# Patient Record
Sex: FEMALE | Race: WHITE | Employment: OTHER | URBAN - METROPOLITAN AREA
[De-identification: names, ages, dates, MRNs, and addresses within clinical notes are randomized per-mention and may not be internally consistent; named-entity substitution may affect disease eponyms.]

---

## 2017-08-30 ENCOUNTER — HOSPITAL ENCOUNTER (OUTPATIENT)
Dept: GENERAL RADIOLOGY | Age: 62
Discharge: HOME | End: 2017-08-30
Admitting: INTERNAL MEDICINE

## 2017-08-30 DIAGNOSIS — R51 HEADACHE(784.0): ICD-10-CM

## 2017-08-30 DIAGNOSIS — G44.001 INTRACTABLE CLUSTER HEADACHE SYNDROME, UNSPECIFIED CHRONICITY PATTERN: ICD-10-CM

## 2017-08-30 DIAGNOSIS — Z12.31 ENCOUNTER FOR SCREENING MAMMOGRAM FOR BREAST CANCER: ICD-10-CM

## 2017-08-30 DIAGNOSIS — Z13.820 SCREENING FOR OSTEOPOROSIS: ICD-10-CM

## 2018-02-05 ENCOUNTER — TELEPHONE (OUTPATIENT)
Dept: ORTHOPEDIC SURGERY | Age: 63
End: 2018-02-05

## 2018-02-05 PROBLEM — S22.000A THORACIC COMPRESSION FRACTURE, CLOSED, INITIAL ENCOUNTER (HCC): Status: ACTIVE | Noted: 2018-02-05

## 2020-09-12 ENCOUNTER — APPOINTMENT (OUTPATIENT)
Dept: CT IMAGING | Age: 65
DRG: 190 | End: 2020-09-12
Payer: MEDICARE

## 2020-09-12 ENCOUNTER — HOSPITAL ENCOUNTER (INPATIENT)
Age: 65
LOS: 2 days | Discharge: HOME | DRG: 190 | End: 2020-09-14
Attending: EMERGENCY MEDICINE | Admitting: INTERNAL MEDICINE
Payer: MEDICARE

## 2020-09-12 ENCOUNTER — APPOINTMENT (OUTPATIENT)
Dept: GENERAL RADIOLOGY | Age: 65
DRG: 190 | End: 2020-09-12
Payer: MEDICARE

## 2020-09-12 PROBLEM — J96.01 ACUTE RESPIRATORY FAILURE WITH HYPOXIA AND HYPERCAPNIA (HCC): Status: ACTIVE | Noted: 2020-09-12

## 2020-09-12 PROBLEM — J44.1 COPD EXACERBATION (HCC): Status: ACTIVE | Noted: 2020-09-12

## 2020-09-12 PROBLEM — J96.02 ACUTE RESPIRATORY FAILURE WITH HYPOXIA AND HYPERCAPNIA (HCC): Status: ACTIVE | Noted: 2020-09-12

## 2020-09-12 LAB
A/G RATIO: 1.2 (ref 1.1–2.2)
ALBUMIN SERPL-MCNC: 4.3 G/DL (ref 3.4–5)
ALP BLD-CCNC: 116 U/L (ref 40–129)
ALT SERPL-CCNC: 15 U/L (ref 10–40)
ANION GAP SERPL CALCULATED.3IONS-SCNC: 12 MMOL/L (ref 3–16)
APTT: 31.7 SEC (ref 24.2–36.2)
AST SERPL-CCNC: 21 U/L (ref 15–37)
BASE EXCESS VENOUS: -0.6 MMOL/L (ref -3–3)
BASOPHILS ABSOLUTE: 0 K/UL (ref 0–0.2)
BASOPHILS RELATIVE PERCENT: 0.2 %
BILIRUB SERPL-MCNC: 0.3 MG/DL (ref 0–1)
BUN BLDV-MCNC: 22 MG/DL (ref 7–20)
CALCIUM SERPL-MCNC: 10 MG/DL (ref 8.3–10.6)
CARBOXYHEMOGLOBIN: 7.3 % (ref 0–1.5)
CHLORIDE BLD-SCNC: 97 MMOL/L (ref 99–110)
CO2: 27 MMOL/L (ref 21–32)
CREAT SERPL-MCNC: 0.9 MG/DL (ref 0.6–1.2)
D DIMER: 293 NG/ML DDU (ref 0–229)
EOSINOPHILS ABSOLUTE: 0 K/UL (ref 0–0.6)
EOSINOPHILS RELATIVE PERCENT: 0 %
GFR AFRICAN AMERICAN: >60
GFR NON-AFRICAN AMERICAN: >60
GLOBULIN: 3.6 G/DL
GLUCOSE BLD-MCNC: 205 MG/DL (ref 70–99)
HCO3 VENOUS: 26.2 MMOL/L (ref 23–29)
HCT VFR BLD CALC: 48.3 % (ref 36–48)
HEMOGLOBIN: 16 G/DL (ref 12–16)
INR BLD: 0.94 (ref 0.86–1.14)
LACTIC ACID, SEPSIS: 1.6 MMOL/L (ref 0.4–1.9)
LYMPHOCYTES ABSOLUTE: 0.2 K/UL (ref 1–5.1)
LYMPHOCYTES RELATIVE PERCENT: 2 %
MCH RBC QN AUTO: 32.7 PG (ref 26–34)
MCHC RBC AUTO-ENTMCNC: 33 G/DL (ref 31–36)
MCV RBC AUTO: 99.1 FL (ref 80–100)
METHEMOGLOBIN VENOUS: 0.3 %
MONOCYTES ABSOLUTE: 0.4 K/UL (ref 0–1.3)
MONOCYTES RELATIVE PERCENT: 4 %
NEUTROPHILS ABSOLUTE: 10.2 K/UL (ref 1.7–7.7)
NEUTROPHILS RELATIVE PERCENT: 93.8 %
O2 CONTENT, VEN: 21 VOL %
O2 SAT, VEN: 97 %
O2 THERAPY: ABNORMAL
PCO2, VEN: 50.8 MMHG (ref 40–50)
PDW BLD-RTO: 16.1 % (ref 12.4–15.4)
PH VENOUS: 7.33 (ref 7.35–7.45)
PLATELET # BLD: 114 K/UL (ref 135–450)
PMV BLD AUTO: 9.4 FL (ref 5–10.5)
PO2, VEN: 92.4 MMHG (ref 25–40)
POTASSIUM SERPL-SCNC: 4.6 MMOL/L (ref 3.5–5.1)
PRO-BNP: 105 PG/ML (ref 0–124)
PROCALCITONIN: 0.05 NG/ML (ref 0–0.15)
PROTHROMBIN TIME: 10.9 SEC (ref 10–13.2)
RBC # BLD: 4.87 M/UL (ref 4–5.2)
SODIUM BLD-SCNC: 136 MMOL/L (ref 136–145)
TCO2 CALC VENOUS: 28 MMOL/L
TOTAL PROTEIN: 7.9 G/DL (ref 6.4–8.2)
TROPONIN: <0.01 NG/ML
WBC # BLD: 10.9 K/UL (ref 4–11)

## 2020-09-12 PROCEDURE — 2000000000 HC ICU R&B

## 2020-09-12 PROCEDURE — 71045 X-RAY EXAM CHEST 1 VIEW: CPT

## 2020-09-12 PROCEDURE — 94640 AIRWAY INHALATION TREATMENT: CPT

## 2020-09-12 PROCEDURE — 6360000002 HC RX W HCPCS: Performed by: INTERNAL MEDICINE

## 2020-09-12 PROCEDURE — 82803 BLOOD GASES ANY COMBINATION: CPT

## 2020-09-12 PROCEDURE — 2580000003 HC RX 258: Performed by: EMERGENCY MEDICINE

## 2020-09-12 PROCEDURE — 83605 ASSAY OF LACTIC ACID: CPT

## 2020-09-12 PROCEDURE — U0003 INFECTIOUS AGENT DETECTION BY NUCLEIC ACID (DNA OR RNA); SEVERE ACUTE RESPIRATORY SYNDROME CORONAVIRUS 2 (SARS-COV-2) (CORONAVIRUS DISEASE [COVID-19]), AMPLIFIED PROBE TECHNIQUE, MAKING USE OF HIGH THROUGHPUT TECHNOLOGIES AS DESCRIBED BY CMS-2020-01-R: HCPCS

## 2020-09-12 PROCEDURE — 85379 FIBRIN DEGRADATION QUANT: CPT

## 2020-09-12 PROCEDURE — 87449 NOS EACH ORGANISM AG IA: CPT

## 2020-09-12 PROCEDURE — 6360000002 HC RX W HCPCS: Performed by: EMERGENCY MEDICINE

## 2020-09-12 PROCEDURE — 96365 THER/PROPH/DIAG IV INF INIT: CPT

## 2020-09-12 PROCEDURE — 85610 PROTHROMBIN TIME: CPT

## 2020-09-12 PROCEDURE — 84145 PROCALCITONIN (PCT): CPT

## 2020-09-12 PROCEDURE — 94660 CPAP INITIATION&MGMT: CPT

## 2020-09-12 PROCEDURE — 6360000002 HC RX W HCPCS

## 2020-09-12 PROCEDURE — 2580000003 HC RX 258: Performed by: INTERNAL MEDICINE

## 2020-09-12 PROCEDURE — 36415 COLL VENOUS BLD VENIPUNCTURE: CPT

## 2020-09-12 PROCEDURE — 6370000000 HC RX 637 (ALT 250 FOR IP): Performed by: INTERNAL MEDICINE

## 2020-09-12 PROCEDURE — 96375 TX/PRO/DX INJ NEW DRUG ADDON: CPT

## 2020-09-12 PROCEDURE — 94761 N-INVAS EAR/PLS OXIMETRY MLT: CPT

## 2020-09-12 PROCEDURE — 2700000000 HC OXYGEN THERAPY PER DAY

## 2020-09-12 PROCEDURE — 85730 THROMBOPLASTIN TIME PARTIAL: CPT

## 2020-09-12 PROCEDURE — 99285 EMERGENCY DEPT VISIT HI MDM: CPT

## 2020-09-12 PROCEDURE — 84484 ASSAY OF TROPONIN QUANT: CPT

## 2020-09-12 PROCEDURE — 85025 COMPLETE CBC W/AUTO DIFF WBC: CPT

## 2020-09-12 PROCEDURE — 6370000000 HC RX 637 (ALT 250 FOR IP)

## 2020-09-12 PROCEDURE — 6360000004 HC RX CONTRAST MEDICATION: Performed by: EMERGENCY MEDICINE

## 2020-09-12 PROCEDURE — 71260 CT THORAX DX C+: CPT

## 2020-09-12 PROCEDURE — 96367 TX/PROPH/DG ADDL SEQ IV INF: CPT

## 2020-09-12 PROCEDURE — 93005 ELECTROCARDIOGRAM TRACING: CPT | Performed by: EMERGENCY MEDICINE

## 2020-09-12 PROCEDURE — 83880 ASSAY OF NATRIURETIC PEPTIDE: CPT

## 2020-09-12 PROCEDURE — 80053 COMPREHEN METABOLIC PANEL: CPT

## 2020-09-12 PROCEDURE — 6370000000 HC RX 637 (ALT 250 FOR IP): Performed by: EMERGENCY MEDICINE

## 2020-09-12 RX ORDER — SACCHAROMYCES BOULARDII 250 MG
250 CAPSULE ORAL 2 TIMES DAILY
Status: DISCONTINUED | OUTPATIENT
Start: 2020-09-12 | End: 2020-09-14 | Stop reason: HOSPADM

## 2020-09-12 RX ORDER — PROMETHAZINE HYDROCHLORIDE 25 MG/1
12.5 TABLET ORAL EVERY 6 HOURS PRN
Status: DISCONTINUED | OUTPATIENT
Start: 2020-09-12 | End: 2020-09-14 | Stop reason: HOSPADM

## 2020-09-12 RX ORDER — MAGNESIUM SULFATE 1 G/100ML
1 INJECTION INTRAVENOUS ONCE
Status: COMPLETED | OUTPATIENT
Start: 2020-09-12 | End: 2020-09-12

## 2020-09-12 RX ORDER — BUSPIRONE HYDROCHLORIDE 10 MG/1
10 TABLET ORAL 2 TIMES DAILY
Status: DISCONTINUED | OUTPATIENT
Start: 2020-09-12 | End: 2020-09-14 | Stop reason: HOSPADM

## 2020-09-12 RX ORDER — MAGNESIUM SULFATE 1 G/100ML
INJECTION INTRAVENOUS
Status: COMPLETED
Start: 2020-09-12 | End: 2020-09-12

## 2020-09-12 RX ORDER — POLYETHYLENE GLYCOL 3350 17 G/17G
17 POWDER, FOR SOLUTION ORAL DAILY PRN
Status: DISCONTINUED | OUTPATIENT
Start: 2020-09-12 | End: 2020-09-14 | Stop reason: HOSPADM

## 2020-09-12 RX ORDER — ACETAMINOPHEN 325 MG/1
TABLET ORAL
Status: COMPLETED
Start: 2020-09-12 | End: 2020-09-12

## 2020-09-12 RX ORDER — LISINOPRIL 20 MG/1
20 TABLET ORAL DAILY
Status: DISCONTINUED | OUTPATIENT
Start: 2020-09-13 | End: 2020-09-14 | Stop reason: HOSPADM

## 2020-09-12 RX ORDER — METHYLPREDNISOLONE SODIUM SUCCINATE 40 MG/ML
40 INJECTION, POWDER, LYOPHILIZED, FOR SOLUTION INTRAMUSCULAR; INTRAVENOUS EVERY 12 HOURS
Status: DISCONTINUED | OUTPATIENT
Start: 2020-09-13 | End: 2020-09-14

## 2020-09-12 RX ORDER — MAGNESIUM SULFATE HEPTAHYDRATE 500 MG/ML
1 INJECTION, SOLUTION INTRAMUSCULAR; INTRAVENOUS ONCE
Status: DISCONTINUED | OUTPATIENT
Start: 2020-09-12 | End: 2020-09-12

## 2020-09-12 RX ORDER — POLYETHYLENE GLYCOL 3350 17 G/17G
17 POWDER, FOR SOLUTION ORAL DAILY
Status: DISCONTINUED | OUTPATIENT
Start: 2020-09-13 | End: 2020-09-14 | Stop reason: HOSPADM

## 2020-09-12 RX ORDER — IPRATROPIUM BROMIDE AND ALBUTEROL SULFATE 2.5; .5 MG/3ML; MG/3ML
3 SOLUTION RESPIRATORY (INHALATION)
Status: DISCONTINUED | OUTPATIENT
Start: 2020-09-12 | End: 2020-09-12 | Stop reason: DRUGHIGH

## 2020-09-12 RX ORDER — ACETAMINOPHEN 325 MG/1
650 TABLET ORAL ONCE
Status: COMPLETED | OUTPATIENT
Start: 2020-09-12 | End: 2020-09-12

## 2020-09-12 RX ORDER — PANTOPRAZOLE SODIUM 40 MG/1
40 TABLET, DELAYED RELEASE ORAL
Status: DISCONTINUED | OUTPATIENT
Start: 2020-09-13 | End: 2020-09-14 | Stop reason: HOSPADM

## 2020-09-12 RX ORDER — RISPERIDONE 0.25 MG/1
0.5 TABLET, FILM COATED ORAL 2 TIMES DAILY
Status: DISCONTINUED | OUTPATIENT
Start: 2020-09-12 | End: 2020-09-14 | Stop reason: HOSPADM

## 2020-09-12 RX ORDER — ALBUTEROL SULFATE 90 UG/1
2 AEROSOL, METERED RESPIRATORY (INHALATION) EVERY 4 HOURS
Status: DISCONTINUED | OUTPATIENT
Start: 2020-09-13 | End: 2020-09-13

## 2020-09-12 RX ORDER — ONDANSETRON 2 MG/ML
4 INJECTION INTRAMUSCULAR; INTRAVENOUS EVERY 6 HOURS PRN
Status: DISCONTINUED | OUTPATIENT
Start: 2020-09-12 | End: 2020-09-14 | Stop reason: HOSPADM

## 2020-09-12 RX ORDER — ACETAMINOPHEN 325 MG/1
650 TABLET ORAL EVERY 6 HOURS PRN
Status: DISCONTINUED | OUTPATIENT
Start: 2020-09-12 | End: 2020-09-14 | Stop reason: HOSPADM

## 2020-09-12 RX ORDER — METHYLPREDNISOLONE SODIUM SUCCINATE 125 MG/2ML
125 INJECTION, POWDER, LYOPHILIZED, FOR SOLUTION INTRAMUSCULAR; INTRAVENOUS ONCE
Status: COMPLETED | OUTPATIENT
Start: 2020-09-12 | End: 2020-09-12

## 2020-09-12 RX ORDER — ATORVASTATIN CALCIUM 10 MG/1
20 TABLET, FILM COATED ORAL DAILY
Status: DISCONTINUED | OUTPATIENT
Start: 2020-09-13 | End: 2020-09-14 | Stop reason: HOSPADM

## 2020-09-12 RX ORDER — OXYCODONE AND ACETAMINOPHEN 10; 325 MG/1; MG/1
1 TABLET ORAL EVERY 8 HOURS PRN
Status: DISCONTINUED | OUTPATIENT
Start: 2020-09-12 | End: 2020-09-14 | Stop reason: HOSPADM

## 2020-09-12 RX ORDER — IPRATROPIUM BROMIDE AND ALBUTEROL SULFATE 2.5; .5 MG/3ML; MG/3ML
1 SOLUTION RESPIRATORY (INHALATION)
Status: DISCONTINUED | OUTPATIENT
Start: 2020-09-13 | End: 2020-09-12

## 2020-09-12 RX ORDER — SERTRALINE HYDROCHLORIDE 100 MG/1
200 TABLET, FILM COATED ORAL 2 TIMES DAILY
Status: DISCONTINUED | OUTPATIENT
Start: 2020-09-12 | End: 2020-09-14 | Stop reason: HOSPADM

## 2020-09-12 RX ORDER — PREDNISONE 20 MG/1
40 TABLET ORAL DAILY
Status: DISCONTINUED | OUTPATIENT
Start: 2020-09-15 | End: 2020-09-14

## 2020-09-12 RX ORDER — SODIUM CHLORIDE 0.9 % (FLUSH) 0.9 %
10 SYRINGE (ML) INJECTION EVERY 12 HOURS SCHEDULED
Status: DISCONTINUED | OUTPATIENT
Start: 2020-09-12 | End: 2020-09-14 | Stop reason: HOSPADM

## 2020-09-12 RX ORDER — PREGABALIN 100 MG/1
100 CAPSULE ORAL 2 TIMES DAILY
Status: DISCONTINUED | OUTPATIENT
Start: 2020-09-12 | End: 2020-09-14 | Stop reason: HOSPADM

## 2020-09-12 RX ORDER — SODIUM CHLORIDE 0.9 % (FLUSH) 0.9 %
10 SYRINGE (ML) INJECTION PRN
Status: DISCONTINUED | OUTPATIENT
Start: 2020-09-12 | End: 2020-09-14 | Stop reason: HOSPADM

## 2020-09-12 RX ORDER — ACETAMINOPHEN 650 MG/1
650 SUPPOSITORY RECTAL EVERY 6 HOURS PRN
Status: DISCONTINUED | OUTPATIENT
Start: 2020-09-12 | End: 2020-09-14 | Stop reason: HOSPADM

## 2020-09-12 RX ADMIN — RDII 250 MG CAPSULE 250 MG: at 23:11

## 2020-09-12 RX ADMIN — IPRATROPIUM BROMIDE AND ALBUTEROL SULFATE 3 AMPULE: .5; 3 SOLUTION RESPIRATORY (INHALATION) at 15:58

## 2020-09-12 RX ADMIN — Medication 2 PUFF: at 23:56

## 2020-09-12 RX ADMIN — SERTRALINE 200 MG: 100 TABLET, FILM COATED ORAL at 23:11

## 2020-09-12 RX ADMIN — MAGNESIUM SULFATE HEPTAHYDRATE 1 G: 1 INJECTION, SOLUTION INTRAVENOUS at 16:16

## 2020-09-12 RX ADMIN — PREGABALIN 100 MG: 100 CAPSULE ORAL at 23:11

## 2020-09-12 RX ADMIN — OXYCODONE AND ACETAMINOPHEN 1 TABLET: 10; 325 TABLET ORAL at 23:11

## 2020-09-12 RX ADMIN — MAGNESIUM SULFATE 1 G: 1 INJECTION INTRAVENOUS at 16:16

## 2020-09-12 RX ADMIN — ACETAMINOPHEN 650 MG: 325 TABLET ORAL at 17:10

## 2020-09-12 RX ADMIN — CEFTRIAXONE SODIUM 1 G: 1 INJECTION, POWDER, FOR SOLUTION INTRAMUSCULAR; INTRAVENOUS at 17:17

## 2020-09-12 RX ADMIN — DEXTROSE MONOHYDRATE 500 MG: 50 INJECTION, SOLUTION INTRAVENOUS at 18:14

## 2020-09-12 RX ADMIN — BUSPIRONE HYDROCHLORIDE 10 MG: 10 TABLET ORAL at 23:11

## 2020-09-12 RX ADMIN — Medication 10 ML: at 23:12

## 2020-09-12 RX ADMIN — METHYLPREDNISOLONE SODIUM SUCCINATE 40 MG: 40 INJECTION, POWDER, FOR SOLUTION INTRAMUSCULAR; INTRAVENOUS at 23:11

## 2020-09-12 RX ADMIN — IOPAMIDOL 85 ML: 755 INJECTION, SOLUTION INTRAVENOUS at 17:25

## 2020-09-12 RX ADMIN — METHYLPREDNISOLONE SODIUM SUCCINATE 125 MG: 125 INJECTION, POWDER, FOR SOLUTION INTRAMUSCULAR; INTRAVENOUS at 15:59

## 2020-09-12 RX ADMIN — RISPERIDONE 0.5 MG: 0.25 TABLET ORAL at 23:11

## 2020-09-12 ASSESSMENT — PAIN SCALES - GENERAL
PAINLEVEL_OUTOF10: 9
PAINLEVEL_OUTOF10: 6
PAINLEVEL_OUTOF10: 5
PAINLEVEL_OUTOF10: 4
PAINLEVEL_OUTOF10: 0
PAINLEVEL_OUTOF10: 0

## 2020-09-12 ASSESSMENT — PAIN DESCRIPTION - PAIN TYPE
TYPE: ACUTE PAIN
TYPE: ACUTE PAIN

## 2020-09-12 ASSESSMENT — PAIN DESCRIPTION - LOCATION
LOCATION: BACK
LOCATION: HEAD

## 2020-09-12 NOTE — PROGRESS NOTES
09/12/20 1738   NIV Type   NIV Started/Stopped On   Equipment Type v60   Mode Bilevel   Mask Type Full face mask   Mask Size Medium   Settings/Measurements   IPAP 12 cmH20   CPAP/EPAP 6 cmH2O   Rate Ordered 12   Resp 18   FiO2  40 %   Vt Exhaled 630 mL   Comfort Level Good   Using Accessory Muscles No   SpO2 100   Patient Observation   Observations MEPILEX ON NOSE   Alarm Settings   Alarms On Y   Press Low Alarm 10 cmH2O   High Pressure Alarm 25 cmH2O   Delay Alarm 20 sec(s)   Apnea (secs) 20 secs   Resp Rate Low Alarm 12   High Respiratory Rate 40 br/min

## 2020-09-12 NOTE — ED NOTES
[address] [facility] serve sent to Dr. Ana Melgoza for admission      Jose Avila  09/12/20 1734    PerfectServe completed with call back from Dr. Ana Melgoza at [place] [address]  09/12/20 1743    Consult to Pulmonology at [place] [address]  09/12/20 174

## 2020-09-13 LAB
ANION GAP SERPL CALCULATED.3IONS-SCNC: 9 MMOL/L (ref 3–16)
ANISOCYTOSIS: ABNORMAL
BANDED NEUTROPHILS RELATIVE PERCENT: 3 % (ref 0–7)
BASOPHILS ABSOLUTE: 0 K/UL (ref 0–0.2)
BASOPHILS RELATIVE PERCENT: 0 %
BUN BLDV-MCNC: 22 MG/DL (ref 7–20)
CALCIUM SERPL-MCNC: 9.4 MG/DL (ref 8.3–10.6)
CHLORIDE BLD-SCNC: 96 MMOL/L (ref 99–110)
CO2: 28 MMOL/L (ref 21–32)
CREAT SERPL-MCNC: 0.8 MG/DL (ref 0.6–1.2)
EKG ATRIAL RATE: 111 BPM
EKG DIAGNOSIS: NORMAL
EKG P AXIS: 82 DEGREES
EKG P-R INTERVAL: 126 MS
EKG Q-T INTERVAL: 306 MS
EKG QRS DURATION: 74 MS
EKG QTC CALCULATION (BAZETT): 416 MS
EKG R AXIS: 95 DEGREES
EKG T AXIS: 54 DEGREES
EKG VENTRICULAR RATE: 111 BPM
EOSINOPHILS ABSOLUTE: 0 K/UL (ref 0–0.6)
EOSINOPHILS RELATIVE PERCENT: 0 %
GFR AFRICAN AMERICAN: >60
GFR NON-AFRICAN AMERICAN: >60
GLUCOSE BLD-MCNC: 189 MG/DL (ref 70–99)
HCT VFR BLD CALC: 46.1 % (ref 36–48)
HEMOGLOBIN: 15.2 G/DL (ref 12–16)
L. PNEUMOPHILA SEROGP 1 UR AG: NORMAL
LACTIC ACID: 1.6 MMOL/L (ref 0.4–2)
LACTIC ACID: 2.5 MMOL/L (ref 0.4–2)
LYMPHOCYTES ABSOLUTE: 0.3 K/UL (ref 1–5.1)
LYMPHOCYTES RELATIVE PERCENT: 3 %
MCH RBC QN AUTO: 32.7 PG (ref 26–34)
MCHC RBC AUTO-ENTMCNC: 33 G/DL (ref 31–36)
MCV RBC AUTO: 99.1 FL (ref 80–100)
MONOCYTES ABSOLUTE: 0.7 K/UL (ref 0–1.3)
MONOCYTES RELATIVE PERCENT: 7 %
NEUTROPHILS ABSOLUTE: 8.4 K/UL (ref 1.7–7.7)
NEUTROPHILS RELATIVE PERCENT: 87 %
NUCLEATED RED BLOOD CELLS: 1 /100 WBC
NUCLEATED RED BLOOD CELLS: 1 /100 WBC
PDW BLD-RTO: 16.1 % (ref 12.4–15.4)
PLATELET # BLD: 91 K/UL (ref 135–450)
PLATELET SLIDE REVIEW: ABNORMAL
PMV BLD AUTO: 9.2 FL (ref 5–10.5)
POTASSIUM REFLEX MAGNESIUM: 4.6 MMOL/L (ref 3.5–5.1)
RBC # BLD: 4.65 M/UL (ref 4–5.2)
SARS-COV-2, NAAT: NOT DETECTED
SLIDE REVIEW: ABNORMAL
SODIUM BLD-SCNC: 133 MMOL/L (ref 136–145)
STREP PNEUMONIAE ANTIGEN, URINE: NORMAL
WBC # BLD: 9.3 K/UL (ref 4–11)

## 2020-09-13 PROCEDURE — 6370000000 HC RX 637 (ALT 250 FOR IP): Performed by: INTERNAL MEDICINE

## 2020-09-13 PROCEDURE — 85025 COMPLETE CBC W/AUTO DIFF WBC: CPT

## 2020-09-13 PROCEDURE — 93010 ELECTROCARDIOGRAM REPORT: CPT | Performed by: INTERNAL MEDICINE

## 2020-09-13 PROCEDURE — 99232 SBSQ HOSP IP/OBS MODERATE 35: CPT | Performed by: INTERNAL MEDICINE

## 2020-09-13 PROCEDURE — 99291 CRITICAL CARE FIRST HOUR: CPT | Performed by: INTERNAL MEDICINE

## 2020-09-13 PROCEDURE — U0002 COVID-19 LAB TEST NON-CDC: HCPCS

## 2020-09-13 PROCEDURE — 6360000002 HC RX W HCPCS: Performed by: INTERNAL MEDICINE

## 2020-09-13 PROCEDURE — 94761 N-INVAS EAR/PLS OXIMETRY MLT: CPT

## 2020-09-13 PROCEDURE — 36415 COLL VENOUS BLD VENIPUNCTURE: CPT

## 2020-09-13 PROCEDURE — 2060000000 HC ICU INTERMEDIATE R&B

## 2020-09-13 PROCEDURE — 94640 AIRWAY INHALATION TREATMENT: CPT

## 2020-09-13 PROCEDURE — 2700000000 HC OXYGEN THERAPY PER DAY

## 2020-09-13 PROCEDURE — 2580000003 HC RX 258: Performed by: INTERNAL MEDICINE

## 2020-09-13 PROCEDURE — 83605 ASSAY OF LACTIC ACID: CPT

## 2020-09-13 PROCEDURE — 80048 BASIC METABOLIC PNL TOTAL CA: CPT

## 2020-09-13 RX ORDER — ALBUTEROL SULFATE 90 UG/1
2 AEROSOL, METERED RESPIRATORY (INHALATION) EVERY 4 HOURS PRN
Status: DISCONTINUED | OUTPATIENT
Start: 2020-09-13 | End: 2020-09-14 | Stop reason: HOSPADM

## 2020-09-13 RX ORDER — IPRATROPIUM BROMIDE AND ALBUTEROL SULFATE 2.5; .5 MG/3ML; MG/3ML
1 SOLUTION RESPIRATORY (INHALATION) EVERY 4 HOURS
Status: DISCONTINUED | OUTPATIENT
Start: 2020-09-13 | End: 2020-09-14

## 2020-09-13 RX ADMIN — IPRATROPIUM BROMIDE AND ALBUTEROL SULFATE 1 AMPULE: .5; 3 SOLUTION RESPIRATORY (INHALATION) at 23:35

## 2020-09-13 RX ADMIN — IPRATROPIUM BROMIDE AND ALBUTEROL SULFATE 1 AMPULE: .5; 3 SOLUTION RESPIRATORY (INHALATION) at 20:12

## 2020-09-13 RX ADMIN — Medication 2 PUFF: at 07:31

## 2020-09-13 RX ADMIN — AZITHROMYCIN MONOHYDRATE 500 MG: 500 INJECTION, POWDER, LYOPHILIZED, FOR SOLUTION INTRAVENOUS at 16:51

## 2020-09-13 RX ADMIN — RISPERIDONE 0.5 MG: 0.25 TABLET ORAL at 20:26

## 2020-09-13 RX ADMIN — PREGABALIN 100 MG: 100 CAPSULE ORAL at 09:20

## 2020-09-13 RX ADMIN — BUSPIRONE HYDROCHLORIDE 10 MG: 10 TABLET ORAL at 09:20

## 2020-09-13 RX ADMIN — IPRATROPIUM BROMIDE AND ALBUTEROL SULFATE 1 AMPULE: .5; 3 SOLUTION RESPIRATORY (INHALATION) at 15:32

## 2020-09-13 RX ADMIN — IPRATROPIUM BROMIDE AND ALBUTEROL SULFATE 1 AMPULE: .5; 3 SOLUTION RESPIRATORY (INHALATION) at 11:39

## 2020-09-13 RX ADMIN — ATORVASTATIN CALCIUM 20 MG: 10 TABLET, FILM COATED ORAL at 09:20

## 2020-09-13 RX ADMIN — RISPERIDONE 0.5 MG: 0.25 TABLET ORAL at 09:20

## 2020-09-13 RX ADMIN — BUSPIRONE HYDROCHLORIDE 10 MG: 10 TABLET ORAL at 20:28

## 2020-09-13 RX ADMIN — METHYLPREDNISOLONE SODIUM SUCCINATE 40 MG: 40 INJECTION, POWDER, FOR SOLUTION INTRAMUSCULAR; INTRAVENOUS at 23:48

## 2020-09-13 RX ADMIN — Medication 2 PUFF: at 07:32

## 2020-09-13 RX ADMIN — Medication 10 ML: at 09:20

## 2020-09-13 RX ADMIN — LISINOPRIL 20 MG: 20 TABLET ORAL at 09:20

## 2020-09-13 RX ADMIN — PREGABALIN 100 MG: 100 CAPSULE ORAL at 20:28

## 2020-09-13 RX ADMIN — SERTRALINE 200 MG: 100 TABLET, FILM COATED ORAL at 09:20

## 2020-09-13 RX ADMIN — RDII 250 MG CAPSULE 250 MG: at 09:20

## 2020-09-13 RX ADMIN — PROMETHAZINE HYDROCHLORIDE 12.5 MG: 25 TABLET ORAL at 20:26

## 2020-09-13 RX ADMIN — RDII 250 MG CAPSULE 250 MG: at 20:26

## 2020-09-13 RX ADMIN — OXYCODONE AND ACETAMINOPHEN 1 TABLET: 10; 325 TABLET ORAL at 16:20

## 2020-09-13 RX ADMIN — PANTOPRAZOLE SODIUM 40 MG: 40 TABLET, DELAYED RELEASE ORAL at 06:30

## 2020-09-13 RX ADMIN — POLYETHYLENE GLYCOL (3350) 17 G: 17 POWDER, FOR SOLUTION ORAL at 09:20

## 2020-09-13 RX ADMIN — OXYCODONE AND ACETAMINOPHEN 1 TABLET: 10; 325 TABLET ORAL at 09:20

## 2020-09-13 RX ADMIN — Medication 10 ML: at 20:28

## 2020-09-13 RX ADMIN — ENOXAPARIN SODIUM 40 MG: 40 INJECTION SUBCUTANEOUS at 09:20

## 2020-09-13 RX ADMIN — METHYLPREDNISOLONE SODIUM SUCCINATE 40 MG: 40 INJECTION, POWDER, FOR SOLUTION INTRAMUSCULAR; INTRAVENOUS at 12:39

## 2020-09-13 RX ADMIN — CEFTRIAXONE SODIUM 1 G: 1 INJECTION, POWDER, FOR SOLUTION INTRAMUSCULAR; INTRAVENOUS at 16:21

## 2020-09-13 RX ADMIN — SERTRALINE 200 MG: 100 TABLET, FILM COATED ORAL at 20:27

## 2020-09-13 ASSESSMENT — PAIN SCALES - GENERAL
PAINLEVEL_OUTOF10: 0
PAINLEVEL_OUTOF10: 6
PAINLEVEL_OUTOF10: 0
PAINLEVEL_OUTOF10: 10
PAINLEVEL_OUTOF10: 0

## 2020-09-13 ASSESSMENT — PAIN DESCRIPTION - PAIN TYPE
TYPE: CHRONIC PAIN
TYPE: CHRONIC PAIN

## 2020-09-13 ASSESSMENT — PAIN DESCRIPTION - LOCATION
LOCATION: BACK
LOCATION: BACK

## 2020-09-13 ASSESSMENT — PAIN DESCRIPTION - FREQUENCY: FREQUENCY: CONTINUOUS

## 2020-09-13 NOTE — PROGRESS NOTES
Patient resting in bed, Percocet given for back pain 8/10. IV antibiotics infusing currently. No acute distress noted. Call light in reach. Will continue to monitor.

## 2020-09-13 NOTE — PROGRESS NOTES
Dr Mt Barba at bedside, update given about rapid COVID results, droplet isolation d/uriel at this time per Dr Mt Barba

## 2020-09-13 NOTE — CONSULTS
Patient is being seen at the request of Fredis Pérez MD for a consultation for Acute respiratory failure with hypoxemia    HISTORY OF PRESENT ILLNESS: The patient is a 37-year-old woman with a past medical history of COPD, restrictive lung disease, ongoing tobacco abuse (40-pack-year smoking history), hypertension, chronic low back pain who presented to [facility] emergency department with complaints of productive cough and shortness of breath over the last 3 days. The patient is followed by Dr. Hatfield at [address] [place] [address] for pulmonary care. In the emergency department the patient was found to be in some respiratory distress with audible expiratory wheezing. She was placed on noninvasive positive pressure ventilation and given broad-spectrum antibiotics and IV steroids. She was admitted to the ICU for further care. She was placed in respiratory droplet plus isolation. Currently she is feeling slightly better.      PAST MEDICAL HISTORY:  Past Medical History:   Diagnosis Date    Anxiety     Arthritis     Cancer ([facility] [address] 1980    Cervical    Chronic back pain     Chronic pain     COPD (chronic obstructive pulmonary disease) (HCC)     Depression     DJD (degenerative joint disease), cervical     Drug-seeking behavior     GERD (gastroesophageal reflux disease)     Hypercholesteremia     Hypertension     IBS (irritable bowel syndrome)     Insomnia     K deficiency     Kidney stone     Narcotic addiction [facility])     Patient Denies    Narcotic overdose ([address]     Patient Denies    Osteoporosis     Overdose     [id number] Patient Denies on 4/3/14    PNA (pneumonia) 10/17/2016    Restless leg syndrome     Thyroid disease      PAST SURGICAL HISTORY:  Past Surgical History:   Procedure Laterality Date    APPENDECTOMY      BACK SURGERY      CERVICAL DISCECTOMY  12-14-13    anterior discetomy fusion c 3/4 c 4/5 posterior fusion C3-C5 screws and rods C3-C5    CHOLECYSTECTOMY  2012    Laparoscopic    COLONOSCOPY  2008    COLONOSCOPY  08/10/2016    FOOT SURGERY Right     FRACTURE SURGERY Right 9/14    distal radius    HAND SURGERY  08/2012    crushed right hand and has two steel plates in hand    HYSTERECTOMY      NECK SURGERY  2002    disc    OTHER SURGICAL HISTORY  4/9/14    TRANSFORAMINAL LUMBAR INTERBODY FUSION L4 L5       FAMILY HISTORY:  family history includes Asthma in her mother; Diabetes in her mother; Emphysema in her father; Heart Disease in her father and mother; Heart Failure in her father and mother; High Cholesterol in her father and mother; Hypertension in her father and mother. SOCIAL HISTORY:   reports that she has been smoking cigarettes. She started smoking about 47 years ago. She has a 40.00 pack-year smoking history. She has never used smokeless tobacco.    Scheduled Meds:   cefTRIAXone (ROCEPHIN) IV  1 g Intravenous Q24H    azithromycin  500 mg Intravenous Q24H    pregabalin  100 mg Oral BID    atorvastatin  20 mg Oral Daily    lisinopril  20 mg Oral Daily    sertraline  200 mg Oral BID    saccharomyces boulardii  250 mg Oral BID    risperiDONE  0.5 mg Oral BID    busPIRone  10 mg Oral BID    pantoprazole  40 mg Oral QAM AC    polyethylene glycol  17 g Oral Daily    sodium chloride flush  10 mL Intravenous 2 times per day    enoxaparin  40 mg Subcutaneous Daily    methylPREDNISolone  40 mg Intravenous Q12H    Followed by   Doug Soliman ON 9/15/2020] predniSONE  40 mg Oral Daily    ipratropium  2 puff Inhalation Q4H    albuterol sulfate HFA  2 puff Inhalation Q4H     Continuous Infusions:    PRN Meds:  oxyCODONE-acetaminophen, sodium chloride flush, acetaminophen **OR** acetaminophen, polyethylene glycol, promethazine **OR** ondansetron    ALLERGIES:  Patient is allergic to asa buff (mag [buffered aspirin] and cymbalta [duloxetine hcl].     REVIEW OF SYSTEMS:  Constitutional: Negative for fever  HENT: Negative for sore throat  Eyes: Negative for redness   Respiratory: + for dyspnea, + cough  Cardiovascular: Negative for chest pain  Gastrointestinal: Negative for vomiting, diarrhea   Genitourinary: Negative for hematuria   Musculoskeletal: Negative for arthralgias   Skin: Negative for rash  Neurological: Negative for syncope  Hematological: Negative for adenopathy  Psychiatric/Behavorial: Negative for anxiety    PHYSICAL EXAM:  Blood pressure 125/63, pulse 88, temperature 97.4 °F (36.3 °C), temperature source Axillary, resp. rate 15, height 5' 3\" (1.6 m), weight 168 lb 12.8 oz (76.6 kg), SpO2 94 %, not currently breastfeeding.' on 3l NC  Gen: MIld distress. Normocephalic, atraumatic. Eyes: PERRL. No sclera icterus. No conjunctival injection. ENT: No discharge. Pharynx clear. Neck: Trachea midline. No obvious mass. Resp: No accessory muscle use. No crackles. bilateral wheezes. No rhonchi. No dullness on percussion. CV: Regular rate. Regular rhythm. No murmur or rub. No edema. GI: Non-tender. Non-distended. No hernia. Skin: Warm and dry. No nodule on exposed extremities. Lymph: No cervical LAD. No supraclavicular LAD. M/S: No cyanosis. No joint deformity. No clubbing. Neuro: Awake. Alert. Moves all four extremities. Psych: Oriented x 3. No anxiety.      LABS:  CBC:   Recent Labs     09/12/20  1557 09/13/20 0444   WBC 10.9 9.3   HGB 16.0 15.2   HCT 48.3* 46.1   MCV 99.1 99.1   * 91*     BMP:   Recent Labs     09/12/20 1557 09/13/20 0444    133*   K 4.6 4.6   CL 97* 96*   CO2 27 28   BUN 22* 22*   CREATININE 0.9 0.8     LIVER PROFILE:   Recent Labs     09/12/20 1557   AST 21   ALT 15   BILITOT 0.3   ALKPHOS 116     PT/INR:   Recent Labs     09/12/20 1557   PROTIME 10.9   INR 0.94     APTT:   Recent Labs     09/12/20 1557   APTT 31.7     UA:No results for input(s): NITRITE, COLORU, PHUR, LABCAST, WBCUA, RBCUA, MUCUS, TRICHOMONAS, YEAST, BACTERIA, CLARITYU, SPECGRAV, LEUKOCYTESUR, UROBILINOGEN, BILIRUBINUR, BLOODU, GLUCOSEU, AMORPHOUS in the last 72 hours. Invalid input(s): KETONESU  No results for input(s): PHART, QFC1NPW, PO2ART in the last 72 hours. Chest imaging was reviewed by me and showed:  Chest CTA: Pulmonary Arteries: Pulmonary arteries show no evidence of intraluminal  filling defect to suggest pulmonary embolism.  Main pulmonary artery is normal in caliber.       Mediastinum: No evidence of mediastinal lymphadenopathy.  The heart and   pericardium demonstrate no acute abnormality.  There is no acute abnormality of the thoracic aorta.       Lungs/pleura: The lungs are without acute process.  No focal consolidation or pulmonary edema.  No evidence of pleural effusion or pneumothorax. T10 and T11 vertebral body wedging with superior endplate depression and   sclerosis probably combination of degenerative disc disease, prior infection   and/or trauma.  This does not appear acute. ASSESSMENT:  ·  Acute respiratory failure with hypoxemia  · COPD with acute exacerbation  · Restrictive lung defect may be secondary to thoracic cavity/scoliosis    PLAN:   IV steroids today, with plan to switch to oral prednisone 40 mg daily tomorrow, then taper   Inhaled bronchodilators   Antibiotics (doxycycline)  Supplemental oxygen to maintain SaO2 >92%; wean as tolerated  Non-invasive positive pressure ventilation, if needed  · Rapid Covid 19 testing and respiratory droplet isolation is appropriate - ok to remove isolation if test negative  · Smoking cessation counseling    Patient is critically ill with acute respiratory failure. Critical care time spent reviewing labs/films, examining patient, collaborating with other physicians but excluding procedures for life threatening organ failure is 32 minutes.

## 2020-09-13 NOTE — ED PROVIDER NOTES
[address] [facility] ICU      CHIEF COMPLAINT  Shortness of Breath       HISTORY OF PRESENT ILLNESS  Saroj Lay is a 63 y.o. female with history of COPD who presents to the emergency department for evaluation of shortness of breath, cough for 4 days. Patient reports wearing 3 L of nasal cannula O2 at home. She started having increased cough and increased sputum production 4 days ago. Patient reports trying her home inhalers and breathing treatments with minimal improvement in symptoms. Patient says yesterday, she started notice that her oxygen saturation dipped below 90 on her home 3 L and was requiring higher O2 to maintain sats above 90. Patient says she borrowed some amoxicillin pills from her relatives and started taking them last night. Due to continued shortness of breath, patient finally presented to the emergency department for evaluation. Patient says she was trying to avoid coming to the hospital due to covid. Denies any known sick contacts. Denies any history of DVT or PE. Denies any recent immobilization or recent surgeries. Denies having calf pain or leg swelling. No other complaints, modifying factors or associated symptoms. I have reviewed the following from the nursing documentation.     Past Medical History:   Diagnosis Date    Anxiety     Arthritis     Cancer ([facility] [address] 1980    Cervical    Chronic back pain     Chronic pain     COPD (chronic obstructive pulmonary disease) (HCC)     Depression     DJD (degenerative joint disease), cervical     Drug-seeking behavior     GERD (gastroesophageal reflux disease)     Hypercholesteremia     Hypertension     IBS (irritable bowel syndrome)     Insomnia     K deficiency     Kidney stone     Narcotic addiction [facility])     Patient Denies    Narcotic overdose ([facility] [address]     Patient Denies    Osteoporosis     Overdose     [id number] Patient Denies on 4/3/14    PNA (pneumonia) 10/17/2016    Restless leg syndrome     Thyroid disease      Past Surgical History:   Procedure Laterality Date    APPENDECTOMY      BACK SURGERY      CERVICAL DISCECTOMY  12-14-13    anterior discetomy fusion c 3/4 c 4/5 posterior fusion C3-C5 screws and rods C3-C5    CHOLECYSTECTOMY  2012    Laparoscopic    COLONOSCOPY  2008    COLONOSCOPY  08/10/2016    FOOT SURGERY Right     FRACTURE SURGERY Right 9/14    distal radius    HAND SURGERY  08/2012    crushed right hand and has two steel plates in hand    HYSTERECTOMY      NECK SURGERY  2002    disc    OTHER SURGICAL HISTORY  4/9/14    TRANSFORAMINAL LUMBAR INTERBODY FUSION L4 L5     Family History   Problem Relation Age of Onset    Heart Disease Mother     High Cholesterol Mother     Diabetes Mother     Asthma Mother     Heart Failure Mother     Hypertension Mother     Heart Disease Father     High Cholesterol Father     Emphysema Father     Heart Failure Father     Hypertension Father     Cancer Neg Hx      Social History     Socioeconomic History    Marital status:      Spouse name: Not on file    Number of children: 3    Years of education: Not on file    Highest education level: Not on file   Occupational History    Not on file   Social Needs    Financial resource strain: Not on file    Food insecurity     Worry: Not on file     Inability: Not on file   [employer] needs     Medical: Not on file     Non-medical: Not on file   Tobacco Use    Smoking status: Current Every Day Smoker     Packs/day: 1.00     Years: 40.00     Pack years: 40.00     Types: Cigarettes     Start date: 1/1/1973    Smokeless tobacco: Never Used   Substance and Sexual Activity    Alcohol use: No    Drug use: No    Sexual activity: Not Currently   Lifestyle    Physical activity     Days per week: Not on file     Minutes per session: Not on file    Stress: Not on file   Relationships    Social connections     Talks on phone: Not on file     Gets together: Not on file     Attends [employer] service: Not on chloride flush 0.9 % injection 10 mL  10 mL Intravenous PRN Jocelyne Del Cid MD        acetaminophen (TYLENOL) tablet 650 mg  650 mg Oral Q6H PRN Jocelyne Del Cid MD        Or    acetaminophen (TYLENOL) suppository 650 mg  650 mg Rectal Q6H PRN Jocelyne Del Cid MD        polyethylene glycol [facility]) packet 17 g  17 g Oral Daily PRN Jocelyne Del Cid MD        promethazine (PHENERGAN) tablet 12.5 mg  12.5 mg Oral Q6H PRN Jocelyne Del Cid MD        Or    ondansetron [facility]) injection 4 mg  4 mg Intravenous Q6H PRN Jocelyne Del Cid MD        enoxaparin (LOVENOX) injection 40 mg  40 mg Subcutaneous Daily Jocelyne Del Cid MD        methylPREDNISolone sodium (SOLU-MEDROL) injection 40 mg  40 mg Intravenous Q12H Jocelyne Del Cid MD   40 mg at 09/12/20 2311    Followed by   Franck Divine ON 9/15/2020] predniSONE (DELTASONE) tablet 40 mg  40 mg Oral Daily Jocelyne Del Cid MD        ipratropium (ATROVENT HFA) 17 MCG/ACT inhaler 2 puff  2 puff Inhalation Q4H Jocelyne Del Cid MD   2 puff at 09/12/20 2356    albuterol sulfate  (90 Base) MCG/ACT inhaler 2 puff  2 puff Inhalation Q4H Jocelyne Del Cid MD   2 puff at 09/12/20 2356     Allergies   Allergen Reactions    Asa Buff (Mag [Buffered Aspirin] Hives    Cymbalta [Duloxetine Hcl] Nausea And Vomiting       REVIEW OF SYSTEMS  10 systems reviewed, pertinent positives per HPI otherwise noted to be negative. PHYSICAL EXAM  BP (!) 97/56   Pulse 73   Temp 98.3 °F (36.8 °C) (Oral)   Resp 14   Ht 5' 3\" (1.6 m)   Wt 168 lb 12.8 oz (76.6 kg)   SpO2 96%   BMI 29.90 kg/m²    GENERAL APPEARANCE: Awake and alert. Increased work of breathing  HENT: Normocephalic. Atraumatic. CN  2-12 grossly intact. HEART/CHEST: Tachycardic. Regular. No murmurs appreciated  LUNGS: Increased work of breathing with subcostal retractions. Maintaining sats above 90 on 5 L nasal cannula O2. Diffuse wheezing throughout.   ABDOMEN: Soft, non-distended abdomen. Non tender to palpation. No guarding. No rebound. EXTREMITIES: No gross deformities. Moving all extremities. All extremities neurovascularly intact. SKIN: Warm and dry. No acute rashes. NEUROLOGICAL: Alert and oriented. CN's 2-12 intact. No gross facial drooping. Strength 5/5, sensation intact. PSYCHIATRIC: Normal mood and affect.     LABS  Results for orders placed or performed during the hospital encounter of 09/12/20   Blood Gas, Venous   Result Value Ref Range    pH, Don 7.330 (L) 7.350 - 7.450    pCO2, Don 50.8 (H) 40.0 - 50.0 mmHg    pO2, Don 92.4 (H) 25.0 - 40.0 mmHg    HCO3, Venous 26.2 23.0 - 29.0 mmol/L    Base Excess, Don -0.6 -3.0 - 3.0 mmol/L    O2 Sat, Don 97 Not Established %    Carboxyhemoglobin 7.3 (H) 0.0 - 1.5 %    MetHgb, Don 0.3 <1.5 %    TC02 (Calc), Don 28 Not Established mmol/L    O2 Content, Don 21 Not Established VOL %    O2 Therapy Unknown    CBC Auto Differential   Result Value Ref Range    WBC 10.9 4.0 - 11.0 K/uL    RBC 4.87 4.00 - 5.20 M/uL    Hemoglobin 16.0 12.0 - 16.0 g/dL    Hematocrit 48.3 (H) 36.0 - 48.0 %    MCV 99.1 80.0 - 100.0 fL    MCH 32.7 26.0 - 34.0 pg    MCHC 33.0 31.0 - 36.0 g/dL    RDW 16.1 (H) 12.4 - 15.4 %    Platelets 382 (L) 292 - 450 K/uL    MPV 9.4 5.0 - 10.5 fL    Neutrophils % 93.8 %    Lymphocytes % 2.0 %    Monocytes % 4.0 %    Eosinophils % 0.0 %    Basophils % 0.2 %    Neutrophils Absolute 10.2 (H) 1.7 - 7.7 K/uL    Lymphocytes Absolute 0.2 (L) 1.0 - 5.1 K/uL    Monocytes Absolute 0.4 0.0 - 1.3 K/uL    Eosinophils Absolute 0.0 0.0 - 0.6 K/uL    Basophils Absolute 0.0 0.0 - 0.2 K/uL   Comprehensive Metabolic Panel   Result Value Ref Range    Sodium 136 136 - 145 mmol/L    Potassium 4.6 3.5 - 5.1 mmol/L    Chloride 97 (L) 99 - 110 mmol/L    CO2 27 21 - 32 mmol/L    Anion Gap 12 3 - 16    Glucose 205 (H) 70 - 99 mg/dL    BUN 22 (H) 7 - 20 mg/dL    CREATININE 0.9 0.6 - 1.2 mg/dL    GFR Non-African American >60 >60    GFR  American >60 >60    Calcium 10.0 8.3 - 10.6 mg/dL    Total Protein 7.9 6.4 - 8.2 g/dL    Alb 4.3 3.4 - 5.0 g/dL    Albumin/Globulin Ratio 1.2 1.1 - 2.2    Total Bilirubin 0.3 0.0 - 1.0 mg/dL    Alkaline Phosphatase 116 40 - 129 U/L    ALT 15 10 - 40 U/L    AST 21 15 - 37 U/L    Globulin 3.6 g/dL   Procalcitonin   Result Value Ref Range    Procalcitonin 0.05 0.00 - 0.15 ng/mL   Troponin   Result Value Ref Range    Troponin <0.01 <0.01 ng/mL   Lactate, Sepsis   Result Value Ref Range    Lactic Acid, Sepsis 1.6 0.4 - 1.9 mmol/L   Protime-INR   Result Value Ref Range    Protime 10.9 10.0 - 13.2 sec    INR 0.94 0.86 - 1.14   APTT   Result Value Ref Range    aPTT 31.7 24.2 - 36.2 sec   Brain Natriuretic Peptide   Result Value Ref Range    Pro- 0 - 124 pg/mL   D-dimer, quantitative   Result Value Ref Range    D-Dimer, Quant 293 (H) 0 - 229 ng/mL DDU   Lactic Acid, Plasma   Result Value Ref Range    Lactic Acid 2.5 (H) 0.4 - 2.0 mmol/L   EKG 12 Lead   Result Value Ref Range    Ventricular Rate 111 BPM    Atrial Rate 111 BPM    P-R Interval 126 ms    QRS Duration 74 ms    Q-T Interval 306 ms    QTc Calculation (Bazett) 416 ms    P Axis 82 degrees    R Axis 95 degrees    T Axis 54 degrees    Diagnosis       Sinus tachycardiaBiatrial enlargementRightward axisPulmonary disease patternAbnormal ECGNo previous ECGs available       I have reviewed all labs for this visit.      ECG  The Ekg interpreted by me shows  Sinus tachycardia with a rate of 111  Axis is right  QTc is normal  Biatrial enlargement, pulmonary disease pattern  ST Segments: Nonspecific ST changes  No significant change from prior EKG dated July 1, 2018    RADIOLOGY  Xr Chest Portable    Result Date: 9/12/2020  EXAMINATION: ONE XRAY VIEW OF THE CHEST 9/12/2020 4:05 pm COMPARISON: 07/01/2018 HISTORY: ORDERING SYSTEM PROVIDED HISTORY: other TECHNOLOGIST PROVIDED HISTORY: Reason for exam:->other Reason for Exam:  Shortness of breath Acuity: Acute Type of Exam: trauma/infection. This does not appear acute. Please correlate clinically. Anterior and posterior fusion of lower cervical spine , T1 and T2.     1. No evidence for acute pulmonary embolism. 2. No evidence for pneumonia. 3. T10 and T11 vertebral body wedging with superior endplate depression and sclerosis probably combination of degenerative disc disease, prior infection and/or trauma. This does not appear acute. Please correlate clinically. ED COURSE/MDM  Patient seen and evaluated. At presentation, patient was sitting upright, had increased work of breathing with subcostal retractions, tachycardic to 120, requiring 5 L nasal cannula O2 to maintain sat >90%. Patient reports wearing 3 L nasal cannula O2 at baseline and has been requiring increasing oxygen requirements from yesterday afternoon due to intermittent desat <90. Patient had diffuse wheezing bilaterally. Differential diagnoses include COPD exacerbation, pneumonia, ACS, arrhythmia, viral URI, covid, PE, chf exacerbation, among others. Given this, CBC, CMP, troponin, BNP, VBG, lactate, and d-dimer obtained. Patient given DuoNeb x3, Solu-Medrol 125 mg IV, magnesium. On reassessment, patient reported no improvement of symptoms. Patient still had increased work of breathing with subcostal retractions. Patient placed on BiPAP. VBG consistent with COPD exacerbation. pH was 7.33 and PCO2 50.8. Troponin negative. procalcitonin 0.05. . D dimer elevated at 293. Given this, CT PE obtained. Chest x-ray concerning for pneumonia with opacities to right middle lobe and right lower lung. Patient given ceftriaxone and azithromycin IV. Pending CT PE results, hospitalist consulted for admission. Pulmonologist also consulted. Patient admitted. CT PE showed no acute PE and no evidence of pneumonia and t10-11 chronic wedge fracture. Admit. I provided at least 60 minutes of critical care excluding separately billable procedures.      During the patient's ED course, the patient was given:  Medications   cefTRIAXone (ROCEPHIN) 1 g IVPB in 50 mL D5W minibag (has no administration in time range)   azithromycin (ZITHROMAX) 500 mg in D5W 250ml addavial (has no administration in time range)   pregabalin (LYRICA) capsule 100 mg (100 mg Oral Given 9/12/20 2311)   atorvastatin (LIPITOR) tablet 20 mg (has no administration in time range)   lisinopril (PRINIVIL;ZESTRIL) tablet 20 mg (has no administration in time range)   sertraline (ZOLOFT) tablet 200 mg (200 mg Oral Given 9/12/20 2311)   saccharomyces boulardii (FLORASTOR) capsule 250 mg (250 mg Oral Given 9/12/20 2311)   risperiDONE (RISPERDAL) tablet 0.5 mg (0.5 mg Oral Given 9/12/20 2311)   oxyCODONE-acetaminophen (PERCOCET)  MG per tablet 1 tablet (1 tablet Oral Given 9/12/20 2311)   busPIRone (BUSPAR) tablet 10 mg (10 mg Oral Given 9/12/20 2311)   pantoprazole (PROTONIX) tablet 40 mg (has no administration in time range)   polyethylene glycol (GLYCOLAX) packet 17 g (has no administration in time range)   sodium chloride flush 0.9 % injection 10 mL (10 mLs Intravenous Given 9/12/20 2312)   sodium chloride flush 0.9 % injection 10 mL (has no administration in time range)   acetaminophen (TYLENOL) tablet 650 mg (has no administration in time range)     Or   acetaminophen (TYLENOL) suppository 650 mg (has no administration in time range)   polyethylene glycol (GLYCOLAX) packet 17 g (has no administration in time range)   promethazine (PHENERGAN) tablet 12.5 mg (has no administration in time range)     Or   ondansetron (ZOFRAN) injection 4 mg (has no administration in time range)   enoxaparin (LOVENOX) injection 40 mg (has no administration in time range)   methylPREDNISolone sodium (SOLU-MEDROL) injection 40 mg (40 mg Intravenous Given 9/12/20 2311)     Followed by   predniSONE (DELTASONE) tablet 40 mg (has no administration in time range)   ipratropium (ATROVENT HFA) 17 MCG/ACT inhaler 2 puff (2 puffs

## 2020-09-13 NOTE — H&P
Hospital Medicine History & Physical      PCP: Jaky Jaffe    Date of Admission: 9/12/2020    Date of Service: Pt seen/examined on 9/12/2020  and Admitted to Inpatient with expected LOS greater than two midnights due to medical therapy. Chief Complaint:    Chief Complaint   Patient presents with    Shortness of Breath          History Of Present Illness: The patient is a 63 y.o. female with history of anxiety/depression, chronic low back pain, COPD, DJD, GERD, hyperlipidemia, hypertension who presented with 3-day history of productive cough with yellowish phlegm, associated shortness of breath. No hemoptysis. No fevers or chills. Initially chest x-ray was concerning for right lower lobe infiltrate, however CT chest with pulmonary protocol was negative for PE with no focal consolidation. She is wheezy and ABGs are noted for type II respiratory failure. She received BiPAP in the ER together with antibiotics, magnesium sulfate and steroids with symptom improvement.     Past Medical History:        Diagnosis Date    Anxiety     Arthritis     Cancer ([facility] [address] 1980    Cervical    Chronic back pain     Chronic pain     COPD (chronic obstructive pulmonary disease) (HCC)     Depression     DJD (degenerative joint disease), cervical     Drug-seeking behavior     GERD (gastroesophageal reflux disease)     Hypercholesteremia     Hypertension     IBS (irritable bowel syndrome)     Insomnia     K deficiency     Kidney stone     Narcotic addiction [facility])     Patient Denies    Narcotic overdose ([address]     Patient Denies    Osteoporosis     Overdose     [id number] Patient Denies on 4/3/14    PNA (pneumonia) 10/17/2016    Restless leg syndrome     Thyroid disease        Past Surgical History:        Procedure Laterality Date    APPENDECTOMY      BACK SURGERY      CERVICAL DISCECTOMY  12-14-13    anterior discetomy fusion c 3/4 c 4/5 posterior fusion C3-C5 screws and rods C3-C5    10/17/2016    LEUKOCYTESUR Negative 10/17/2016    BLOODU Negative 10/17/2016    GLUCOSEU Negative 10/17/2016    GLUCOSEU NEGATIVE 01/23/2012    AMORPHOUS 1+ 07/20/2015       ABG    Lab Results   Component Value Date    SWY4UHO 15.5 06/03/2013    BEART -7.8 06/03/2013    O2ULKNIX 97.8 06/03/2013    PHART 7.389 06/03/2013    THGBART 13.2 06/03/2013    THGBART 12.2 01/23/2012    XFH0GHQ 26.3 06/03/2013    PO2ART 103.2 06/03/2013    AWM9CAM 16.3 06/03/2013           Active Hospital Problems    Diagnosis Date Noted    Hyperlipidemia [E78.5] 04/20/2012     Priority: High    GERD (gastroesophageal reflux disease) [K21.9] 04/19/2012     Priority: Medium    COPD exacerbation (HCC) [J44.1] 09/12/2020    Acute respiratory failure with hypoxia and hypercapnia (HCC) [J96.01, J96.02] 09/12/2020    [facility] [S22.000A] 02/05/2018         ASSESSMENT/PLAN:   63 y.o. female with history of anxiety/depression, chronic low back pain, COPD, DJD, GERD, hyperlipidemia, hypertension who presented with 3-day history of productive cough with yellowish phlegm, associated shortness of breath found to have acute exacerbation of COPD with acute respiratory failure with hypoxia and hypercapnia    Plan:  -Continue on breathing therapy with scheduled and PRN bronchodilators and  cardiopulmonary protocol  -Systemic IV steroids  -Antibiotic coverage  - Oxygen supplementation with target saturations greater than 90% and wean as tolerated  - Pulmonology consult        DVT Prophylaxis: Subcut enoxaparin  Diet: General  Code Status: Full         Valerie Felton MD    Thank you Waldemar Gannon for the opportunity to be involved in this patient's care. If you have any questions or concerns please feel free to contact me at [phone].

## 2020-09-13 NOTE — PROGRESS NOTES
Data:  Recent Labs     09/12/20  1557 09/13/20  0444   WBC 10.9 9.3   HGB 16.0 15.2   HCT 48.3* 46.1   MCV 99.1 99.1   * 91*     Recent Labs     09/12/20  1557 09/13/20  0444    133*   K 4.6 4.6   CL 97* 96*   CO2 27 28   BUN 22* 22*   CREATININE 0.9 0.8     Recent Labs     09/12/20  1557   TROPONINI <0.01       Coagulation:   Lab Results   Component Value Date    INR 0.94 09/12/2020    APTT 31.7 09/12/2020     Cardiac markers:   Lab Results   Component Value Date    CKMB 10.3 07/14/2010    CKTOTAL 74 10/17/2014    TROPONINI <0.01 09/12/2020         Lab Results   Component Value Date    ALT 15 09/12/2020    AST 21 09/12/2020    ALKPHOS 116 09/12/2020    BILITOT 0.3 09/12/2020       Lab Results   Component Value Date    INR 0.94 09/12/2020    INR 0.99 07/01/2018    INR 1.01 06/05/2018    PROTIME 10.9 09/12/2020    PROTIME 11.3 07/01/2018    PROTIME 11.5 06/05/2018       Radiology    CTA chest  No evidence for acute pulmonary embolism. 2. No evidence for pneumonia. 3. T10 and T11 vertebral body wedging with superior endplate depression and    sclerosis probably combination of degenerative disc disease, prior infection    and/or trauma.  This does not appear acute.  Please correlate clinically. cxr    Right mid and lower lung consolidation, concerning for pneumonia.  Small    right pleural effusion.          Cultures:   covid neg  Sputum pending        Assessment & Plan:      Copd exacerbation   Acute on chronic resp failure  CAP - right sided infiltrate likely gram positive organism  - improved with bipap, steroids and abx  - pulm consulted  - sputum and blood cx pending  - covid neg  - ok for PCU   -taper steroids   - no sepsis     HTN - stable on ACEi    GERD- on ppi     Hyperlipidemia- on statins     Chronic pain syndrome- on  Lyrica, perocecet    Smoking cessation discussed with pt    dvt prophylaxis  Ok for [employer], MD 9/13/2020 7:45 AM

## 2020-09-13 NOTE — PROGRESS NOTES
Reassessment completed, see flow sheet    Pt placed on bipap per RT. Tolerating well. No other needs at this time, pt resting quietly in bed. Will continue to monitor.

## 2020-09-13 NOTE — PROGRESS NOTES
Patient admitted to ICU for respiratory failure. Telemetry monitor hooked up to patient. NSR on monitor. Breathing pattern appears unlabored. Lung sounds diminished. Admitted to ICU on 5L NC. A&O x4. BUE strength is strong. BLE strength is strong. PERRLA. Abdomen appears soft and rounded. Bowel sounds active. IV access is 20g LAC. 22g RFA placed by this RN upon admission. Purewick catheter placed. CHG bath provided for patient. Pt able to demonstrate the ability to move to and from a reclining position.

## 2020-09-13 NOTE — PROGRESS NOTES
Patient transferred from ICU, O2 at 3L per NC. Lungs decreased. BS+. [facility] Mepilex on L elbow, redness noted. Patient up to [facility] with assist x 1. No acute distress noted. Call light in reach. Will continue to monitor.

## 2020-09-13 NOTE — PROGRESS NOTES
RESPIRATORY THERAPY ASSESSMENT    Name:  Tres Bedolla Record Number:  [id number]  Age: 63 y.o. Gender: female  : 1955  Today's Date:  2020  Room:  3013/3013-01    Assessment     Is the patient being admitted for a COPD or Asthma exacerbation? Yes   (If yes the patient will be seen every 4 hours for the first 24 hours and then reassessed)    Patient Admission Diagnosis      Allergies  Allergies   Allergen Reactions    Asa Buff (Mag [Buffered Aspirin] Hives    Cymbalta [Duloxetine Hcl] Nausea And Vomiting       Minimum Predicted Vital Capacity:               Actual Vital Capacity:                    Pulmonary History:copd  Home Oxygen Therapy:  3L  Home Respiratory Therapy:albuterol 4x daily/breo ellipta   Current Respiratory Therapy:  Albuterol/atrovent 2 puffs q4  Treatment Type: MDI  Medications: Ipratropium Bromide    Respiratory Severity Index(RSI)   Patients with orders for inhalation medications, oxygen, or any therapeutic treatment modality will be placed on Respiratory Protocol. They will be assessed with the first treatment and at least every 72 hours thereafter. The following severity scale will be used to determine frequency of treatment intervention.     Smoking History: Mild Exacerbation = 3    Social History  Social History     Tobacco Use    Smoking status: Current Every Day Smoker     Packs/day: 1.00     Years: 40.00     Pack years: 40.00     Types: Cigarettes     Start date: 1973    Smokeless tobacco: Never Used   Substance Use Topics    Alcohol use: No    Drug use: No       Recent Surgical History: None = 0  Past Surgical History  Past Surgical History:   Procedure Laterality Date    APPENDECTOMY      BACK SURGERY      CERVICAL DISCECTOMY  13    anterior discetomy fusion c 3/4 c 4/5 posterior fusion C3-C5 screws and rods C3-C5    CHOLECYSTECTOMY      Laparoscopic    COLONOSCOPY      COLONOSCOPY  08/10/2016    FOOT SURGERY Right     FRACTURE SURGERY Right 9/14    distal radius    HAND SURGERY  08/2012    crushed right hand and has two steel plates in hand   [address]  2002    disc    OTHER SURGICAL HISTORY  4/9/14    TRANSFORAMINAL LUMBAR INTERBODY FUSION L4 L5       Level of Consciousness: Alert, Oriented, and Cooperative = 0    Level of Activity: Mostly sedentary, minimal walking = 2    Respiratory Pattern: Dyspnea with exertion;Irregular pattern;or RR less than 6 = 2    Breath Sounds: Absent bilaterally and/or with wheezes = 3    Sputum   ,  ,    Cough: Strong, spontaneous, non-productive = 0    Vital Signs   BP (!) 97/56   Pulse 73   Temp 98.3 °F (36.8 °C) (Oral)   Resp 14   Ht 5' 3\" (1.6 m)   Wt 168 lb 12.8 oz (76.6 kg)   SpO2 96%   BMI 29.90 kg/m²   SPO2 (COPD values may differ): 86-87% on room air or greater than 92% on FiO2 35- 50% = 3    Peak Flow (asthma only): not applicable = 0    RSI: 52-72 = Q6H or QID and Q4HPRN for dyspnea        Plan       Goals: medication delivery and improve oxygenation    Patient/caregiver was educated on the proper method of use for Respiratory Care Devices:  Yes      Level of patient/caregiver understanding able to:   ? Verbalize understanding   ? Demonstrate understanding       ? Teach back        ? Needs reinforcement       ? No available caregiver               ? Other:     Response to education:  Good     Is patient being placed on Home Treatment Regimen? No     Does the patient have everything they need prior to discharge? NA     Comments: chart reviewed and patient assessed    Plan of Care: albuterol/atrovent 2 puffs q4 (copd exac x 24 hours)    Electronically signed by Srinivasan Giron RCP on 9/13/2020 at 1:24 AM    Respiratory Protocol Guidelines     1. Assessment and treatment by Respiratory Therapy will be initiated for medication and therapeutic interventions upon initiation of aerosolized medication.   2. Physician will be contacted for respiratory rate (RR) greater than 35 breaths per minute. Therapy will be held for heart rate (HR) greater than 140 beats per minute, pending direction from physician. 3. Bronchodilators will be administered via Metered Dose Inhaler (MDI) with spacer when the following criteria are met:  a. Alert and cooperative     b. HR < 140 bpm  c. RR < 30 bpm                d. Can demonstrate a 2-3 second inspiratory hold  4. Bronchodilators will be administered via Hand Held Nebulizer RAHUL [facility]) to patients when ANY of the following criteria are met  a. Incognizant or uncooperative          b. Patients treated with HHN at Home        c. Unable to demonstrate proper use of MDI with spacer     d. RR > 30 bpm   5. Bronchodilators will be delivered via Metered Dose Inhaler (MDI), HHN, Aerogen to intubated patients on mechanical ventilation. 6. Inhalation medication orders will be delivered and/or substituted as outlined below. Aerosolized Medications Ordering and Administration Guidelines:    1. All Medications will be ordered by a physician, and their frequency and/or modality will be adjusted as defined by the patients Respiratory Severity Index (RSI) score. 2. If the patient does not have documented COPD, consider discontinuing anticholinergics when RSI is less than 9.  3. If the bronchospasm worsens (increased RSI), then the bronchodilator frequency can be increased to a maximum of every 4 hours. If greater than every 4 hours is required, the physician will be contacted. 4. If the bronchospasm improves, the frequency of the bronchodilator can be decreased, based on the patient's RSI, but not less than home treatment regimen frequency. 5. Bronchodilator(s) will be discontinued if patient has a RSI less than 9 and has received no scheduled or as needed treatment for 72  Hrs. Patients Ordered on a Mucolytic Agent:    1. Must always be administered with a bronchodilator.     2. Discontinue if patient experiences worsened bronchospasm, or secretions have lessened to the point that the patient is able to clear them with a cough. Anti-inflammatory and Combination Medications:    1. If the patient lacks prior history of lung disease, is not using inhaled anti-inflammatory medication at home, and lacks wheezing by examination or by history for at least 24 hours, contact physician for possible discontinuation.

## 2020-09-13 NOTE — PROGRESS NOTES
09/12/20 0979   NIV Type   $NIV $Daily Charge   Skin Assessment Clean, dry, & intact   Skin Protection for O2 Device Yes   Equipment Type v60   Mode Bilevel   Mask Type Full face mask   Mask Size Medium   Settings/Measurements   IPAP 12 cmH20   CPAP/EPAP 6 cmH2O   Rate Ordered 12   Resp 15   FiO2  40 %   Vt Exhaled 1210 mL   Mask Leak (lpm) 7 lpm   Comfort Level Good   Using Accessory Muscles No   SpO2 97   Patient Observation   Observations spo2 97% on 40% bipap

## 2020-09-13 NOTE — PROGRESS NOTES
4 Eyes Skin Assessment     The patient is being assess for   Transfer to New Unit    I agree that 2 RN's have performed a thorough Head to Toe Skin Assessment on the patient. ALL assessment sites listed below have been assessed. Areas assessed by both nurses:   [x]   Head, Face, and Ears   [x]   Shoulders, Back, and Chest, Abdomen  [x]   Arms, Elbows, and Hands   [x]   Coccyx, Sacrum, and Ischium  [x]   Legs, Feet, and Heels        No open areas noted, blanchable redness on L elbow      Co-signer eSignature: Electronically signed by Tessa Almazan RN on 9/13/20 at 3:20 PM EDT    Does the Patient have Skin Breakdown?   Yes LDA WOUND CARE was Initiated documentation include the Sue-wound, Wound Assessment, Measurements, Dressing Treatment, Drainage, and Color\",          Zack Prevention initiated:  Yes   Wound Care Orders initiated:  No      WOC nurse consulted for Pressure Injury (Stage 3,4, Unstageable, DTI, NWPT, Complex wounds)and New or Established Ostomies:  No      Primary Nurse eSignature: Electronically signed by Milli Simmons RN on 9/13/20 at 2:25 PM EDT

## 2020-09-13 NOTE — PROGRESS NOTES
Bedside report given to [facility] [place], RN. POC transferred.

## 2020-09-13 NOTE — PROGRESS NOTES
Shift assessment completed, see flowsheet. Pt A/O X 4. Lung sounds cta diminished , Heart Sounds , s1s2  NSR on the monitor, Bowel Sounds +x4 , Pulses +2x4 , LBM 09/12/19 C/o back pain . Call light within reach, bed in lowest position, side rails x 3 . Will continue to monitor.

## 2020-09-14 VITALS
BODY MASS INDEX: 29.16 KG/M2 | HEIGHT: 63 IN | DIASTOLIC BLOOD PRESSURE: 75 MMHG | HEART RATE: 89 BPM | TEMPERATURE: 97.4 F | RESPIRATION RATE: 18 BRPM | SYSTOLIC BLOOD PRESSURE: 122 MMHG | OXYGEN SATURATION: 96 % | WEIGHT: 164.56 LBS

## 2020-09-14 LAB
ANION GAP SERPL CALCULATED.3IONS-SCNC: 8 MMOL/L (ref 3–16)
BUN BLDV-MCNC: 23 MG/DL (ref 7–20)
CALCIUM SERPL-MCNC: 9.4 MG/DL (ref 8.3–10.6)
CHLORIDE BLD-SCNC: 101 MMOL/L (ref 99–110)
CO2: 28 MMOL/L (ref 21–32)
CREAT SERPL-MCNC: 0.7 MG/DL (ref 0.6–1.2)
GFR AFRICAN AMERICAN: >60
GFR NON-AFRICAN AMERICAN: >60
GLUCOSE BLD-MCNC: 182 MG/DL (ref 70–99)
POTASSIUM REFLEX MAGNESIUM: 4.7 MMOL/L (ref 3.5–5.1)
SODIUM BLD-SCNC: 137 MMOL/L (ref 136–145)

## 2020-09-14 PROCEDURE — 6370000000 HC RX 637 (ALT 250 FOR IP): Performed by: INTERNAL MEDICINE

## 2020-09-14 PROCEDURE — 2580000003 HC RX 258: Performed by: INTERNAL MEDICINE

## 2020-09-14 PROCEDURE — 80048 BASIC METABOLIC PNL TOTAL CA: CPT

## 2020-09-14 PROCEDURE — 99232 SBSQ HOSP IP/OBS MODERATE 35: CPT | Performed by: INTERNAL MEDICINE

## 2020-09-14 PROCEDURE — 94640 AIRWAY INHALATION TREATMENT: CPT

## 2020-09-14 PROCEDURE — 6360000002 HC RX W HCPCS: Performed by: INTERNAL MEDICINE

## 2020-09-14 PROCEDURE — 99238 HOSP IP/OBS DSCHRG MGMT 30/<: CPT | Performed by: INTERNAL MEDICINE

## 2020-09-14 PROCEDURE — 36415 COLL VENOUS BLD VENIPUNCTURE: CPT

## 2020-09-14 RX ORDER — PREDNISONE 20 MG/1
40 TABLET ORAL DAILY
Status: DISCONTINUED | OUTPATIENT
Start: 2020-09-14 | End: 2020-09-14 | Stop reason: HOSPADM

## 2020-09-14 RX ORDER — PREDNISONE 20 MG/1
TABLET ORAL
Qty: 12 TABLET | Refills: 0 | Status: SHIPPED | OUTPATIENT
Start: 2020-09-14 | End: 2021-03-07

## 2020-09-14 RX ORDER — IPRATROPIUM BROMIDE AND ALBUTEROL SULFATE 2.5; .5 MG/3ML; MG/3ML
1 SOLUTION RESPIRATORY (INHALATION) 4 TIMES DAILY
Status: DISCONTINUED | OUTPATIENT
Start: 2020-09-14 | End: 2020-09-14 | Stop reason: HOSPADM

## 2020-09-14 RX ORDER — AZITHROMYCIN 250 MG/1
250 TABLET, FILM COATED ORAL DAILY
Status: DISCONTINUED | OUTPATIENT
Start: 2020-09-14 | End: 2020-09-14 | Stop reason: HOSPADM

## 2020-09-14 RX ORDER — AZITHROMYCIN 250 MG/1
250 TABLET, FILM COATED ORAL DAILY
Qty: 3 TABLET | Refills: 0 | Status: SHIPPED | OUTPATIENT
Start: 2020-09-14 | End: 2020-09-17

## 2020-09-14 RX ADMIN — PANTOPRAZOLE SODIUM 40 MG: 40 TABLET, DELAYED RELEASE ORAL at 06:15

## 2020-09-14 RX ADMIN — OXYCODONE AND ACETAMINOPHEN 1 TABLET: 10; 325 TABLET ORAL at 09:00

## 2020-09-14 RX ADMIN — RISPERIDONE 0.5 MG: 0.25 TABLET ORAL at 09:00

## 2020-09-14 RX ADMIN — IPRATROPIUM BROMIDE AND ALBUTEROL SULFATE 1 AMPULE: .5; 3 SOLUTION RESPIRATORY (INHALATION) at 11:04

## 2020-09-14 RX ADMIN — PREGABALIN 100 MG: 100 CAPSULE ORAL at 09:00

## 2020-09-14 RX ADMIN — ENOXAPARIN SODIUM 40 MG: 40 INJECTION SUBCUTANEOUS at 09:00

## 2020-09-14 RX ADMIN — RDII 250 MG CAPSULE 250 MG: at 09:00

## 2020-09-14 RX ADMIN — SERTRALINE 200 MG: 100 TABLET, FILM COATED ORAL at 09:00

## 2020-09-14 RX ADMIN — ATORVASTATIN CALCIUM 20 MG: 10 TABLET, FILM COATED ORAL at 09:00

## 2020-09-14 RX ADMIN — Medication 10 ML: at 09:01

## 2020-09-14 RX ADMIN — BUSPIRONE HYDROCHLORIDE 10 MG: 10 TABLET ORAL at 09:00

## 2020-09-14 RX ADMIN — IPRATROPIUM BROMIDE AND ALBUTEROL SULFATE 1 AMPULE: .5; 3 SOLUTION RESPIRATORY (INHALATION) at 06:48

## 2020-09-14 RX ADMIN — POLYETHYLENE GLYCOL (3350) 17 G: 17 POWDER, FOR SOLUTION ORAL at 09:00

## 2020-09-14 RX ADMIN — OXYCODONE AND ACETAMINOPHEN 1 TABLET: 10; 325 TABLET ORAL at 00:42

## 2020-09-14 RX ADMIN — LISINOPRIL 20 MG: 20 TABLET ORAL at 08:59

## 2020-09-14 ASSESSMENT — PAIN DESCRIPTION - LOCATION
LOCATION: BACK
LOCATION: BACK

## 2020-09-14 ASSESSMENT — PAIN DESCRIPTION - FREQUENCY: FREQUENCY: CONTINUOUS

## 2020-09-14 ASSESSMENT — PAIN SCALES - GENERAL
PAINLEVEL_OUTOF10: 7
PAINLEVEL_OUTOF10: 5
PAINLEVEL_OUTOF10: 7

## 2020-09-14 ASSESSMENT — PAIN DESCRIPTION - PAIN TYPE
TYPE: CHRONIC PAIN
TYPE: CHRONIC PAIN

## 2020-09-14 NOTE — PROGRESS NOTES
IM Progress Note    Admit Date:  9/12/2020  2    Interval history:  Admitted for copd exacerbation on bipap  covid neg    Subjective:  Ms. Juan Bonilla seen up in bed, feels much improved and wishes to go home      Objective:   /75   Pulse 89   Temp 97.4 °F (36.3 °C) (Oral)   Resp 18   Ht 5' 3\" (1.6 m)   Wt 164 lb 9 oz (74.6 kg)   SpO2 96%   BMI 29.15 kg/m²       Intake/Output Summary (Last 24 hours) at 9/14/2020 1127  Last data filed at 9/14/2020 3530  Gross per 24 hour   Intake 660 ml   Output 1300 ml   Net -640 ml       Physical Exam:        General:  Middle aged female, obese  Awake, alert and oriented. Appears to be not in any distress  Mucous Membranes:  Pink , anicteric  Neck: No JVD, no carotid bruit, no thyromegaly  Chest:  Diminished mary with scattered wheeze and basilar crackles  Cardiovascular:  RRR S1S2 heard, no murmurs or gallops  Abdomen:  Soft, undistended, non tender, no organomegaly, BS present  Extremities: No edema or cyanosis.  Distal pulses well felt  Neurological : grossly normal      Medications:   Scheduled Medications:    ipratropium-albuterol  1 ampule Inhalation 4x daily    cefTRIAXone (ROCEPHIN) IV  1 g Intravenous Q24H    azithromycin  500 mg Intravenous Q24H    pregabalin  100 mg Oral BID    atorvastatin  20 mg Oral Daily    lisinopril  20 mg Oral Daily    sertraline  200 mg Oral BID    saccharomyces boulardii  250 mg Oral BID    risperiDONE  0.5 mg Oral BID    busPIRone  10 mg Oral BID    pantoprazole  40 mg Oral QAM AC    polyethylene glycol  17 g Oral Daily    sodium chloride flush  10 mL Intravenous 2 times per day    enoxaparin  40 mg Subcutaneous Daily    methylPREDNISolone  40 mg Intravenous Q12H    Followed by   Brandon Siu ON 9/15/2020] predniSONE  40 mg Oral Daily     I  albuterol sulfate HFA, oxyCODONE-acetaminophen, sodium chloride flush, acetaminophen **OR** acetaminophen, polyethylene glycol, promethazine **OR** ondansetron    Lab Data:  Recent Labs 09/12/20  1557 09/13/20  0444   WBC 10.9 9.3   HGB 16.0 15.2   HCT 48.3* 46.1   MCV 99.1 99.1   * 91*     Recent Labs     09/12/20  1557 09/13/20  0444 09/14/20  0424    133* 137   K 4.6 4.6 4.7   CL 97* 96* 101   CO2 27 28 28   BUN 22* 22* 23*   CREATININE 0.9 0.8 0.7     Recent Labs     09/12/20  1557   TROPONINI <0.01       Coagulation:   Lab Results   Component Value Date    INR 0.94 09/12/2020    APTT 31.7 09/12/2020     Cardiac markers:   Lab Results   Component Value Date    CKMB 10.3 07/14/2010    CKTOTAL 74 10/17/2014    TROPONINI <0.01 09/12/2020         Lab Results   Component Value Date    ALT 15 09/12/2020    AST 21 09/12/2020    ALKPHOS 116 09/12/2020    BILITOT 0.3 09/12/2020       Lab Results   Component Value Date    INR 0.94 09/12/2020    INR 0.99 07/01/2018    INR 1.01 06/05/2018    PROTIME 10.9 09/12/2020    PROTIME 11.3 07/01/2018    PROTIME 11.5 06/05/2018       Radiology    CTA chest  No evidence for acute pulmonary embolism. 2. No evidence for pneumonia. 3. T10 and T11 vertebral body wedging with superior endplate depression and    sclerosis probably combination of degenerative disc disease, prior infection    and/or trauma.  This does not appear acute.  Please correlate clinically. cxr    Right mid and lower lung consolidation, concerning for pneumonia.  Small    right pleural effusion.          Cultures:   covid neg  Sputum pending        Assessment & Plan:      Copd exacerbation   Acute on chronic resp failure  No pneumonia   - improved with bipap, steroids and abx  - pulm consulted  - sputum and blood cx pending  - covid neg  -taper steroids   - no sepsis   Now on 2 L     HTN - stable on ACEi    GERD- on ppi     Hyperlipidemia- on statins     Chronic pain syndrome- on  Lyrica, perocecet    Smoking cessation discussed with pt    D/c home       Jem Nascimento MD 9/14/2020 11:27 AM

## 2020-09-14 NOTE — DISCHARGE SUMMARY
Name:  Srinivasa Shelby  Room:  /3107-54  MRN:    [id number]    Discharge Summary      This discharge summary is in conjunction with a complete physical exam done on the day of discharge. Attending Physician: Dr. Avelina Bailey  Discharging Physician: Dr. Michelle Garcia: 2020  Discharge:  2020    HPI:  The patient is a 63 y.o. female with history of anxiety/depression, chronic low back pain, COPD, DJD, GERD, hyperlipidemia, hypertension who presented with 3-day history of productive cough with yellowish phlegm, associated shortness of breath. No hemoptysis. No fevers or chills. Initially chest x-ray was concerning for right lower lobe infiltrate, however CT chest with pulmonary protocol was negative for PE with no focal consolidation. She is wheezy and ABGs are noted for type II respiratory failure. She received BiPAP in the ER together with antibiotics, magnesium sulfate and steroids with symptom improvement.     Diagnoses this Admission and Hospital Course      Copd exacerbation   Acute on chronic resp failure  No pneumonia   - improved with bipap, steroids and abx  - pulm consulted  - sputum cx pending  - covid neg  -taper steroids   - no sepsis   Now on 2 L   D/c home on Zithromax, prednisone taper.      HTN - stable on ACEi     GERD- on ppi      Hyperlipidemia- on statins      Chronic pain syndrome- on  Lyrica, percocet     Smoking cessation discussed with pt     D/c home     Procedures (Please Review Full Report for Details)  N/A    Consults    Pulmonology       Physical Exam at Discharge:    /75   Pulse 89   Temp 97.4 °F (36.3 °C) (Oral)   Resp 18   Ht 5' 3\" (1.6 m)   Wt 164 lb 9 oz (74.6 kg)   SpO2 96%   BMI 29.15 kg/m²     See today's progress note    CBC:   Recent Labs     20  1557 20  0444   WBC 10.9 9.3   HGB 16.0 15.2   HCT 48.3* 46.1   MCV 99.1 99.1   * 91*     BMP:   Recent Labs     20  1557 20  0444 20  0424    133* 137   K 4.6 4.6 4.7   CL 97* 96* 101   CO2 27 28 28   BUN 22* 22* 23*   CREATININE 0.9 0.8 0.7     LIVER PROFILE:   Recent Labs     09/12/20  1557   AST 21   ALT 15   BILITOT 0.3   ALKPHOS 116     PT/INR:   Recent Labs     09/12/20  1557   PROTIME 10.9   INR 0.94     APTT:   Recent Labs     09/12/20  1557   APTT 31.7       CARDIAC ENZYMES  Recent Labs     09/12/20  1557   TROPONINI <0.01       U/A:    Lab Results   Component Value Date    NITRITE neg 02/14/2014    COLORU Yellow 10/17/2016    WBCUA 6-10 07/20/2015    RBCUA 0-2 07/20/2015    MUCUS Present 04/17/2013    BACTERIA 3+ 07/20/2015    CLARITYU Clear 10/17/2016    SPECGRAV 1.010 10/17/2016    LEUKOCYTESUR Negative 10/17/2016    BLOODU Negative 10/17/2016    GLUCOSEU Negative 10/17/2016    GLUCOSEU NEGATIVE 01/23/2012    AMORPHOUS 1+ 07/20/2015       ABG    Lab Results   Component Value Date    OYJ5JBW 15.5 06/03/2013    BEART -7.8 06/03/2013    C2GOSJHX 97.8 06/03/2013    PHART 7.389 06/03/2013    THGBART 13.2 06/03/2013    THGBART 12.2 01/23/2012    DHZ4MFS 26.3 06/03/2013    PO2ART 103.2 06/03/2013    MNH6RLX 16.3 06/03/2013         CULTURES  COVID: negative    RADIOLOGY  CT CHEST PULMONARY EMBOLISM W CONTRAST   Final Result   1. No evidence for acute pulmonary embolism. 2. No evidence for pneumonia. 3. T10 and T11 vertebral body wedging with superior endplate depression and   sclerosis probably combination of degenerative disc disease, prior infection   and/or trauma. This does not appear acute. Please correlate clinically. XR CHEST PORTABLE   Final Result   Right mid and lower lung consolidation, concerning for pneumonia. Small   right pleural effusion. Discharge Medications     Medication List      START taking these medications    azithromycin 250 MG tablet  Commonly known as:  ZITHROMAX  Take 1 tablet by mouth daily for 3 days  Notes to patient:  Azithromycin (Zithromax®)  Use: treat Infections.   Side effects: nausea, vomiting, or diarrhea        CHANGE how you take these medications    lisinopril 20 MG tablet  Commonly known as:  PRINIVIL;ZESTRIL  TAKE 1/2  TABLET BY MOUTH ONCE DAILY  What changed:    · how much to take  · how to take this  · when to take this     predniSONE 20 MG tablet  Commonly known as:  DELTASONE  Take 3 tabs daily - 1 day,take 2 tabs orally for 3 days then take 1 tab orally for 3 days. What changed:  additional instructions        CONTINUE taking these medications    atorvastatin 20 MG tablet  Commonly known as:  LIPITOR  Take 1 tablet by mouth daily. Breo Ellipta 100-25 MCG/INH Aepb inhaler  Generic drug:  fluticasone-vilanterol     busPIRone 10 MG tablet  Commonly known as:  BUSPAR     lidocaine 5 % ointment  Commonly known as:  XYLOCAINE     Lyrica 300 MG capsule  Generic drug:  pregabalin     metaxalone 800 MG tablet  Commonly known as:  SKELAXIN     NORFLEX PO     omeprazole 40 MG delayed release capsule  Commonly known as:  PRILOSEC     ondansetron 4 MG disintegrating tablet  Commonly known as:  ZOFRAN-ODT     oxyCODONE-acetaminophen  MG per tablet  Commonly known as:  PERCOCET     polyethylene glycol 17 g Pack packet  Commonly known as:  MIRALAX     * ProAir  (90 Base) MCG/ACT inhaler  Generic drug:  albuterol sulfate HFA  INHALE 2 PUFFS INTO THE LUNGS 4 TIMES DAILY. * albuterol sulfate  (90 Base) MCG/ACT inhaler  Commonly known as:  Proventil HFA  Inhale 2 puffs into the lungs every 4 hours as needed for Wheezing     risperiDONE 0.5 MG tablet  Commonly known as:  RISPERDAL     rOPINIRole 2 MG tablet  Commonly known as:  REQUIP     saccharomyces boulardii 250 MG capsule  Commonly known as:  FLORASTOR  Take 1 capsule by mouth 2 times daily     sertraline 100 MG tablet  Commonly known as:  ZOLOFT     triamcinolone 0.1 % cream  Commonly known as:  KENALOG         * This list has 2 medication(s) that are the same as other medications prescribed for you.  Read the directions carefully, and ask your doctor or other care provider to review them with you. Where to Get Your Medications      These medications were sent to [facility], [address] [facility] [address] [address]    Phone:  [phone]   · azithromycin 250 MG tablet  · predniSONE 20 MG tablet           Discharged in stable condition to home. Follow Up: Follow up with PCP. ROOSEVELT Lazaro.

## 2020-09-14 NOTE — PROGRESS NOTES
Patient in bed, eyes closed, no distress noted, call light in reach, will continue to monitor, bed alarm on. Patient refused bipap, sleeping with 3 liter nasal cannula.      Electronically signed by Stevie Laird RN on 9/14/2020 at 2:38 AM

## 2020-09-14 NOTE — CARE COORDINATION
Case Management Assessment  Initial Evaluation      Patient Name: Adelso Garcia  YOB: 1955  [facility] [J44.1]  Date / Time: 9/12/2020  3:44 PM    Admission status/Date:09/12/20  Chart Reviewed: Yes      Patient Interviewed: Yes   Family Interviewed:  No      Hospitalization in the last 30 days:  No    Genevieve Almaraz Maker:Yes, Dtr Danae Sanderson    Met with: Pt   Interview conducted  (bedside/phone):bedside     Current PCP: Shayan Benedict MD    Financial  Commercial  Precert required for SNF : Y        3 night stay required - N    ADLS  Support Systems/Care Needs: Children  Transportation: family    Meal Preparation: self    Housing  Living Arrangements: mobile home with dtr and dtr's BF. Steps: 4 PASCUAL with handrail  Intent for return to present living arrangements: Yes  Identified Issues: none    Home Care Information  Active with Home Health Care : No Agency:(Services)  Type of Home Care Services: None  Passport/Waiver : No  :                      Phone Number:    Passport/Waiver Services: n/a          Durable Medical Equiptment   DME Provider: Kahlil Lo  Equipment:   Walker_X__Cane_X__RTS___ BSC___Shower Chair_X__Hospital Bed___W/C____Other________  02 at __3__Liter(s)---wears(frequency)__continuously_____ HHN __X_ CPAP___ BiPap___   N/A____      Home O2 Use :  Yes    If No for home O2---if presently on O2 during hospitalization:  Yes  if yes CM to follow for potential DC O2 need  Informed of need for care provider to bring portable home O2 tank on day of discharge for nursing to connect prior to leaving:   Yes  Verbalized agreement/Understanding:   Yes    Community Service Affiliation  Dialysis:  No    · Agency:  · Location:  · Dialysis Schedule:  · Phone:   · Fax: Other Community Services:   DISCHARGE PLAN: Explained Case Management role/services.  Pt has been to [facility] in the past, and states that she is not open to SNF at d/c, if its recommended. She is only agreeable to [facility], if needed. Pt has had HHC in the pst, however cant remember the name. RN HEATHER looked through previous notes and could not find mention of [facility] in the past. Will follow.

## 2020-09-14 NOTE — PROGRESS NOTES
PM assessment completed. Scheduled medications given per MAR. VSS 3 lpm, A/O x4 denies any needs at this time. Call light in reach, will monitor, bed alarm on. Patient resting in bed.     Electronically signed by Bee Dixon RN on 9/13/2020 at 8:39 PM

## 2020-09-14 NOTE — PROGRESS NOTES
Pulmonary Progress Note  CC: Shortness of breath    Subjective: Shortness of breath better, wants to go home    IV line peripheral    EXAM:   /75   Pulse 89   Temp 97.4 °F (36.3 °C) (Oral)   Resp 18   Ht 5' 3\" (1.6 m)   Wt 164 lb 9 oz (74.6 kg)   SpO2 96%   BMI 29.15 kg/m²  on 2 L  Constitutional:  No acute distress   HEENT: no scleral icterus  Neck: No tracheal deviation present. Cardiovascular: Normal heart sounds. Pulmonary/Chest: few wheezes. No rhonchi. No rales. No decreased breath sounds. No accessory muscle usage or stridor. Abdominal: Soft. Musculoskeletal: No cyanosis. No clubbing. Skin: Skin is warm and dry.      Scheduled Meds:   ipratropium-albuterol  1 ampule Inhalation 4x daily    cefTRIAXone (ROCEPHIN) IV  1 g Intravenous Q24H    azithromycin  500 mg Intravenous Q24H    pregabalin  100 mg Oral BID    atorvastatin  20 mg Oral Daily    lisinopril  20 mg Oral Daily    sertraline  200 mg Oral BID    saccharomyces boulardii  250 mg Oral BID    risperiDONE  0.5 mg Oral BID    busPIRone  10 mg Oral BID    pantoprazole  40 mg Oral QAM AC    polyethylene glycol  17 g Oral Daily    sodium chloride flush  10 mL Intravenous 2 times per day    enoxaparin  40 mg Subcutaneous Daily    methylPREDNISolone  40 mg Intravenous Q12H    Followed by   Skyla Lofton ON 9/15/2020] predniSONE  40 mg Oral Daily     Continuous Infusions:    PRN Meds:  albuterol sulfate HFA, oxyCODONE-acetaminophen, sodium chloride flush, acetaminophen **OR** acetaminophen, polyethylene glycol, promethazine **OR** ondansetron    Labs:  CBC:   Recent Labs     09/12/20  1557 09/13/20  0444   WBC 10.9 9.3   HGB 16.0 15.2   HCT 48.3* 46.1   MCV 99.1 99.1   * 91*     BMP:   Recent Labs     09/12/20  1557 09/13/20  0444 09/14/20  0424    133* 137   K 4.6 4.6 4.7   CL 97* 96* 101   CO2 27 28 28   BUN 22* 22* 23*   CREATININE 0.9 0.8 0.7       Cultures:  Pending    Films:  CTPA 9/12/20  Impression:         1. No evidence for acute pulmonary embolism. 2. No evidence for pneumonia. 3. T10 and T11 vertebral body wedging with superior endplate depression and   sclerosis probably combination of degenerative disc disease, prior infection   and/or trauma.  This does not appear acute. ASSESSMENT:  ·  Acute respiratory failure with hypoxemia  · COPD with acute exacerbation  · Restrictive lung defect may be secondary to thoracic cavity/scoliosis    PLAN:  Prednisone taper  Ceftriaxone and azithromycin day #3 of 5.   Can be discharged just on azithromycin  · Smoking cessation counseling was given  · Follow-up with Dr. Micaela Toledo   · Okay with me for home

## 2020-09-14 NOTE — PROGRESS NOTES
RESPIRATORY THERAPY ASSESSMENT    Name:  Tres Bedolla Record Number:  [id number]  Age: 63 y.o. Gender: female  : 1955  Today's Date:  2020  Room:  /0306-01    Assessment     Is the patient being admitted for a COPD or Asthma exacerbation? Yes   (If yes the patient will be seen every 4 hours for the first 24 hours and then reassessed)    Patient Admission Diagnosis      Allergies  Allergies   Allergen Reactions    Asa Buff (Mag [Buffered Aspirin] Hives    Cymbalta [Duloxetine Hcl] Nausea And Vomiting       Minimum Predicted Vital Capacity:               Actual Vital Capacity:                    Pulmonary History:COPD  Home Oxygen Therapy:  3 liters/min via nasal cannula  Home Respiratory Therapy:Albuterol and Vilanterol/Fluticasone Furoate   Current Respiratory Therapy:  Duoneb Q4, Albuterol 2 puff Q4 PRN  Treatment Type: HHN  Medications: Albuterol/Ipratropium    Respiratory Severity Index(RSI)   Patients with orders for inhalation medications, oxygen, or any therapeutic treatment modality will be placed on Respiratory Protocol. They will be assessed with the first treatment and at least every 72 hours thereafter. The following severity scale will be used to determine frequency of treatment intervention.     Smoking History: Pulmonary Disease or Smoking History, Greater than 15 pack year = 2    Social History  Social History     Tobacco Use    Smoking status: Current Every Day Smoker     Packs/day: 1.00     Years: 40.00     Pack years: 40.00     Types: Cigarettes     Start date: 1973    Smokeless tobacco: Never Used   Substance Use Topics    Alcohol use: No    Drug use: No       Recent Surgical History: None = 0  Past Surgical History  Past Surgical History:   Procedure Laterality Date    APPENDECTOMY      BACK SURGERY      CERVICAL DISCECTOMY  13    anterior discetomy fusion c 3/4 c 4/5 posterior fusion C3-C5 screws and rods C3-C5    CHOLECYSTECTOMY   Laparoscopic    COLONOSCOPY  2008    COLONOSCOPY  08/10/2016    FOOT SURGERY Right     FRACTURE SURGERY Right 9/14    distal radius    HAND SURGERY  08/2012    crushed right hand and has two steel plates in hand    HYSTERECTOMY      NECK SURGERY  2002    disc    OTHER SURGICAL HISTORY  4/9/14    TRANSFORAMINAL LUMBAR INTERBODY FUSION L4 L5       Level of Consciousness: Alert, Oriented, and Cooperative = 0    Level of Activity: Mostly sedentary, minimal walking = 2    Respiratory Pattern: Dyspnea with exertion;Irregular pattern;or RR less than 6 = 2    Breath Sounds: Absent bilaterally and/or with wheezes = 3    Sputum   ,  , Sputum How Obtained: Spontaneous cough  Cough: Strong, spontaneous, non-productive = 0    Vital Signs   BP (!) 140/78   Pulse 85   Temp 97.2 °F (36.2 °C) (Oral)   Resp 18   Ht 5' 3\" (1.6 m)   Wt 168 lb 12.8 oz (76.6 kg)   SpO2 98%   BMI 29.90 kg/m²   SPO2 (COPD values may differ): 88-89% on room air or greater than 92% on FiO2 28- 35% = 2    Peak Flow (asthma only): not applicable = 0    RSI: 90-57 = Q6H or QID and Q4HPRN for dyspnea        Plan       Goals: medication delivery, mobilize retained secretions, volume expansion and improve oxygenation    Patient/caregiver was educated on the proper method of use for Respiratory Care Devices:  Yes      Level of patient/caregiver understanding able to:   ? Verbalize understanding   ? Demonstrate understanding       ? Teach back        ? Needs reinforcement       ? No available caregiver               ? Other:     Response to education:  Very Good     Is patient being placed on Home Treatment Regimen? Yes     Does the patient have everything they need prior to discharge? NA     Comments: pt assessed & chart reviewed    Plan of Care: placed on home regimen    Electronically signed by Anjana Nascimento RCP on 9/14/2020 at 12:03 AM    Respiratory Protocol Guidelines     1.  Assessment and treatment by Respiratory Therapy will be initiated for medication and therapeutic interventions upon initiation of aerosolized medication. 2. Physician will be contacted for respiratory rate (RR) greater than 35 breaths per minute. Therapy will be held for heart rate (HR) greater than 140 beats per minute, pending direction from physician. 3. Bronchodilators will be administered via Metered Dose Inhaler (MDI) with spacer when the following criteria are met:  a. Alert and cooperative     b. HR < 140 bpm  c. RR < 30 bpm                d. Can demonstrate a 2-3 second inspiratory hold  4. Bronchodilators will be administered via Hand Held Nebulizer [facility]) to patients when ANY of the following criteria are met  a. Incognizant or uncooperative          b. Patients treated with HHN at Home        c. Unable to demonstrate proper use of MDI with spacer     d. RR > 30 bpm   5. Bronchodilators will be delivered via Metered Dose Inhaler (MDI), HHN, Aerogen to intubated patients on mechanical ventilation. 6. Inhalation medication orders will be delivered and/or substituted as outlined below. Aerosolized Medications Ordering and Administration Guidelines:    1. All Medications will be ordered by a physician, and their frequency and/or modality will be adjusted as defined by the patients Respiratory Severity Index (RSI) score. 2. If the patient does not have documented COPD, consider discontinuing anticholinergics when RSI is less than 9.  3. If the bronchospasm worsens (increased RSI), then the bronchodilator frequency can be increased to a maximum of every 4 hours. If greater than every 4 hours is required, the physician will be contacted. 4. If the bronchospasm improves, the frequency of the bronchodilator can be decreased, based on the patient's RSI, but not less than home treatment regimen frequency. 5. Bronchodilator(s) will be discontinued if patient has a RSI less than 9 and has received no scheduled or as needed treatment for 72  Hrs.     Patients Ordered on a Mucolytic Agent:    1. Must always be administered with a bronchodilator. 2. Discontinue if patient experiences worsened bronchospasm, or secretions have lessened to the point that the patient is able to clear them with a cough. Anti-inflammatory and Combination Medications:    1. If the patient lacks prior history of lung disease, is not using inhaled anti-inflammatory medication at home, and lacks wheezing by examination or by history for at least 24 hours, contact physician for possible discontinuation.

## 2020-09-14 NOTE — PLAN OF CARE
Problem: Falls - Risk of:  Goal: Will remain free from falls  Description: Will remain free from falls  9/14/2020 1005 by Renny Barahona RN  Outcome: Ongoing  9/13/2020 2325 by Yan Flores RN  Outcome: Ongoing     Problem: OXYGENATION/RESPIRATORY FUNCTION  Goal: Patient will maintain patent airway  9/13/2020 2325 by Yan Flores RN  Outcome: Ongoing     Problem: SKIN INTEGRITY  Goal: Skin integrity is maintained or improved  9/14/2020 1005 by Renny Barahona RN  Outcome: Ongoing  9/13/2020 2325 by Yan Flores RN  Outcome: Ongoing

## 2020-09-14 NOTE — PROGRESS NOTES
Pt refusing BiPAP overnight stating \" I don't need it, I feel better\"  Explained the benefits of helping her and Pt still refused

## 2020-09-14 NOTE — PROGRESS NOTES
Reviewed d/c instructions with pt, verbalized understanding and questions answered.   Waiting on family to arrive with oxygen

## 2020-09-15 ENCOUNTER — CARE COORDINATION (OUTPATIENT)
Dept: CASE MANAGEMENT | Age: 65
End: 2020-09-15

## 2020-09-15 LAB — SARS-COV-2, NAA: NOT DETECTED

## 2020-09-15 NOTE — CARE COORDINATION
[address] Initial Outreach    Call within 2 business days of discharge: Yes    Patient: Kristina Vann Patient : 1955   MRN: [id number]  Reason for Admission: COPD, COVID-19 ruled out  Discharge Date: 20 RARS: Readmission Risk Score: 17      Last Discharge [facility]       Complaint Diagnosis Description Type Department Provider    20 Shortness of Breath COPD exacerbation ([facility] . .. ED to Hosp-Admission (Discharged) (ADMITTED) [facility] PCU Tanmay Parisi MD; Madhav Navarrete. .. COVID-19 Not Detected on 2020. Patient has following COVID-19 risk factors: COPD, acute respiratory failure and smoker, cancer hx. 1st attempt - Unable to reach patient by phone. Message left stating purpose of call with contact information requesting return call. Follow Up  No future appointments.     Mera Mahmood, RN  Care Transition Nurse  [phone] mobile

## 2020-09-16 ENCOUNTER — CARE COORDINATION (OUTPATIENT)
Dept: CASE MANAGEMENT | Age: 65
End: 2020-09-16

## 2020-09-16 NOTE — CARE COORDINATION
[facility]    Call within 2 business days of discharge: Yes    Patient: Merlyn Parrish Patient : 1955   MRN: [id number]  Discharge Date: 20   RARS: Readmission Risk Score: 17      Last Discharge [facility]       Complaint Diagnosis Description Type Department Provider    20 Shortness of Breath COPD exacerbation ([address] . .. ED to Hosp-Admission (Discharged) (ADMITTED) [address]  PCU Sondra Diop MD; Elisa Perez. .. Spoke with: Brenda Anton (daughter/POA)    Non-face-to-face services provided:  Obtained and reviewed discharge summary and/or continuity of care documents  Education of patient/family/caregiver/guardian to support self-management-s/s to report  Assessment and support for treatment adherence and medication management-med review    Challenges to be reviewed by the provider   Additional needs identified to be addressed with provider No    COVID-19 Not Detected on 2020. Patient informed of results, if available? Yes       Method of communication with provider : none    Advance Care Planning:   Does patient have an Advance Directive:  reviewed and current. Was this a readmission? No  Patient stated reason for admission: SOB  Patients top risk factors for readmission: functional physical ability and medical condition    Care Transition Nurse (CTN) contacted the family by telephone to perform post hospital discharge assessment. Verified name and  with family as identifiers. Provided introduction to self, and explanation of the CTN role. CTN reviewed discharge instructions, medical action plan and red flags with family who verbalized understanding. Family given an opportunity to ask questions and does not have any further questions or concerns at this time. Were discharge instructions available to patient? Yes.  Reviewed appropriate site of care based on symptoms and resources available to patient including: PCP, Specialist, [employer] and CTN . The family agrees to contact the PCP office for questions related to their healthcare. Medication reconciliation was performed with family, who verbalizes understanding of administration of home medications. Advised obtaining a 90-day supply of all daily and as-needed medications. Covid Risk Education    Patient has following COVID-19 risk factors: COPD, acute respiratory failure and smoker, hx of cancer. Education provided regarding infection prevention, and signs and symptoms of COVID-19 and when to seek medical attention with family who verbalized understanding. Discussed exposure protocols and quarantine From CDC: Are you at higher risk for severe illness?   and given an opportunity for questions and concerns. The family agrees to contact the COVID-19 hotline [phone] or PCP office for questions related to COVID-19. Symptoms reviewed with family who verbalized the following symptoms: fatigue and no new symptoms. Due to no new or worsening symptoms encounter was not routed to provider for escalation. Discussed follow-up appointments. If no appointment was previously scheduled, appointment scheduling offered: Yes-dtr states Bullock talked to nurse at [facility] pulmonary office yesterday and scheduled a f/up appt . For more information on steps you can take to protect yourself, see CDC's How to Protect Yourself     Discussed follow-up appointments. If no appointment was previously scheduled, appointment scheduling offered: Yes. Is follow up appointment scheduled within 7 days of discharge? unknown  Non-[facility] follow up appointment(s):  Pulm    Plan for follow-up call in 1-2 days based on severity of symptoms and risk factors. Plan for next call: symptom management-fatigue      Reached dtr/ANTHONY Mcginnis. States Bullock has been very tired since home. Reviewed meds. She was not aware of decrease in lisinopril. Taking pred and zithromax as ordered.  CTN calixto follow up before weekend to check symptoms. Encouraged PCP Follow Up appt. CTN provided contact information for future needs. Follow Up  No future appointments.     Santy Gonzales RN  Care Transition Nurse  [phone] mobile

## 2020-09-18 ENCOUNTER — CARE COORDINATION (OUTPATIENT)
Dept: CASE MANAGEMENT | Age: 65
End: 2020-09-18

## 2020-09-18 NOTE — CARE COORDINATION
Patient contacted regarding COVID-19 risk and screening. COVID-19 Not Detected on 9/13/2020. Patient has following risk factors for COVID-19: COPD, acute respiratory failure and cancer, smoker. CTN attempted outreach to patient as planned. Unable to reach both patient and Reji Ovalle. Message left on home number for patient and Helene's voice mail was not set up. Annetta Hayes RN  Care Transition Nurse  [phone] mobile    No future appointments.

## 2020-09-21 ENCOUNTER — CARE COORDINATION (OUTPATIENT)
Dept: CASE MANAGEMENT | Age: 65
End: 2020-09-21

## 2020-09-21 NOTE — CARE COORDINATION
Patient contacted regarding COVID-19 risk and screening. COVID-19 Not Detected on 9/13/2020. Patient has following risk factors for COVID-19: COPD, acute respiratory failure and smoker, hx cancer. 2nd attempt - Unable to reach patient by phone. Message left stating purpose of call with contact information requesting return call. No further CTN outreach scheduled. Episode resolved at this time. Berna Davies RN  Care Transition Nurse  [phone] mobile    No future appointments.

## 2021-03-07 ENCOUNTER — APPOINTMENT (OUTPATIENT)
Dept: CT IMAGING | Age: 66
End: 2021-03-07
Payer: MEDICARE

## 2021-03-07 ENCOUNTER — APPOINTMENT (OUTPATIENT)
Dept: GENERAL RADIOLOGY | Age: 66
End: 2021-03-07
Payer: MEDICARE

## 2021-03-07 ENCOUNTER — HOSPITAL ENCOUNTER (EMERGENCY)
Age: 66
Discharge: HOME | End: 2021-03-07
Attending: EMERGENCY MEDICINE
Payer: MEDICARE

## 2021-03-07 VITALS
TEMPERATURE: 97.7 F | SYSTOLIC BLOOD PRESSURE: 149 MMHG | WEIGHT: 180 LBS | RESPIRATION RATE: 20 BRPM | HEART RATE: 104 BPM | DIASTOLIC BLOOD PRESSURE: 76 MMHG | OXYGEN SATURATION: 90 % | BODY MASS INDEX: 31.89 KG/M2

## 2021-03-07 DIAGNOSIS — J44.1 COPD EXACERBATION (HCC): Primary | ICD-10-CM

## 2021-03-07 DIAGNOSIS — L03.116 CELLULITIS OF LEFT LOWER EXTREMITY: ICD-10-CM

## 2021-03-07 DIAGNOSIS — L03.115 CELLULITIS OF RIGHT LOWER EXTREMITY: ICD-10-CM

## 2021-03-07 DIAGNOSIS — R79.89 ELEVATED D-DIMER: ICD-10-CM

## 2021-03-07 LAB
ANION GAP SERPL CALCULATED.3IONS-SCNC: 7 MMOL/L (ref 3–16)
BASE EXCESS VENOUS: 4.3 MMOL/L (ref -3–3)
BASOPHILS ABSOLUTE: 0.2 K/UL (ref 0–0.2)
BASOPHILS RELATIVE PERCENT: 2.8 %
BUN BLDV-MCNC: 12 MG/DL (ref 7–20)
CALCIUM SERPL-MCNC: 9.6 MG/DL (ref 8.3–10.6)
CARBOXYHEMOGLOBIN: 12.1 % (ref 0–1.5)
CHLORIDE BLD-SCNC: 101 MMOL/L (ref 99–110)
CO2: 30 MMOL/L (ref 21–32)
CREAT SERPL-MCNC: 0.7 MG/DL (ref 0.6–1.2)
D DIMER: 1189 NG/ML DDU (ref 0–229)
EOSINOPHILS ABSOLUTE: 0 K/UL (ref 0–0.6)
EOSINOPHILS RELATIVE PERCENT: 0.1 %
GFR AFRICAN AMERICAN: >60
GFR NON-AFRICAN AMERICAN: >60
GLUCOSE BLD-MCNC: 99 MG/DL (ref 70–99)
HCO3 VENOUS: 30.3 MMOL/L (ref 23–29)
HCT VFR BLD CALC: 41.7 % (ref 36–48)
HEMOGLOBIN: 13.7 G/DL (ref 12–16)
LACTIC ACID: 0.9 MMOL/L (ref 0.4–2)
LYMPHOCYTES ABSOLUTE: 0.7 K/UL (ref 1–5.1)
LYMPHOCYTES RELATIVE PERCENT: 8.5 %
MCH RBC QN AUTO: 31.3 PG (ref 26–34)
MCHC RBC AUTO-ENTMCNC: 32.9 G/DL (ref 31–36)
MCV RBC AUTO: 95.2 FL (ref 80–100)
METHEMOGLOBIN VENOUS: 0.3 %
MONOCYTES ABSOLUTE: 0.6 K/UL (ref 0–1.3)
MONOCYTES RELATIVE PERCENT: 7.4 %
NEUTROPHILS ABSOLUTE: 6.5 K/UL (ref 1.7–7.7)
NEUTROPHILS RELATIVE PERCENT: 81.2 %
O2 CONTENT, VEN: 17 VOL %
O2 SAT, VEN: 94 %
O2 THERAPY: ABNORMAL
PCO2, VEN: 50.8 MMHG (ref 40–50)
PDW BLD-RTO: 18.6 % (ref 12.4–15.4)
PH VENOUS: 7.39 (ref 7.35–7.45)
PLATELET # BLD: 132 K/UL (ref 135–450)
PMV BLD AUTO: 9 FL (ref 5–10.5)
PO2, VEN: 69.4 MMHG (ref 25–40)
POTASSIUM REFLEX MAGNESIUM: 4.4 MMOL/L (ref 3.5–5.1)
PRO-BNP: 88 PG/ML (ref 0–124)
RBC # BLD: 4.38 M/UL (ref 4–5.2)
SARS-COV-2, NAAT: NOT DETECTED
SODIUM BLD-SCNC: 138 MMOL/L (ref 136–145)
TCO2 CALC VENOUS: 32 MMOL/L
TROPONIN: <0.01 NG/ML
WBC # BLD: 8 K/UL (ref 4–11)

## 2021-03-07 PROCEDURE — 99285 EMERGENCY DEPT VISIT HI MDM: CPT

## 2021-03-07 PROCEDURE — 83605 ASSAY OF LACTIC ACID: CPT

## 2021-03-07 PROCEDURE — 71260 CT THORAX DX C+: CPT

## 2021-03-07 PROCEDURE — 87635 SARS-COV-2 COVID-19 AMP PRB: CPT

## 2021-03-07 PROCEDURE — 82803 BLOOD GASES ANY COMBINATION: CPT

## 2021-03-07 PROCEDURE — 73590 X-RAY EXAM OF LOWER LEG: CPT

## 2021-03-07 PROCEDURE — 80048 BASIC METABOLIC PNL TOTAL CA: CPT

## 2021-03-07 PROCEDURE — 93005 ELECTROCARDIOGRAM TRACING: CPT | Performed by: EMERGENCY MEDICINE

## 2021-03-07 PROCEDURE — 96365 THER/PROPH/DIAG IV INF INIT: CPT

## 2021-03-07 PROCEDURE — 74177 CT ABD & PELVIS W/CONTRAST: CPT

## 2021-03-07 PROCEDURE — 71045 X-RAY EXAM CHEST 1 VIEW: CPT

## 2021-03-07 PROCEDURE — 83880 ASSAY OF NATRIURETIC PEPTIDE: CPT

## 2021-03-07 PROCEDURE — 96375 TX/PRO/DX INJ NEW DRUG ADDON: CPT

## 2021-03-07 PROCEDURE — 2580000003 HC RX 258: Performed by: EMERGENCY MEDICINE

## 2021-03-07 PROCEDURE — 85379 FIBRIN DEGRADATION QUANT: CPT

## 2021-03-07 PROCEDURE — 85025 COMPLETE CBC W/AUTO DIFF WBC: CPT

## 2021-03-07 PROCEDURE — 6360000004 HC RX CONTRAST MEDICATION: Performed by: EMERGENCY MEDICINE

## 2021-03-07 PROCEDURE — 84484 ASSAY OF TROPONIN QUANT: CPT

## 2021-03-07 PROCEDURE — 6360000002 HC RX W HCPCS: Performed by: EMERGENCY MEDICINE

## 2021-03-07 PROCEDURE — 6370000000 HC RX 637 (ALT 250 FOR IP): Performed by: EMERGENCY MEDICINE

## 2021-03-07 RX ORDER — CEPHALEXIN 500 MG/1
500 CAPSULE ORAL 3 TIMES DAILY
Qty: 15 CAPSULE | Refills: 0 | Status: SHIPPED | OUTPATIENT
Start: 2021-03-07 | End: 2021-03-12

## 2021-03-07 RX ORDER — ONDANSETRON 2 MG/ML
4 INJECTION INTRAMUSCULAR; INTRAVENOUS EVERY 6 HOURS PRN
Status: DISCONTINUED | OUTPATIENT
Start: 2021-03-07 | End: 2021-03-08 | Stop reason: HOSPADM

## 2021-03-07 RX ORDER — IPRATROPIUM BROMIDE AND ALBUTEROL SULFATE 2.5; .5 MG/3ML; MG/3ML
2 SOLUTION RESPIRATORY (INHALATION) ONCE
Status: DISCONTINUED | OUTPATIENT
Start: 2021-03-07 | End: 2021-03-07

## 2021-03-07 RX ORDER — ONDANSETRON 4 MG/1
4 TABLET, ORALLY DISINTEGRATING ORAL 3 TIMES DAILY PRN
Qty: 6 TABLET | Refills: 0 | Status: SHIPPED | OUTPATIENT
Start: 2021-03-07

## 2021-03-07 RX ORDER — IPRATROPIUM BROMIDE AND ALBUTEROL SULFATE 2.5; .5 MG/3ML; MG/3ML
2 SOLUTION RESPIRATORY (INHALATION) ONCE
Status: COMPLETED | OUTPATIENT
Start: 2021-03-07 | End: 2021-03-07

## 2021-03-07 RX ORDER — DEXAMETHASONE SODIUM PHOSPHATE 10 MG/ML
10 INJECTION, SOLUTION INTRAMUSCULAR; INTRAVENOUS ONCE
Status: COMPLETED | OUTPATIENT
Start: 2021-03-07 | End: 2021-03-07

## 2021-03-07 RX ADMIN — IPRATROPIUM BROMIDE AND ALBUTEROL SULFATE 2 AMPULE: .5; 3 SOLUTION RESPIRATORY (INHALATION) at 18:24

## 2021-03-07 RX ADMIN — IOPAMIDOL 85 ML: 755 INJECTION, SOLUTION INTRAVENOUS at 18:48

## 2021-03-07 RX ADMIN — CEFTRIAXONE SODIUM 1000 MG: 1 INJECTION, POWDER, FOR SOLUTION INTRAMUSCULAR; INTRAVENOUS at 18:13

## 2021-03-07 RX ADMIN — DEXAMETHASONE SODIUM PHOSPHATE 10 MG: 10 INJECTION, SOLUTION INTRAMUSCULAR; INTRAVENOUS at 16:43

## 2021-03-07 RX ADMIN — ONDANSETRON HYDROCHLORIDE 4 MG: 2 INJECTION, SOLUTION INTRAMUSCULAR; INTRAVENOUS at 16:42

## 2021-03-07 NOTE — ED PROVIDER NOTES
[address] ED      CHIEF COMPLAINT  Shortness of Breath (shortness of breath x several hours. wears 4L home O2. hx copd. )       HISTORY OF PRESENT ILLNESS  Breanna Hubbard is a 61 y.o. female with history of COPD on 2 L nasal cannula O2 presents to the emergency department for evaluation of shortness of breath, worsening bilateral lower extremity edema and redness. Patient reports she had several weeks of bilateral lower extremity edema and redness. Patient reports she has a follow-up scheduled with her PCP tomorrow. However, she has not had an appointment yet. Denies being on antibiotics currently. Reports she has been increasingly short of breath over the past week. She has been using her breathing treatments at home with some improvement of symptoms. Patient lives at her private residence with her daughter's friend. Per the daughter at bedside, she was surprised to see her work of breathing and the bilateral lower extremity edema. Reports she had not seen the patient over the past several weeks and this is new development since the last time she saw her. Daughter is currently in a rehab facility. Denies being on steroids. No other complaints, modifying factors or associated symptoms. I have reviewed the following from the nursing documentation.     Past Medical History:   Diagnosis Date    Anxiety     Arthritis     Cancer ([facility] [address] 1980    Cervical    Chronic back pain     Chronic pain     COPD (chronic obstructive pulmonary disease) (HCC)     Depression     DJD (degenerative joint disease), cervical     Drug-seeking behavior     GERD (gastroesophageal reflux disease)     Hypercholesteremia     Hypertension     IBS (irritable bowel syndrome)     Insomnia     K deficiency     Kidney stone     Narcotic addiction [facility])     Patient Denies    Narcotic overdose [facility])     Patient Denies    Osteoporosis     Overdose     [id number] Patient Denies on 4/3/14    PNA (pneumonia) 10/17/2016    Restless leg syndrome     Thyroid disease      Past Surgical History:   Procedure Laterality Date    APPENDECTOMY      BACK SURGERY      CERVICAL DISCECTOMY  12-14-13    anterior discetomy fusion c 3/4 c 4/5 posterior fusion C3-C5 screws and rods C3-C5    CHOLECYSTECTOMY  2012    Laparoscopic    COLONOSCOPY  2008    COLONOSCOPY  08/10/2016    FOOT SURGERY Right     FRACTURE SURGERY Right 9/14    distal radius    HAND SURGERY  08/2012    crushed right hand and has two steel plates in hand    HYSTERECTOMY      NECK SURGERY  2002    disc    OTHER SURGICAL HISTORY  4/9/14    TRANSFORAMINAL LUMBAR INTERBODY FUSION L4 L5     Family History   Problem Relation Age of Onset    Heart Disease Mother     High Cholesterol Mother     Diabetes Mother     Asthma Mother     Heart Failure Mother     Hypertension Mother     Heart Disease Father     High Cholesterol Father     Emphysema Father     Heart Failure Father     Hypertension Father     Cancer Neg Hx      Social History     Socioeconomic History    Marital status:      Spouse name: Not on file    Number of children: 3    Years of education: Not on file    Highest education level: Not on file   Occupational History    Not on file   Social Needs    Financial resource strain: Not on file    Food insecurity     Worry: Not on file     Inability: Not on file   [employer] needs     Medical: Not on file     Non-medical: Not on file   Tobacco Use    Smoking status: Current Every Day Smoker     Packs/day: 1.00     Years: 40.00     Pack years: 40.00     Types: Cigarettes     Start date: 1/1/1973    Smokeless tobacco: Never Used   Substance and Sexual Activity    Alcohol use: No    Drug use: No    Sexual activity: Not Currently   Lifestyle    Physical activity     Days per week: Not on file     Minutes per session: Not on file    Stress: Not on file   Relationships    Social connections     Talks on phone: Not on file Gets together: Not on file     Attends Evangelical service: Not on file     Active member of club or organization: Not on file     Attends meetings of clubs or organizations: Not on file     Relationship status: Not on file    Intimate partner violence     Fear of current or ex partner: Not on file     Emotionally abused: Not on file     Physically abused: Not on file     Forced sexual activity: Not on file   Other Topics Concern    Not on file   Social History Narrative    Not on file     Current Facility-Administered Medications   Medication Dose Route Frequency Provider Last Rate Last Admin    ondansetron (ZOFRAN) injection 4 mg  4 mg Intravenous Q6H PRN Shyann Aguilar MD   4 mg at 03/07/21 1642    cefTRIAXone (ROCEPHIN) 1000 mg IVPB in 50 mL D5W minibag  1,000 mg Intravenous Q24H Shyann Aguilar MD         Current Outpatient Medications   Medication Sig Dispense Refill    rOPINIRole (REQUIP) 2 MG tablet Take 2 mg by mouth 3 times daily      risperiDONE (RISPERDAL) 0.5 MG tablet Take 0.5 mg by mouth 2 times daily      oxyCODONE-acetaminophen (PERCOCET)  MG per tablet Take 1 tablet by mouth every 4 hours as needed for Pain.  BREO ELLIPTA 100-25 MCG/INH AEPB inhaler INHALE 1 PUFF BY MOUTH ONCE DAILY (RINSE MOUTH AFTER EACH USE)  5    lidocaine (XYLOCAINE) 5 % ointment       omeprazole (PRILOSEC) 40 MG delayed release capsule TAKE ONE CAPSULE(S) BY MOUTH EVERY MORNING  11    ondansetron (ZOFRAN-ODT) 4 MG disintegrating tablet TAKE 1 TABLET BY MOUTH 4 TIMES A DAY AS NEEDED (PLACE ON TOP OF THE TONGUE WHERE IT WILL DISSOLVE, THEN SWALLOW)  5    sertraline (ZOLOFT) 100 MG tablet Take 200 mg by mouth 2 times daily       PROAIR  (90 BASE) MCG/ACT inhaler INHALE 2 PUFFS INTO THE LUNGS 4 TIMES DAILY.  1 Inhaler 0    lisinopril (PRINIVIL;ZESTRIL) 20 MG tablet TAKE 1/2  TABLET BY MOUTH ONCE DAILY (Patient taking differently: Take 20 mg by mouth daily TAKE 1/2  TABLET BY MOUTH ONCE DAILY) 1 tablet 5    atorvastatin (LIPITOR) 20 MG tablet Take 1 tablet by mouth daily. 30 tablet 5    pregabalin (LYRICA) 300 MG capsule   Take 100 mg by mouth 2 times daily       polyethylene glycol (MIRALAX) PACK packet Take 17 g by mouth daily      Orphenadrine Citrate (NORFLEX PO) Take by mouth      albuterol sulfate HFA (PROVENTIL HFA) 108 (90 Base) MCG/ACT inhaler Inhale 2 puffs into the lungs every 4 hours as needed for Wheezing 1 Inhaler 0    busPIRone (BUSPAR) 10 MG tablet TAKE 1 TABLET BY MOUTH TWICE DAILY  5    triamcinolone (KENALOG) 0.1 % cream APPLY TOPICALLY TWICE A DAY TO AFFECTED AREAS  5    saccharomyces boulardii (FLORASTOR) 250 MG capsule Take 1 capsule by mouth 2 times daily 30 capsule 0    metaxalone (SKELAXIN) 800 MG tablet Take 800 mg by mouth 3 times daily       Allergies   Allergen Reactions    Asa Buff (Mag [Buffered Aspirin] Hives    Cymbalta [Duloxetine Hcl] Nausea And Vomiting       REVIEW OF SYSTEMS  10 systems reviewed, pertinent positives per HPI otherwise noted to be negative. PHYSICAL EXAM  BP (!) 141/87   Pulse 84   Temp 97.7 °F (36.5 °C) (Oral)   Resp 24   Wt 180 lb (81.6 kg)   SpO2 90%   BMI 31.89 kg/m²    GENERAL APPEARANCE: Awake and alert. No acute distress. HENT: Normocephalic. Atraumatic. PERRL. EOMI. No facial droop. HEART/CHEST: RRR. LUNGS: On 4 L nasal cannula O2 to maintain sat above 90. Wheezing bilaterally. Coughing. ABDOMEN: Soft, non-distended abdomen. Non tender to palpation. No guarding. No rebound. EXTREMITIES: cellulitis bilateral lower extremity. Lower extremity edema. Palpable pulses to all extremities. Moving all extremities  SKIN: Warm and dry. Cellulitis bilateral lower extremities  NEUROLOGICAL: Alert and oriented. No gross facial drooping. Answering questions appropriately. Moving all extremities. PSYCHIATRIC: Pleasant. Normal mood and affect.     LABS  Results for orders placed or performed during the hospital encounter of 03/07/21 COVID-19, Rapid    Specimen: Nasopharyngeal Swab; Throat   Result Value Ref Range    SARS-CoV-2, NAAT Not Detected Not Detected   EKG 12 Lead   Result Value Ref Range    Ventricular Rate 73 BPM    Atrial Rate 73 BPM    P-R Interval 164 ms    QRS Duration 70 ms    Q-T Interval 362 ms    QTc Calculation (Bazett) 398 ms    P Axis 73 degrees    R Axis 84 degrees    T Axis 73 degrees    Diagnosis       Normal sinus rhythmNormal ECGWhen compared with ECG of 12-SEP-2020 15:54,Vent. rate has decreased BY  38 BPMNonspecific T wave abnormality no longer evident in Inferior leads       I have reviewed all labs for this visit. ECG  The Ekg interpreted by me shows  Normal sinus rhythm with a rate of 73  Axis is normal  QTc is normal  Intervals and Durations are unremarkable. ST Segments: Nonspecific ST changes  No significant change from prior EKG dated September 12, 2020    RADIOLOGY  Xr Tibia Fibula Left (2 Views)    Result Date: 3/7/2021  EXAMINATION: 2 X-RAY VIEWS OF THE LEFT TIBIA AND FIBULA; 2 X-RAY VIEWS OF THE RIGHT TIBIA AND FIBULA 3/7/2021 4:40 pm COMPARISON: None HISTORY: ORDERING SYSTEM PROVIDED HISTORY: Redness TECHNOLOGIST PROVIDED HISTORY: Reason for exam:  Redness Reason for Exam:  Pain with redness and swelling for one month. Acuity: Acute Type of Exam: Initial ORDERING SYSTEM PROVIDED HISTORY:  Pain TECHNOLOGIST PROVIDED HISTORY: Reason for exam:  Pain Reason for Exam:  Pain with redness and swelling for 1 month. Acuity: Acute Type of Exam: Initial FINDINGS: Left Tibia and Fibula: No acute osseous abnormality is noted. Induration of the subcutaneous fat noted diffusely without significant soft tissue swelling. Mild swelling is noted at the level of the ankles. Right Tibia and Fibula: No acute fracture or dislocation is noted. Soft tissue swelling at the ankle noted with mild induration diffusely of the subcutaneous fat.  Sclerotic foci within the distal femur and proximal tibia appear nonaggressive intraperitoneal free air, ascites or fluid collection. No lymphadenopathy in the abdomen or pelvis. No acute abnormality in the abdomen or pelvis. No bowel obstruction. Colonic diverticulosis without acute diverticulitis. Splenomegaly. Bilateral nonobstructing kidney stones. No hydronephrosis. Xr Chest Portable    Result Date: 3/7/2021  EXAMINATION: ONE XRAY VIEW OF THE CHEST 3/7/2021 4:34 pm COMPARISON: 09/12/2020 HISTORY: ORDERING SYSTEM PROVIDED HISTORY: sob and cp TECHNOLOGIST PROVIDED HISTORY: Reason for exam:->sob and cp Reason for Exam: chest pain, sob Acuity: Acute Type of Exam: Initial FINDINGS: The heart size is stable. Right parahilar pulmonary opacity. No pneumothorax. No pleural effusion. Right parahilar pulmonary opacity could represent asymmetric edema or infection     Ct Chest Pulmonary Embolism W Contrast    Result Date: 3/7/2021  EXAMINATION: CTA OF THE CHEST 3/7/2021 6:56 pm TECHNIQUE: CTA of the chest was performed after the administration of intravenous contrast.  Multiplanar reformatted images are provided for review. MIP images are provided for review. Dose modulation, iterative reconstruction, and/or weight based adjustment of the mA/kV was utilized to reduce the radiation dose to as low as reasonably achievable. COMPARISON: 09/12/2020. July 1, 2018. HISTORY: ORDERING SYSTEM PROVIDED HISTORY: increased o2 requirement. bilateral lower extremity edema. elevated d dimer TECHNOLOGIST PROVIDED HISTORY: Reason for exam:->increased o2 requirement. bilateral lower extremity edema. elevated d dimer Decision Support Exception->Emergency Medical Condition (MA) Reason for Exam: increased o2 requirement. bilateral lower extremity edema. elevated d dimer Acuity: Unknown Type of Exam: Unknown FINDINGS: Pulmonary Arteries: Pulmonary arteries are adequately opacified for evaluation. No evidence of intraluminal filling defect to suggest pulmonary embolism.   Main pulmonary artery is normal in caliber. Mediastinum: No evidence of mediastinal lymphadenopathy. The heart and pericardium demonstrate no acute abnormality. There is no acute abnormality of the thoracic aorta. Atherosclerosis of the thoracic aorta. Lungs/pleura: Respiratory motion. Emphysema. Diffuse bilateral ground-glass opacities. Bilateral smooth interlobular septal thickening is most pronounced in the left upper lobe. Left lower lobe and lingular linear atelectasis/scarring. No endoluminal lesion. No pleural effusion or pneumothorax. Upper Abdomen: Please refer to concurrent CT abdomen pelvis report for intra-findings. Soft Tissues/Bones: Postsurgical changes of multilevel fusion with hardware in the visualized cervical and upper thoracic spine. There is some osseous lucency about the T2 transpedicular screws which is similar to the prior study and could relate to chronic loosening. Stable chronic vertebral body height loss in the upper and lower thoracic spine. 1.  No acute pulmonary thromboembolic disease. 2.  Bilateral ground-glass opacities with smooth interlobular septal thickening suggest mild pulmonary edema. ED COURSE/MDM  Patient seen and evaluated. At presentation, patient was awake, alert, afebrile, hypertensive to 121 systolic, requiring 4 L nasal cannula O2 to maintain saturation above 90. Patient is on 2 L nasal cannula O2 at home normally. Patient had increased work of breathing, decreased breath sounds bilaterally. She had 2+ pitting edema bilaterally with overlying erythema involving bilateral lower legs. Differential diagnosis includes CHF exacerbation, COPD exacerbation, cellulitis of bilateral lower extremity, ACS, among others. Given this, CBC, CMP, troponin, VBG, BNP, chest x-ray, and Covid swab obtained. COVID-19 negative. Patient given DuoNeb x2, Decadron 4 presumed COPD exacerbation. Patient given ceftriaxone for the cellulitis of bilateral lower extremity cellulitis.  Patient given nausea medicine because patient complained of nausea during her stay in the emergency department. However, patient's abdomen was still soft, nondistended, and patient denied having any tenderness throughout all 4 quadrants. The patient reported having some abdominal pain. D-dimer elevated at 1100. CT PE and CT abdomen pelvis with contrast obtained. On reassessment, patient symptoms after the Decadron and DuoNeb. Patient was weaned back down to her 2 L nasal cannula O2 and maintained sat above 90. Troponin less than 0.1. BNP less than 100. CT showed no acute PE. This was discussed with patient. I recommended patient be admitted for the cellulitis and COPD exacerbation. However, patient was adamant and said she wanted to leave. Patient states she had an appointment with her PCP tomorrow and said she will follow up and return to the emergency department if her symptoms worsen. Given this, patient given a prescription of Keflex. Patient discharged home with strict return precautions. Pt was seen during the [place] 19 pandemic. Appropriate PPE worn by ME during patient encounters. Pt seen during a time with constrained hospital bed capacity and other potential inpatient and outpatient resources were constrained due to the viral pandemic. During the patient's ED course, the patient was given:  Medications   ondansetron (ZOFRAN) injection 4 mg (4 mg Intravenous Given 3/7/21 1642)   cefTRIAXone (ROCEPHIN) 1000 mg IVPB in 50 mL D5W minibag (has no administration in time range)   dexamethasone (PF) (DECADRON) injection 10 mg (10 mg Intravenous Given 3/7/21 1643)        CLINICAL IMPRESSION  1. COPD exacerbation ([address]    2. Cellulitis of right lower extremity    3. Cellulitis of left lower extremity        Blood pressure (!) 141/87, pulse 84, temperature 97.7 °F (36.5 °C), temperature source Oral, resp. rate 24, weight 180 lb (81.6 kg), SpO2 90 %, not currently breastfeeding.     [address] was discharged home in stable condition. Patient was given scripts for the following medications. I counseled patient how to take these medications. New Prescriptions    No medications on file       Follow-up with:  No follow-up provider specified. DISCLAIMER: This chart was created using Dragon dictation software. Efforts were made by me to ensure accuracy, however some errors may be present due to limitations of this technology and occasionally words are not transcribed correctly.         Nito Luque MD  03/08/21 [id number]

## 2021-03-08 ENCOUNTER — CARE COORDINATION (OUTPATIENT)
Dept: CARE COORDINATION | Age: 66
End: 2021-03-08

## 2021-03-08 LAB
EKG ATRIAL RATE: 73 BPM
EKG DIAGNOSIS: NORMAL
EKG P AXIS: 73 DEGREES
EKG P-R INTERVAL: 164 MS
EKG Q-T INTERVAL: 362 MS
EKG QRS DURATION: 70 MS
EKG QTC CALCULATION (BAZETT): 398 MS
EKG R AXIS: 84 DEGREES
EKG T AXIS: 73 DEGREES
EKG VENTRICULAR RATE: 73 BPM

## 2021-03-08 PROCEDURE — 93010 ELECTROCARDIOGRAM REPORT: CPT | Performed by: INTERNAL MEDICINE

## 2021-03-08 NOTE — ED NOTES
Pt refused duoneb. Notified MD Magali Villar. Okay for discharge.       Cici Parker RN  03/07/21 [id number]

## 2021-03-09 ENCOUNTER — CARE COORDINATION (OUTPATIENT)
Dept: CARE COORDINATION | Age: 66
End: 2021-03-09

## 2021-03-09 NOTE — CARE COORDINATION
ACM attempted ER  outreach. Left message. Contact information for call back provided. UTR x 2. Care transition completed.

## 2022-05-14 ENCOUNTER — APPOINTMENT (OUTPATIENT)
Dept: GENERAL RADIOLOGY | Age: 67
DRG: 191 | End: 2022-05-14
Payer: MEDICARE

## 2022-05-14 ENCOUNTER — HOSPITAL ENCOUNTER (INPATIENT)
Age: 67
LOS: 4 days | Discharge: HOME | DRG: 191 | End: 2022-05-18
Attending: EMERGENCY MEDICINE | Admitting: INTERNAL MEDICINE
Payer: MEDICARE

## 2022-05-14 DIAGNOSIS — J44.1 COPD EXACERBATION (HCC): Primary | ICD-10-CM

## 2022-05-14 DIAGNOSIS — L03.116 CELLULITIS OF LEFT LOWER EXTREMITY: ICD-10-CM

## 2022-05-14 PROBLEM — J18.9 COMMUNITY ACQUIRED PNEUMONIA: Status: ACTIVE | Noted: 2022-05-14

## 2022-05-14 LAB
A/G RATIO: 1.4 (ref 1.1–2.2)
ALBUMIN SERPL-MCNC: 4.2 G/DL (ref 3.4–5)
ALP BLD-CCNC: 112 U/L (ref 40–129)
ALT SERPL-CCNC: 10 U/L (ref 10–40)
ANION GAP SERPL CALCULATED.3IONS-SCNC: 12 MMOL/L (ref 3–16)
AST SERPL-CCNC: 14 U/L (ref 15–37)
BASE EXCESS VENOUS: 2.8 MMOL/L (ref -3–3)
BASOPHILS ABSOLUTE: 0.1 K/UL (ref 0–0.2)
BASOPHILS RELATIVE PERCENT: 0.7 %
BILIRUB SERPL-MCNC: 0.3 MG/DL (ref 0–1)
BUN BLDV-MCNC: 17 MG/DL (ref 7–20)
CALCIUM SERPL-MCNC: 9.6 MG/DL (ref 8.3–10.6)
CARBOXYHEMOGLOBIN: 12.6 % (ref 0–1.5)
CHLORIDE BLD-SCNC: 99 MMOL/L (ref 99–110)
CO2: 27 MMOL/L (ref 21–32)
CREAT SERPL-MCNC: 0.9 MG/DL (ref 0.6–1.2)
D DIMER: 304 NG/ML DDU (ref 0–229)
EOSINOPHILS ABSOLUTE: 0 K/UL (ref 0–0.6)
EOSINOPHILS RELATIVE PERCENT: 0.1 %
GFR AFRICAN AMERICAN: >60
GFR NON-AFRICAN AMERICAN: >60
GLUCOSE BLD-MCNC: 212 MG/DL (ref 70–99)
HCO3 VENOUS: 29.8 MMOL/L (ref 23–29)
HCT VFR BLD CALC: 38.9 % (ref 36–48)
HEMOGLOBIN: 12.8 G/DL (ref 12–16)
INFLUENZA A: NOT DETECTED
INFLUENZA B: NOT DETECTED
LACTIC ACID, SEPSIS: 1.2 MMOL/L (ref 0.4–1.9)
LACTIC ACID, SEPSIS: 1.5 MMOL/L (ref 0.4–1.9)
LYMPHOCYTES ABSOLUTE: 0.8 K/UL (ref 1–5.1)
LYMPHOCYTES RELATIVE PERCENT: 9 %
MCH RBC QN AUTO: 31.8 PG (ref 26–34)
MCHC RBC AUTO-ENTMCNC: 32.9 G/DL (ref 31–36)
MCV RBC AUTO: 96.8 FL (ref 80–100)
METHEMOGLOBIN VENOUS: 0.3 %
MONOCYTES ABSOLUTE: 0.8 K/UL (ref 0–1.3)
MONOCYTES RELATIVE PERCENT: 9.3 %
NEUTROPHILS ABSOLUTE: 6.9 K/UL (ref 1.7–7.7)
NEUTROPHILS RELATIVE PERCENT: 80.9 %
O2 CONTENT, VEN: 13 VOL %
O2 SAT, VEN: 72 %
O2 THERAPY: ABNORMAL
PCO2, VEN: 55.8 MMHG (ref 40–50)
PDW BLD-RTO: 16.2 % (ref 12.4–15.4)
PH VENOUS: 7.34 (ref 7.35–7.45)
PLATELET # BLD: 190 K/UL (ref 135–450)
PMV BLD AUTO: 8.3 FL (ref 5–10.5)
PO2, VEN: 40.3 MMHG (ref 25–40)
POTASSIUM REFLEX MAGNESIUM: 4.6 MMOL/L (ref 3.5–5.1)
PRO-BNP: 35 PG/ML (ref 0–124)
RBC # BLD: 4.02 M/UL (ref 4–5.2)
SARS-COV-2 RNA, RT PCR: NOT DETECTED
SODIUM BLD-SCNC: 138 MMOL/L (ref 136–145)
TCO2 CALC VENOUS: 32 MMOL/L
TOTAL PROTEIN: 7.2 G/DL (ref 6.4–8.2)
WBC # BLD: 8.5 K/UL (ref 4–11)

## 2022-05-14 PROCEDURE — 71046 X-RAY EXAM CHEST 2 VIEWS: CPT

## 2022-05-14 PROCEDURE — 2580000003 HC RX 258: Performed by: INTERNAL MEDICINE

## 2022-05-14 PROCEDURE — 94640 AIRWAY INHALATION TREATMENT: CPT

## 2022-05-14 PROCEDURE — 87636 SARSCOV2 & INF A&B AMP PRB: CPT

## 2022-05-14 PROCEDURE — 94761 N-INVAS EAR/PLS OXIMETRY MLT: CPT

## 2022-05-14 PROCEDURE — 6370000000 HC RX 637 (ALT 250 FOR IP): Performed by: INTERNAL MEDICINE

## 2022-05-14 PROCEDURE — 99285 EMERGENCY DEPT VISIT HI MDM: CPT

## 2022-05-14 PROCEDURE — 93005 ELECTROCARDIOGRAM TRACING: CPT | Performed by: EMERGENCY MEDICINE

## 2022-05-14 PROCEDURE — 36415 COLL VENOUS BLD VENIPUNCTURE: CPT

## 2022-05-14 PROCEDURE — 83880 ASSAY OF NATRIURETIC PEPTIDE: CPT

## 2022-05-14 PROCEDURE — 83605 ASSAY OF LACTIC ACID: CPT

## 2022-05-14 PROCEDURE — 73590 X-RAY EXAM OF LOWER LEG: CPT

## 2022-05-14 PROCEDURE — 6360000002 HC RX W HCPCS: Performed by: EMERGENCY MEDICINE

## 2022-05-14 PROCEDURE — 96365 THER/PROPH/DIAG IV INF INIT: CPT

## 2022-05-14 PROCEDURE — 82803 BLOOD GASES ANY COMBINATION: CPT

## 2022-05-14 PROCEDURE — 2700000000 HC OXYGEN THERAPY PER DAY

## 2022-05-14 PROCEDURE — 6370000000 HC RX 637 (ALT 250 FOR IP): Performed by: EMERGENCY MEDICINE

## 2022-05-14 PROCEDURE — 6360000002 HC RX W HCPCS: Performed by: INTERNAL MEDICINE

## 2022-05-14 PROCEDURE — 85025 COMPLETE CBC W/AUTO DIFF WBC: CPT

## 2022-05-14 PROCEDURE — 85379 FIBRIN DEGRADATION QUANT: CPT

## 2022-05-14 PROCEDURE — 2580000003 HC RX 258: Performed by: EMERGENCY MEDICINE

## 2022-05-14 PROCEDURE — 96375 TX/PRO/DX INJ NEW DRUG ADDON: CPT

## 2022-05-14 PROCEDURE — 1200000000 HC SEMI PRIVATE

## 2022-05-14 PROCEDURE — 80053 COMPREHEN METABOLIC PANEL: CPT

## 2022-05-14 RX ORDER — ACETAMINOPHEN 650 MG/1
650 SUPPOSITORY RECTAL EVERY 6 HOURS PRN
Status: DISCONTINUED | OUTPATIENT
Start: 2022-05-14 | End: 2022-05-18 | Stop reason: HOSPADM

## 2022-05-14 RX ORDER — IPRATROPIUM BROMIDE AND ALBUTEROL SULFATE 2.5; .5 MG/3ML; MG/3ML
1 SOLUTION RESPIRATORY (INHALATION)
Status: DISCONTINUED | OUTPATIENT
Start: 2022-05-15 | End: 2022-05-15

## 2022-05-14 RX ORDER — PANTOPRAZOLE SODIUM 40 MG/1
40 TABLET, DELAYED RELEASE ORAL
Status: DISCONTINUED | OUTPATIENT
Start: 2022-05-15 | End: 2022-05-18 | Stop reason: HOSPADM

## 2022-05-14 RX ORDER — DEXAMETHASONE SODIUM PHOSPHATE 10 MG/ML
10 INJECTION, SOLUTION INTRAMUSCULAR; INTRAVENOUS ONCE
Status: COMPLETED | OUTPATIENT
Start: 2022-05-14 | End: 2022-05-14

## 2022-05-14 RX ORDER — METAXALONE 800 MG/1
800 TABLET ORAL 3 TIMES DAILY
Status: DISCONTINUED | OUTPATIENT
Start: 2022-05-14 | End: 2022-05-16

## 2022-05-14 RX ORDER — SODIUM CHLORIDE 0.9 % (FLUSH) 0.9 %
5-40 SYRINGE (ML) INJECTION EVERY 12 HOURS SCHEDULED
Status: DISCONTINUED | OUTPATIENT
Start: 2022-05-14 | End: 2022-05-18 | Stop reason: HOSPADM

## 2022-05-14 RX ORDER — BUSPIRONE HYDROCHLORIDE 10 MG/1
1 TABLET ORAL 2 TIMES DAILY
Status: DISCONTINUED | OUTPATIENT
Start: 2022-05-14 | End: 2022-05-14

## 2022-05-14 RX ORDER — BUDESONIDE AND FORMOTEROL FUMARATE DIHYDRATE 160; 4.5 UG/1; UG/1
2 AEROSOL RESPIRATORY (INHALATION) 2 TIMES DAILY
Status: DISCONTINUED | OUTPATIENT
Start: 2022-05-14 | End: 2022-05-18 | Stop reason: HOSPADM

## 2022-05-14 RX ORDER — ENOXAPARIN SODIUM 100 MG/ML
40 INJECTION SUBCUTANEOUS NIGHTLY
Status: DISCONTINUED | OUTPATIENT
Start: 2022-05-14 | End: 2022-05-18 | Stop reason: HOSPADM

## 2022-05-14 RX ORDER — POLYETHYLENE GLYCOL 3350 17 G/17G
17 POWDER, FOR SOLUTION ORAL DAILY PRN
Status: DISCONTINUED | OUTPATIENT
Start: 2022-05-14 | End: 2022-05-14 | Stop reason: SDUPTHER

## 2022-05-14 RX ORDER — ALBUTEROL SULFATE 90 UG/1
2 AEROSOL, METERED RESPIRATORY (INHALATION) EVERY 4 HOURS PRN
Status: DISCONTINUED | OUTPATIENT
Start: 2022-05-14 | End: 2022-05-18 | Stop reason: HOSPADM

## 2022-05-14 RX ORDER — RISPERIDONE 0.25 MG/1
0.5 TABLET, FILM COATED ORAL 2 TIMES DAILY
Status: DISCONTINUED | OUTPATIENT
Start: 2022-05-14 | End: 2022-05-17

## 2022-05-14 RX ORDER — 0.9 % SODIUM CHLORIDE 0.9 %
500 INTRAVENOUS SOLUTION INTRAVENOUS ONCE
Status: COMPLETED | OUTPATIENT
Start: 2022-05-14 | End: 2022-05-14

## 2022-05-14 RX ORDER — SODIUM CHLORIDE 0.9 % (FLUSH) 0.9 %
5-40 SYRINGE (ML) INJECTION PRN
Status: DISCONTINUED | OUTPATIENT
Start: 2022-05-14 | End: 2022-05-18 | Stop reason: HOSPADM

## 2022-05-14 RX ORDER — IPRATROPIUM BROMIDE AND ALBUTEROL SULFATE 2.5; .5 MG/3ML; MG/3ML
3 SOLUTION RESPIRATORY (INHALATION) ONCE
Status: COMPLETED | OUTPATIENT
Start: 2022-05-14 | End: 2022-05-14

## 2022-05-14 RX ORDER — OXYCODONE AND ACETAMINOPHEN 10; 325 MG/1; MG/1
1 TABLET ORAL EVERY 4 HOURS PRN
Status: DISCONTINUED | OUTPATIENT
Start: 2022-05-14 | End: 2022-05-18

## 2022-05-14 RX ORDER — AZITHROMYCIN 250 MG/1
500 TABLET, FILM COATED ORAL EVERY 24 HOURS
Status: DISCONTINUED | OUTPATIENT
Start: 2022-05-15 | End: 2022-05-16

## 2022-05-14 RX ORDER — POLYETHYLENE GLYCOL 3350 17 G/17G
17 POWDER, FOR SOLUTION ORAL DAILY
Status: DISCONTINUED | OUTPATIENT
Start: 2022-05-14 | End: 2022-05-18 | Stop reason: HOSPADM

## 2022-05-14 RX ORDER — ONDANSETRON 4 MG/1
4 TABLET, ORALLY DISINTEGRATING ORAL EVERY 8 HOURS PRN
Status: DISCONTINUED | OUTPATIENT
Start: 2022-05-14 | End: 2022-05-18 | Stop reason: HOSPADM

## 2022-05-14 RX ORDER — SACCHAROMYCES BOULARDII 250 MG
250 CAPSULE ORAL 2 TIMES DAILY
Status: DISCONTINUED | OUTPATIENT
Start: 2022-05-14 | End: 2022-05-14

## 2022-05-14 RX ORDER — ONDANSETRON 2 MG/ML
4 INJECTION INTRAMUSCULAR; INTRAVENOUS EVERY 6 HOURS PRN
Status: DISCONTINUED | OUTPATIENT
Start: 2022-05-14 | End: 2022-05-18 | Stop reason: HOSPADM

## 2022-05-14 RX ORDER — SODIUM CHLORIDE 9 MG/ML
250 INJECTION, SOLUTION INTRAVENOUS PRN
Status: DISCONTINUED | OUTPATIENT
Start: 2022-05-14 | End: 2022-05-18 | Stop reason: HOSPADM

## 2022-05-14 RX ORDER — ACETAMINOPHEN 325 MG/1
650 TABLET ORAL EVERY 6 HOURS PRN
Status: DISCONTINUED | OUTPATIENT
Start: 2022-05-14 | End: 2022-05-18 | Stop reason: HOSPADM

## 2022-05-14 RX ORDER — ATORVASTATIN CALCIUM 40 MG/1
60 TABLET, FILM COATED ORAL DAILY
Status: DISCONTINUED | OUTPATIENT
Start: 2022-05-14 | End: 2022-05-18 | Stop reason: HOSPADM

## 2022-05-14 RX ORDER — PREGABALIN 100 MG/1
300 CAPSULE ORAL 2 TIMES DAILY
Status: DISCONTINUED | OUTPATIENT
Start: 2022-05-14 | End: 2022-05-16

## 2022-05-14 RX ORDER — ONDANSETRON 4 MG/1
4 TABLET, ORALLY DISINTEGRATING ORAL 3 TIMES DAILY PRN
Status: DISCONTINUED | OUTPATIENT
Start: 2022-05-14 | End: 2022-05-14 | Stop reason: SDUPTHER

## 2022-05-14 RX ORDER — SERTRALINE HYDROCHLORIDE 100 MG/1
100 TABLET, FILM COATED ORAL 2 TIMES DAILY
Status: DISCONTINUED | OUTPATIENT
Start: 2022-05-14 | End: 2022-05-18 | Stop reason: HOSPADM

## 2022-05-14 RX ORDER — LISINOPRIL 20 MG/1
10 TABLET ORAL DAILY
Status: DISCONTINUED | OUTPATIENT
Start: 2022-05-15 | End: 2022-05-18 | Stop reason: HOSPADM

## 2022-05-14 RX ORDER — ROPINIROLE 1 MG/1
2 TABLET, FILM COATED ORAL 2 TIMES DAILY
Status: DISCONTINUED | OUTPATIENT
Start: 2022-05-14 | End: 2022-05-16

## 2022-05-14 RX ORDER — SODIUM CHLORIDE 9 MG/ML
INJECTION, SOLUTION INTRAVENOUS CONTINUOUS
Status: DISCONTINUED | OUTPATIENT
Start: 2022-05-14 | End: 2022-05-16

## 2022-05-14 RX ADMIN — Medication 2 PUFF: at 23:20

## 2022-05-14 RX ADMIN — DEXAMETHASONE SODIUM PHOSPHATE 10 MG: 10 INJECTION INTRAMUSCULAR; INTRAVENOUS at 16:34

## 2022-05-14 RX ADMIN — RISPERIDONE 0.5 MG: 0.25 TABLET ORAL at 22:45

## 2022-05-14 RX ADMIN — ENOXAPARIN SODIUM 40 MG: 100 INJECTION SUBCUTANEOUS at 22:45

## 2022-05-14 RX ADMIN — OXYCODONE HYDROCHLORIDE AND ACETAMINOPHEN 1 TABLET: 10; 325 TABLET ORAL at 19:35

## 2022-05-14 RX ADMIN — Medication 10 ML: at 22:46

## 2022-05-14 RX ADMIN — SODIUM CHLORIDE 500 ML: 9 INJECTION, SOLUTION INTRAVENOUS at 16:35

## 2022-05-14 RX ADMIN — POLYETHYLENE GLYCOL (3350) 17 G: 17 POWDER, FOR SOLUTION ORAL at 22:46

## 2022-05-14 RX ADMIN — PREGABALIN 300 MG: 100 CAPSULE ORAL at 22:45

## 2022-05-14 RX ADMIN — ATORVASTATIN CALCIUM 60 MG: 40 TABLET, FILM COATED ORAL at 22:52

## 2022-05-14 RX ADMIN — SERTRALINE 100 MG: 100 TABLET, FILM COATED ORAL at 22:45

## 2022-05-14 RX ADMIN — ROPINIROLE HYDROCHLORIDE 2 MG: 1 TABLET, FILM COATED ORAL at 22:45

## 2022-05-14 RX ADMIN — DEXTROSE MONOHYDRATE 500 MG: 50 INJECTION, SOLUTION INTRAVENOUS at 19:17

## 2022-05-14 RX ADMIN — SODIUM CHLORIDE: 9 INJECTION, SOLUTION INTRAVENOUS at 22:44

## 2022-05-14 RX ADMIN — CEFTRIAXONE SODIUM 1000 MG: 1 INJECTION, POWDER, FOR SOLUTION INTRAMUSCULAR; INTRAVENOUS at 18:28

## 2022-05-14 RX ADMIN — IPRATROPIUM BROMIDE AND ALBUTEROL SULFATE 3 AMPULE: .5; 3 SOLUTION RESPIRATORY (INHALATION) at 16:34

## 2022-05-14 RX ADMIN — IPRATROPIUM BROMIDE AND ALBUTEROL SULFATE 1 AMPULE: .5; 3 SOLUTION RESPIRATORY (INHALATION) at 23:20

## 2022-05-14 ASSESSMENT — PAIN DESCRIPTION - DESCRIPTORS
DESCRIPTORS: ACHING
DESCRIPTORS: ACHING

## 2022-05-14 ASSESSMENT — PAIN DESCRIPTION - LOCATION
LOCATION: BACK
LOCATION: BACK

## 2022-05-14 ASSESSMENT — PAIN DESCRIPTION - ORIENTATION
ORIENTATION: UPPER
ORIENTATION: UPPER

## 2022-05-14 ASSESSMENT — PAIN SCALES - GENERAL
PAINLEVEL_OUTOF10: 8
PAINLEVEL_OUTOF10: 6

## 2022-05-14 NOTE — PLAN OF CARE
72-year-old female presenting with increased cough and shortness of breath for 3 to 4 days; has history of COPD on home oxygen 2 to 3 L, O2 sats stable on 2 L, COVID and flu negative, chest x-ray shows pneumonia. Admission for community-acquired pneumonia, placed on Rocephin and Zithromax. Patient also with worsening left leg cellulitis failed outpatient antibiotic treatment   Blood pressure elevated -resume home meds.

## 2022-05-14 NOTE — ED PROVIDER NOTES
[address] MEDICAL-SURGICAL      CHIEF COMPLAINT  Shortness of Breath (Patient states that she has increased leg swelling and blisters on lower legs. Has been sitting up to sleep and pain in the back when she breathes. History of COPD)       HISTORY OF PRESENT ILLNESS  Orlin Turner is a 74 y.o. female medical history of COPD on 2 to 3 L of nasal cannula O2 at home who presents to the emergency department for evaluation of shortness of breath, bilateral lower extremity swelling, and redness over the left leg. I saw patient for similar complaints in March. I sent her home with prescription of Keflex. Says that made the swelling and the redness appears somewhat better. Said she followed up with her PCP who put her on another oral antibiotic. That also seem to initially help with the redness and swelling, but never made it completely go away. Says since completing the second antibiotic, the redness and swelling has gotten much worse. Reports having worse than usual cough, productive sputum, and shortness of breath over the past 3 to 4 days. Not on any steroids. She has been trying breathing treatments at home with no improvement in symptoms. Denies having history of DVT or PE. No other complaints, modifying factors or associated symptoms. I have reviewed the following from the nursing documentation.     Past Medical History:   Diagnosis Date    Anxiety     Arthritis     Cancer ([facility] 1980    Cervical    Chronic back pain     Chronic pain     COPD [facility]     Depression     DJD (degenerative joint disease), cervical     Drug-seeking behavior     GERD (gastroesophageal reflux disease)     Hypercholesteremia     Hypertension     IBS (irritable bowel syndrome)     Insomnia     K deficiency     Kidney stone     Narcotic addiction [facility])     Patient Denies    Narcotic overdose ([facility] [address]     Patient Denies    Osteoporosis     Overdose     [id number] Patient Denies on 4/3/14    PNA (pneumonia) 10/17/2016    Restless leg syndrome     Thyroid disease      Past Surgical History:   Procedure Laterality Date    APPENDECTOMY      BACK SURGERY      CERVICAL DISCECTOMY  12-14-13    anterior discetomy fusion c 3/4 c 4/5 posterior fusion C3-C5 screws and rods C3-C5    CHOLECYSTECTOMY  2012    Laparoscopic    COLONOSCOPY  2008    COLONOSCOPY  08/10/2016    FOOT SURGERY Right     FRACTURE SURGERY Right 9/14    distal radius    HAND SURGERY  08/2012    crushed right hand and has two steel plates in hand    HYSTERECTOMY      NECK SURGERY  2002    disc    OTHER SURGICAL HISTORY  4/9/14    TRANSFORAMINAL LUMBAR INTERBODY FUSION L4 L5     Family History   Problem Relation Age of Onset    Heart Disease Mother     High Cholesterol Mother     Diabetes Mother     Asthma Mother     Heart Failure Mother     Hypertension Mother     Heart Disease Father     High Cholesterol Father     Emphysema Father     Heart Failure Father     Hypertension Father     Cancer Neg Hx      Social History     Socioeconomic History    Marital status:      Spouse name: Not on file    Number of children: 3    Years of education: Not on file    Highest education level: Not on file   Occupational History    Not on file   Tobacco Use    Smoking status: Current Every Day Smoker     Packs/day: 1.00     Years: 40.00     Pack years: 40.00     Types: Cigarettes     Start date: 1/1/1973    Smokeless tobacco: Never Used   Substance and Sexual Activity    Alcohol use: No    Drug use: No    Sexual activity: Not Currently   Other Topics Concern    Not on file   Social History Narrative    Not on file     Social Determinants of Health     Financial Resource Strain:     Difficulty of Paying Living Expenses: Not on file   Food Insecurity:     Worried About Running Out of Food in the Last Year: Not on file    Alyssa of Food in the Last Year: Not on HWei Delarosa Needs:     Lack of Transportation (Medical): Not on file    Lack of Transportation (Non-Medical):  Not on file   Physical Activity:     Days of Exercise per Week: Not on file    Minutes of Exercise per Session: Not on file   Stress:     Feeling of Stress : Not on file   Social Connections:     Frequency of Communication with Friends and Family: Not on file    Frequency of Social Gatherings with Friends and Family: Not on file    Attends Catholic Services: Not on file    Active Member of [address] [place] [address] or Organizations: Not on file    Attends Club or Organization Meetings: Not on file    Marital Status: Not on file   Intimate Partner Violence:     Fear of Current or Ex-Partner: Not on file    Emotionally Abused: Not on file    Physically Abused: Not on file    Sexually Abused: Not on file   Housing Stability:     Unable to Pay for Housing in the Last Year: Not on file    Number of [place] in the Last Year: Not on file    Unstable Housing in the Last Year: Not on file     Current Facility-Administered Medications   Medication Dose Route Frequency Provider Last Rate Last Admin    ipratropium-albuterol (DUONEB) nebulizer solution 1 ampule  1 ampule Inhalation Q4H While awake Samson Diamond MD        albuterol sulfate  (90 Base) MCG/ACT inhaler 2 puff  2 puff Inhalation Q4H PRN Samson Diamond MD        atorvastatin (LIPITOR) tablet 60 mg  60 mg Oral Daily Samson Diamond MD   60 mg at 05/14/22 2252    budesonide-formoterol (SYMBICORT) 160-4.5 MCG/ACT inhaler 2 puff  2 puff Inhalation BID Samson Diamond MD   2 puff at 05/14/22 2320    lisinopril (PRINIVIL;ZESTRIL) tablet 10 mg  10 mg Oral Daily Samson Diamond MD        metaxalone [facility]) tablet 800 mg  800 mg Oral TID Samson Diamnod MD        pantoprazole (PROTONIX) tablet 40 mg  40 mg Oral QAM AC Samson Diamond MD        pregabalin (LYRICA) capsule 300 mg  300 mg Oral BID Samson Diamond MD   300 mg at 05/14/22 2245    sertraline (ZOLOFT) tablet 100 mg  100 mg Oral BID Gracie Garber MD   100 mg at 05/14/22 2245    rOPINIRole (REQUIP) tablet 2 mg  2 mg Oral BID Gracie Garber MD   2 mg at 05/14/22 2245    risperiDONE (RISPERDAL) tablet 0.5 mg  0.5 mg Oral BID Gracie Garber MD   0.5 mg at 05/14/22 2245    oxyCODONE-acetaminophen (PERCOCET)  MG per tablet 1 tablet  1 tablet Oral Q4H PRN Gracie Garber MD   1 tablet at 05/14/22 1935    polyethylene glycol (GLYCOLAX) packet 17 g  17 g Oral Daily Gracie Garber MD   17 g at 05/14/22 2246    0.9 % sodium chloride infusion   IntraVENous Continuous Gracie Garber MD 75 mL/hr at 05/14/22 2244 New Bag at 05/14/22 2244    sodium chloride flush 0.9 % injection 5-40 mL  5-40 mL IntraVENous 2 times per day Gracie Garber MD   10 mL at 05/14/22 2246    sodium chloride flush 0.9 % injection 5-40 mL  5-40 mL IntraVENous PRN Gracie Garber MD        0.9 % sodium chloride infusion  250 mL IntraVENous PRN Gracie Garber MD        enoxaparin (LOVENOX) injection 40 mg  40 mg SubCUTAneous Nightly Gracie Garber MD   40 mg at 05/14/22 2245    ondansetron (ZOFRAN-ODT) disintegrating tablet 4 mg  4 mg Oral Q8H PRN Gracie Garber MD        Or    [facility]ron [facility] injection 4 mg  4 mg IntraVENous Q6H PRN Gracie Garber MD        acetaminophen (TYLENOL) tablet 650 mg  650 mg Oral Q6H PRN Gracie Garber MD        Or   [place] acetaminophen (TYLENOL) suppository 650 mg  650 mg Rectal Q6H PRN Gracie Garber MD        cefTRIAXone (ROCEPHIN) 1000 mg IVPB in 50 mL D5W minibag  1,000 mg IntraVENous Q24H Gracie Garber MD        And    azithromycin (ZITHROMAX) tablet 500 mg  500 mg Oral Q24H Gracie Garber MD         Allergies   Allergen Reactions    Asa Buff (Mag [Buffered Aspirin] Hives    Cymbalta [Duloxetine Hcl] Nausea And Vomiting       REVIEW OF SYSTEMS  10 systems reviewed, pertinent positives per HPI otherwise noted to be negative.     PHYSICAL EXAM  BP (!) 153/80   Pulse 94   Temp 97.1 °F (36.2 °C) (Oral)   Resp 18   Ht 5' 3\" (1.6 m)   Wt 180 lb (81.6 kg)   SpO2 97%   BMI 31.89 kg/m²    GENERAL APPEARANCE: Awake and alert. On 3 L nasal cannula O2. HENT: Normocephalic. Atraumatic. PERRL. EOMI. No facial droop. HEART/CHEST: RRR. LUNGS:  satting greater than 90% on home 3 L nasal cannula O2. Mild expiratory wheezing bilaterally. ABDOMEN: Soft, non-distended abdomen. Non tender to palpation. No guarding. No rebound. EXTREMITIES: Circumferential erythema and warmth to mid left tib-fib. Moving all toes bilaterally. Palpable pulses bilaterally. SKIN: Warm and dry. No acute rashes. NEUROLOGICAL: Alert and oriented. No gross facial drooping. Answering questions appropriately. Moving all extremities. PSYCHIATRIC: Pleasant. Normal mood and affect.     LABS  Results for orders placed or performed during the hospital encounter of 05/14/22   COVID-19 & Influenza Combo    Specimen: Nasopharyngeal Swab   Result Value Ref Range    SARS-CoV-2 RNA, RT PCR NOT DETECTED NOT DETECTED    INFLUENZA A NOT DETECTED NOT DETECTED    INFLUENZA B NOT DETECTED NOT DETECTED   Brain Natriuretic Peptide   Result Value Ref Range    Pro-BNP 35 0 - 124 pg/mL   CBC with Auto Differential   Result Value Ref Range    WBC 8.5 4.0 - 11.0 K/uL    RBC 4.02 4.00 - 5.20 M/uL    Hemoglobin 12.8 12.0 - 16.0 g/dL    Hematocrit 38.9 36.0 - 48.0 %    MCV 96.8 80.0 - 100.0 fL    MCH 31.8 26.0 - 34.0 pg    MCHC 32.9 31.0 - 36.0 g/dL    RDW 16.2 (H) 12.4 - 15.4 %    Platelets 523 436 - 179 K/uL    MPV 8.3 5.0 - 10.5 fL    Neutrophils % 80.9 %    Lymphocytes % 9.0 %    Monocytes % 9.3 %    Eosinophils % 0.1 %    Basophils % 0.7 %    Neutrophils Absolute 6.9 1.7 - 7.7 K/uL    Lymphocytes Absolute 0.8 (L) 1.0 - 5.1 K/uL    Monocytes Absolute 0.8 0.0 - 1.3 K/uL    Eosinophils Absolute 0.0 0.0 - 0.6 K/uL    Basophils Absolute 0.1 0.0 - 0.2 K/uL   Comprehensive Metabolic Panel w/ Reflex to MG   Result Value Ref Range    Sodium 138 136 - 145 mmol/L    Potassium reflex Magnesium 4.6 3.5 - 5.1 mmol/L    Chloride 99 99 - 110 mmol/L    CO2 27 21 - 32 mmol/L    Anion Gap 12 3 - 16    Glucose 212 (H) 70 - 99 mg/dL    BUN 17 7 - 20 mg/dL    CREATININE 0.9 0.6 - 1.2 mg/dL    GFR Non-African American >60 >60    GFR African American >60 >60    Calcium 9.6 8.3 - 10.6 mg/dL    Total Protein 7.2 6.4 - 8.2 g/dL    Albumin 4.2 3.4 - 5.0 g/dL    Albumin/Globulin Ratio 1.4 1.1 - 2.2    Total Bilirubin 0.3 0.0 - 1.0 mg/dL    Alkaline Phosphatase 112 40 - 129 U/L    ALT 10 10 - 40 U/L    AST 14 (L) 15 - 37 U/L   Blood gas, venous   Result Value Ref Range    pH, Don 7.345 (L) 7.350 - 7.450    pCO2, Don 55.8 (H) 40.0 - 50.0 mmHg    pO2, Don 40.3 (H) 25.0 - 40.0 mmHg    HCO3, Venous 29.8 (H) 23.0 - 29.0 mmol/L    Base Excess, Don 2.8 -3.0 - 3.0 mmol/L    O2 Sat, Don 72 Not Established %    Carboxyhemoglobin 12.6 (H) 0.0 - 1.5 %    MetHgb, Don 0.3 <1.5 %    TC02 (Calc), Don 32 Not Established mmol/L    O2 Content, Don 13 Not Established VOL %    O2 Therapy Unknown    Lactate, Sepsis   Result Value Ref Range    Lactic Acid, Sepsis 1.5 0.4 - 1.9 mmol/L   Lactate, Sepsis   Result Value Ref Range    Lactic Acid, Sepsis 1.2 0.4 - 1.9 mmol/L   D-Dimer, Quantitative   Result Value Ref Range    D-Dimer, Quant 304 (H) 0 - 229 ng/mL DDU   EKG 12 Lead   Result Value Ref Range    Ventricular Rate 107 BPM    Atrial Rate 107 BPM    P-R Interval 114 ms    QRS Duration 70 ms    Q-T Interval 322 ms    QTc Calculation (Bazett) 429 ms    P Axis 43 degrees    R Axis 88 degrees    T Axis 39 degrees    Diagnosis       Sinus tachycardiaOtherwise normal ECGWhen compared with ECG of 07-MAR-2021 16:14,No significant change was found       I have reviewed all labs for this visit. ECG  The Ekg interpreted by me shows  Sinus tachycardia with a rate of 107  Axis is normal  QTc is normal  Intervals and Durations are unremarkable.       ST Segments: Nonspecific changes    RADIOLOGY  XR CHEST (2 VW)    Result Date: 5/14/2022  EXAMINATION: TWO XRAY VIEWS OF THE CHEST 5/14/2022 4:00 pm COMPARISON: Chest x-ray dated 03/07/2021 HISTORY: ORDERING SYSTEM PROVIDED HISTORY: shortness of breath TECHNOLOGIST PROVIDED HISTORY: Reason for exam:->shortness of breath Reason for Exam: cough sob FINDINGS: Mild bilateral interstitial opacities may represent infection versus atelectasis versus pulmonary edema. No pleural effusion or pneumothorax. Cardiac silhouette is enlarged. Degenerative disease of the visualized osseous structures. Mild bilateral interstitial opacities may represent infection versus atelectasis versus pulmonary edema. XR TIBIA FIBULA LEFT (2 VIEWS)    Result Date: 5/14/2022  EXAMINATION: 2 XRAY VIEWS OF THE LEFT TIBIA AND FIBULA 5/14/2022 4:35 pm COMPARISON: None. HISTORY: ORDERING SYSTEM PROVIDED HISTORY: redness. swelling TECHNOLOGIST PROVIDED HISTORY: Reason for exam:->redness. swelling Reason for Exam: swelling reddness FINDINGS: The tibia and fibula are intact. No fracture or dislocation is seen. The joint spaces are normal.  There is moderate stranding and edema in the soft tissues around the calf region extending inferiorly. The bones are osteopenic. There is no periosteal reaction. Moderate cellulitis or edema in the soft tissues around the calf extending distally around the ankle with no acute bony abnormality. ED COURSE/MDM  Patient seen and evaluated. At presentation, patient was awake, alert, afebrile, hemodynamically stable, and satting greater than 90% on her home 3 L nasal cannula O2. Had some wheezing on auscultation bilaterally. She has circumferential edema and erythema involving the distal left tib-fib. Differential diagnosis includes cellulitis versus osteomyelitis. She has no crepitus, no fever, low concern for necrotizing soft tissue infection. Trays of the tib-fib obtained which shows moderate cellulitis or edema.   No acute bony abnormality present. She has wheezing bilaterally. She was given Decadron and DuoNeb's. On reassessment, patient reports improved symptoms. Lung sounds improved. She remained stable on her home 3 L nasal cannula O2 in the ED. Per chart review, patient was discharged home with prescription of Keflex. As she reports failing to outpatient treatment with oral antibiotics for the left leg cellulitis, I do think patient warrants hospitalization and IV antibiotics. Her lactate is normal at 1.5. No leukocytosis present. She does not meet SIRS criteria. BNP is less than 100. D-dimer elevated, but within normal limits when age-adjusted. Chest x-ray shows mild bilateral interstitial opacities, may represent infection versus atelectasis versus pulmonary edema. As BNP less than 100, not due to heart failure. Given her productive cough, interstitial opacities, will treat as community-acquired pneumonia with IV ceftriaxone and azithromycin which will also cover for cellulitis. Hospitalist consulted for admission for further evaluation and treatment. Admit. Pt was seen during the [place] 19 pandemic. Appropriate PPE worn by ME during patient encounters. Patient was cared for during a time with constrained hospital bed capacity with nationwide stress on resources and staffing.      During the patient's ED course, the patient was given:  Medications   albuterol sulfate  (90 Base) MCG/ACT inhaler 2 puff (has no administration in time range)   atorvastatin (LIPITOR) tablet 60 mg (60 mg Oral Given 5/14/22 2252)   budesonide-formoterol (SYMBICORT) 160-4.5 MCG/ACT inhaler 2 puff (2 puffs Inhalation Given 5/14/22 2320)   lisinopril (PRINIVIL;ZESTRIL) tablet 10 mg (has no administration in time range)   metaxalone (SKELAXIN) tablet 800 mg (800 mg Oral Not Given 5/14/22 2252)   pantoprazole (PROTONIX) tablet 40 mg (has no administration in time range)   pregabalin (LYRICA) capsule 300 mg (300 mg Oral Given 5/14/22 2245) sertraline (ZOLOFT) tablet 100 mg (100 mg Oral Given 5/14/22 2245)   rOPINIRole (REQUIP) tablet 2 mg (2 mg Oral Given 5/14/22 2245)   risperiDONE (RISPERDAL) tablet 0.5 mg (0.5 mg Oral Given 5/14/22 2245)   oxyCODONE-acetaminophen (PERCOCET)  MG per tablet 1 tablet (1 tablet Oral Given 5/14/22 1935)   polyethylene glycol (GLYCOLAX) packet 17 g (17 g Oral Given 5/14/22 2246)   0.9 % sodium chloride infusion ( IntraVENous New Bag 5/14/22 2244)   sodium chloride flush 0.9 % injection 5-40 mL (10 mLs IntraVENous Given 5/14/22 2246)   sodium chloride flush 0.9 % injection 5-40 mL (has no administration in time range)   0.9 % sodium chloride infusion (has no administration in time range)   enoxaparin (LOVENOX) injection 40 mg (40 mg SubCUTAneous Given 5/14/22 2245)   ondansetron (ZOFRAN-ODT) disintegrating tablet 4 mg (has no administration in time range)     Or   ondansetron (ZOFRAN) injection 4 mg (has no administration in time range)   acetaminophen (TYLENOL) tablet 650 mg (has no administration in time range)     Or   acetaminophen (TYLENOL) suppository 650 mg (has no administration in time range)   cefTRIAXone (ROCEPHIN) 1000 mg IVPB in 50 mL D5W minibag (has no administration in time range)     And   azithromycin (ZITHROMAX) tablet 500 mg (has no administration in time range)   ipratropium-albuterol (DUONEB) nebulizer solution 1 ampule (has no administration in time range)   dexamethasone (PF) (DECADRON) injection 10 mg (10 mg IntraVENous Given 5/14/22 1634)   ipratropium-albuterol (DUONEB) nebulizer solution 3 ampule (3 ampules Inhalation Given 5/14/22 1634)   0.9 % sodium chloride bolus (500 mLs IntraVENous New Bag 5/14/22 1635)   azithromycin (ZITHROMAX) 500 mg in D5W 250ml addavial (0 mg IntraVENous Stopped 5/14/22 2017)        CLINICAL IMPRESSION  1. COPD exacerbation ([address]    2.  Cellulitis of left lower extremity        Blood pressure (!) 153/80, pulse 94, temperature 97.1 °F (36.2 °C), temperature source Oral, resp. rate 18, height 5' 3\" (1.6 m), weight 180 lb (81.6 kg), SpO2 97 %, not currently breastfeeding. DISPOSITION  Delio Pisano was admitted to the hospital.    Patient was given scripts for the following medications. I counseled patient how to take these medications. Current Discharge Medication List          Follow-up with:  No follow-up provider specified. DISCLAIMER: This chart was created using Dragon dictation software. Efforts were made by me to ensure accuracy, however some errors may be present due to limitations of this technology and occasionally words are not transcribed correctly.         Filemon Del Angel MD  05/15/22 3397

## 2022-05-14 NOTE — ED NOTES
1815- Perfect serve sent to hospitalist  [phone]- Dr. Ruth Briceno returned call, spoke to Dr. Alyssa Tomlin  05/14/22 [address] [facility]  05/14/22 1811

## 2022-05-15 ENCOUNTER — APPOINTMENT (OUTPATIENT)
Dept: GENERAL RADIOLOGY | Age: 67
DRG: 191 | End: 2022-05-15
Payer: MEDICARE

## 2022-05-15 ENCOUNTER — APPOINTMENT (OUTPATIENT)
Dept: CT IMAGING | Age: 67
DRG: 191 | End: 2022-05-15
Payer: MEDICARE

## 2022-05-15 LAB
ANION GAP SERPL CALCULATED.3IONS-SCNC: 7 MMOL/L (ref 3–16)
BASOPHILS ABSOLUTE: 0 K/UL (ref 0–0.2)
BASOPHILS RELATIVE PERCENT: 1.4 %
BUN BLDV-MCNC: 15 MG/DL (ref 7–20)
CALCIUM SERPL-MCNC: 9.3 MG/DL (ref 8.3–10.6)
CHLORIDE BLD-SCNC: 102 MMOL/L (ref 99–110)
CO2: 32 MMOL/L (ref 21–32)
CREAT SERPL-MCNC: 0.8 MG/DL (ref 0.6–1.2)
EKG ATRIAL RATE: 107 BPM
EKG DIAGNOSIS: NORMAL
EKG P AXIS: 43 DEGREES
EKG P-R INTERVAL: 114 MS
EKG Q-T INTERVAL: 322 MS
EKG QRS DURATION: 70 MS
EKG QTC CALCULATION (BAZETT): 429 MS
EKG R AXIS: 88 DEGREES
EKG T AXIS: 39 DEGREES
EKG VENTRICULAR RATE: 107 BPM
EOSINOPHILS ABSOLUTE: 0 K/UL (ref 0–0.6)
EOSINOPHILS RELATIVE PERCENT: 0 %
GFR AFRICAN AMERICAN: >60
GFR NON-AFRICAN AMERICAN: >60
GLUCOSE BLD-MCNC: 186 MG/DL (ref 70–99)
HCT VFR BLD CALC: 38.1 % (ref 36–48)
HEMOGLOBIN: 12.2 G/DL (ref 12–16)
LYMPHOCYTES ABSOLUTE: 0.3 K/UL (ref 1–5.1)
LYMPHOCYTES RELATIVE PERCENT: 4 %
MCH RBC QN AUTO: 30.7 PG (ref 26–34)
MCHC RBC AUTO-ENTMCNC: 32.1 G/DL (ref 31–36)
MCV RBC AUTO: 95.6 FL (ref 80–100)
MONOCYTES ABSOLUTE: 0.5 K/UL (ref 0–1.3)
MONOCYTES RELATIVE PERCENT: 7 %
NEUTROPHILS ABSOLUTE: 6.9 K/UL (ref 1.7–7.7)
NEUTROPHILS RELATIVE PERCENT: 89 %
PDW BLD-RTO: 15.9 % (ref 12.4–15.4)
PLATELET # BLD: 154 K/UL (ref 135–450)
PLATELET SLIDE REVIEW: ADEQUATE
PMV BLD AUTO: 8.6 FL (ref 5–10.5)
POTASSIUM REFLEX MAGNESIUM: 5.3 MMOL/L (ref 3.5–5.1)
RBC # BLD: 3.99 M/UL (ref 4–5.2)
SLIDE REVIEW: ABNORMAL
SODIUM BLD-SCNC: 141 MMOL/L (ref 136–145)
WBC # BLD: 7.8 K/UL (ref 4–11)

## 2022-05-15 PROCEDURE — 6360000002 HC RX W HCPCS

## 2022-05-15 PROCEDURE — 94640 AIRWAY INHALATION TREATMENT: CPT

## 2022-05-15 PROCEDURE — 80048 BASIC METABOLIC PNL TOTAL CA: CPT

## 2022-05-15 PROCEDURE — 93010 ELECTROCARDIOGRAM REPORT: CPT | Performed by: INTERNAL MEDICINE

## 2022-05-15 PROCEDURE — 71046 X-RAY EXAM CHEST 2 VIEWS: CPT

## 2022-05-15 PROCEDURE — 6360000002 HC RX W HCPCS: Performed by: INTERNAL MEDICINE

## 2022-05-15 PROCEDURE — 71275 CT ANGIOGRAPHY CHEST: CPT

## 2022-05-15 PROCEDURE — 94761 N-INVAS EAR/PLS OXIMETRY MLT: CPT

## 2022-05-15 PROCEDURE — 6370000000 HC RX 637 (ALT 250 FOR IP): Performed by: INTERNAL MEDICINE

## 2022-05-15 PROCEDURE — 6360000004 HC RX CONTRAST MEDICATION: Performed by: INTERNAL MEDICINE

## 2022-05-15 PROCEDURE — 2700000000 HC OXYGEN THERAPY PER DAY

## 2022-05-15 PROCEDURE — 87449 NOS EACH ORGANISM AG IA: CPT

## 2022-05-15 PROCEDURE — 1200000000 HC SEMI PRIVATE

## 2022-05-15 PROCEDURE — 85025 COMPLETE CBC W/AUTO DIFF WBC: CPT

## 2022-05-15 PROCEDURE — 2580000003 HC RX 258: Performed by: INTERNAL MEDICINE

## 2022-05-15 PROCEDURE — 36415 COLL VENOUS BLD VENIPUNCTURE: CPT

## 2022-05-15 PROCEDURE — 99223 1ST HOSP IP/OBS HIGH 75: CPT | Performed by: INTERNAL MEDICINE

## 2022-05-15 RX ORDER — NICOTINE 21 MG/24HR
1 PATCH, TRANSDERMAL 24 HOURS TRANSDERMAL DAILY
Status: DISCONTINUED | OUTPATIENT
Start: 2022-05-15 | End: 2022-05-18 | Stop reason: HOSPADM

## 2022-05-15 RX ORDER — METHYLPREDNISOLONE SODIUM SUCCINATE 40 MG/ML
40 INJECTION, POWDER, LYOPHILIZED, FOR SOLUTION INTRAMUSCULAR; INTRAVENOUS EVERY 12 HOURS
Status: DISCONTINUED | OUTPATIENT
Start: 2022-05-15 | End: 2022-05-17

## 2022-05-15 RX ORDER — METHYLPREDNISOLONE SODIUM SUCCINATE 40 MG/ML
40 INJECTION, POWDER, LYOPHILIZED, FOR SOLUTION INTRAMUSCULAR; INTRAVENOUS DAILY
Status: DISCONTINUED | OUTPATIENT
Start: 2022-05-15 | End: 2022-05-15

## 2022-05-15 RX ORDER — IPRATROPIUM BROMIDE AND ALBUTEROL SULFATE 2.5; .5 MG/3ML; MG/3ML
1 SOLUTION RESPIRATORY (INHALATION)
Status: DISCONTINUED | OUTPATIENT
Start: 2022-05-15 | End: 2022-05-16

## 2022-05-15 RX ORDER — TIZANIDINE 4 MG/1
4 TABLET ORAL EVERY 8 HOURS PRN
Status: DISCONTINUED | OUTPATIENT
Start: 2022-05-15 | End: 2022-05-16

## 2022-05-15 RX ADMIN — SERTRALINE 100 MG: 100 TABLET, FILM COATED ORAL at 09:21

## 2022-05-15 RX ADMIN — METAXALONE 800 MG: 800 TABLET ORAL at 20:34

## 2022-05-15 RX ADMIN — ROPINIROLE HYDROCHLORIDE 2 MG: 1 TABLET, FILM COATED ORAL at 09:21

## 2022-05-15 RX ADMIN — CEFTRIAXONE SODIUM 1000 MG: 1 INJECTION, POWDER, FOR SOLUTION INTRAMUSCULAR; INTRAVENOUS at 17:33

## 2022-05-15 RX ADMIN — OXYCODONE HYDROCHLORIDE AND ACETAMINOPHEN 1 TABLET: 10; 325 TABLET ORAL at 02:55

## 2022-05-15 RX ADMIN — OXYCODONE HYDROCHLORIDE AND ACETAMINOPHEN 1 TABLET: 10; 325 TABLET ORAL at 20:42

## 2022-05-15 RX ADMIN — LISINOPRIL 10 MG: 20 TABLET ORAL at 09:21

## 2022-05-15 RX ADMIN — RISPERIDONE 0.5 MG: 0.25 TABLET ORAL at 09:21

## 2022-05-15 RX ADMIN — METHYLPREDNISOLONE SODIUM SUCCINATE 40 MG: 40 INJECTION, POWDER, FOR SOLUTION INTRAMUSCULAR; INTRAVENOUS at 09:28

## 2022-05-15 RX ADMIN — SERTRALINE 100 MG: 100 TABLET, FILM COATED ORAL at 20:34

## 2022-05-15 RX ADMIN — ATORVASTATIN CALCIUM 60 MG: 40 TABLET, FILM COATED ORAL at 09:21

## 2022-05-15 RX ADMIN — PREGABALIN 300 MG: 100 CAPSULE ORAL at 20:34

## 2022-05-15 RX ADMIN — IPRATROPIUM BROMIDE AND ALBUTEROL SULFATE 1 AMPULE: .5; 3 SOLUTION RESPIRATORY (INHALATION) at 15:29

## 2022-05-15 RX ADMIN — PREGABALIN 300 MG: 100 CAPSULE ORAL at 09:21

## 2022-05-15 RX ADMIN — Medication 2 PUFF: at 19:44

## 2022-05-15 RX ADMIN — POLYETHYLENE GLYCOL (3350) 17 G: 17 POWDER, FOR SOLUTION ORAL at 09:21

## 2022-05-15 RX ADMIN — IPRATROPIUM BROMIDE AND ALBUTEROL SULFATE 1 AMPULE: .5; 3 SOLUTION RESPIRATORY (INHALATION) at 19:44

## 2022-05-15 RX ADMIN — OXYCODONE HYDROCHLORIDE AND ACETAMINOPHEN 1 TABLET: 10; 325 TABLET ORAL at 09:20

## 2022-05-15 RX ADMIN — METHYLPREDNISOLONE SODIUM SUCCINATE 40 MG: 40 INJECTION, POWDER, FOR SOLUTION INTRAMUSCULAR; INTRAVENOUS at 20:34

## 2022-05-15 RX ADMIN — AZITHROMYCIN 500 MG: 250 TABLET, FILM COATED ORAL at 17:28

## 2022-05-15 RX ADMIN — IOPAMIDOL 75 ML: 755 INJECTION, SOLUTION INTRAVENOUS at 13:53

## 2022-05-15 RX ADMIN — ENOXAPARIN SODIUM 40 MG: 100 INJECTION SUBCUTANEOUS at 20:34

## 2022-05-15 RX ADMIN — PANTOPRAZOLE SODIUM 40 MG: 40 TABLET, DELAYED RELEASE ORAL at 05:25

## 2022-05-15 RX ADMIN — RISPERIDONE 0.5 MG: 0.25 TABLET ORAL at 20:34

## 2022-05-15 RX ADMIN — IPRATROPIUM BROMIDE AND ALBUTEROL SULFATE 1 AMPULE: .5; 3 SOLUTION RESPIRATORY (INHALATION) at 22:59

## 2022-05-15 RX ADMIN — IPRATROPIUM BROMIDE AND ALBUTEROL SULFATE 1 AMPULE: .5; 3 SOLUTION RESPIRATORY (INHALATION) at 11:36

## 2022-05-15 RX ADMIN — ROPINIROLE HYDROCHLORIDE 2 MG: 1 TABLET, FILM COATED ORAL at 20:34

## 2022-05-15 RX ADMIN — SODIUM CHLORIDE: 9 INJECTION, SOLUTION INTRAVENOUS at 17:30

## 2022-05-15 RX ADMIN — METAXALONE 800 MG: 800 TABLET ORAL at 09:21

## 2022-05-15 RX ADMIN — Medication 10 ML: at 20:35

## 2022-05-15 RX ADMIN — OXYCODONE HYDROCHLORIDE AND ACETAMINOPHEN 1 TABLET: 10; 325 TABLET ORAL at 14:10

## 2022-05-15 ASSESSMENT — PAIN DESCRIPTION - FREQUENCY
FREQUENCY: CONTINUOUS
FREQUENCY: CONTINUOUS

## 2022-05-15 ASSESSMENT — PAIN - FUNCTIONAL ASSESSMENT
PAIN_FUNCTIONAL_ASSESSMENT: ACTIVITIES ARE NOT PREVENTED
PAIN_FUNCTIONAL_ASSESSMENT: ACTIVITIES ARE NOT PREVENTED

## 2022-05-15 ASSESSMENT — PAIN DESCRIPTION - PAIN TYPE
TYPE: CHRONIC PAIN
TYPE: ACUTE PAIN

## 2022-05-15 ASSESSMENT — PAIN SCALES - GENERAL
PAINLEVEL_OUTOF10: 9
PAINLEVEL_OUTOF10: 6
PAINLEVEL_OUTOF10: 10

## 2022-05-15 ASSESSMENT — PAIN DESCRIPTION - ONSET
ONSET: GRADUAL
ONSET: GRADUAL

## 2022-05-15 ASSESSMENT — PAIN DESCRIPTION - ORIENTATION
ORIENTATION: LOWER
ORIENTATION: UPPER

## 2022-05-15 ASSESSMENT — PAIN DESCRIPTION - LOCATION
LOCATION: BACK
LOCATION: BACK

## 2022-05-15 ASSESSMENT — PAIN DESCRIPTION - DESCRIPTORS
DESCRIPTORS: ACHING
DESCRIPTORS: ACHING

## 2022-05-15 NOTE — PLAN OF CARE
Problem: Discharge Planning  Goal: Discharge to home or other facility with appropriate resources  Outcome: Progressing  Flowsheets (Taken 5/14/2022 2037)  Discharge to home or other facility with appropriate resources: Identify discharge learning needs (meds, wound care, etc)     Problem: Pain  Goal: Verbalizes/displays adequate comfort level or baseline comfort level  Outcome: Progressing     Problem: ABCDS Injury Assessment  Goal: Absence of physical injury  Outcome: Progressing

## 2022-05-15 NOTE — PLAN OF CARE
Problem: Discharge Planning  Goal: Discharge to home or other facility with appropriate resources  5/15/2022 1136 by Jamari Greenwood RN  Outcome: Progressing  Flowsheets (Taken 5/15/2022 0915)  Discharge to home or other facility with appropriate resources: Identify barriers to discharge with patient and caregiver  5/15/2022 0107 by Alicia Wadsworth RN  Outcome: Progressing  Flowsheets (Taken 5/14/2022 2037)  Discharge to home or other facility with appropriate resources: Identify discharge learning needs (meds, wound care, etc)     Problem: Pain  Goal: Verbalizes/displays adequate comfort level or baseline comfort level  5/15/2022 1136 by Jamari Greenwood RN  Outcome: Progressing  5/15/2022 0107 by Alicia Wadsworth RN  Outcome: Progressing     Problem: ABCDS Injury Assessment  Goal: Absence of physical injury  5/15/2022 1136 by Jamari Greenwood RN  Outcome: Progressing  5/15/2022 0107 by Alicia Wadsworth RN  Outcome: Progressing     Problem: Safety - Adult  Goal: Free from fall injury  Outcome: Progressing

## 2022-05-15 NOTE — PROGRESS NOTES
RT Inhaler-Nebulizer Bronchodilator Protocol Note    There is a bronchodilator order in the chart from a provider indicating to follow the RT Bronchodilator Protocol and there is an Initiate RT Inhaler-Nebulizer Bronchodilator Protocol order as well (see protocol at bottom of note). CXR Findings:  No results found. The findings from the last RT Protocol Assessment were as follows:   History Pulmonary Disease: (P) Chronic pulmonary disease  Respiratory Pattern: (P) Dyspnea on exertion or RR 21-25 bpm  Breath Sounds: (P) Inspiratory and expiratory or bilateral wheezing and/or rhonchi  Cough: (P) Strong, spontaneous, non-productive  Indication for Bronchodilator Therapy: (P) Wheezing associated with pulm disorder  Bronchodilator Assessment Score: (P) 10    Aerosolized bronchodilator medication orders have been revised according to the RT Inhaler-Nebulizer Bronchodilator Protocol below. Respiratory Therapist to perform RT Therapy Protocol Assessment initially then follow the protocol. Repeat RT Therapy Protocol Assessment PRN for score 0-3 or on second treatment, BID, and PRN for scores above 3. No Indications - adjust the frequency to every 6 hours PRN wheezing or bronchospasm, if no treatments needed after 48 hours then discontinue using Per Protocol order mode. If indication present, adjust the RT bronchodilator orders based on the Bronchodilator Assessment Score as indicated below. Use Inhaler orders unless patient has one or more of the following: on home nebulizer, not able to hold breath for 10 seconds, is not alert and oriented, cannot activate and use MDI correctly, or respiratory rate 25 breaths per minute or more, then use the equivalent nebulizer order(s) with same Frequency and PRN reasons based on the score. If a patient is on this medication at home then do not decrease Frequency below that used at home.     0-3 - enter or revise RT bronchodilator order(s) to equivalent RT Bronchodilator order with Frequency of every 4 hours PRN for wheezing or increased work of breathing using Per Protocol order mode. 4-6 - enter or revise RT Bronchodilator order(s) to two equivalent RT bronchodilator orders with one order with BID Frequency and one order with Frequency of every 4 hours PRN wheezing or increased work of breathing using Per Protocol order mode. 7-10 - enter or revise RT Bronchodilator order(s) to two equivalent RT bronchodilator orders with one order with TID Frequency and one order with Frequency of every 4 hours PRN wheezing or increased work of breathing using Per Protocol order mode. 11-13 - enter or revise RT Bronchodilator order(s) to one equivalent RT bronchodilator order with QID Frequency and an Albuterol order with Frequency of every 4 hours PRN wheezing or increased work of breathing using Per Protocol order mode. Greater than 13 - enter or revise RT Bronchodilator order(s) to one equivalent RT bronchodilator order with every 4 hours Frequency and an Albuterol order with Frequency of every 2 hours PRN wheezing or increased work of breathing using Per Protocol order mode. RT to enter RT Home Evaluation for COPD & MDI Assessment order using Per Protocol order mode.     Electronically signed by Senait Cross RCP on 5/15/2022 at 11:39 AM

## 2022-05-15 NOTE — PROGRESS NOTES
05/15/22 0000   RT Protocol   History Pulmonary Disease 2   Respiratory pattern 0   Breath sounds 6   Cough 0   Indications for Bronchodilator Therapy Wheezing associated with pulm disorder   Bronchodilator Assessment Score 8   RT Inhaler-Nebulizer Bronchodilator Protocol Note    There is a bronchodilator order in the chart from a provider indicating to follow the RT Bronchodilator Protocol and there is an Initiate RT Inhaler-Nebulizer Bronchodilator Protocol order as well (see protocol at bottom of note). CXR Findings:  No results found. The findings from the last RT Protocol Assessment were as follows:   History Pulmonary Disease: Chronic pulmonary disease  Respiratory Pattern: Regular pattern and RR 12-20 bpm  Breath Sounds: Inspiratory and expiratory or bilateral wheezing and/or rhonchi  Cough: Strong, spontaneous, non-productive  Indication for Bronchodilator Therapy: Wheezing associated with pulm disorder  Bronchodilator Assessment Score: 8    Aerosolized bronchodilator medication orders have been revised according to the RT Inhaler-Nebulizer Bronchodilator Protocol below. Respiratory Therapist to perform RT Therapy Protocol Assessment initially then follow the protocol. Repeat RT Therapy Protocol Assessment PRN for score 0-3 or on second treatment, BID, and PRN for scores above 3. No Indications - adjust the frequency to every 6 hours PRN wheezing or bronchospasm, if no treatments needed after 48 hours then discontinue using Per Protocol order mode. If indication present, adjust the RT bronchodilator orders based on the Bronchodilator Assessment Score as indicated below.   Use Inhaler orders unless patient has one or more of the following: on home nebulizer, not able to hold breath for 10 seconds, is not alert and oriented, cannot activate and use MDI correctly, or respiratory rate 25 breaths per minute or more, then use the equivalent nebulizer order(s) with same Frequency and PRN reasons based on the score. If a patient is on this medication at home then do not decrease Frequency below that used at home. 0-3 - enter or revise RT bronchodilator order(s) to equivalent RT Bronchodilator order with Frequency of every 4 hours PRN for wheezing or increased work of breathing using Per Protocol order mode. 4-6 - enter or revise RT Bronchodilator order(s) to two equivalent RT bronchodilator orders with one order with BID Frequency and one order with Frequency of every 4 hours PRN wheezing or increased work of breathing using Per Protocol order mode. 7-10 - enter or revise RT Bronchodilator order(s) to two equivalent RT bronchodilator orders with one order with TID Frequency and one order with Frequency of every 4 hours PRN wheezing or increased work of breathing using Per Protocol order mode. 11-13 - enter or revise RT Bronchodilator order(s) to one equivalent RT bronchodilator order with QID Frequency and an Albuterol order with Frequency of every 4 hours PRN wheezing or increased work of breathing using Per Protocol order mode. Greater than 13 - enter or revise RT Bronchodilator order(s) to one equivalent RT bronchodilator order with every 4 hours Frequency and an Albuterol order with Frequency of every 2 hours PRN wheezing or increased work of breathing using Per Protocol order mode.      Electronically signed by Katerina Pyle RCP on 5/15/2022 at 12:28 AM

## 2022-05-15 NOTE — PROGRESS NOTES
AM Shift assessment completed. Pt is alert and oriented. Vital signs are WNL. Respirations are even & easy. Patient complaint of back pain; PRN percocet given; will continue to monitor. Pt denies needs at this time. SR up x 2 and bed in low position. Call light is within reach. Will monitor. .Bedside Mobility Assessment Tool (BMAT):     Assessment Level 1- Sit and Shake    1. From a semi-reclined position, ask patient to sit up and rotate to a seated position at the side of the bed. Can use the bedrail. 2. Ask patient to reach out and grab your hand and shake making sure patient reaches across his/her midline. Pass- Patient is able to come to a seated position, maintain core strength. Maintains seated balance while reaching across midline. Move on to Assessment Level 2. Assessment Level 2- Stretch and Point   1. With patient in seated position at the side of the bed, have patient place both feet on the floor (or stool) with knees no higher than hips. 2. Ask patient to stretch one leg and straighten the knee, then bend the ankle/flex and point the toes. If appropriate, repeat with the other leg. Pass- Patient is able to demonstrate appropriate quad strength on intended weight bearing limb(s). Move onto Assessment Level 3. Assessment Level 3- Stand   1. Ask patient to elevate off the bed or chair (seated to standing) using an assistive device (cane, bedrail). 2. Patient should be able to raise buttocks off be and hold for a count of five. May repeat once. Pass- Patient maintains standing stability for at least 5 seconds, proceed to assessment level 4. Assessment Level 4- Walk   1. Ask patient to march in place at bedside. 2. Then ask patient to advance step and return each foot. Some medical conditions may render a patient from stepping backwards, use your best clinical judgement.    Pass- Patient demonstrates balance while shifting weight and ability to step, takes independent steps,

## 2022-05-15 NOTE — PROGRESS NOTES
reasons based on the score. If a patient is on this medication at home then do not decrease Frequency below that used at home. 0-3 - enter or revise RT bronchodilator order(s) to equivalent RT Bronchodilator order with Frequency of every 4 hours PRN for wheezing or increased work of breathing using Per Protocol order mode. 4-6 - enter or revise RT Bronchodilator order(s) to two equivalent RT bronchodilator orders with one order with BID Frequency and one order with Frequency of every 4 hours PRN wheezing or increased work of breathing using Per Protocol order mode. 7-10 - enter or revise RT Bronchodilator order(s) to two equivalent RT bronchodilator orders with one order with TID Frequency and one order with Frequency of every 4 hours PRN wheezing or increased work of breathing using Per Protocol order mode. 11-13 - enter or revise RT Bronchodilator order(s) to one equivalent RT bronchodilator order with QID Frequency and an Albuterol order with Frequency of every 4 hours PRN wheezing or increased work of breathing using Per Protocol order mode. Greater than 13 - enter or revise RT Bronchodilator order(s) to one equivalent RT bronchodilator order with every 4 hours Frequency and an Albuterol order with Frequency of every 2 hours PRN wheezing or increased work of breathing using Per Protocol order mode.      Electronically signed by Julio Alvarez RCP on 5/15/2022 at 7:47 PM

## 2022-05-15 NOTE — PROGRESS NOTES
Patient admitted to room 0231 from ER. Patient oriented to room, call light, bed rails, phone, lights and bathroom. Patient instructed about the schedule of the day including: vital sign frequency, lab draws, possible tests, frequency of MD and staff rounds, daily weights, I &O's and prescribed diet. Bed alarm armed. Bed locked, in lowest position, side rails up 2/4, call light within reach. Recliner Assessment:     Patient is able to demonstrate the ability to move from a reclining position to an upright position within the recliner. 4 Eyes Skin Assessment     The patient is being assess for   Admission    I agree that 2 RN's have performed a thorough Head to Toe Skin Assessment on the patient. ALL assessment sites listed below have been assessed. Areas assessed for pressure by both nurses:   [x]   Head, Face, and Ears   [x]   Shoulders, Back, and Chest, Abdomen  [x]   Arms, Elbows, and Hands   [x]   Coccyx, Sacrum, and Ischium  [x]   Legs, Feet, and Heels        Skin Assessed Under all Medical Devices by both nurses:  N/A            The patient bilateral lower extremities are red and swollen. All Mepilex Borders were peeled back and area peeked at by both nurses:  No: N/A  Please list where Mepilex Borders are located:  N/A             **SHARE this note so that the co-signing nurse is able to place an eSignature**    Co-signer eSignature: Electronically signed by Tee Glover RN on 5/15/22 at 6:36 AM EDT    Does the Patient have Skin Breakdown related to pressure?   No         Zack Prevention initiated:  Yes   Wound Care Orders initiated:  No      [facility] nurse consulted for Pressure Injury (Stage 3,4, Unstageable, DTI, NWPT, Complex wounds)and New or Established Ostomies:  No      Primary Nurse eSignature: Electronically signed by Daisy Siu RN on 5/15/22 at 6:35 AM EDT

## 2022-05-15 NOTE — H&P
Hospital Medicine History & Physical      PCP: Madison Garduno    Date of Admission: 5/14/2022    Date of Service: Pt seen/examined on 5/15/2022     Chief Complaint:    Chief Complaint   Patient presents with    Shortness of Breath     Patient states that she has increased leg swelling and blisters on lower legs. Has been sitting up to sleep and pain in the back when she breathes. History of COPD         History Of Present Illness: The patient is a 74 y.o. female with pmhx of COPD on 2-3 L O2 baseline, HTN, HLD, thyroid disease, GERD who presented to [facility] ED with complaint of shortness of breath, cough . Also C/O  bilateral lower extremity swelling, and redness over left leg. Recently seen for similar complaint, dced home on keflex, no improvement with keflex. Patient states that she has been having increasing shortness of breath for almost a month now; symptoms have gotten significantly worse over the last 4 days. She is more short of breath and wheezy , she has been coughing up some clear sputum. No fevers or chills. she is on home oxygen 2 L, and continues to smoke tobacco 1 pack/day. She takes her oxygen off and she smokes. .  Presenting now with worsening symptoms. She has also developed a back pain more in the upper back. Bilateral legs are red , erythematous  , left leg is more swollen . ED Course- Elevated d dimer. VBG mild respiratory acidosis. EKG sinus tachycardia. CXR with mild bilateral interstitial opacities could be atelectasis vs pulmonary edema. Xray left tib fib with moderate cellulitis or edema in soft tissues around calf extending into ankle, no bony abnormality. Given decadron and duonebs. IV ceftriaxone and zithromax given.      Past Medical History:        Diagnosis Date    Anxiety     Arthritis     Cancer ([facility] [address] 1980    Cervical    Chronic back pain     Chronic pain     COPD (chronic obstructive pulmonary disease) (HCC)     Depression     DJD (degenerative joint disease), cervical     Drug-seeking behavior     GERD (gastroesophageal reflux disease)     Hypercholesteremia     Hypertension     IBS (irritable bowel syndrome)     Insomnia     K deficiency     Kidney stone     Narcotic addiction [facility])     Patient Denies    Narcotic overdose ([facility] [address]     Patient Denies    Osteoporosis     Overdose     [id number] Patient Denies on 4/3/14    PNA (pneumonia) 10/17/2016    Restless leg syndrome     Thyroid disease        Past Surgical History:        Procedure Laterality Date    APPENDECTOMY      BACK SURGERY      CERVICAL DISCECTOMY  12-14-13    anterior discetomy fusion c 3/4 c 4/5 posterior fusion C3-C5 screws and rods C3-C5    CHOLECYSTECTOMY  2012    Laparoscopic    COLONOSCOPY  2008    COLONOSCOPY  08/10/2016    FOOT SURGERY Right     FRACTURE SURGERY Right 9/14    distal radius    HAND SURGERY  08/2012    crushed right hand and has two steel plates in hand   [facility] [address]  2002    disc    OTHER SURGICAL HISTORY  4/9/14    TRANSFORAMINAL LUMBAR INTERBODY FUSION L4 L5       Medications Prior to Admission:    Prior to Admission medications    Medication Sig Start Date End Date Taking? Authorizing Provider   ondansetron (ZOFRAN-ODT) 4 MG disintegrating tablet Take 1 tablet by mouth 3 times daily as needed for Nausea or Vomiting 3/7/21   Farhat Armando MD   polyethylene glycol (MIRALAX) PACK packet Take 17 g by mouth daily    Historical Provider, MD   Orphenadrine Citrate (NORFLEX PO) Take by mouth    Historical Provider, MD   albuterol sulfate HFA (PROVENTIL HFA) 108 (90 Base) MCG/ACT inhaler Inhale 2 puffs into the lungs every 4 hours as needed for Wheezing 4/9/18 4/9/19  Dom Guaman MD   rOPINIRole (REQUIP) 2 MG tablet Take 2 mg by mouth 2 times daily 5/14/22:  Amount of tablets per RX decreased from 90 (30 days supply) to 60 (30 days supply) since 11/2020.     Historical Provider, MD   risperiDONE (RISPERDAL) 0.5 MG tablet Take 0.5 mg by mouth 2 times daily    Historical Provider, MD   oxyCODONE-acetaminophen (PERCOCET)  MG per tablet Take 1 tablet by mouth every 4 hours as needed for Pain. Historical Provider, MD MARINA ELLIPTA 100-25 MCG/INH AEPB inhaler INHALE 1 PUFF BY MOUTH ONCE DAILY (RINSE MOUTH AFTER EACH USE) 1/30/18   Historical Provider, MD   lidocaine (XYLOCAINE) 5 % ointment  12/7/17   Historical Provider, MD   omeprazole (PRILOSEC) 40 MG delayed release capsule TAKE ONE CAPSULE(S) BY MOUTH EVERY MORNING 1/31/18   Historical Provider, MD   triamcinolone (KENALOG) 0.1 % cream APPLY TOPICALLY TWICE A DAY TO AFFECTED AREAS  Patient not taking: Reported on 5/14/2022 1/30/18   Historical Provider, MD   metaxalone (SKELAXIN) 800 MG tablet Take 800 mg by mouth 3 times daily    Historical Provider, MD   sertraline (ZOLOFT) 100 MG tablet Take 200 mg by mouth 2 times daily     Historical Provider, MD   PROAIR  (90 BASE) MCG/ACT inhaler INHALE 2 PUFFS INTO THE LUNGS 4 TIMES DAILY. 6/27/15   Nilo Carmen MD   lisinopril (PRINIVIL;ZESTRIL) 20 MG tablet TAKE 1/2  TABLET BY MOUTH ONCE DAILY  Patient taking differently: 10 mg TAKE 1/2  TABLET BY MOUTH ONCE DAILY 5/1/15   Nilo Carmen MD   atorvastatin (LIPITOR) 20 MG tablet Take 1 tablet by mouth daily. 1/6/15   Nilo Carmen MD   pregabalin (LYRICA) 300 MG capsule Take 300 mg by mouth 2 times daily. Historical Provider, MD       Allergies:  Asa buff (mag [buffered aspirin] and Cymbalta [duloxetine hcl]    Social History:  The patient currently lives at home    TOBACCO:   reports that she has been smoking cigarettes. She started smoking about 49 years ago. She has a 40.00 pack-year smoking history. She has never used smokeless tobacco.  ETOH:   reports no history of alcohol use.       Family History:   Positive as follows:        Problem Relation Age of Onset    Heart Disease Mother     High Cholesterol Mother     Diabetes Mother     Asthma Mother     Heart Failure Mother     Hypertension Mother     Heart Disease Father     High Cholesterol Father     Emphysema Father     Heart Failure Father     Hypertension Father     Cancer Neg Hx        REVIEW OF SYSTEMS:       Constitutional: Negative for fever   HENT: Negative for sore throat   Eyes: Negative for redness   Respiratory: +  for dyspnea, cough   Cardiovascular: Negative for chest pain.   + upper back pain   Gastrointestinal: Negative for vomiting, diarrhea   Genitourinary: Negative for hematuria   Musculoskeletal: Negative for arthralgias   + leg edema, erythema   Skin: Negative for rash   Neurological: Negative for syncope   Hematological: Negative for adenopathy   Psychiatric/Behavorial: Negative for anxiety    PHYSICAL EXAM:    BP (!) 146/78   Pulse 80   Temp 97 °F (36.1 °C) (Oral)   Resp 18   Ht 5' 3\" (1.6 m)   Wt 181 lb 8 oz (82.3 kg)   SpO2 97%   BMI 32.15 kg/m²     Gen: Patient appears ill, in mild distress, awake alert and well-oriented. .   distress. Alert. Eyes: PERRL. No sclera icterus. No conjunctival injection. ENT: No discharge. Pharynx clear. Neck: No JVD. No Carotid Bruit. Trachea midline. Resp: + accessory muscle use. She has diffuse wheezes and rhonchi . Darlynn Magic Back examination upper mid back and right side of the back is tender,   no crackles. No wheezes. No rhonchi. CV: Regular rate. Regular rhythm. No murmur. No rub. No edema. Capillary Refill: Brisk,< 3 seconds   Peripheral Pulses: +2 palpable, equal bilaterally   GI: Non-tender. Non-distended. No masses. No organomegaly. Normal bowel sounds. No hernia. Skin: Warm and dry. No nodule on exposed extremities. No rash on exposed extremities. M/S: No cyanosis. No joint deformity. No clubbing. Left leg is more edematous than the right with diffuse erythema and warmth  Neuro: Awake. Grossly nonfocal    Psych: Oriented x 3. No anxiety or agitation.      Lab Results   Component Value Date    WBC 7.8 05/15/2022    HGB 12.2 05/15/2022    HCT 38.1 05/15/2022    MCV 95.6 05/15/2022     05/15/2022     Lab Results   Component Value Date     05/15/2022    K 5.3 (H) 05/15/2022     05/15/2022    CO2 32 05/15/2022    BUN 15 05/15/2022    CREATININE 0.8 05/15/2022    GLUCOSE 186 (H) 05/15/2022    CALCIUM 9.3 05/15/2022    PROT 7.2 05/14/2022    LABALBU 4.2 05/14/2022    BILITOT 0.3 05/14/2022    ALKPHOS 112 05/14/2022    AST 14 (L) 05/14/2022    ALT 10 05/14/2022    LABGLOM >60 05/15/2022    GFRAA >60 05/15/2022    AGRATIO 1.4 05/14/2022    GLOB 3.6 09/12/2020       U/A:    Lab Results   Component Value Date    NITRITE neg 02/14/2014    COLORU Yellow 10/17/2016    WBCUA 6-10 07/20/2015    RBCUA 0-2 07/20/2015    MUCUS Present 04/17/2013    BACTERIA 3+ 07/20/2015    CLARITYU Clear 10/17/2016    SPECGRAV 1.010 10/17/2016    LEUKOCYTESUR Negative 10/17/2016    BLOODU Negative 10/17/2016    GLUCOSEU Negative 10/17/2016    GLUCOSEU NEGATIVE 01/23/2012    AMORPHOUS 1+ 07/20/2015       ABG    Lab Results   Component Value Date    IVU3GVK 15.5 06/03/2013    BEART -7.8 06/03/2013    E9EVVDTH 97.8 06/03/2013    PHART 7.389 06/03/2013    THGBART 13.2 06/03/2013    THGBART 12.2 01/23/2012    UKE1GMM 26.3 06/03/2013    PO2ART 103.2 06/03/2013    BUH6NXM 16.3 06/03/2013       CULTURES  COVID and Flu not detected     EKG:    Sinus tachycardia  Otherwise normal ECG  When compared with ECG of 07-MAR-2021 16:14,  No significant change was found  Confirmed by CONCHIS FULTON, [address] ([id number]) on 5/15/2022 7:16:31 AM    RADIOLOGY  XR TIBIA FIBULA LEFT (2 VIEWS)   Final Result   Moderate cellulitis or edema in the soft tissues around the calf extending   distally around the ankle with no acute bony abnormality. XR CHEST (2 VW)   Final Result   Mild bilateral interstitial opacities may represent infection versus   atelectasis versus pulmonary edema.          XR CHEST (2 VW)    (Results Pending) ASSESSMENT/PLAN:      #Community Acquired Pneumonia   -IV rocephin and zithromax   -IVF   -continue duonebs   -legionella and strep pending  -sputum culture pending     # Back pain. Appears to be musculoskeletal. Order robaxin  Need to rule out PE. Check CT PA    #COPD with acute exacerbation   #Chronic respiratory failure   -on 2-3 L O2 baseline   -continue inhaler regimens   -continue duonebs   -continue solumedrol   Pulmonology consult    #Tobacco abuse  On nicotine patch    #Left leg cellulitis   -failed outpatient tx with keflex  -on IV abx as below   -no sepsis   Check lower extremity Doppler study    #HTN   -continue lisinopril     #HLD   -continue statin     #Chronic pain syndrome   -continue home regimen       DVT Prophylaxis: lovenox  Diet: ADULT DIET;  Regular  Code Status: Full Code    Griffin Khan MD

## 2022-05-15 NOTE — PROGRESS NOTES
Patient resting at this time. Patient eyes closed; respirations easy and unlabored. In bed, lowest position, rails up x 2, call light within reach.

## 2022-05-15 NOTE — PROGRESS NOTES
.Shift report given to Brianna Grady 4 RN at Bedside. Patient is stable. All end of shift needs have been met. No further assistance needed at this time.

## 2022-05-16 ENCOUNTER — APPOINTMENT (OUTPATIENT)
Dept: VASCULAR LAB | Age: 67
DRG: 191 | End: 2022-05-16
Payer: MEDICARE

## 2022-05-16 LAB
ANION GAP SERPL CALCULATED.3IONS-SCNC: 9 MMOL/L (ref 3–16)
BASOPHILS ABSOLUTE: 0 K/UL (ref 0–0.2)
BASOPHILS RELATIVE PERCENT: 0.4 %
BUN BLDV-MCNC: 15 MG/DL (ref 7–20)
C-REACTIVE PROTEIN: 5.7 MG/L (ref 0–5.1)
CALCIUM SERPL-MCNC: 9.1 MG/DL (ref 8.3–10.6)
CHLORIDE BLD-SCNC: 101 MMOL/L (ref 99–110)
CO2: 29 MMOL/L (ref 21–32)
CREAT SERPL-MCNC: 0.7 MG/DL (ref 0.6–1.2)
EOSINOPHILS ABSOLUTE: 0 K/UL (ref 0–0.6)
EOSINOPHILS RELATIVE PERCENT: 0 %
GFR AFRICAN AMERICAN: >60
GFR NON-AFRICAN AMERICAN: >60
GLUCOSE BLD-MCNC: 215 MG/DL (ref 70–99)
HCT VFR BLD CALC: 37.3 % (ref 36–48)
HEMOGLOBIN: 11.9 G/DL (ref 12–16)
L. PNEUMOPHILA SEROGP 1 UR AG: NORMAL
LYMPHOCYTES ABSOLUTE: 0.3 K/UL (ref 1–5.1)
LYMPHOCYTES RELATIVE PERCENT: 2.9 %
MCH RBC QN AUTO: 31.2 PG (ref 26–34)
MCHC RBC AUTO-ENTMCNC: 31.8 G/DL (ref 31–36)
MCV RBC AUTO: 98.1 FL (ref 80–100)
MONOCYTES ABSOLUTE: 0.6 K/UL (ref 0–1.3)
MONOCYTES RELATIVE PERCENT: 5.7 %
NEUTROPHILS ABSOLUTE: 8.9 K/UL (ref 1.7–7.7)
NEUTROPHILS RELATIVE PERCENT: 91 %
PDW BLD-RTO: 15.5 % (ref 12.4–15.4)
PLATELET # BLD: 155 K/UL (ref 135–450)
PMV BLD AUTO: 8.4 FL (ref 5–10.5)
POTASSIUM REFLEX MAGNESIUM: 5.3 MMOL/L (ref 3.5–5.1)
RBC # BLD: 3.81 M/UL (ref 4–5.2)
SODIUM BLD-SCNC: 139 MMOL/L (ref 136–145)
STREP PNEUMONIAE ANTIGEN, URINE: NORMAL
WBC # BLD: 9.8 K/UL (ref 4–11)

## 2022-05-16 PROCEDURE — 36415 COLL VENOUS BLD VENIPUNCTURE: CPT

## 2022-05-16 PROCEDURE — 86140 C-REACTIVE PROTEIN: CPT

## 2022-05-16 PROCEDURE — 87449 NOS EACH ORGANISM AG IA: CPT

## 2022-05-16 PROCEDURE — 2700000000 HC OXYGEN THERAPY PER DAY

## 2022-05-16 PROCEDURE — 93970 EXTREMITY STUDY: CPT

## 2022-05-16 PROCEDURE — 80048 BASIC METABOLIC PNL TOTAL CA: CPT

## 2022-05-16 PROCEDURE — 6370000000 HC RX 637 (ALT 250 FOR IP): Performed by: INTERNAL MEDICINE

## 2022-05-16 PROCEDURE — 92610 EVALUATE SWALLOWING FUNCTION: CPT

## 2022-05-16 PROCEDURE — 99233 SBSQ HOSP IP/OBS HIGH 50: CPT | Performed by: INTERNAL MEDICINE

## 2022-05-16 PROCEDURE — 2580000003 HC RX 258: Performed by: INTERNAL MEDICINE

## 2022-05-16 PROCEDURE — 94640 AIRWAY INHALATION TREATMENT: CPT

## 2022-05-16 PROCEDURE — 6360000002 HC RX W HCPCS: Performed by: INTERNAL MEDICINE

## 2022-05-16 PROCEDURE — 85025 COMPLETE CBC W/AUTO DIFF WBC: CPT

## 2022-05-16 PROCEDURE — 92526 ORAL FUNCTION THERAPY: CPT

## 2022-05-16 PROCEDURE — 99223 1ST HOSP IP/OBS HIGH 75: CPT | Performed by: INTERNAL MEDICINE

## 2022-05-16 PROCEDURE — 1200000000 HC SEMI PRIVATE

## 2022-05-16 PROCEDURE — 94761 N-INVAS EAR/PLS OXIMETRY MLT: CPT

## 2022-05-16 RX ORDER — METAXALONE 800 MG/1
800 TABLET ORAL 3 TIMES DAILY PRN
Status: DISCONTINUED | OUTPATIENT
Start: 2022-05-16 | End: 2022-05-18

## 2022-05-16 RX ORDER — FUROSEMIDE 10 MG/ML
20 INJECTION INTRAMUSCULAR; INTRAVENOUS ONCE
Status: DISCONTINUED | OUTPATIENT
Start: 2022-05-16 | End: 2022-05-16

## 2022-05-16 RX ORDER — FUROSEMIDE 10 MG/ML
40 INJECTION INTRAMUSCULAR; INTRAVENOUS ONCE
Status: COMPLETED | OUTPATIENT
Start: 2022-05-16 | End: 2022-05-16

## 2022-05-16 RX ORDER — ROPINIROLE 1 MG/1
2 TABLET, FILM COATED ORAL NIGHTLY
Status: DISCONTINUED | OUTPATIENT
Start: 2022-05-16 | End: 2022-05-18 | Stop reason: HOSPADM

## 2022-05-16 RX ORDER — PREGABALIN 100 MG/1
200 CAPSULE ORAL 2 TIMES DAILY
Status: DISCONTINUED | OUTPATIENT
Start: 2022-05-16 | End: 2022-05-18 | Stop reason: HOSPADM

## 2022-05-16 RX ORDER — IPRATROPIUM BROMIDE AND ALBUTEROL SULFATE 2.5; .5 MG/3ML; MG/3ML
1 SOLUTION RESPIRATORY (INHALATION) 4 TIMES DAILY
Status: DISCONTINUED | OUTPATIENT
Start: 2022-05-16 | End: 2022-05-17

## 2022-05-16 RX ADMIN — ATORVASTATIN CALCIUM 60 MG: 40 TABLET, FILM COATED ORAL at 09:48

## 2022-05-16 RX ADMIN — OXYCODONE HYDROCHLORIDE AND ACETAMINOPHEN 1 TABLET: 10; 325 TABLET ORAL at 14:31

## 2022-05-16 RX ADMIN — CEFTRIAXONE SODIUM 1000 MG: 1 INJECTION, POWDER, FOR SOLUTION INTRAMUSCULAR; INTRAVENOUS at 18:09

## 2022-05-16 RX ADMIN — ROPINIROLE HYDROCHLORIDE 2 MG: 1 TABLET, FILM COATED ORAL at 20:50

## 2022-05-16 RX ADMIN — IPRATROPIUM BROMIDE AND ALBUTEROL SULFATE 1 AMPULE: .5; 3 SOLUTION RESPIRATORY (INHALATION) at 11:07

## 2022-05-16 RX ADMIN — OXYCODONE HYDROCHLORIDE AND ACETAMINOPHEN 1 TABLET: 10; 325 TABLET ORAL at 09:47

## 2022-05-16 RX ADMIN — PREGABALIN 200 MG: 100 CAPSULE ORAL at 20:51

## 2022-05-16 RX ADMIN — RISPERIDONE 0.5 MG: 0.25 TABLET ORAL at 09:47

## 2022-05-16 RX ADMIN — SERTRALINE 100 MG: 100 TABLET, FILM COATED ORAL at 09:47

## 2022-05-16 RX ADMIN — METHYLPREDNISOLONE SODIUM SUCCINATE 40 MG: 40 INJECTION, POWDER, FOR SOLUTION INTRAMUSCULAR; INTRAVENOUS at 09:47

## 2022-05-16 RX ADMIN — ENOXAPARIN SODIUM 40 MG: 100 INJECTION SUBCUTANEOUS at 20:51

## 2022-05-16 RX ADMIN — Medication 2 PUFF: at 07:22

## 2022-05-16 RX ADMIN — LISINOPRIL 10 MG: 20 TABLET ORAL at 09:47

## 2022-05-16 RX ADMIN — RISPERIDONE 0.5 MG: 0.25 TABLET ORAL at 20:50

## 2022-05-16 RX ADMIN — Medication 10 ML: at 20:56

## 2022-05-16 RX ADMIN — FUROSEMIDE 40 MG: 10 INJECTION, SOLUTION INTRAMUSCULAR; INTRAVENOUS at 14:23

## 2022-05-16 RX ADMIN — OXYCODONE HYDROCHLORIDE AND ACETAMINOPHEN 1 TABLET: 10; 325 TABLET ORAL at 20:51

## 2022-05-16 RX ADMIN — SERTRALINE 100 MG: 100 TABLET, FILM COATED ORAL at 20:50

## 2022-05-16 RX ADMIN — PANTOPRAZOLE SODIUM 40 MG: 40 TABLET, DELAYED RELEASE ORAL at 06:03

## 2022-05-16 RX ADMIN — METHYLPREDNISOLONE SODIUM SUCCINATE 40 MG: 40 INJECTION, POWDER, FOR SOLUTION INTRAMUSCULAR; INTRAVENOUS at 20:50

## 2022-05-16 RX ADMIN — Medication 2 PUFF: at 15:35

## 2022-05-16 RX ADMIN — IPRATROPIUM BROMIDE AND ALBUTEROL SULFATE 1 AMPULE: .5; 3 SOLUTION RESPIRATORY (INHALATION) at 07:22

## 2022-05-16 RX ADMIN — POLYETHYLENE GLYCOL (3350) 17 G: 17 POWDER, FOR SOLUTION ORAL at 09:48

## 2022-05-16 RX ADMIN — METAXALONE 800 MG: 800 TABLET ORAL at 09:48

## 2022-05-16 RX ADMIN — OXYCODONE HYDROCHLORIDE AND ACETAMINOPHEN 1 TABLET: 10; 325 TABLET ORAL at 04:26

## 2022-05-16 RX ADMIN — PREGABALIN 200 MG: 100 CAPSULE ORAL at 09:47

## 2022-05-16 ASSESSMENT — PAIN SCALES - GENERAL
PAINLEVEL_OUTOF10: 9
PAINLEVEL_OUTOF10: 8
PAINLEVEL_OUTOF10: 8
PAINLEVEL_OUTOF10: 7

## 2022-05-16 NOTE — PROGRESS NOTES
IM Progress Note    Admit Date:  5/14/2022  2    Interval history:   Pneumonia     Subjective:  Ms. Nicol Watkins seen up in bed, feels ok today , remains on 2 L   Denies having liver failure in the past but CT s.o cirrhosis      Objective:   /68   Pulse 79   Temp 97 °F (36.1 °C) (Oral)   Resp 18   Ht 5' 3\" (1.6 m)   Wt 172 lb 12.8 oz (78.4 kg)   SpO2 97%   BMI 30.61 kg/m²   No intake or output data in the 24 hours ending 05/16/22 1339    Physical Exam:        General: elderly obese female, sleeping in bed, arousable   Awake, alert and oriented. Appears to be not in any distress  Mucous Membranes:  Pink , anicteric  Neck: No JVD, no carotid bruit, no thyromegaly  Chest:  Scattered wheeze mary with basilar crackles  Cardiovascular:  RRR S1S2 heard, no murmurs or gallops  Abdomen:  Soft, obese, undistended, non tender, no organomegaly, BS present  Extremities: 1+ chronic brawny edema of left leg with crusted wounds  No open ulcers. Mild cellulitis noted  .  Distal pulses well felt  Neurological : grossly normal        Medications:   Scheduled Medications:    rOPINIRole  2 mg Oral Nightly    pregabalin  200 mg Oral BID    ipratropium-albuterol  1 ampule Inhalation 4x daily    methylPREDNISolone  40 mg IntraVENous Q12H    nicotine  1 patch TransDERmal Daily    atorvastatin  60 mg Oral Daily    budesonide-formoterol  2 puff Inhalation BID    lisinopril  10 mg Oral Daily    metaxalone  800 mg Oral TID    pantoprazole  40 mg Oral QAM AC    sertraline  100 mg Oral BID    risperiDONE  0.5 mg Oral BID    polyethylene glycol  17 g Oral Daily    sodium chloride flush  5-40 mL IntraVENous 2 times per day    enoxaparin  40 mg SubCUTAneous Nightly    cefTRIAXone (ROCEPHIN) IV  1,000 mg IntraVENous Q24H    And    azithromycin  500 mg Oral Q24H     I   sodium chloride       tiZANidine, albuterol sulfate HFA, oxyCODONE-acetaminophen, sodium chloride flush, sodium chloride, ondansetron **OR** ondansetron, acetaminophen **OR** acetaminophen    Lab Data:  Recent Labs     05/14/22  1510 05/15/22  0616 05/16/22  0541   WBC 8.5 7.8 9.8   HGB 12.8 12.2 11.9*   HCT 38.9 38.1 37.3   MCV 96.8 95.6 98.1    154 155     Recent Labs     05/14/22  1510 05/15/22  0616 05/16/22  0541    141 139   K 4.6 5.3* 5.3*   CL 99 102 101   CO2 27 32 29   BUN 17 15 15   CREATININE 0.9 0.8 0.7     No results for input(s): CKTOTAL, CKMB, CKMBINDEX, TROPONINI in the last 72 hours.     Coagulation:   Lab Results   Component Value Date    INR 0.94 09/12/2020    APTT 31.7 09/12/2020     Cardiac markers:   Lab Results   Component Value Date    CKMB 10.3 07/14/2010    CKTOTAL 74 10/17/2014    TROPONINI <0.01 03/07/2021         Lab Results   Component Value Date    ALT 10 05/14/2022    AST 14 (L) 05/14/2022    ALKPHOS 112 05/14/2022    BILITOT 0.3 05/14/2022       Lab Results   Component Value Date    INR 0.94 09/12/2020    INR 0.99 07/01/2018    INR 1.01 06/05/2018    PROTIME 10.9 09/12/2020    PROTIME 11.3 07/01/2018    PROTIME 11.5 06/05/2018       Radiology      CULTURES  COVID and Flu not detected      EKG:    Sinus tachycardia  Otherwise normal ECG  When compared with ECG of 07-MAR-2021 16:14,  No significant change was found  Confirmed by Georgette Nageotte MD, [address] ([id number]) on 5/15/2022 7:16:31 AM     RADIOLOGY  XR TIBIA FIBULA LEFT (2 VIEWS)   Final Result   Moderate cellulitis or edema in the soft tissues around the calf extending   distally around the ankle with no acute bony abnormality.           XR CHEST (2 VW)   Final Result   Mild bilateral interstitial opacities may represent infection versus   atelectasis versus pulmonary edema.         Venous doppler - no DVT      ASSESSMENT/PLAN:          #COPD with acute exacerbation   #Chronic respiratory failure   -on 2-3 L O2 baseline   -continue inhaler regimens   -continue solumedrol , HHn and levaquin  Pulmonology consulted   recommend tobacco cessation   On nicotine patch     #Left leg cellulitis   -failed outpatient tx with keflex  -on IV abx as below - change to levaquin   -no sepsis   - venous doppler neg for dvt     # possible liver cirrhosis with fluid overload per CT  - pulm edema, LE edema   - obtain ECHO  - need workup for cirrhosis- likely fatty liver disease  - start diuretics       #HTN   -continue lisinopril      #HLD   -continue statin      #Chronic pain syndrome   -continue home regimen   - avoid excessive sedation   - I have cut down lyrica and requip        DVT Prophylaxis: lovenox  Diet: ADULT DIET;  Regular  Code Status: Full Code      Zane Newman MD, 5/16/2022 1:39 PM

## 2022-05-16 NOTE — PROGRESS NOTES
Speech Language Pathology  Clinical Bedside Swallow Assessment  Facility/Department: [facility]    Instrumentation: Not clinically indicated at this time    Diet recommendation: IDDSI 7 Regular Solids; IDDSI 0 Thin Liquids; Meds PO as tolerated  Risk management: upright for all intake, stay upright for at least 30 mins after intake, small bites/sips, oral care 2-3x/day to reduce adverse affects in the event of aspiration, increase physical mobility as able, slow rate of intake, general aspiration precautions and hold PO and contact SLP if s/s of aspiration or worsening respiratory status develop. Tom Navarro  : 1955 (68 y.o.)   MRN: [id number]  ROOM: [facility]/1473-  ADMISSION DATE: 2022  PATIENT DIAGNOSIS(ES): Community acquired pneumonia, unspecified laterality [J18.9]  Chief Complaint   Patient presents with    Shortness of Breath     Patient states that she has increased leg swelling and blisters on lower legs. Has been sitting up to sleep and pain in the back when she breathes.  History of COPD     Patient Active Problem List    Diagnosis Date Noted    Spinal stenosis 10/29/2013    Hyperlipidemia 2012    DJD (degenerative joint disease), cervical 2012    Cellulitis of left lower extremity     Tobacco abuse     [facility]osis of liver without ascites [facility]     Community acquired pneumonia 2022    COPD (chronic obstructive pulmonary disease) ([facility] 2013    Vitamin D deficiency 07/15/2013    Lumbosacral radiculopathy due to degenerative joint disease of spine 2013    B12 deficiency 2012    Generalized anxiety disorder 2012    Depression with anxiety 2012    GERD (gastroesophageal reflux disease) 2012    Osteoporosis 2012    Insomnia 2012    Restless leg syndrome 2012    Chronic pain 2012    Fatigue 2012    Bipolar disorder ([facility] [facility] 2012    Anorexia nervosa 04/20/2012    IBS (irritable bowel syndrome) 04/19/2012    Essential hypertension     Acute on chronic respiratory failure with hypoxia and hypercapnia (HCC)     COPD exacerbation (HCC) 09/12/2020    Acute respiratory failure with hypoxia and hypercapnia (HCC) 09/12/2020    Thoracic compression fracture, closed, initial encounter ([facility] 02/05/2018    Post traumatic stress disorder (PTSD)     PNA (pneumonia) 10/17/2016    Thyroid nodule 07/21/2015    Paresthesia 07/20/2015    TIA (transient ischemic attack)     Gait disturbance 11/14/2013    Weakness 11/14/2013    Constipation due to pain medication 04/17/2013    Low back pain 04/09/2013    Low back pain radiating to both legs 04/09/2013     Past Medical History:   Diagnosis Date    Anxiety     Arthritis     Cancer ([facility] [facility] 1980    Cervical    Chronic back pain     Chronic pain     COPD (chronic obstructive pulmonary disease) ([facility] [address] [facility]     Depression     DJD (degenerative joint disease), cervical     Drug-seeking behavior     GERD (gastroesophageal reflux disease)     Hypercholesteremia     Hypertension     IBS (irritable bowel syndrome)     Insomnia     K deficiency     Kidney stone     Narcotic addiction [facility])     Patient Denies    Narcotic overdose ([facility] [facility]     Patient Denies    Osteoporosis     Overdose     [id number] Patient Denies on 4/3/14    PNA (pneumonia) 10/17/2016    Restless leg syndrome     Thyroid disease      Past Surgical History:   Procedure Laterality Date    APPENDECTOMY      BACK SURGERY      CERVICAL DISCECTOMY  12-14-13    anterior discetomy fusion c 3/4 c 4/5 posterior fusion C3-C5 screws and rods C3-C5    CHOLECYSTECTOMY  2012    Laparoscopic    COLONOSCOPY  2008    COLONOSCOPY  08/10/2016    FOOT SURGERY Right     FRACTURE SURGERY Right 9/14    distal radius    HAND SURGERY  08/2012    crushed right hand and has two steel plates in hand    HYSTERECTOMY      NECK SURGERY  2002    disc    OTHER SURGICAL HISTORY  4/9/14    TRANSFORAMINAL LUMBAR INTERBODY FUSION L4 L5     Allergies   Allergen Reactions    Asa Buff (Mag [Buffered Aspirin] Hives    Cymbalta [Duloxetine Hcl] Nausea And Vomiting       DATE ONSET: 05/14/2022    Date of Evaluation: 5/16/2022   Evaluating Therapist: Franklin Palomino SLP    Chart Reviewed: : [x] Yes [] No    Current Diet: ADULT DIET; Regular    Recent Chest Radiography: [x] Chest XR   [] CT of Chest  Date: 05/15/2022  Impressions  Impression   Stable chest.  Mild increased interstitial markings throughout the lungs,   likely infiltrate as opposed to edema.  Mild bibasilar atelectasis. Pain: The patient does not complain of pain     Reason for Referral  Giselle Quintero was referred for a bedside swallow evaluation to assess the efficiency of their swallow function, identify signs and symptoms of aspiration and make recommendations regarding safe dietary consistencies, effective compensatory strategies, and safe eating environment. Assessment    Medical record review/interview: Per MD H&P: \"The patient is a 74 y.o. female with pmhx of COPD on 2-3 L O2 baseline, HTN, HLD, thyroid disease, GERD who presented to [facility] ED with complaint of shortness of breath, cough . Also C/O  bilateral lower extremity swelling, and redness over left leg. Recently seen for similar complaint, dced home on keflex, no improvement with keflex.      Patient states that she has been having increasing shortness of breath for almost a month now; symptoms have gotten significantly worse over the last 4 days. She is more short of breath and wheezy , she has been coughing up some clear sputum. No fevers or chills. she is on home oxygen 2 L, and continues to smoke tobacco 1 pack/day. She takes her oxygen off and she smokes. .  Presenting now with worsening symptoms.   She has also developed a back pain more in the upper back.      Bilateral legs are red , erythematous  , left leg is more swollen .     ED Course- Elevated d dimer. VBG mild respiratory acidosis. EKG sinus tachycardia. CXR with mild bilateral interstitial opacities could be atelectasis vs pulmonary edema. Xray left tib fib with moderate cellulitis or edema in soft tissues around calf extending into ankle, no bony abnormality. Given decadron and duonebs. IV ceftriaxone and zithromax given. \"        Patient Complaints:  Odynophagia: [x] Yes [] No  Globus Sensation: [x] Yes [] No  SOB with PO intake: [x] Yes [] No  Increased WOB with PO intake: [x] Yes [] No  Reflux Sx's: [] Yes [x] No  Weight loss: [x] Yes [] No  Coughing/Choking with PO intake: [] Yes [x] No  Reduced Appetite: [] Yes [x] No    Additional Reported Symptoms/Complaints: Per Dr. Guilherme Valerio, there is COPD exacerbation with possible CHF/fluid overload. Pt reports occasional pain with swallow and occasional \"smothering/tightening\" sensation to lower throat. Unintentional weight loss. Feels as though she has to chose between eating and breathing. Noting particularly if any physical activity, such as walking to [place], has happened before meal/snack. Reports voice is more hoarse than usual. Reports she feels a \"tickle\" when she inhales deeply or yawns. Encouraged her to discuss with pulmonology.        Predisposing dysphagia risk factors: COPD and GERD  Clinical signs of possible chronic dysphagia: recurrent PNA  Precipitating dysphagia risk factors: reduced physical mobility and increased O2 demands    Vitals/labs:     Vitals:    05/16/22 1108 05/16/22 1403 05/16/22 1501 05/16/22 1535   BP:  130/69     Pulse:  87     Resp: 18 16 16 18   Temp:  98.4 °F (36.9 °C)     TempSrc:  Oral     SpO2: 97% 92%  95%   Weight:       Height:            CBC:   Recent Labs     05/16/22  0541   WBC 9.8   HGB 11.9*         BMP:  Recent Labs     05/16/22  0541      K 5.3*      CO2 29   BUN 15   CREATININE 0.7   GLUCOSE 215*          Cranial nerve exam:   CN V (trigeminal): ophthalmic, maxillary, and mandibular facial sensation- WNL b/l  CN VII (facial): reduced labial retraction left  CN IX/X (glossopharyngeal/vagus): MPT: Reduced; pitch range: Reduced; vocal quality: hoarse; cough: Strong-perceptually, Non-Productive and Dry  CN XII (hypoglossal): WNL b/l    Laryngeal function exam:   Secretions: Oral mucosa pink and dry  Vocal quality: See CN exam above  MPT: See CN exam above  S/Z ratio: WNL  Pitch range: See CN exam above  Cough: See CN exam above    Oral Care Status:    [x] Oral Care [facility]  [] Poor oral care status  [x] Edentulous  [] Upper Dentures  [] Lower Dentures  [] Missing/Broken Teeth  [] Evidence of dental cavities/carries    PO trials:   Ice: WFl with no clinical s/s of aspiration    IDDSI 0 (thin):   - Cup, straw: no anterior bolus loss , suspect functional A-P bolus transit, swallow timing subjectively appears timely, no clinical s/s of aspiration but did note increased WOB    IDDSI 4 (puree): suspect functional A-P bolus transit, swallow timing subjectively appears timely, oral clearance grossly WFL and no clinical s/s of aspiration    IDDSI 7 (regular): suspect functional A-P bolus transit, swallow timing subjectively appears timely, grossly functional mastication, oral clearance grossly WFL and no clinical s/s of aspiration    3 oz water: PASS    Impressions:    Clinical signs of pharyngeal dysphagia likely acute-on-chronic related to increased O2 demands and respiratory illness. Swallow prognosis is good. Instrumental swallow study is not indicated. Given tolerance to PO at bedside and lack of clinical s/s of aspiration at bedside, pt is safe for oral diet at this time    Instrumentation: Not clinically indicated at this time    Diet recommendation: IDDSI 7 Regular Solids; IDDSI 0 Thin Liquids;  Meds PO as tolerated  Risk management: upright for all intake, stay upright for at least 30 mins after intake, small bites/sips, oral care 2-3x/day to reduce adverse affects in the event of aspiration, increase physical mobility as able, slow rate of intake, general aspiration precautions and hold PO and contact SLP if s/s of aspiration or worsening respiratory status develop. Prognosis: Good    Recommended Intervention:  [x] Dysphagia tx  [] Videostroboscopy                      [] NPO   [] MBS       [] Speech/Cog Eval    [] Therapeutic PO Trials     [] Ice Chips   [] Other:  [] FEES                                                 Dysphagia Therapeutic Intervention:  []  Bolus control Exercises  []  Oral Motor Exercises  []  Anderson Water Protocol  []  Thermal Stimulation  []  Oral Care    []  Vital Stim/NMES  []  Laryngeal Exercises  [x]  Patient/Family Education  []  Pharyngeal Exercises  []  Therapeutic PO trials with SLP  [x]  Diet tolerance monitoring  []  Other:     Referrals:  [x] ENT    [] PT  [x] Pulmonology [] GI  [] Neurology  [] RD  [] OT   []     Goals:  Short Term Goals:  Timeframe for Short Term Goals: (5 days 05/21/2022)  Goal 1: The patient will tolerate recommended diet with no clinical s/s of aspiration 5/5  Goal 2: The patient will recall/perform recommended compensatory strategies given min cues    Long Term Goals:   Timeframe for Long Term Goals: (7 days 05/23/2022)  Goal 1: The patient will tolerate least restrictive diet with no clinical s/s of aspiration or worsening respiratory/pulmonary status    Treatment:  Skilled instruction completed with patient re: evidenced based practice regarding recommendations and POC, importance of oral care to reduce adverse affects in the event of aspiration, and instruction of recommended compensatory strategies developed based upon clinical exam. Pt able to recall/demonstrate compensatory strategies with min cues.       Pt Education: SLP educated the patient re: Role of SLP, rationale for completion of assessment, anatomical components of swallow structures as they pertain to airway protection, results of assessment,

## 2022-05-16 NOTE — FLOWSHEET NOTE
05/16/22 0930   Vital Signs   Temp 97 °F (36.1 °C)   Temp Source Oral   Pulse 79   Heart Rate Source Monitor   Resp 16   /68   BP Location Right upper arm   MAP (Calculated) 89.67   Patient Position Semi fowlers   Level of Consciousness Alert (0)   MEWS Score 1   Pain Assessment   Pain Assessment 0-10   Pain Level 8   Oxygen Therapy   SpO2 95 %   O2 Device Nasal cannula   O2 Flow Rate (L/min) 2 L/min   Shift assessment complete. See flow sheet. Scheduled medications given, See MAR. Head to toe complete. Vital signs logged and active bowel sounds in all 4 quadrants. Pt awake in bed. Pt complaining of pain 8/10. PRN Percocet given per PRN protocol. Medications taken without difficulty. No further needs noted at this time. Call light and bedside table within reach. Bed in lowest position, wheels locked and side rails up x2.      Shirley Vargas RN

## 2022-05-16 NOTE — PLAN OF CARE
Problem: Discharge Planning  Goal: Discharge to home or other facility with appropriate resources  5/16/2022 0007 by Mushtaq Monaco RN  Outcome: Progressing  5/15/2022 1136 by Emanuel Garay RN  Outcome: Progressing  Flowsheets (Taken 5/15/2022 0915)  Discharge to home or other facility with appropriate resources: Identify barriers to discharge with patient and caregiver     Problem: Pain  Goal: Verbalizes/displays adequate comfort level or baseline comfort level  5/16/2022 0007 by Mushtaq Monaco RN  Outcome: Progressing  5/15/2022 1136 by Emanuel Garay RN  Outcome: Progressing     Problem: ABCDS Injury Assessment  Goal: Absence of physical injury  5/16/2022 0007 by Mushtaq Monaco RN  Outcome: Progressing  5/15/2022 1136 by Emanuel Garay RN  Outcome: Progressing     Problem: Safety - Adult  Goal: Free from fall injury  5/16/2022 0007 by Mushtaq Monaco RN  Outcome: Progressing  5/15/2022 1136 by Emanuel Garay RN  Outcome: Progressing

## 2022-05-16 NOTE — CONSULTS
[facility]                                                                    CHIEF COMPLAINT: SOB ,leg swelling     Consulting provider: Dr Noelle Raymond     HPI:   74Year old with 48 PPY smoking hsitory   Follow with Pulmonary [facility]  She is on home O2 2 L   Comes in with SOB and HANKINS as well as chest tightness   She also has bilateral leg pain . She is at home albuterol        ED Course- Elevated d dimer. VBG mild respiratory acidosis. EKG sinus tachycardia. CXR with mild bilateral interstitial opacities could be atelectasis vs pulmonary edema. Xray left tib fib with moderate cellulitis or edema in soft tissues around calf extending into ankle, no bony abnormality. Given decadron and duonebs.  IV ceftriaxone and zithromax given    CT chest stable when comapre with old CT  On Breo at home   ABG shows some hypercapnia andAnd Co2     Past Medical History:   Diagnosis Date    Anxiety     Arthritis     Cancer ([facility] [address] 1980    Cervical    Chronic back pain     Chronic pain     COPD (chronic obstructive pulmonary disease) ([facility] [address]     Depression     DJD (degenerative joint disease), cervical     Drug-seeking behavior     GERD (gastroesophageal reflux disease)     Hypercholesteremia     Hypertension     IBS (irritable bowel syndrome)     Insomnia     K deficiency     Kidney stone     Narcotic addiction [facility])     Patient Denies    Narcotic overdose ([facility] [address]     Patient Denies    Osteoporosis     Overdose     [id number] Patient Denies on 4/3/14    PNA (pneumonia) 10/17/2016    Restless leg syndrome     Thyroid disease        Past Surgical History:        Procedure Laterality Date    APPENDECTOMY      BACK SURGERY      CERVICAL DISCECTOMY  12-14-13    anterior discetomy fusion c 3/4 c 4/5 posterior fusion C3-C5 screws and rods C3-C5    CHOLECYSTECTOMY  2012    Laparoscopic    COLONOSCOPY  2008    COLONOSCOPY  08/10/2016    FOOT SURGERY Right     FRACTURE SURGERY Right 9/14    distal radius    HAND SURGERY  08/2012    crushed right hand and has two steel plates in hand    HYSTERECTOMY      NECK SURGERY  2002    disc    OTHER SURGICAL HISTORY  4/9/14    TRANSFORAMINAL LUMBAR INTERBODY FUSION L4 L5       Allergies:  is allergic to asa buff (mag [buffered aspirin] and cymbalta [duloxetine hcl]. Social History:    TOBACCO:   reports that she has been smoking cigarettes. She started smoking about 49 years ago. She has a 40.00 pack-year smoking history. She has never used smokeless tobacco.  ETOH:   reports no history of alcohol use.       Family History:       Problem Relation Age of Onset    Heart Disease Mother     High Cholesterol Mother     Diabetes Mother     Asthma Mother     Heart Failure Mother     Hypertension Mother     Heart Disease Father     High Cholesterol Father     Emphysema Father     Heart Failure Father     Hypertension Father     Cancer Neg Hx        Current Medications:    Current Facility-Administered Medications:     rOPINIRole (REQUIP) tablet 2 mg, 2 mg, Oral, Nightly, Blessing Milton MD    pregabalin (LYRICA) capsule 200 mg, 200 mg, Oral, BID, Blessing Milton MD    ipratropium-albuterol (DUONEB) nebulizer solution 1 ampule, 1 ampule, Inhalation, Q4H While awake, Abby Ghosh MD, 1 ampule at 05/16/22 0722    methylPREDNISolone sodium (SOLU-MEDROL) injection 40 mg, 40 mg, IntraVENous, Q12H, Abby Ghosh MD, 40 mg at 05/15/22 2034    tiZANidine (ZANAFLEX) tablet 4 mg, 4 mg, Oral, Q8H PRN, Abby Ghosh MD    nicotine (NICODERM CQ) 14 MG/24HR 1 patch, 1 patch, TransDERmal, Daily, Abby Ghosh MD, 1 patch at 05/15/22 1411    albuterol sulfate  (90 Base) MCG/ACT inhaler 2 puff, 2 puff, Inhalation, Q4H PRN, Abby Ghosh MD    atorvastatin (LIPITOR) tablet 60 mg, 60 mg, Oral, Daily, Abby Ghosh MD, 60 mg at 05/15/22 0921    budesonide-formoterol (SYMBICORT) 160-4.5 MCG/ACT inhaler 2 puff, 2 puff, Inhalation, BID, Salina Headley MD, 2 puff at 05/16/22 0722    lisinopril (PRINIVIL;ZESTRIL) tablet 10 mg, 10 mg, Oral, Daily, Salina Fang, MD, 10 mg at 05/15/22 6946    metaxalone (SKELAXIN) tablet 800 mg, 800 mg, Oral, TID, Salina Headley MD, 800 mg at 05/15/22 2034    pantoprazole (PROTONIX) tablet 40 mg, 40 mg, Oral, QAM AC, Salina MD Fang, 40 mg at 05/16/22 0603    sertraline (ZOLOFT) tablet 100 mg, 100 mg, Oral, BID, Salina MD Fang, 100 mg at 05/15/22 2034    risperiDONE (RISPERDAL) tablet 0.5 mg, 0.5 mg, Oral, BID, Salina Headley MD, 0.5 mg at 05/15/22 2034    oxyCODONE-acetaminophen (PERCOCET)  MG per tablet 1 tablet, 1 tablet, Oral, Q4H PRN, Salina Headley MD, 1 tablet at 05/16/22 0426    polyethylene glycol (GLYCOLAX) packet 17 g, 17 g, Oral, Daily, Salina Headley MD, 17 g at 05/15/22 0921    sodium chloride flush 0.9 % injection 5-40 mL, 5-40 mL, IntraVENous, 2 times per day, Salina Headley MD, 10 mL at 05/15/22 2035    sodium chloride flush 0.9 % injection 5-40 mL, 5-40 mL, IntraVENous, PRN, Salina Headley MD    0.9 % sodium chloride infusion, 250 mL, IntraVENous, PRN, Salina Headley MD    enoxaparin (LOVENOX) injection 40 mg, 40 mg, SubCUTAneous, Nightly, Salina Headley MD, 40 mg at 05/15/22 2034    ondansetron (ZOFRAN-ODT) disintegrating tablet 4 mg, 4 mg, Oral, Q8H PRN **OR** ondansetron (ZOFRAN) injection 4 mg, 4 mg, IntraVENous, Q6H PRN, Salina Headley MD    acetaminophen (TYLENOL) tablet 650 mg, 650 mg, Oral, Q6H PRN **OR** acetaminophen (TYLENOL) suppository 650 mg, 650 mg, Rectal, Q6H PRN, Slaina Headley MD    cefTRIAXone (ROCEPHIN) 1000 mg IVPB in 50 mL D5W minibag, 1,000 mg, IntraVENous, Q24H, Stopped at 05/15/22 1815 **AND** azithromycin (ZITHROMAX) tablet 500 mg, 500 mg, Oral, Q24H, Salina Headley MD, 500 mg at 05/15/22 1728      REVIEW OF SYSTEMS:  Constitutional: Negative for fever  HENT: Negative for sore throat  Eyes: Negative for redness   Respiratory: Shortness of breath, cough  Cardiovascular: Negative for chest pain  Gastrointestinal: Negative for vomiting, diarrhea   Genitourinary: Negative for hematuria   Musculoskeletal: Negative for arthralgias   Skin: Negative for rash  Neurological: Negative for syncope  Hematological: Negative for adenopathy  Psychiatric/Behavorial: Negative for anxiety      Objective:   PHYSICAL EXAM:    Blood pressure (!) 144/76, pulse 79, temperature 97.2 °F (36.2 °C), temperature source Oral, resp. rate 18, height 5' 3\" (1.6 m), weight 172 lb 12.8 oz (78.4 kg), SpO2 99 %, not currently breastfeeding.' on RA  Gen: No distress. Eyes: PERRL. No sclera icterus. No conjunctival injection. ENT: No discharge. Pharynx clear. Neck: Trachea midline. No obvious mass. Resp: Rhonchi bilaterally more in the right   CV: Regular rate. Regular rhythm. No murmur or rub. No edema. GI: Non-tender. Non-distended. No hernia. Skin: Warm and dry. No nodule on exposed extremities. Lymph: No cervical LAD. No supraclavicular LAD. M/S: No cyanosis. No joint deformity. No clubbing. Neuro: Awake. Alert. Moves all four extremities. Psych: Oriented x 3. No anxiety. DATA reviewed by me:       Assessment:       · Acute COPD exacerbation   · Bilateral lower extremity cellulitis  · Possible CHF/fluid overload      Plan:         *-IV steroids   *-IV abx, Rocephin and Zithromax  *-negative  viral panel   *- Sputum culture   *_Wean O2  *- procalcitonin  *-BD  ICS   Watch K level   Strep pneum  legionella  Incentive spirmetery and flutter valve   Watch IVF   CT chest :stable GGO lungs   Check o2 at ambulation before discharge  · May needed NIMV  ·   · Pending ultrasound lower extremities to rule out the  · We will follow along.       Thank you very much for allowing me to participate in the care of this pleasant patient , should you have any questions ,please do not hesitate to contact me      Desi Womack MD,[facility]  Pulmonary&Critical Care Medicine    Adali Gaitan    NOTE: This report was transcribed using voice recognition software. Every effort was made to ensure accuracy; however, inadvertent computerized transcription errors may be present.

## 2022-05-16 NOTE — PROGRESS NOTES
RT Inhaler-Nebulizer Bronchodilator Protocol Note    There is a bronchodilator order in the chart from a provider indicating to follow the RT Bronchodilator Protocol and there is an Initiate RT Inhaler-Nebulizer Bronchodilator Protocol order as well (see protocol at bottom of note). CXR Findings:  No results found. The findings from the last RT Protocol Assessment were as follows:   History Pulmonary Disease: (P) Chronic pulmonary disease  Respiratory Pattern: (P) Dyspnea on exertion or RR 21-25 bpm  Breath Sounds: (P) Inspiratory and expiratory or bilateral wheezing and/or rhonchi  Cough: (P) Strong, productive  Indication for Bronchodilator Therapy: (P) Wheezing associated with pulm disorder  Bronchodilator Assessment Score: (P) 11    Aerosolized bronchodilator medication orders have been revised according to the RT Inhaler-Nebulizer Bronchodilator Protocol below. Respiratory Therapist to perform RT Therapy Protocol Assessment initially then follow the protocol. Repeat RT Therapy Protocol Assessment PRN for score 0-3 or on second treatment, BID, and PRN for scores above 3. No Indications - adjust the frequency to every 6 hours PRN wheezing or bronchospasm, if no treatments needed after 48 hours then discontinue using Per Protocol order mode. If indication present, adjust the RT bronchodilator orders based on the Bronchodilator Assessment Score as indicated below. Use Inhaler orders unless patient has one or more of the following: on home nebulizer, not able to hold breath for 10 seconds, is not alert and oriented, cannot activate and use MDI correctly, or respiratory rate 25 breaths per minute or more, then use the equivalent nebulizer order(s) with same Frequency and PRN reasons based on the score. If a patient is on this medication at home then do not decrease Frequency below that used at home.     0-3 - enter or revise RT bronchodilator order(s) to equivalent RT Bronchodilator order with Frequency of every 4 hours PRN for wheezing or increased work of breathing using Per Protocol order mode. 4-6 - enter or revise RT Bronchodilator order(s) to two equivalent RT bronchodilator orders with one order with BID Frequency and one order with Frequency of every 4 hours PRN wheezing or increased work of breathing using Per Protocol order mode. 7-10 - enter or revise RT Bronchodilator order(s) to two equivalent RT bronchodilator orders with one order with TID Frequency and one order with Frequency of every 4 hours PRN wheezing or increased work of breathing using Per Protocol order mode. 11-13 - enter or revise RT Bronchodilator order(s) to one equivalent RT bronchodilator order with QID Frequency and an Albuterol order with Frequency of every 4 hours PRN wheezing or increased work of breathing using Per Protocol order mode. Greater than 13 - enter or revise RT Bronchodilator order(s) to one equivalent RT bronchodilator order with every 4 hours Frequency and an Albuterol order with Frequency of every 2 hours PRN wheezing or increased work of breathing using Per Protocol order mode.          Electronically signed by Casi Vergara RCP on 5/16/2022 at 11:15 AM

## 2022-05-16 NOTE — PLAN OF CARE
Problem: Discharge Planning  Goal: Discharge to home or other facility with appropriate resources  5/16/2022 1205 by Sulaiman Lo RN  Outcome: Progressing  5/16/2022 0007 by Ramin Harris RN  Outcome: Progressing     Problem: Pain  Goal: Verbalizes/displays adequate comfort level or baseline comfort level  5/16/2022 1205 by Sulaiman Lo RN  Outcome: Progressing  5/16/2022 0007 by Ramin Harris RN  Outcome: Progressing     Problem: ABCDS Injury Assessment  Goal: Absence of physical injury  5/16/2022 1205 by Sulaiman Lo RN  Outcome: Progressing  Flowsheets (Taken 5/16/2022 1203)  Absence of Physical Injury: Implement safety measures based on patient assessment  5/16/2022 0007 by Ramin Harris RN  Outcome: Progressing     Problem: Safety - Adult  Goal: Free from fall injury  5/16/2022 1205 by Sulaiman Lo RN  Outcome: Progressing  Flowsheets (Taken 5/16/2022 1203)  Free From Fall Injury: Instruct family/caregiver on patient safety  5/16/2022 0007 by Ramin Harris RN  Outcome: Progressing

## 2022-05-17 LAB
ANION GAP SERPL CALCULATED.3IONS-SCNC: 9 MMOL/L (ref 3–16)
BASOPHILS ABSOLUTE: 0.1 K/UL (ref 0–0.2)
BASOPHILS RELATIVE PERCENT: 0.5 %
BUN BLDV-MCNC: 22 MG/DL (ref 7–20)
CALCIUM SERPL-MCNC: 9.9 MG/DL (ref 8.3–10.6)
CHLORIDE BLD-SCNC: 94 MMOL/L (ref 99–110)
CO2: 35 MMOL/L (ref 21–32)
CREAT SERPL-MCNC: 0.7 MG/DL (ref 0.6–1.2)
EOSINOPHILS ABSOLUTE: 0 K/UL (ref 0–0.6)
EOSINOPHILS RELATIVE PERCENT: 0 %
GFR AFRICAN AMERICAN: >60
GFR NON-AFRICAN AMERICAN: >60
GLUCOSE BLD-MCNC: 194 MG/DL (ref 70–99)
GLUCOSE BLD-MCNC: 236 MG/DL (ref 70–99)
GLUCOSE BLD-MCNC: 336 MG/DL (ref 70–99)
GLUCOSE BLD-MCNC: 390 MG/DL (ref 70–99)
HCT VFR BLD CALC: 39.1 % (ref 36–48)
HEMOGLOBIN: 12.8 G/DL (ref 12–16)
L. PNEUMOPHILA SEROGP 1 UR AG: NORMAL
LV EF: 55 %
LVEF MODALITY: NORMAL
LYMPHOCYTES ABSOLUTE: 0.3 K/UL (ref 1–5.1)
LYMPHOCYTES RELATIVE PERCENT: 3 %
MCH RBC QN AUTO: 31.1 PG (ref 26–34)
MCHC RBC AUTO-ENTMCNC: 32.7 G/DL (ref 31–36)
MCV RBC AUTO: 95 FL (ref 80–100)
MONOCYTES ABSOLUTE: 0.5 K/UL (ref 0–1.3)
MONOCYTES RELATIVE PERCENT: 5 %
NEUTROPHILS ABSOLUTE: 9.8 K/UL (ref 1.7–7.7)
NEUTROPHILS RELATIVE PERCENT: 91.5 %
PDW BLD-RTO: 15.9 % (ref 12.4–15.4)
PERFORMED ON: ABNORMAL
PLATELET # BLD: 173 K/UL (ref 135–450)
PMV BLD AUTO: 8.5 FL (ref 5–10.5)
POTASSIUM REFLEX MAGNESIUM: 5.2 MMOL/L (ref 3.5–5.1)
RBC # BLD: 4.12 M/UL (ref 4–5.2)
SODIUM BLD-SCNC: 138 MMOL/L (ref 136–145)
STREP PNEUMONIAE ANTIGEN, URINE: NORMAL
WBC # BLD: 10.7 K/UL (ref 4–11)

## 2022-05-17 PROCEDURE — 94640 AIRWAY INHALATION TREATMENT: CPT

## 2022-05-17 PROCEDURE — 99233 SBSQ HOSP IP/OBS HIGH 50: CPT | Performed by: INTERNAL MEDICINE

## 2022-05-17 PROCEDURE — 85025 COMPLETE CBC W/AUTO DIFF WBC: CPT

## 2022-05-17 PROCEDURE — 83036 HEMOGLOBIN GLYCOSYLATED A1C: CPT

## 2022-05-17 PROCEDURE — 94669 MECHANICAL CHEST WALL OSCILL: CPT

## 2022-05-17 PROCEDURE — 6370000000 HC RX 637 (ALT 250 FOR IP): Performed by: INTERNAL MEDICINE

## 2022-05-17 PROCEDURE — 2580000003 HC RX 258: Performed by: INTERNAL MEDICINE

## 2022-05-17 PROCEDURE — 6360000002 HC RX W HCPCS: Performed by: INTERNAL MEDICINE

## 2022-05-17 PROCEDURE — 36415 COLL VENOUS BLD VENIPUNCTURE: CPT

## 2022-05-17 PROCEDURE — 99232 SBSQ HOSP IP/OBS MODERATE 35: CPT | Performed by: INTERNAL MEDICINE

## 2022-05-17 PROCEDURE — 80048 BASIC METABOLIC PNL TOTAL CA: CPT

## 2022-05-17 PROCEDURE — 1200000000 HC SEMI PRIVATE

## 2022-05-17 PROCEDURE — 93306 TTE W/DOPPLER COMPLETE: CPT

## 2022-05-17 PROCEDURE — 2700000000 HC OXYGEN THERAPY PER DAY

## 2022-05-17 PROCEDURE — 94761 N-INVAS EAR/PLS OXIMETRY MLT: CPT

## 2022-05-17 RX ORDER — INSULIN LISPRO 100 [IU]/ML
0-3 INJECTION, SOLUTION INTRAVENOUS; SUBCUTANEOUS NIGHTLY
Status: DISCONTINUED | OUTPATIENT
Start: 2022-05-17 | End: 2022-05-18 | Stop reason: HOSPADM

## 2022-05-17 RX ORDER — IPRATROPIUM BROMIDE AND ALBUTEROL SULFATE 2.5; .5 MG/3ML; MG/3ML
1 SOLUTION RESPIRATORY (INHALATION) 3 TIMES DAILY
Status: DISCONTINUED | OUTPATIENT
Start: 2022-05-17 | End: 2022-05-18 | Stop reason: HOSPADM

## 2022-05-17 RX ORDER — FUROSEMIDE 10 MG/ML
40 INJECTION INTRAMUSCULAR; INTRAVENOUS ONCE
Status: COMPLETED | OUTPATIENT
Start: 2022-05-17 | End: 2022-05-17

## 2022-05-17 RX ORDER — DEXTROSE MONOHYDRATE 50 MG/ML
100 INJECTION, SOLUTION INTRAVENOUS PRN
Status: DISCONTINUED | OUTPATIENT
Start: 2022-05-17 | End: 2022-05-18 | Stop reason: HOSPADM

## 2022-05-17 RX ORDER — RISPERIDONE 0.25 MG/1
0.5 TABLET, FILM COATED ORAL NIGHTLY
Status: DISCONTINUED | OUTPATIENT
Start: 2022-05-17 | End: 2022-05-18 | Stop reason: HOSPADM

## 2022-05-17 RX ORDER — PREDNISONE 20 MG/1
40 TABLET ORAL DAILY
Status: DISCONTINUED | OUTPATIENT
Start: 2022-05-17 | End: 2022-05-18 | Stop reason: HOSPADM

## 2022-05-17 RX ORDER — INSULIN LISPRO 100 [IU]/ML
0-6 INJECTION, SOLUTION INTRAVENOUS; SUBCUTANEOUS
Status: DISCONTINUED | OUTPATIENT
Start: 2022-05-17 | End: 2022-05-18 | Stop reason: HOSPADM

## 2022-05-17 RX ADMIN — RISPERIDONE 0.5 MG: 0.25 TABLET ORAL at 20:31

## 2022-05-17 RX ADMIN — SERTRALINE 100 MG: 100 TABLET, FILM COATED ORAL at 09:20

## 2022-05-17 RX ADMIN — PREGABALIN 200 MG: 100 CAPSULE ORAL at 20:31

## 2022-05-17 RX ADMIN — Medication 2 PUFF: at 07:25

## 2022-05-17 RX ADMIN — IPRATROPIUM BROMIDE AND ALBUTEROL SULFATE 1 AMPULE: .5; 3 SOLUTION RESPIRATORY (INHALATION) at 12:03

## 2022-05-17 RX ADMIN — FUROSEMIDE 40 MG: 10 INJECTION, SOLUTION INTRAMUSCULAR; INTRAVENOUS at 09:24

## 2022-05-17 RX ADMIN — ROPINIROLE HYDROCHLORIDE 2 MG: 1 TABLET, FILM COATED ORAL at 20:31

## 2022-05-17 RX ADMIN — OXYCODONE HYDROCHLORIDE AND ACETAMINOPHEN 1 TABLET: 10; 325 TABLET ORAL at 20:32

## 2022-05-17 RX ADMIN — SERTRALINE 100 MG: 100 TABLET, FILM COATED ORAL at 20:32

## 2022-05-17 RX ADMIN — OXYCODONE HYDROCHLORIDE AND ACETAMINOPHEN 1 TABLET: 10; 325 TABLET ORAL at 15:37

## 2022-05-17 RX ADMIN — IPRATROPIUM BROMIDE AND ALBUTEROL SULFATE 1 AMPULE: .5; 3 SOLUTION RESPIRATORY (INHALATION) at 07:24

## 2022-05-17 RX ADMIN — INSULIN LISPRO 2 UNITS: 100 INJECTION, SOLUTION INTRAVENOUS; SUBCUTANEOUS at 20:40

## 2022-05-17 RX ADMIN — PANTOPRAZOLE SODIUM 40 MG: 40 TABLET, DELAYED RELEASE ORAL at 05:41

## 2022-05-17 RX ADMIN — OXYCODONE HYDROCHLORIDE AND ACETAMINOPHEN 1 TABLET: 10; 325 TABLET ORAL at 05:41

## 2022-05-17 RX ADMIN — PREDNISONE 40 MG: 20 TABLET ORAL at 09:20

## 2022-05-17 RX ADMIN — Medication 10 ML: at 09:27

## 2022-05-17 RX ADMIN — INSULIN LISPRO 1 UNITS: 100 INJECTION, SOLUTION INTRAVENOUS; SUBCUTANEOUS at 12:39

## 2022-05-17 RX ADMIN — CEFTRIAXONE SODIUM 1000 MG: 1 INJECTION, POWDER, FOR SOLUTION INTRAMUSCULAR; INTRAVENOUS at 18:10

## 2022-05-17 RX ADMIN — ONDANSETRON 4 MG: 4 TABLET, ORALLY DISINTEGRATING ORAL at 10:21

## 2022-05-17 RX ADMIN — Medication 10 ML: at 20:31

## 2022-05-17 RX ADMIN — Medication 2 PUFF: at 20:17

## 2022-05-17 RX ADMIN — OXYCODONE HYDROCHLORIDE AND ACETAMINOPHEN 1 TABLET: 10; 325 TABLET ORAL at 10:10

## 2022-05-17 RX ADMIN — SODIUM CHLORIDE 25 ML: 9 INJECTION, SOLUTION INTRAVENOUS at 18:10

## 2022-05-17 RX ADMIN — PREGABALIN 200 MG: 100 CAPSULE ORAL at 09:20

## 2022-05-17 RX ADMIN — ATORVASTATIN CALCIUM 60 MG: 40 TABLET, FILM COATED ORAL at 09:20

## 2022-05-17 RX ADMIN — INSULIN LISPRO 5 UNITS: 100 INJECTION, SOLUTION INTRAVENOUS; SUBCUTANEOUS at 17:19

## 2022-05-17 RX ADMIN — POLYETHYLENE GLYCOL (3350) 17 G: 17 POWDER, FOR SOLUTION ORAL at 09:19

## 2022-05-17 RX ADMIN — IPRATROPIUM BROMIDE AND ALBUTEROL SULFATE 1 AMPULE: .5; 3 SOLUTION RESPIRATORY (INHALATION) at 20:17

## 2022-05-17 RX ADMIN — ENOXAPARIN SODIUM 40 MG: 100 INJECTION SUBCUTANEOUS at 20:32

## 2022-05-17 RX ADMIN — METAXALONE 800 MG: 800 TABLET ORAL at 09:20

## 2022-05-17 ASSESSMENT — PAIN SCALES - GENERAL
PAINLEVEL_OUTOF10: 0
PAINLEVEL_OUTOF10: 4
PAINLEVEL_OUTOF10: 9
PAINLEVEL_OUTOF10: 7

## 2022-05-17 ASSESSMENT — PAIN DESCRIPTION - LOCATION: LOCATION: BACK

## 2022-05-17 NOTE — FLOWSHEET NOTE
05/17/22 0915   Vital Signs   Temp 97.9 °F (36.6 °C)   Temp Source Oral   Pulse 92   Heart Rate Source Monitor   Resp 18   /72   BP Location Right upper arm   MAP (Calculated) 89.33   Patient Position Up in chair   Level of Consciousness Alert (0)   MEWS Score 1   Pain Assessment   Pain Assessment 0-10   Pain Level 4   Oxygen Therapy   SpO2 95 %   O2 Device Nasal cannula   O2 Flow Rate (L/min) 2 L/min   Shift assessment complete. See flow sheet. Scheduled medications given, See MAR. Head to toe complete. Vital signs logged and active bowel sounds in all 4 quadrants. Pt awake in bed. Pt agreeable to get up to chair, RN assisted. Medications taken without difficulty. No further needs noted at this time. Call light and bedside table within reach. Bed in lowest position, wheels locked and side rails up x2.      Kodi Evans, RN

## 2022-05-17 NOTE — PROGRESS NOTES
hours PRN for wheezing or increased work of breathing using Per Protocol order mode. 4-6 - enter or revise RT Bronchodilator order(s) to two equivalent RT bronchodilator orders with one order with BID Frequency and one order with Frequency of every 4 hours PRN wheezing or increased work of breathing using Per Protocol order mode. 7-10 - enter or revise RT Bronchodilator order(s) to two equivalent RT bronchodilator orders with one order with TID Frequency and one order with Frequency of every 4 hours PRN wheezing or increased work of breathing using Per Protocol order mode. 11-13 - enter or revise RT Bronchodilator order(s) to one equivalent RT bronchodilator order with QID Frequency and an Albuterol order with Frequency of every 4 hours PRN wheezing or increased work of breathing using Per Protocol order mode. Greater than 13 - enter or revise RT Bronchodilator order(s) to one equivalent RT bronchodilator order with every 4 hours Frequency and an Albuterol order with Frequency of every 2 hours PRN wheezing or increased work of breathing using Per Protocol order mode. RT to enter RT Home Evaluation for COPD & MDI Assessment order using Per Protocol order mode.     Electronically signed by Laurie Mcghee RCP on 5/17/2022 at 7:27 AM

## 2022-05-17 NOTE — PLAN OF CARE
Problem: Discharge Planning  Goal: Discharge to home or other facility with appropriate resources  5/17/2022 1254 by Ancelmo Ray RN  Outcome: Progressing  Flowsheets (Taken 5/17/2022 0915)  Discharge to home or other facility with appropriate resources: Identify barriers to discharge with patient and caregiver  5/16/2022 2324 by Kirstie Barriga RN  Outcome: Progressing     Problem: Pain  Goal: Verbalizes/displays adequate comfort level or baseline comfort level  5/17/2022 1254 by Ancelmo Ray RN  Outcome: Progressing  5/16/2022 2324 by Kirstie Barriga RN  Outcome: Progressing     Problem: ABCDS Injury Assessment  Goal: Absence of physical injury  5/17/2022 1254 by Ancelmo Ray RN  Outcome: Progressing  Flowsheets (Taken 5/17/2022 1242)  Absence of Physical Injury: Implement safety measures based on patient assessment  5/16/2022 2324 by Kirstie Barriga RN  Outcome: Progressing     Problem: Safety - Adult  Goal: Free from fall injury  5/17/2022 1254 by Ancelmo Rya RN  Outcome: Progressing  Flowsheets (Taken 5/17/2022 1242)  Free From Fall Injury: Instruct family/caregiver on patient safety  5/16/2022 2324 by Kirstie Barriga RN  Outcome: Progressing

## 2022-05-17 NOTE — PLAN OF CARE
Problem: Discharge Planning  Goal: Discharge to home or other facility with appropriate resources  5/16/2022 2324 by Meli Mccarthy RN  Outcome: Progressing  5/16/2022 1205 by Shelah Schlatter, RN  Outcome: Progressing     Problem: Pain  Goal: Verbalizes/displays adequate comfort level or baseline comfort level  5/16/2022 2324 by Meli Mccarthy RN  Outcome: Progressing  5/16/2022 1205 by Shelah Schlatter, RN  Outcome: Progressing     Problem: ABCDS Injury Assessment  Goal: Absence of physical injury  5/16/2022 2324 by Meli Mccarthy RN  Outcome: Progressing  5/16/2022 1205 by Shelah Schlatter, RN  Outcome: Progressing  Flowsheets (Taken 5/16/2022 1203)  Absence of Physical Injury: Implement safety measures based on patient assessment     Problem: Safety - Adult  Goal: Free from fall injury  5/16/2022 2324 by Meli Mccarthy RN  Outcome: Progressing  5/16/2022 1205 by Shelah Schlatter, RN  Outcome: Progressing  Flowsheets (Taken 5/16/2022 1203)  Free From Fall Injury: Instruct family/caregiver on patient safety

## 2022-05-17 NOTE — FLOWSHEET NOTE
05/16/22 2051   Vital Signs   Temp 97.2 °F (36.2 °C)   Temp Source Oral   Pulse 82   Heart Rate Source Monitor   Resp 18   BP (!) 140/64   BP Location Right upper arm   MAP (Calculated) 89.33   Patient Position Semi fowlers   Level of Consciousness Alert (0)   MEWS Score 1   Pain Assessment   Pain Assessment 0-10   Pain Level 9   Opioid-Induced Sedation   POSS Score 1   Oxygen Therapy   SpO2 91 %   O2 Device Nasal cannula   O2 Flow Rate (L/min) 2 L/min   HS assessment completed, see flow sheet. Pt is alert and oriented. BP slightly elevated, no s/s of distress noted. Respirations are even & easy. complaints of pain voiced, prn pain medication given and effective. Pt denies needs at this time. SR up x 2, and bed in low position. Call light is within reach. Bedside Mobility Assessment Tool (BMAT):     Assessment Level 1- Sit and Shake    1. From a semi-reclined position, ask patient to sit up and rotate to a seated position at the side of the bed. Can use the bedrail. 2. Ask patient to reach out and grab your hand and shake making sure patient reaches across his/her midline. Pass- Patient is able to come to a seated position, maintain core strength. Maintains seated balance while reaching across midline. Move on to Assessment Level 2. Assessment Level 2- Stretch and Point   1. With patient in seated position at the side of the bed, have patient place both feet on the floor (or stool) with knees no higher than hips. 2. Ask patient to stretch one leg and straighten the knee, then bend the ankle/flex and point the toes. If appropriate, repeat with the other leg. Pass- Patient is able to demonstrate appropriate quad strength on intended weight bearing limb(s). Move onto Assessment Level 3. Assessment Level 3- Stand   1. Ask patient to elevate off the bed or chair (seated to standing) using an assistive device (cane, bedrail).     2. Patient should be able to raise buttocks off be and hold for a count of five. May repeat once. Pass- Patient maintains standing stability for at least 5 seconds, proceed to assessment level 4. Assessment Level 4- Walk   1. Ask patient to march in place at bedside. 2. Then ask patient to advance step and return each foot. Some medical conditions may render a patient from stepping backwards, use your best clinical judgement. Pass- Patient demonstrates balance while shifting weight and ability to step, takes independent steps, does not use assistive device patient is MOBILITY LEVEL 4. Mobility Level- 4    Patient is not able to demonstrate the ability to move from a reclining position to an upright position within the recliner due to weakness.

## 2022-05-17 NOTE — PROGRESS NOTES
P Pulmonary, [facility] Special[facility]                                 Pulmonary Consult /Progress Note :                                                                      CHIEF COMPLAINT: SOB ,leg swelling     Consulting provider: Dr Sondra Mckinney     HPI:   She has  been doing well   Received Lasix and she put good urine output and her SOB has improved  No fever or chills  No chest pain   Sitting up to chair     OE   Vitals:    05/17/22 1332   BP: (!) 105/90   Pulse: 76   Resp: 16   Temp: 98.8 °F (37.1 °C)   SpO2: 92%     Gen: No distress. Eyes: PERRL. No sclera icterus. No conjunctival injection. ENT: No discharge. Pharynx clear. Neck: Trachea midline. No obvious mass. Resp: Rhonchi bilaterally more in the right   CV: Regular rate. Regular rhythm. No murmur or rub. No edema. GI: Non-tender. Non-distended. No hernia. Skin: Warm and dry. No nodule on exposed extremities. Lymph: No cervical LAD. No supraclavicular LAD. M/S: No cyanosis. No joint deformity. No clubbing. Neuro: Awake. Alert. Moves all four extremities. Psych: Oriented x 3. No anxiety.      Recent Results (from the past 24 hour(s))   Strep Pneumoniae Antigen    Collection Time: 05/16/22  3:10 PM    Specimen: Urine, clean catch   Result Value Ref Range    STREP PNEUMONIAE ANTIGEN, URINE       Presumptive Negative  Presumptive negative suggests no current or recent  pneumococcal infection. Infection due to Strep pneumoniae  cannot be ruled out since the antigen present in the sample  may be below the detection limit of the test.  Normal Range:Presumptive Negative     Legionella antigen, urine    Collection Time: 05/16/22  3:10 PM    Specimen: Urine voided   Result Value Ref Range    L. pneumophila Serogp 1 Ur Ag       Presumptive Negative  No Legionella pneumophila serogroup 1 antigens detected.   A negative result does not exclude infection with  Legionella pneumophila serogroup 1 nor does it rule out  other microbial-caused respiratory infections or  disease caused by other serogroups of  Legionella pneumophila.   Normal Range: Presumptive Negative     CBC with Auto Differential    Collection Time: 05/17/22  5:26 AM   Result Value Ref Range    WBC 10.7 4.0 - 11.0 K/uL    RBC 4.12 4.00 - 5.20 M/uL    Hemoglobin 12.8 12.0 - 16.0 g/dL    Hematocrit 39.1 36.0 - 48.0 %    MCV 95.0 80.0 - 100.0 fL    MCH 31.1 26.0 - 34.0 pg    MCHC 32.7 31.0 - 36.0 g/dL    RDW 15.9 (H) 12.4 - 15.4 %    Platelets 638 379 - 854 K/uL    MPV 8.5 5.0 - 10.5 fL    Neutrophils % 91.5 %    Lymphocytes % 3.0 %    Monocytes % 5.0 %    Eosinophils % 0.0 %    Basophils % 0.5 %    Neutrophils Absolute 9.8 (H) 1.7 - 7.7 K/uL    Lymphocytes Absolute 0.3 (L) 1.0 - 5.1 K/uL    Monocytes Absolute 0.5 0.0 - 1.3 K/uL    Eosinophils Absolute 0.0 0.0 - 0.6 K/uL    Basophils Absolute 0.1 0.0 - 0.2 K/uL   Basic Metabolic Panel w/ Reflex to MG    Collection Time: 05/17/22  5:26 AM   Result Value Ref Range    Sodium 138 136 - 145 mmol/L    Potassium reflex Magnesium 5.2 (H) 3.5 - 5.1 mmol/L    Chloride 94 (L) 99 - 110 mmol/L    CO2 35 (H) 21 - 32 mmol/L    Anion Gap 9 3 - 16    Glucose 236 (H) 70 - 99 mg/dL    BUN 22 (H) 7 - 20 mg/dL    CREATININE 0.7 0.6 - 1.2 mg/dL    GFR Non-African American >60 >60    GFR African American >60 >60    Calcium 9.9 8.3 - 10.6 mg/dL   POCT Glucose    Collection Time: 05/17/22 12:07 PM   Result Value Ref Range    POC Glucose 194 (H) 70 - 99 mg/dl    Performed on ACCU-CHEK          Assessment:       · Acute COPD exacerbation   · Bilateral lower extremity cellulitis  · Possible CHF/fluid overload      Plan:         *-IV steroids ,start wean to Prednisone ,wean over 10 days   *-IV abx, Rocephin and Zithromax,can be stopped once finished 5 days or change PO   *-negative  viral panel   *- Sputum culture so far negative ,legionella nnegaive   *_Wean O2  *-BD  ICS   Watch K level   Strep pneum negative   Legionella negative   Incentive spirmetery and flutter valve   CT chest :stable GGO lungs ,follow with CT in 3-6 months as OP  Check o2 at ambulation before discharge  · May needed NIMV  · Work up for liver cirrhosis as per primary   · US LE no DVT   · We will follow along. Diuresis as per primary   Discharge plan   Pablo Womack MD,[facility]  Pulmonary&Critical Care Medicine    Janie Jacnito    NOTE: This report was transcribed using voice recognition software. Every effort was made to ensure accuracy; however, inadvertent computerized transcription errors may be present.

## 2022-05-17 NOTE — PROGRESS NOTES
RT Inhaler-Nebulizer Bronchodilator Protocol Note    There is a bronchodilator order in the chart from a provider indicating to follow the RT Bronchodilator Protocol and there is an Initiate RT Inhaler-Nebulizer Bronchodilator Protocol order as well (see protocol at bottom of note). CXR Findings:  No results found. The findings from the last RT Protocol Assessment were as follows:   History Pulmonary Disease: (P) Chronic pulmonary disease  Respiratory Pattern: (P) Dyspnea on exertion or RR 21-25 bpm  Breath Sounds: (P) Inspiratory and expiratory or bilateral wheezing and/or rhonchi  Cough: (P) Strong, spontaneous, non-productive  Indication for Bronchodilator Therapy: (P) Wheezing associated with pulm disorder  Bronchodilator Assessment Score: (P) 10    Aerosolized bronchodilator medication orders have been revised according to the RT Inhaler-Nebulizer Bronchodilator Protocol below. Respiratory Therapist to perform RT Therapy Protocol Assessment initially then follow the protocol. Repeat RT Therapy Protocol Assessment PRN for score 0-3 or on second treatment, BID, and PRN for scores above 3. No Indications - adjust the frequency to every 6 hours PRN wheezing or bronchospasm, if no treatments needed after 48 hours then discontinue using Per Protocol order mode. If indication present, adjust the RT bronchodilator orders based on the Bronchodilator Assessment Score as indicated below. Use Inhaler orders unless patient has one or more of the following: on home nebulizer, not able to hold breath for 10 seconds, is not alert and oriented, cannot activate and use MDI correctly, or respiratory rate 25 breaths per minute or more, then use the equivalent nebulizer order(s) with same Frequency and PRN reasons based on the score. If a patient is on this medication at home then do not decrease Frequency below that used at home.     0-3 - enter or revise RT bronchodilator order(s) to equivalent RT Bronchodilator order with Frequency of every 4 hours PRN for wheezing or increased work of breathing using Per Protocol order mode. 4-6 - enter or revise RT Bronchodilator order(s) to two equivalent RT bronchodilator orders with one order with BID Frequency and one order with Frequency of every 4 hours PRN wheezing or increased work of breathing using Per Protocol order mode. 7-10 - enter or revise RT Bronchodilator order(s) to two equivalent RT bronchodilator orders with one order with TID Frequency and one order with Frequency of every 4 hours PRN wheezing or increased work of breathing using Per Protocol order mode. 11-13 - enter or revise RT Bronchodilator order(s) to one equivalent RT bronchodilator order with QID Frequency and an Albuterol order with Frequency of every 4 hours PRN wheezing or increased work of breathing using Per Protocol order mode. Greater than 13 - enter or revise RT Bronchodilator order(s) to one equivalent RT bronchodilator order with every 4 hours Frequency and an Albuterol order with Frequency of every 2 hours PRN wheezing or increased work of breathing using Per Protocol order mode.          Electronically signed by Lanie Ro RCP on 5/16/2022 at 8:20 PM

## 2022-05-17 NOTE — PROGRESS NOTES
IM Progress Note    Admit Date:  5/14/2022  3    Interval history:   Pneumonia     Subjective:  Ms. Ana Elena seen up in chair  feels ok today , remains on 2 L   Still with wheezing  Good UOP yesterday with lasix        Denies having liver failure in the past but CT s.o cirrhosis      Objective:   /72   Pulse 92   Temp 97.9 °F (36.6 °C) (Oral)   Resp 16   Ht 5' 3\" (1.6 m)   Wt 170 lb 14.4 oz (77.5 kg)   SpO2 95%   BMI 30.27 kg/m²       Intake/Output Summary (Last 24 hours) at 5/17/2022 1140  Last data filed at 5/17/2022 7010  Gross per 24 hour   Intake 260 ml   Output 2700 ml   Net -2440 ml       Physical Exam:        General: elderly obese female, sleeping in bed, arousable   Awake, alert and oriented. Appears to be not in any distress  Mucous Membranes:  Pink , anicteric  Neck: No JVD, no carotid bruit, no thyromegaly  Chest:  Scattered wheeze mary with basilar crackles  Cardiovascular:  RRR S1S2 heard, no murmurs or gallops  Abdomen:  Soft, obese, undistended, non tender, no organomegaly, BS present  Extremities: 1+ chronic brawny edema of left leg with crusted wounds  No open ulcers. Resolving  cellulitis noted  .  Distal pulses well felt  Neurological : grossly normal        Medications:   Scheduled Medications:    ipratropium-albuterol  1 ampule Inhalation TID    risperiDONE  0.5 mg Oral Nightly    predniSONE  40 mg Oral Daily    rOPINIRole  2 mg Oral Nightly    pregabalin  200 mg Oral BID    nicotine  1 patch TransDERmal Daily    atorvastatin  60 mg Oral Daily    budesonide-formoterol  2 puff Inhalation BID    [Held by provider] lisinopril  10 mg Oral Daily    pantoprazole  40 mg Oral QAM AC    sertraline  100 mg Oral BID    polyethylene glycol  17 g Oral Daily    sodium chloride flush  5-40 mL IntraVENous 2 times per day    enoxaparin  40 mg SubCUTAneous Nightly    cefTRIAXone (ROCEPHIN) IV  1,000 mg IntraVENous Q24H     I   sodium chloride       metaxalone, perflutren lipid microspheres, albuterol sulfate HFA, oxyCODONE-acetaminophen, sodium chloride flush, sodium chloride, ondansetron **OR** ondansetron, acetaminophen **OR** acetaminophen    Lab Data:  Recent Labs     05/15/22  0616 05/16/22  0541 05/17/22  0526   WBC 7.8 9.8 10.7   HGB 12.2 11.9* 12.8   HCT 38.1 37.3 39.1   MCV 95.6 98.1 95.0    155 173     Recent Labs     05/15/22  0616 05/16/22  0541 05/17/22  0526    139 138   K 5.3* 5.3* 5.2*    101 94*   CO2 32 29 35*   BUN 15 15 22*   CREATININE 0.8 0.7 0.7     No results for input(s): CKTOTAL, CKMB, CKMBINDEX, TROPONINI in the last 72 hours. Coagulation:   Lab Results   Component Value Date    INR 0.94 09/12/2020    APTT 31.7 09/12/2020     Cardiac markers:   Lab Results   Component Value Date    CKMB 10.3 07/14/2010    CKTOTAL 74 10/17/2014    TROPONINI <0.01 03/07/2021         Lab Results   Component Value Date    ALT 10 05/14/2022    AST 14 (L) 05/14/2022    ALKPHOS 112 05/14/2022    BILITOT 0.3 05/14/2022       Lab Results   Component Value Date    INR 0.94 09/12/2020    INR 0.99 07/01/2018    INR 1.01 06/05/2018    PROTIME 10.9 09/12/2020    PROTIME 11.3 07/01/2018    PROTIME 11.5 06/05/2018       Radiology      CULTURES  COVID and Flu not detected      EKG:    Sinus tachycardia  Otherwise normal ECG  When compared with ECG of 07-MAR-2021 16:14,  No significant change was found  Confirmed by Jenna Chaudhry MD, [address] ([id number]) on 5/15/2022 7:16:31 AM     RADIOLOGY  XR TIBIA FIBULA LEFT (2 VIEWS)   Final Result   Moderate cellulitis or edema in the soft tissues around the calf extending   distally around the ankle with no acute bony abnormality.           XR CHEST (2 VW)   Final Result   Mild bilateral interstitial opacities may represent infection versus   atelectasis versus pulmonary edema.         Venous doppler - no DVT      ECHO        Summary   LV systolic function is normal with EF estimated at 55%. No regional wall motion abnormalities.    There is mild

## 2022-05-18 VITALS
TEMPERATURE: 98.1 F | DIASTOLIC BLOOD PRESSURE: 63 MMHG | WEIGHT: 167.6 LBS | BODY MASS INDEX: 29.7 KG/M2 | RESPIRATION RATE: 18 BRPM | HEART RATE: 96 BPM | HEIGHT: 63 IN | SYSTOLIC BLOOD PRESSURE: 100 MMHG | OXYGEN SATURATION: 95 %

## 2022-05-18 LAB
ANION GAP SERPL CALCULATED.3IONS-SCNC: 12 MMOL/L (ref 3–16)
BASOPHILS ABSOLUTE: 0 K/UL (ref 0–0.2)
BASOPHILS RELATIVE PERCENT: 0.2 %
BUN BLDV-MCNC: 40 MG/DL (ref 7–20)
CALCIUM SERPL-MCNC: 9.6 MG/DL (ref 8.3–10.6)
CHLORIDE BLD-SCNC: 94 MMOL/L (ref 99–110)
CO2: 34 MMOL/L (ref 21–32)
CREAT SERPL-MCNC: 1 MG/DL (ref 0.6–1.2)
EOSINOPHILS ABSOLUTE: 0 K/UL (ref 0–0.6)
EOSINOPHILS RELATIVE PERCENT: 0 %
ESTIMATED AVERAGE GLUCOSE: 139.9 MG/DL
GFR AFRICAN AMERICAN: >60
GFR NON-AFRICAN AMERICAN: 56
GLUCOSE BLD-MCNC: 153 MG/DL (ref 70–99)
GLUCOSE BLD-MCNC: 155 MG/DL (ref 70–99)
GLUCOSE BLD-MCNC: 187 MG/DL (ref 70–99)
HBA1C MFR BLD: 6.5 %
HCT VFR BLD CALC: 40 % (ref 36–48)
HEMOGLOBIN: 13.1 G/DL (ref 12–16)
LYMPHOCYTES ABSOLUTE: 0.7 K/UL (ref 1–5.1)
LYMPHOCYTES RELATIVE PERCENT: 6.8 %
MCH RBC QN AUTO: 31.4 PG (ref 26–34)
MCHC RBC AUTO-ENTMCNC: 32.8 G/DL (ref 31–36)
MCV RBC AUTO: 95.6 FL (ref 80–100)
MONOCYTES ABSOLUTE: 1.2 K/UL (ref 0–1.3)
MONOCYTES RELATIVE PERCENT: 12.8 %
NEUTROPHILS ABSOLUTE: 7.7 K/UL (ref 1.7–7.7)
NEUTROPHILS RELATIVE PERCENT: 80.2 %
PDW BLD-RTO: 15.4 % (ref 12.4–15.4)
PERFORMED ON: ABNORMAL
PERFORMED ON: ABNORMAL
PLATELET # BLD: 156 K/UL (ref 135–450)
PMV BLD AUTO: 8.4 FL (ref 5–10.5)
POTASSIUM REFLEX MAGNESIUM: 4.9 MMOL/L (ref 3.5–5.1)
RBC # BLD: 4.18 M/UL (ref 4–5.2)
SODIUM BLD-SCNC: 140 MMOL/L (ref 136–145)
WBC # BLD: 9.6 K/UL (ref 4–11)

## 2022-05-18 PROCEDURE — 85025 COMPLETE CBC W/AUTO DIFF WBC: CPT

## 2022-05-18 PROCEDURE — 94640 AIRWAY INHALATION TREATMENT: CPT

## 2022-05-18 PROCEDURE — 99232 SBSQ HOSP IP/OBS MODERATE 35: CPT | Performed by: INTERNAL MEDICINE

## 2022-05-18 PROCEDURE — 36415 COLL VENOUS BLD VENIPUNCTURE: CPT

## 2022-05-18 PROCEDURE — 94669 MECHANICAL CHEST WALL OSCILL: CPT

## 2022-05-18 PROCEDURE — 99239 HOSP IP/OBS DSCHRG MGMT >30: CPT | Performed by: INTERNAL MEDICINE

## 2022-05-18 PROCEDURE — 6370000000 HC RX 637 (ALT 250 FOR IP): Performed by: INTERNAL MEDICINE

## 2022-05-18 PROCEDURE — 80048 BASIC METABOLIC PNL TOTAL CA: CPT

## 2022-05-18 PROCEDURE — 2700000000 HC OXYGEN THERAPY PER DAY

## 2022-05-18 PROCEDURE — 94761 N-INVAS EAR/PLS OXIMETRY MLT: CPT

## 2022-05-18 PROCEDURE — 92526 ORAL FUNCTION THERAPY: CPT

## 2022-05-18 RX ORDER — LEVOFLOXACIN 500 MG/1
500 TABLET, FILM COATED ORAL DAILY
Qty: 5 TABLET | Refills: 0 | Status: SHIPPED | OUTPATIENT
Start: 2022-05-18 | End: 2022-05-23

## 2022-05-18 RX ORDER — OXYCODONE AND ACETAMINOPHEN 10; 325 MG/1; MG/1
1 TABLET ORAL EVERY 8 HOURS PRN
Status: DISCONTINUED | OUTPATIENT
Start: 2022-05-18 | End: 2022-05-18

## 2022-05-18 RX ORDER — ROPINIROLE 2 MG/1
2 TABLET, FILM COATED ORAL NIGHTLY
Qty: 90 TABLET | Refills: 3
Start: 2022-05-18

## 2022-05-18 RX ORDER — OXYCODONE AND ACETAMINOPHEN 10; 325 MG/1; MG/1
1 TABLET ORAL EVERY 6 HOURS PRN
Qty: 6 TABLET | Refills: 0 | Status: CANCELLED
Start: 2022-05-18 | End: 2022-05-21

## 2022-05-18 RX ORDER — SERTRALINE HYDROCHLORIDE 100 MG/1
100 TABLET, FILM COATED ORAL 2 TIMES DAILY
Qty: 30 TABLET | Refills: 3
Start: 2022-05-18

## 2022-05-18 RX ORDER — FUROSEMIDE 20 MG/1
20 TABLET ORAL DAILY
Status: DISCONTINUED | OUTPATIENT
Start: 2022-05-18 | End: 2022-05-18 | Stop reason: HOSPADM

## 2022-05-18 RX ORDER — FUROSEMIDE 20 MG/1
20 TABLET ORAL DAILY
Qty: 60 TABLET | Refills: 3 | Status: SHIPPED | OUTPATIENT
Start: 2022-05-18

## 2022-05-18 RX ORDER — PREDNISONE 10 MG/1
TABLET ORAL
Qty: 18 TABLET | Refills: 0 | Status: SHIPPED | OUTPATIENT
Start: 2022-05-18 | End: 2022-06-29

## 2022-05-18 RX ADMIN — Medication 2 PUFF: at 07:43

## 2022-05-18 RX ADMIN — FUROSEMIDE 20 MG: 20 TABLET ORAL at 08:39

## 2022-05-18 RX ADMIN — IPRATROPIUM BROMIDE AND ALBUTEROL SULFATE 1 AMPULE: .5; 3 SOLUTION RESPIRATORY (INHALATION) at 07:43

## 2022-05-18 RX ADMIN — INSULIN LISPRO 1 UNITS: 100 INJECTION, SOLUTION INTRAVENOUS; SUBCUTANEOUS at 08:41

## 2022-05-18 RX ADMIN — PANTOPRAZOLE SODIUM 40 MG: 40 TABLET, DELAYED RELEASE ORAL at 05:42

## 2022-05-18 RX ADMIN — POLYETHYLENE GLYCOL (3350) 17 G: 17 POWDER, FOR SOLUTION ORAL at 08:41

## 2022-05-18 RX ADMIN — ATORVASTATIN CALCIUM 60 MG: 40 TABLET, FILM COATED ORAL at 08:39

## 2022-05-18 RX ADMIN — INSULIN LISPRO 1 UNITS: 100 INJECTION, SOLUTION INTRAVENOUS; SUBCUTANEOUS at 12:06

## 2022-05-18 RX ADMIN — OXYCODONE HYDROCHLORIDE AND ACETAMINOPHEN 1 TABLET: 10; 325 TABLET ORAL at 00:14

## 2022-05-18 RX ADMIN — IPRATROPIUM BROMIDE AND ALBUTEROL SULFATE 1 AMPULE: .5; 3 SOLUTION RESPIRATORY (INHALATION) at 11:39

## 2022-05-18 RX ADMIN — OXYCODONE HYDROCHLORIDE AND ACETAMINOPHEN 1 TABLET: 10; 325 TABLET ORAL at 05:42

## 2022-05-18 RX ADMIN — PREDNISONE 40 MG: 20 TABLET ORAL at 08:39

## 2022-05-18 ASSESSMENT — PAIN SCALES - GENERAL
PAINLEVEL_OUTOF10: 0
PAINLEVEL_OUTOF10: 9

## 2022-05-18 NOTE — FLOWSHEET NOTE
05/18/22 0830   Vital Signs   Temp 97.3 °F (36.3 °C)   Temp Source Oral   Pulse 83   Heart Rate Source Monitor   Resp 18   BP (!) 96/59   MAP (Calculated) 71.33   Patient Position Semi fowlers   Level of Consciousness Alert (0)   MEWS Score 2   Oxygen Therapy   SpO2 (!) 88 %   O2 Device Nasal cannula   O2 Flow Rate (L/min) 2 L/min       Shift assessment complete. See flow sheet. Scheduled meds given. See MAR. Patients head-toe complete, VS are logged, and active bowel sound noted in all four quadrants. MD notified of BP. Patient very drowsy this AM. Although she is able to answer orientation questions she is very unsteady. Bed alarm was turned on, patient was educated on using call light and not getting up on her own. Lyrica and Zoloft held. MD aware. Oxygen titrated to 3L. No further needs  noted at this time. Call light and bedside table are within reach. The bed is locked and is in the lowest position. Mayra Prasad RN

## 2022-05-18 NOTE — CARE COORDINATION
[facility]  Received referral [facility] from [address]. Per CM, plan for dc in next 1-2 days. Sent request to [facility] for [facility] coverage. [facility] is able to service pt for SN, PT/OT, MSW with [facility] 5/23. Liaison to follow up with pt prior to discharge. Notified by [address] pt now going home today. OK for PT [facility] and SN follow up 5/23. Notified [facility] of pt plan for dc today. Sent referral to [facility].       Electronically signed by Brina Sanchez RN on 5/18/2022 at 10:21 AM
DISCHARGE ORDER  Date/Time 2022 3:11 PM  Completed by: Shirley Fisher RN, Case Management    Patient Name: Luis Espinoza      : 1955  Admitting Diagnosis: Community acquired pneumonia, unspecified laterality [J18.9]      Admit order Date and Status: IP 2022  (verify MD's last order for status of admission)      Noted discharge order. If applicable PT/OT recommendation at Discharge: NA  DME recommendation by PT/OT: NA  Confirmed discharge plan with patient  Discharge Plan: Chart reviewed. Met with pt at bedside and explained the role of the CM. Plans to retuen home today. IPTA. Lives alone but family nearby and assists PRN. Pt interested in PASSPORT services. Referral made to [facility] and they will call patient once she is at home top assess her eligibility. Referral to [facility] for SN/PT/OT and SW). Orders and YIMI/AVS in [facility]. Patient will call family for transportation home. Has Home O2 in place on admit:  No  Informed of need to bring portable home O2 tank on day of discharge for nursing to connect prior to leaving:   No  Verbalized agreement/Understanding:   Not Indicated  Pt is being d/c'd to home today. Pt's O2 sats are 95% on 3 liters. Discharge timeout done with [facility]. All discharge needs and concerns addressed.
INTERDISCIPLINARY PLAN OF CARE CONFERENCE    Date/Time: 5/16/2022 11:42 AM  Completed by: Ryanne Henderson RN, Case Management      Patient Name:  Sandi Mckenzie  YOB: 1955  Admitting Diagnosis: Community acquired pneumonia, unspecified laterality [J18.9]     Admit Date/Time:  5/14/2022  2:58 PM    Chart reviewed. Interdisciplinary team contacted or reviewed plan related to patient progress and discharge plans. Disciplines included Case Management, Nursing, and Dietitian. Current Status: IP 05/14/2022  PT/OT recommendation for discharge plan of care:     Expected D/C Disposition:  Home    Discharge Plan Comments: Plans to return home with family. Assist w/ ADLS. +home O2 w/ [employer] 2-3 liters at baseline. Pt off floor to venous doppler today. Anticipate DC tomorrow. CM following. Home O2 in place on admit: Yes  Pt informed of need to bring portable home O2 tank on day of discharge for nursing to connect prior to leaving:  Yes  Verbalized agreement/Understanding:   Yes
INTERDISCIPLINARY PLAN OF CARE CONFERENCE    Date/Time: 5/18/2022 10:12 AM  Completed by: Ryanne Henderson RN, Case Management      Patient Name:  Sandi Mckenzie  YOB: 1955  Admitting Diagnosis: Community acquired pneumonia, unspecified laterality [J18.9]     Admit Date/Time:  5/14/2022  2:58 PM    Chart reviewed. Interdisciplinary team contacted or reviewed plan related to patient progress and discharge plans. Disciplines included Case Management, Nursing, and Dietitian. Current Status: IP 05/14/2022  PT/OT recommendation for discharge plan of care: NA    Expected D/C Disposition:  Home  Confirmed plan with patient  Discharge Plan Comments: Chart reviewed. Met with pt at bedside and explained the role of the CM. Plans to return home. Family assists with some ADLS. Interested in [facility]. Referral to [employer] and referral number placed in AVS for patient to call upon DC for phone application. Pt is agreeable to Alicja Salas (SN, SW, PT/OT). Referrral to [facility]. +CM following.     Home O2 in place on admit: No  Pt informed of need to bring portable home O2 tank on day of discharge for nursing to connect prior to leaving:  No  Verbalized agreement/Understanding:  No
PLAN: Explained Case Management role/services. CM reviewed chart and met with patient at bedside to discuss discharge needs and plan. Patient lives with son and S-I-L. States son provides assist with adl's and family provides transportation. Uses walker with all ambulation. Plans to return home at discharge. Home o2 through [employer]. States uses 2-3L continuous. Has concentrator and etanks. CM attempted to verify home o2 orders but [employer] only available for new customers and emergencies. [facility] [employer] will begin home delivery of diabetic meals on 5/18/2022. Was unable to recall name of company delivering meals. CM to follow.     Sabrina Delcid, RN

## 2022-05-18 NOTE — PLAN OF CARE
Problem: Discharge Planning  Goal: Discharge to home or other facility with appropriate resources  6/85/4618 [id number] by Tamar Horton RN  Outcome: Progressing  5/17/2022 2314 by Claudeen Bonds, RN  Outcome: Progressing     Problem: Pain  Goal: Verbalizes/displays adequate comfort level or baseline comfort level  3/51/2592 [id number] by Tamar Horton RN  Outcome: Progressing  5/17/2022 2314 by Claudeen Bonds, RN  Outcome: Progressing     Problem: Safety - Adult  Goal: Free from fall injury  9/22/4930 3555 by Tamar Horton RN  Outcome: Progressing  5/17/2022 2314 by Claudeen Bonds, RN  Outcome: Progressing

## 2022-05-18 NOTE — PROGRESS NOTES
RT Inhaler-Nebulizer Bronchodilator Protocol Note    There is a bronchodilator order in the chart from a provider indicating to follow the RT Bronchodilator Protocol and there is an Initiate RT Inhaler-Nebulizer Bronchodilator Protocol order as well (see protocol at bottom of note). CXR Findings:  No results found. The findings from the last RT Protocol Assessment were as follows:   History Pulmonary Disease: Chronic pulmonary disease  Respiratory Pattern: Mild dyspnea at rest, irregular pattern, or RR 21-25 bpm  Breath Sounds: Slightly diminished and/or crackles  Cough: Strong, spontaneous, non-productive  Indication for Bronchodilator Therapy: Decreased or absent breath sounds  Bronchodilator Assessment Score: 8    Aerosolized bronchodilator medication orders have been revised according to the RT Inhaler-Nebulizer Bronchodilator Protocol below. Respiratory Therapist to perform RT Therapy Protocol Assessment initially then follow the protocol. Repeat RT Therapy Protocol Assessment PRN for score 0-3 or on second treatment, BID, and PRN for scores above 3. No Indications - adjust the frequency to every 6 hours PRN wheezing or bronchospasm, if no treatments needed after 48 hours then discontinue using Per Protocol order mode. If indication present, adjust the RT bronchodilator orders based on the Bronchodilator Assessment Score as indicated below. Use Inhaler orders unless patient has one or more of the following: on home nebulizer, not able to hold breath for 10 seconds, is not alert and oriented, cannot activate and use MDI correctly, or respiratory rate 25 breaths per minute or more, then use the equivalent nebulizer order(s) with same Frequency and PRN reasons based on the score. If a patient is on this medication at home then do not decrease Frequency below that used at home.     0-3 - enter or revise RT bronchodilator order(s) to equivalent RT Bronchodilator order with Frequency of every 4 hours PRN for wheezing or increased work of breathing using Per Protocol order mode. 4-6 - enter or revise RT Bronchodilator order(s) to two equivalent RT bronchodilator orders with one order with BID Frequency and one order with Frequency of every 4 hours PRN wheezing or increased work of breathing using Per Protocol order mode. 7-10 - enter or revise RT Bronchodilator order(s) to two equivalent RT bronchodilator orders with one order with TID Frequency and one order with Frequency of every 4 hours PRN wheezing or increased work of breathing using Per Protocol order mode. 11-13 - enter or revise RT Bronchodilator order(s) to one equivalent RT bronchodilator order with QID Frequency and an Albuterol order with Frequency of every 4 hours PRN wheezing or increased work of breathing using Per Protocol order mode. Greater than 13 - enter or revise RT Bronchodilator order(s) to one equivalent RT bronchodilator order with every 4 hours Frequency and an Albuterol order with Frequency of every 2 hours PRN wheezing or increased work of breathing using Per Protocol order mode.        Electronically signed by Raffy Vela RCP on 5/17/2022 at 8:20 PM

## 2022-05-18 NOTE — FLOWSHEET NOTE
05/17/22 2027   Vital Signs   Temp 98.2 °F (36.8 °C)   Temp Source Oral   Pulse 81   Heart Rate Source Monitor   Resp 18   /60   BP Location Right upper arm   MAP (Calculated) 76.67   Patient Position High fowlers   Level of Consciousness Alert (0)   MEWS Score 1   Pain Assessment   Pain Assessment 0-10   Pain Level 9   Oxygen Therapy   SpO2 92 %   O2 Device Nasal cannula   O2 Flow Rate (L/min) 2 L/min   HS assessment completed, see flow sheet. Pt is alert and oriented. Vital signs are WNL. Respirations are even & easy. complaints of pain or discomfort voiced, prn pain medication effective. pts IV site was leaking and had new blood pooling under her transparent dressing, IV didn't flush, I removed the IV, then placed a new IV  20 gauge in the left forearm. Pt denies needs at this time. SR up x 2, and bed in low position. Call light is within reach. Bedside Mobility Assessment Tool (BMAT):     Assessment Level 1- Sit and Shake    1. From a semi-reclined position, ask patient to sit up and rotate to a seated position at the side of the bed. Can use the bedrail. 2. Ask patient to reach out and grab your hand and shake making sure patient reaches across his/her midline. Pass- Patient is able to come to a seated position, maintain core strength. Maintains seated balance while reaching across midline. Move on to Assessment Level 2. Assessment Level 2- Stretch and Point   1. With patient in seated position at the side of the bed, have patient place both feet on the floor (or stool) with knees no higher than hips. 2. Ask patient to stretch one leg and straighten the knee, then bend the ankle/flex and point the toes. If appropriate, repeat with the other leg. Pass- Patient is able to demonstrate appropriate quad strength on intended weight bearing limb(s). Move onto Assessment Level 3. Assessment Level 3- Stand   1.  Ask patient to elevate off the bed or chair (seated to standing) using an assistive device (cane, bedrail). 2. Patient should be able to raise buttocks off be and hold for a count of five. May repeat once. Pass- Patient maintains standing stability for at least 5 seconds, proceed to assessment level 4. Assessment Level 4- Walk   1. Ask patient to march in place at bedside. 2. Then ask patient to advance step and return each foot. Some medical conditions may render a patient from stepping backwards, use your best clinical judgement. Fail- Patient not able to complete tasks OR requires use of assistive device. Patient is MOBILITY LEVEL 3. Mobility Level- 3    Patient is not able to demonstrate the ability to move from a reclining position to an upright position within the recliner due to weakness.

## 2022-05-18 NOTE — PROGRESS NOTES
[facility]                                 Pulmonary Consult /Progress Note :                                                                      CHIEF COMPLAINT: SOB ,leg swelling     Consulting provider: Dr Ayleen Manzano     HPI:   She has  been doing well   Received Lasix and she put good urine output and her SOB has improved  No fever or chills  No chest pain   Sitting up to chair   Negative 2.8  Still on 3 L and states she has O2 at home       OE   Vitals:    05/18/22 0743   BP:    Pulse:    Resp: 18   Temp:    SpO2: 97%     Gen: No distress. Eyes: PERRL. No sclera icterus. No conjunctival injection. ENT: No discharge. Pharynx clear. Neck: Trachea midline. No obvious mass. Resp: Rhonchi bilaterally more in the right   CV: Regular rate. Regular rhythm. No murmur or rub. No edema. GI: Non-tender. Non-distended. No hernia. Skin: Warm and dry. No nodule on exposed extremities. Lymph: No cervical LAD. No supraclavicular LAD. M/S: No cyanosis. No joint deformity. No clubbing. Neuro: Awake. Alert. Moves all four extremities. Psych: Oriented x 3.  No anxiety.      Recent Results (from the past 24 hour(s))   POCT Glucose    Collection Time: 05/17/22 12:07 PM   Result Value Ref Range    POC Glucose 194 (H) 70 - 99 mg/dl    Performed on ACCU-CHEK    POCT Glucose    Collection Time: 05/17/22  4:08 PM   Result Value Ref Range    POC Glucose 390 (H) 70 - 99 mg/dl    Performed on ACCU-CHEK    POCT Glucose    Collection Time: 05/17/22  8:39 PM   Result Value Ref Range    POC Glucose 336 (H) 70 - 99 mg/dl    Performed on ACCU-CHEK    CBC with Auto Differential    Collection Time: 05/18/22  5:35 AM   Result Value Ref Range    WBC 9.6 4.0 - 11.0 K/uL    RBC 4.18 4.00 - 5.20 M/uL    Hemoglobin 13.1 12.0 - 16.0 g/dL    Hematocrit 40.0 36.0 - 48.0 %    MCV 95.6 80.0 - 100.0 fL    MCH 31.4 26.0 - 34.0 pg MCHC 32.8 31.0 - 36.0 g/dL    RDW 15.4 12.4 - 15.4 %    Platelets 795 621 - 796 K/uL    MPV 8.4 5.0 - 10.5 fL    Neutrophils % 80.2 %    Lymphocytes % 6.8 %    Monocytes % 12.8 %    Eosinophils % 0.0 %    Basophils % 0.2 %    Neutrophils Absolute 7.7 1.7 - 7.7 K/uL    Lymphocytes Absolute 0.7 (L) 1.0 - 5.1 K/uL    Monocytes Absolute 1.2 0.0 - 1.3 K/uL    Eosinophils Absolute 0.0 0.0 - 0.6 K/uL    Basophils Absolute 0.0 0.0 - 0.2 K/uL   Basic Metabolic Panel w/ Reflex to MG    Collection Time: 05/18/22  5:35 AM   Result Value Ref Range    Sodium 140 136 - 145 mmol/L    Potassium reflex Magnesium 4.9 3.5 - 5.1 mmol/L    Chloride 94 (L) 99 - 110 mmol/L    CO2 34 (H) 21 - 32 mmol/L    Anion Gap 12 3 - 16    Glucose 153 (H) 70 - 99 mg/dL    BUN 40 (H) 7 - 20 mg/dL    CREATININE 1.0 0.6 - 1.2 mg/dL    GFR Non- 56 (A) >60    GFR African American >60 >60    Calcium 9.6 8.3 - 10.6 mg/dL   POCT Glucose    Collection Time: 05/18/22  7:11 AM   Result Value Ref Range    POC Glucose 155 (H) 70 - 99 mg/dl    Performed on ACCU-CHEK          Assessment:       · Acute COPD exacerbation   · Bilateral lower extremity cellulitis  · Possible CHF/fluid overload      Plan:       CTA no PE   *-Prednisone ,wean over 10 days   *-IV abx, Rocephin and Zithromax,can be stopped once finished 5 days or change PO   *-negative  viral panel   *- Sputum culture so far negative ,legionella negaive   *_Wean O2  *-BD  ICS   Watch K level   Strep pneum negative   Legionella negative   Incentive spirmetery and flutter valve   CT chest :stable GGO lungs ,follow with CT in 3-6 months as OP  Check o2 at ambulation before discharge  · May needed NIMV  · Work up for liver cirrhosis as per primary   · US LE no DVT   · We will follow along.   Diuresis as per primary   Discharge plan and follow with Pulmonary service in 4-6 weeks       Javy Womack MD,[facility]  [facility]&Critical [facility] Medicine    [place]    NOTE: This report was

## 2022-05-18 NOTE — FLOWSHEET NOTE
05/18/22 0837   Oxygen Therapy   SpO2 93 %   O2 Device Nasal cannula   O2 Flow Rate (L/min) 3 L/min       MD notified.

## 2022-05-18 NOTE — PROGRESS NOTES
IM Progress Note    Admit Date:  5/14/2022  4    Interval history:   Pneumonia     Subjective:  Ms. Goldie White seen up in bed ,drowsy from pain pill this am    feels ok today , remains on 2 L     Good UOP yesterday with lasix. Weight loss of 13 lbs since adm        Denies having liver failure in the past but CT s.o cirrhosis      Objective:   BP (!) 96/59   Pulse 83   Temp 97.3 °F (36.3 °C) (Oral)   Resp 18   Ht 5' 3\" (1.6 m)   Wt 167 lb 9.6 oz (76 kg)   SpO2 93%   BMI 29.69 kg/m²       Intake/Output Summary (Last 24 hours) at 5/18/2022 0851  Last data filed at 5/18/2022 0084  Gross per 24 hour   Intake 740 ml   Output 850 ml   Net -110 ml       Physical Exam:        General: elderly obese female, sleeping in bed, arousable   Awake, alert and oriented. Appears to be not in any distress  Mucous Membranes:  Pink , anicteric  Neck: No JVD, no carotid bruit, no thyromegaly  Chest:  Scattered wheeze mary with basilar crackles  Cardiovascular:  RRR S1S2 heard, no murmurs or gallops  Abdomen:  Soft, obese, undistended, non tender, no organomegaly, BS present  Extremities: 1+ chronic brawny edema of left leg with crusted wounds  No open ulcers. Resolving  cellulitis noted  .  Distal pulses well felt  Neurological : grossly normal except for mild drowsiness         Medications:   Scheduled Medications:    furosemide  20 mg Oral Daily    ipratropium-albuterol  1 ampule Inhalation TID    [Held by provider] risperiDONE  0.5 mg Oral Nightly    predniSONE  40 mg Oral Daily    insulin lispro  0-6 Units SubCUTAneous TID     insulin lispro  0-3 Units SubCUTAneous Nightly    rOPINIRole  2 mg Oral Nightly    pregabalin  200 mg Oral BID    nicotine  1 patch TransDERmal Daily    atorvastatin  60 mg Oral Daily    budesonide-formoterol  2 puff Inhalation BID    [Held by provider] lisinopril  10 mg Oral Daily    pantoprazole  40 mg Oral QAM AC    sertraline  100 mg Oral BID    polyethylene glycol  17 g Oral Daily    sodium chloride flush  5-40 mL IntraVENous 2 times per day    enoxaparin  40 mg SubCUTAneous Nightly    cefTRIAXone (ROCEPHIN) IV  1,000 mg IntraVENous Q24H     I   dextrose      sodium chloride 25 mL (05/17/22 1810)     glucose, dextrose bolus **OR** dextrose bolus, glucagon (rDNA), dextrose, perflutren lipid microspheres, albuterol sulfate HFA, sodium chloride flush, sodium chloride, ondansetron **OR** ondansetron, acetaminophen **OR** acetaminophen    Lab Data:  Recent Labs     05/16/22  0541 05/17/22  0526 05/18/22  0535   WBC 9.8 10.7 9.6   HGB 11.9* 12.8 13.1   HCT 37.3 39.1 40.0   MCV 98.1 95.0 95.6    173 156     Recent Labs     05/16/22  0541 05/17/22  0526 05/18/22  0535    138 140   K 5.3* 5.2* 4.9    94* 94*   CO2 29 35* 34*   BUN 15 22* 40*   CREATININE 0.7 0.7 1.0     No results for input(s): CKTOTAL, CKMB, CKMBINDEX, TROPONINI in the last 72 hours.     Coagulation:   Lab Results   Component Value Date    INR 0.94 09/12/2020    APTT 31.7 09/12/2020     Cardiac markers:   Lab Results   Component Value Date    CKMB 10.3 07/14/2010    CKTOTAL 74 10/17/2014    TROPONINI <0.01 03/07/2021         Lab Results   Component Value Date    ALT 10 05/14/2022    AST 14 (L) 05/14/2022    ALKPHOS 112 05/14/2022    BILITOT 0.3 05/14/2022       Lab Results   Component Value Date    INR 0.94 09/12/2020    INR 0.99 07/01/2018    INR 1.01 06/05/2018    PROTIME 10.9 09/12/2020    PROTIME 11.3 07/01/2018    PROTIME 11.5 06/05/2018       Radiology      CULTURES  COVID and Flu not detected      EKG:    Sinus tachycardia  Otherwise normal ECG  When compared with ECG of 07-MAR-2021 16:14,  No significant change was found  Confirmed by BILL BALDERRAMA MD ([id number]) on 5/15/2022 7:16:31 AM     RADIOLOGY  XR TIBIA FIBULA LEFT (2 VIEWS)   Final Result   Moderate cellulitis or edema in the soft tissues around the calf extending   distally around the ankle with no acute bony abnormality.           XR CHEST (2 VW) Final Result   Mild bilateral interstitial opacities may represent infection versus   atelectasis versus pulmonary edema.         Venous doppler - no DVT      ECHO        Summary   LV systolic function is normal with EF estimated at 55%. No regional wall motion abnormalities. There is mild concentric left ventricular hypertrophy. There is mild right ventricular hypertrophy. Normal left ventricular diastolic filling pressure. Aortic valve appears sclerotic but opens adequately. MIld aortic regurgitation. Inadequate tricuspid regurgitation jet to estimate systolic artery pressure   (SPAP). ASSESSMENT/PLAN:          #COPD with acute exacerbation   #Chronic respiratory failure   -on 2-3 L O2 baseline   -continue inhaler regimens   -continue solumedrol , HHn and rocephin  Pulmonology consulted   recommend tobacco cessation   On nicotine patch  -improving symptoms, avoid excessive pain meds  - dc home today . I have cut down several sedative meds at dc        #Left leg cellulitis   -failed outpatient tx with keflex  -on IV abx as below - changed to levaquin at dc  -no sepsis   - venous doppler neg for dvt     # possible liver cirrhosis with fluid overload per CT  - pulm edema, LE edema   -  ECHO with normal EF and normal Diastolic function, see above  - need workup for cirrhosis- likely fatty liver disease  - given lasix daily here with 13 lb weight loss   - start diuretics- lasix 20 mg daily   - can add aldactone as outpt If needed . Pt advised to see PCP       #HTN   -continue lisinopril      #HLD   -continue statin      #Chronic pain syndrome   -continue home regimen   - avoid excessive sedation   - I have cut down lyrica and requip for drowsiness          DVT Prophylaxis: lovenox  Diet: ADULT DIET;  Regular  Code Status: Full Code    Dc home later today with prednisone taper  Smoking cessation advised once again         Viky Amin MD, 5/18/2022 8:51 AM

## 2022-05-18 NOTE — PROGRESS NOTES
Speech Language Pathology  Facility/Department: [address] [place] [address] [address] [place] [facility]  Dysphagia Daily Treatment Note    Diet recommendation: IDDSI 7 Regular Solids; IDDSI 0 Thin Liquids; Meds PO as tolerated  Risk management: upright for all intake, stay upright for at least 30 mins after intake, small bites/sips, oral care 2-3x/day to reduce adverse affects in the event of aspiration, increase physical mobility as able, slow rate of intake, general aspiration precautions and hold PO and contact SLP if s/s of aspiration or worsening respiratory status develop.     NAME: Tello Frias  : 1955  MRN: [id number]    Patient Diagnosis(es):   Patient Active Problem List    Diagnosis Date Noted    Spinal stenosis 10/29/2013    Hyperlipidemia 2012    DJD (degenerative joint disease), cervical 2012    Hyperglycemia     Cellulitis of left lower extremity     Tobacco abuse     Cirrhosis of liver without ascites ([address]     Community acquired pneumonia 2022    COPD (chronic obstructive pulmonary disease) ([address] 2013    Vitamin D deficiency 07/15/2013    Lumbosacral radiculopathy due to degenerative joint disease of spine 2013    B12 deficiency 2012    Generalized anxiety disorder 2012    Depression with anxiety 2012    GERD (gastroesophageal reflux disease) 2012    Osteoporosis 2012    Insomnia 2012    Restless leg syndrome 2012    Chronic pain 2012    Fatigue 2012    Bipolar disorder ([address] 2012    Anorexia nervosa 2012    IBS (irritable bowel syndrome) 2012    Essential hypertension     Acute on chronic respiratory failure with hypoxia and hypercapnia (HCC)     COPD exacerbation ([address] 2020    Acute respiratory failure with hypoxia and hypercapnia ([address] 2020    Thoracic compression fracture, closed, initial encounter ([address] 2018    Post traumatic stress disorder (PTSD)     PNA (pneumonia) 10/17/2016    Thyroid nodule 07/21/2015    Paresthesia 07/20/2015    TIA (transient ischemic attack)     Gait disturbance 11/14/2013    Weakness 11/14/2013    Constipation due to pain medication 04/17/2013    Low back pain 04/09/2013    Low back pain radiating to both legs 04/09/2013     Subjective: had been drowsy this morning, now alert and participates in conversation. Pain: denied pain    Current Diet: ADULT DIET; Regular, thin liquid    Diet Tolerance:  Patient tolerating current diet level without signs/symptoms of penetration / aspiration. Reports she had aspiration pneumonia 4 years ago and needed hospitalization. P.O. Trials: Thin       Nectar / Mildly Thick       Honey / Moderately Thick       Pudding / Extremely Thick       Puree       Solid         Dysphagia Treatment and Impressions:  * Pt on 2L per NC, SpO2 93% at rest, dropping to 88% during self-fed PO, resuming 93% with brief rest break. Pt denied feeling dyspneic at rest but continues to endorse quick dyspnea with any activity, including self-feeding. * Pt insisted on side-lying on her Left side but agreed to allow the [facility] elevated to ~70 degrees and elevate her head/neck to upright position. She reports she eats in this position. * Pt self-fed puree, soft solids, thin liquid by cup. She declined to use the straw as she does not use them at baseline per preference. Oral preparation of the solid was prolonged, with complete oral clearance post swallow. Pt with intermittent double swallow which may indicate some pharyngeal residue. Pt denied sensation of pharyngeal residue between boluses. * A delayed productive cough was observed minutes after she completed a full serving of puree and solid, not clearly related to the PO. Pt endorses this is consistent with her baseline cough, and feels \"better\" today.   * Swallow appears coordinated, though concern for fatigue across a meal.     Pt may benefit from smaller, more frequent meals and achieving an upright posture to maximize coordination of breathing with swallowing and minimize fatigue across meals. Provided this education, to which pt agreed, but may benefit from reinforcement. Dysphagia Goals:    Short Term Goals:  Timeframe for Short Term Goals: (5 days 05/21/2022)  Goal 1: The patient will tolerate recommended diet with no clinical s/s of aspiration 5/5 5/18: progressing, ongoing  Goal 2: The patient will recall/perform recommended compensatory strategies given min cues 5/18: progressing, see above, continue to reinforce rest breaks, small meals, and optimal positioning for meals.     Long Term Goals:   Timeframe for Long Term Goals: (7 days 05/23/2022)  Goal 1: The patient will tolerate least restrictive diet with no clinical s/s of aspiration or worsening respiratory/pulmonary status 5/18: progressing, ongoing      Recommendations:  Solid Consistency: Regular solids, choose softer, moist options due to edentulous status   Liquid Consistency: Thin liquids by cup   Medication: PO as tolerated    Patient/Family/Caregiver Education: aspiration precautions, anatomy of the swallow, rationale for swallow precautions and reassessment    Compensatory Strategies: Sit up for all meals and thereafter for 30 minutes, Eat small meals throughout the day (5x/day) and Rinse mouth with water after snacks and meals, take brief rest breaks during meals and snacks to maintain easy breathing    Plan:    Continued Dysphagia treatment with goals per plan of care. Discharge Recommendations: do not anticipate ST needs after discharge  If pt discharges from hospital prior to Speech/Swallowing discharge, this note serves as tx and discharge summary. Total Treatment Time / Charges     Time in Time out Total Time / units   Cognitive Tx         Speech Tx      Dysphagia Tx 1431 1449 18 min/ 1 unit     Signature: Mehnaz Ramos MS CCC-SLP  Speech Language Pathologist       [facility] SLP

## 2022-05-18 NOTE — DISCHARGE INSTR - COC
Continuity of Care Form    Patient Name: Giselle Quintero   :  1955  MRN:  [id number]    Admit date:  2022  Discharge date:  2022    Code Status Order: Full Code   Advance Directives:      Admitting Physician:  Ty Garza MD  PCP: Tere Delarosa    Discharging Nurse: [facility] Unit/Room#: 0336/7517-21  Discharging Unit Phone Number: [phone]    Emergency Contact:   Extended Emergency Contact Information  Primary Emergency Contact: [facility] Phone: [phone]  Relation: Child  Secondary Emergency Contact:  Giovana Dhillon  Address: [place] [address] Phone: [phone]  Relation: Grandchild    Past Surgical History:  Past Surgical History:   Procedure Laterality Date    APPENDECTOMY      BACK SURGERY      CERVICAL DISCECTOMY  13    anterior discetomy fusion c 3/4 c 4/5 posterior fusion C3-C5 screws and rods C3-C5    CHOLECYSTECTOMY      Laparoscopic    COLONOSCOPY      COLONOSCOPY  08/10/2016    FOOT SURGERY Right     FRACTURE SURGERY Right     distal radius    HAND SURGERY  2012    crushed right hand and has two steel plates in hand    HYSTERECTOMY      NECK SURGERY  2002    disc    OTHER SURGICAL HISTORY  14    TRANSFORAMINAL LUMBAR INTERBODY FUSION L4 L5       Immunization History:   Immunization History   Administered Date(s) Administered    COVID-19, [employer] top, DO NOT Dilute, Bertin-Sucrose, 12+ yrs, PF, 30 mcg/0.3 mL dose 2022    COVID-19, Pfizer Purple top, DILUTE for use, 12+ yrs, 30mcg/0.3mL dose 03/15/2021, 2021    Influenza Virus Vaccine 10/27/2008, 2009, 2010, 2011    Influenza, Intradermal, Preservative free 10/17/2012, 2013, 2015    Tdap (Boostrix, Adacel) 02/15/2013       Active Problems:  Patient Active Problem List   Diagnosis Code    GERD (gastroesophageal reflux disease) K21.9    Osteoporosis M81.0    Insomnia G47.00 Restless leg syndrome G25.81    DJD (degenerative joint disease), cervical M47.812    Chronic pain G89.29    IBS (irritable bowel syndrome) K58.9    Hyperlipidemia E78.5    Depression with anxiety F41.8    Bipolar disorder (HCC) F31.9    Anorexia nervosa F50.00    B12 deficiency E53.8    Generalized anxiety disorder F41.1    Fatigue R53.83    Lumbosacral radiculopathy due to degenerative joint disease of spine M47.27    Low back pain M54.50    Low back pain radiating to both legs M54.50, M79.604, M79.605    Constipation due to pain medication K59.03    Vitamin D deficiency E55.9    Spinal stenosis M48.00    COPD (chronic obstructive pulmonary disease) (HCC) J44.9    Gait disturbance R26.9    Weakness R53.1    TIA (transient ischemic attack) G45.9    Paresthesia R20.2    Thyroid nodule E04.1    PNA (pneumonia) J18.9    Post traumatic stress disorder (PTSD) F43.10    Thoracic compression fracture, closed, initial encounter ([facility] S22.000A    COPD exacerbation (HCC) J44.1    Acute respiratory failure with hypoxia and hypercapnia (HCC) J96.01, J96.02    Acute on chronic respiratory failure with hypoxia and hypercapnia (HCC) J96.21, J96.22    Essential hypertension I10    Community acquired pneumonia J18.9    Cellulitis of left lower extremity L03.116    Tobacco abuse Z72.0    Cirrhosis of liver without ascites (HCC) K74.60    Hyperglycemia R73.9       Isolation/Infection:   Isolation            No Isolation          Patient Infection Status       Infection Onset Added Last Indicated Last Indicated By Review Planned Expiration Resolved Resolved By    None active    Resolved    COVID-19 (Rule Out) 05/14/22 05/14/22 05/14/22 COVID-19 & Influenza Combo (Ordered)   05/14/22 Rule-Out Test Resulted    COVID-19 (Rule Out) 03/07/21 03/07/21 03/07/21 COVID-19, Rapid (Ordered)   03/07/21 Rule-Out Test Resulted    COVID-19 (Rule Out) 09/12/20 09/12/20 09/13/20 COVID-19 (Ordered)   09/13/20 Rule-Out Test Resulted            Nurse PM EDT    CASE MANAGEMENT/SOCIAL WORK SECTION    Inpatient Status Date: 05/14/2022    Readmission Risk Assessment Score:  Readmission Risk              Risk of Unplanned Readmission:  19           Discharging to Facility/ Agency   Name: [facility]  Address:  Phone: [phone]  Fax:    Dialysis Facility (if applicable)   Name:  Address:  Dialysis Schedule:  Phone:  Fax:    / signature: Electronically signed by Sherlyn Clarke RN on 5/18/22 at 3:06 PM EDT    PHYSICIAN SECTION    Prognosis: {Prognosis:1831185493}    Condition at Discharge: [address] Patient Condition:[id number]}    Rehab Potential (if transferring to Rehab): {Prognosis:6459236781}    Recommended Labs or Other Treatments After Discharge: SN, PT/OT, MSW  OK for PT [facility] and SN follow up Mon 5/23  HCV and Zoom Programs    Physician Certification: I certify the above information and transfer of Tello Frias  is necessary for the continuing treatment of the diagnosis listed and that she requires Home Care for less 30 days.      Update Admission H&P: Changes in H&P as follows - see attached    PHYSICIAN SIGNATURE: V.O Dr. Cyndi Alexis MD/ Electronically signed by Sherlyn Clarke RN on 5/18/22 at 3:07 PM EDT

## 2022-05-18 NOTE — DISCHARGE SUMMARY
Name:  Marina Rosen  Room:  9860/6257-12  MRN:    [id number]    Discharge Summary      This discharge summary is in conjunction with a complete physical exam done on the day of discharge. Attending Physician: Dr. Key Postal  Discharging Physician: Dr. Agosto Scarce: 5/14/2022  Discharge:   5/18/2022    HPI:    The patient is a 74 y.o. female with pmhx of COPD on 2-3 L O2 baseline, HTN, HLD, thyroid disease, GERD who presented to [facility] ED with complaint of shortness of breath, cough . Also C/O  bilateral lower extremity swelling, and redness over left leg. Recently seen for similar complaint, dced home on keflex, no improvement with keflex.      Patient states that she has been having increasing shortness of breath for almost a month now; symptoms have gotten significantly worse over the last 4 days. She is more short of breath and wheezy , she has been coughing up some clear sputum. No fevers or chills. she is on home oxygen 2 L, and continues to smoke tobacco 1 pack/day. She takes her oxygen off and she smokes. .  Presenting now with worsening symptoms. She has also developed a back pain more in the upper back.      Bilateral legs are red , erythematous  , left leg is more swollen .     ED Course- Elevated d dimer. VBG mild respiratory acidosis. EKG sinus tachycardia. CXR with mild bilateral interstitial opacities could be atelectasis vs pulmonary edema. Xray left tib fib with moderate cellulitis or edema in soft tissues around calf extending into ankle, no bony abnormality. Given decadron and duonebs. IV ceftriaxone and zithromax given. Diagnoses this Admission and Hospital Course      #COPD with acute exacerbation   #Chronic respiratory failure   -on 2-3 L O2 baseline   -continue inhaler regimens   -continue solumedrol , HHn and rocephin  Pulmonology consulted   recommend tobacco cessation   On nicotine patch  -improving symptoms, avoid excessive pain meds  - dc home today .  I have cut down several sedative meds at dc         #Left leg cellulitis   -failed outpatient tx with keflex  -on IV abx as below - changed to levaquin at dc  -no sepsis   - venous doppler neg for dvt      # possible liver cirrhosis with fluid overload per CT  - pulm edema, LE edema   -  ECHO with normal EF and normal Diastolic function, see above  - need workup for cirrhosis- likely fatty liver disease  - given lasix daily here with 13 lb weight loss   - start diuretics- lasix 20 mg daily   - can add aldactone as outpt If needed .  Pt advised to see PCP        #HTN   -continued lisinopril      #HLD   -continued statin      #Chronic pain syndrome   -continued home regimen   - avoid excessive sedation   - I have cut down lyrica and requip for drowsiness           DVT Prophylaxis: lovenox     Dc home later today with prednisone taper  Smoking cessation advised once again     Procedures (Please Review Full Report for Details)  N/A    Consults    Pulmonology       Physical Exam at Discharge:    /63   Pulse 96   Temp 98.1 °F (36.7 °C) (Oral)   Resp 18   Ht 5' 3\" (1.6 m)   Wt 167 lb 9.6 oz (76 kg)   SpO2 95%   BMI 29.69 kg/m²     See today's progress note    CBC: Recent Labs     05/16/22  0541 05/17/22  0526 05/18/22  0535   WBC 9.8 10.7 9.6   HGB 11.9* 12.8 13.1   HCT 37.3 39.1 40.0   MCV 98.1 95.0 95.6    173 156     BMP:   Recent Labs     05/16/22  0541 05/17/22  0526 05/18/22  0535    138 140   K 5.3* 5.2* 4.9    94* 94*   CO2 29 35* 34*   BUN 15 22* 40*   CREATININE 0.7 0.7 1.0       U/A:    Lab Results   Component Value Date    NITRITE neg 02/14/2014    COLORU Yellow 10/17/2016    WBCUA 6-10 07/20/2015    RBCUA 0-2 07/20/2015    MUCUS Present 04/17/2013    BACTERIA 3+ 07/20/2015    CLARITYU Clear 10/17/2016    SPECGRAV 1.010 10/17/2016    LEUKOCYTESUR Negative 10/17/2016    BLOODU Negative 10/17/2016    GLUCOSEU Negative 10/17/2016    GLUCOSEU NEGATIVE 01/23/2012    AMORPHOUS 1+ 07/20/2015       ABG Lab Results   Component Value Date    DNC5XKB 15.5 06/03/2013    BEART -7.8 06/03/2013    J4FLFCRF 97.8 06/03/2013    PHART 7.389 06/03/2013    THGBART 13.2 06/03/2013    THGBART 12.2 01/23/2012    LKI9TII 26.3 06/03/2013    PO2ART 103.2 06/03/2013    OGI6WIV 16.3 06/03/2013         CULTURES  COVID and Flu not detected     RADIOLOGY  VL Extremity Venous Bilateral   Final Result      CTA PULMONARY W CONTRAST   Final Result   No pulmonary embolus identified. Scattered ground-glass opacities are seen throughout the lungs, similar to   prior. , with a few areas of septal thickening, raising the question of mild   edema      Splenomegaly with lobular contour liver suggesting underlying cirrhosis      Dominant nodule left lobe of thyroid. Consider ultrasound follow-up given   its size      RECOMMENDATIONS:   Unavailable         XR CHEST (2 VW)   Final Result   Stable chest.  Mild increased interstitial markings throughout the lungs,   likely infiltrate as opposed to edema. Mild bibasilar atelectasis. XR TIBIA FIBULA LEFT (2 VIEWS)   Final Result   Moderate cellulitis or edema in the soft tissues around the calf extending   distally around the ankle with no acute bony abnormality. XR CHEST (2 VW)   Final Result   Mild bilateral interstitial opacities may represent infection versus   atelectasis versus pulmonary edema.              Venous doppler - no DVT      ECHO         Summary   LV systolic function is normal with EF estimated at 55%.   No regional wall motion abnormalities.   There is mild concentric left ventricular hypertrophy.   There is mild right ventricular hypertrophy.   Normal left ventricular diastolic filling pressure.   Aortic valve appears sclerotic but opens adequately.   MIld aortic regurgitation.   Inadequate tricuspid regurgitation jet to estimate systolic artery pressure   (SPAP).         Discharge Medications     Medication List      START taking these medications    furosemide 20 MG tablet  Commonly known as: LASIX  Take 1 tablet by mouth daily     levoFLOXacin 500 MG tablet  Commonly known as: Levaquin  Take 1 tablet by mouth daily for 5 days     predniSONE 10 MG tablet  Commonly known as: DELTASONE  30 mg x 3 days, 20 mg x 3 days, 10 mg x 3 days then stop        CHANGE how you take these medications    albuterol sulfate  (90 Base) MCG/ACT inhaler  Commonly known as: Proventil HFA  Inhale 2 puffs into the lungs every 4 hours as needed for Wheezing  What changed: Another medication with the same name was removed. Continue taking this medication, and follow the directions you see here. lisinopril 20 MG tablet  Commonly known as: PRINIVIL;ZESTRIL  TAKE 1/2  TABLET BY MOUTH ONCE DAILY  What changed: how much to take     rOPINIRole 2 MG tablet  Commonly known as: REQUIP  Take 1 tablet by mouth nightly 5/14/22:  Amount of tablets per RX decreased from 90 (30 days supply) to 60 (30 days supply) since 11/2020. What changed: when to take this     sertraline 100 MG tablet  Commonly known as: ZOLOFT  Take 1 tablet by mouth 2 times daily  What changed: how much to take        CONTINUE taking these medications    atorvastatin 20 MG tablet  Commonly known as: LIPITOR  Take 1 tablet by mouth daily.      Breo Ellipta 100-25 MCG/INH Aepb inhaler  Generic drug: fluticasone-vilanterol     lidocaine 5 % ointment  Commonly known as: XYLOCAINE     Lyrica 300 MG capsule  Generic drug: pregabalin     omeprazole 40 MG delayed release capsule  Commonly known as: PRILOSEC     ondansetron 4 MG disintegrating tablet  Commonly known as: ZOFRAN-ODT  Take 1 tablet by mouth 3 times daily as needed for Nausea or Vomiting     polyethylene glycol 17 g Pack packet  Commonly known as: MIRALAX     risperiDONE 0.5 MG tablet  Commonly known as: RISPERDAL     triamcinolone 0.1 % cream  Commonly known as: KENALOG        STOP taking these medications    metaxalone 800 MG tablet  Commonly known as: SKELAXIN     NORFLEX

## 2022-05-18 NOTE — PLAN OF CARE
Problem: Discharge Planning  Goal: Discharge to home or other facility with appropriate resources  5/17/2022 2314 by Veronica Naidu RN  Outcome: Progressing  5/17/2022 1254 by Kodi Evans RN  Outcome: Progressing  Flowsheets (Taken 5/17/2022 0915)  Discharge to home or other facility with appropriate resources: Identify barriers to discharge with patient and caregiver     Problem: Pain  Goal: Verbalizes/displays adequate comfort level or baseline comfort level  5/17/2022 2314 by Veronica Naidu RN  Outcome: Progressing  5/17/2022 1254 by Kodi Evans RN  Outcome: Progressing     Problem: ABCDS Injury Assessment  Goal: Absence of physical injury  5/17/2022 2314 by Veronica Naidu RN  Outcome: Progressing  5/17/2022 1254 by Kodi Evans RN  Outcome: Progressing  Flowsheets (Taken 5/17/2022 1242)  Absence of Physical Injury: Implement safety measures based on patient assessment     Problem: Safety - Adult  Goal: Free from fall injury  5/17/2022 2314 by Veronica Naidu RN  Outcome: Progressing  5/17/2022 1254 by Kodi Evans RN  Outcome: Progressing  Flowsheets (Taken 5/17/2022 1242)  Free From Fall Injury: Instruct family/caregiver on patient safety

## 2022-05-25 NOTE — PROGRESS NOTES
Physician Progress Note      PATIENT:               Brenda Ward  CSN #:                  [id number]  :                       1955  ADMIT DATE:       2022 2:58 PM  [address] DATE:        2022 4:20 PM  RESPONDING  PROVIDER #:        Ray Maher MD          QUERY TEXT:    Patient admitted with sob. Documentation reflects pneumonia in the h/p and   then dropped from documentation . If possible, please document in the   progress notes and discharge summary if pneumonia was: The medical record reflects the following:  Risk Factors: age, copd AE, smoker, liver cirrhosis  Clinical Indicators: per h/p: #Community Acquired Pneumonia  Treatment: Rocephin, Zithromax, duonebs, legionella, strep, sputum culture    Thank you for your assistance,  Colleen Wood RN,BSN,CCDS,CRCR  Options provided:  -- Pneumonia ruled out after study  -- Gram positive pneumonia resolved  -- Other - I will add my own diagnosis  -- Disagree - Not applicable / Not valid  -- Disagree - Clinically unable to determine / Unknown  -- Refer to Clinical Documentation Reviewer    PROVIDER RESPONSE TEXT:    Pneumonia ruled out after study.     Query created by: Sunni Herrera on 2022 11:03 AM      Electronically signed by:  Ray Maher MD 2022 8:14 AM

## 2022-06-29 ENCOUNTER — APPOINTMENT (OUTPATIENT)
Dept: GENERAL RADIOLOGY | Age: 67
DRG: 603 | End: 2022-06-29
Payer: MEDICARE

## 2022-06-29 ENCOUNTER — HOSPITAL ENCOUNTER (INPATIENT)
Age: 67
LOS: 4 days | Discharge: HOME | DRG: 603 | End: 2022-07-03
Attending: EMERGENCY MEDICINE | Admitting: INTERNAL MEDICINE
Payer: MEDICARE

## 2022-06-29 DIAGNOSIS — L02.419 CELLULITIS AND ABSCESS OF LEG: Primary | ICD-10-CM

## 2022-06-29 DIAGNOSIS — L03.119 CELLULITIS AND ABSCESS OF LEG: Primary | ICD-10-CM

## 2022-06-29 PROBLEM — L03.90 CELLULITIS: Status: ACTIVE | Noted: 2022-06-29

## 2022-06-29 LAB
A/G RATIO: 2.1 (ref 1.1–2.2)
ALBUMIN SERPL-MCNC: 4.7 G/DL (ref 3.4–5)
ALP BLD-CCNC: 139 U/L (ref 40–129)
ALT SERPL-CCNC: 13 U/L (ref 10–40)
AMORPHOUS: ABNORMAL /HPF
ANION GAP SERPL CALCULATED.3IONS-SCNC: 10 MMOL/L (ref 3–16)
AST SERPL-CCNC: 10 U/L (ref 15–37)
BACTERIA: ABNORMAL /HPF
BASOPHILS ABSOLUTE: 0.1 K/UL (ref 0–0.2)
BASOPHILS RELATIVE PERCENT: 0.7 %
BILIRUB SERPL-MCNC: <0.2 MG/DL (ref 0–1)
BILIRUBIN URINE: NEGATIVE
BLOOD, URINE: ABNORMAL
BUN BLDV-MCNC: 14 MG/DL (ref 7–20)
CALCIUM SERPL-MCNC: 10 MG/DL (ref 8.3–10.6)
CHLORIDE BLD-SCNC: 97 MMOL/L (ref 99–110)
CHP ED QC CHECK: YES
CLARITY: ABNORMAL
CO2: 32 MMOL/L (ref 21–32)
COLOR: YELLOW
CREAT SERPL-MCNC: 0.7 MG/DL (ref 0.6–1.2)
EOSINOPHILS ABSOLUTE: 0 K/UL (ref 0–0.6)
EOSINOPHILS RELATIVE PERCENT: 0 %
EPITHELIAL CELLS, UA: ABNORMAL /HPF (ref 0–5)
GFR AFRICAN AMERICAN: >60
GFR NON-AFRICAN AMERICAN: >60
GLUCOSE BLD-MCNC: 220 MG/DL
GLUCOSE BLD-MCNC: 220 MG/DL (ref 70–99)
GLUCOSE BLD-MCNC: 265 MG/DL (ref 70–99)
GLUCOSE BLD-MCNC: 281 MG/DL (ref 70–99)
GLUCOSE URINE: >=1000 MG/DL
HCT VFR BLD CALC: 37.6 % (ref 36–48)
HEMOGLOBIN: 12.3 G/DL (ref 12–16)
KETONES, URINE: NEGATIVE MG/DL
LACTIC ACID: 2 MMOL/L (ref 0.4–2)
LEUKOCYTE ESTERASE, URINE: ABNORMAL
LYMPHOCYTES ABSOLUTE: 0.9 K/UL (ref 1–5.1)
LYMPHOCYTES RELATIVE PERCENT: 7.5 %
MCH RBC QN AUTO: 30.7 PG (ref 26–34)
MCHC RBC AUTO-ENTMCNC: 32.8 G/DL (ref 31–36)
MCV RBC AUTO: 93.7 FL (ref 80–100)
MICROSCOPIC EXAMINATION: YES
MONOCYTES ABSOLUTE: 0.9 K/UL (ref 0–1.3)
MONOCYTES RELATIVE PERCENT: 7.8 %
NEUTROPHILS ABSOLUTE: 9.5 K/UL (ref 1.7–7.7)
NEUTROPHILS RELATIVE PERCENT: 84 %
NITRITE, URINE: NEGATIVE
PDW BLD-RTO: 17.3 % (ref 12.4–15.4)
PERFORMED ON: ABNORMAL
PERFORMED ON: ABNORMAL
PH UA: 8 (ref 5–8)
PLATELET # BLD: 146 K/UL (ref 135–450)
PMV BLD AUTO: 9 FL (ref 5–10.5)
POTASSIUM REFLEX MAGNESIUM: 4.4 MMOL/L (ref 3.5–5.1)
PROTEIN UA: NEGATIVE MG/DL
RBC # BLD: 4.02 M/UL (ref 4–5.2)
RBC UA: ABNORMAL /HPF (ref 0–4)
SARS-COV-2, NAAT: NOT DETECTED
SODIUM BLD-SCNC: 139 MMOL/L (ref 136–145)
SPECIFIC GRAVITY UA: 1.01 (ref 1–1.03)
TOTAL PROTEIN: 6.9 G/DL (ref 6.4–8.2)
URINE TYPE: ABNORMAL
UROBILINOGEN, URINE: 0.2 E.U./DL
WBC # BLD: 11.4 K/UL (ref 4–11)
WBC UA: ABNORMAL /HPF (ref 0–5)

## 2022-06-29 PROCEDURE — 81001 URINALYSIS AUTO W/SCOPE: CPT

## 2022-06-29 PROCEDURE — 83605 ASSAY OF LACTIC ACID: CPT

## 2022-06-29 PROCEDURE — 85025 COMPLETE CBC W/AUTO DIFF WBC: CPT

## 2022-06-29 PROCEDURE — 73590 X-RAY EXAM OF LOWER LEG: CPT

## 2022-06-29 PROCEDURE — 6360000002 HC RX W HCPCS: Performed by: NURSE PRACTITIONER

## 2022-06-29 PROCEDURE — 83036 HEMOGLOBIN GLYCOSYLATED A1C: CPT

## 2022-06-29 PROCEDURE — 99285 EMERGENCY DEPT VISIT HI MDM: CPT

## 2022-06-29 PROCEDURE — 1200000000 HC SEMI PRIVATE

## 2022-06-29 PROCEDURE — 80053 COMPREHEN METABOLIC PANEL: CPT

## 2022-06-29 PROCEDURE — 6370000000 HC RX 637 (ALT 250 FOR IP): Performed by: NURSE PRACTITIONER

## 2022-06-29 PROCEDURE — 2580000003 HC RX 258: Performed by: NURSE PRACTITIONER

## 2022-06-29 PROCEDURE — 36415 COLL VENOUS BLD VENIPUNCTURE: CPT

## 2022-06-29 PROCEDURE — 6370000000 HC RX 637 (ALT 250 FOR IP): Performed by: INTERNAL MEDICINE

## 2022-06-29 PROCEDURE — 87635 SARS-COV-2 COVID-19 AMP PRB: CPT

## 2022-06-29 PROCEDURE — 71045 X-RAY EXAM CHEST 1 VIEW: CPT

## 2022-06-29 PROCEDURE — 87040 BLOOD CULTURE FOR BACTERIA: CPT

## 2022-06-29 RX ORDER — ACETAMINOPHEN 325 MG/1
650 TABLET ORAL EVERY 6 HOURS PRN
Status: DISCONTINUED | OUTPATIENT
Start: 2022-06-29 | End: 2022-07-03 | Stop reason: HOSPADM

## 2022-06-29 RX ORDER — ONDANSETRON 2 MG/ML
4 INJECTION INTRAMUSCULAR; INTRAVENOUS EVERY 6 HOURS PRN
Status: DISCONTINUED | OUTPATIENT
Start: 2022-06-29 | End: 2022-07-03 | Stop reason: HOSPADM

## 2022-06-29 RX ORDER — FUROSEMIDE 20 MG/1
20 TABLET ORAL DAILY
Status: DISCONTINUED | OUTPATIENT
Start: 2022-06-30 | End: 2022-07-02

## 2022-06-29 RX ORDER — LISINOPRIL 10 MG/1
10 TABLET ORAL DAILY
Status: CANCELLED | OUTPATIENT
Start: 2022-06-30

## 2022-06-29 RX ORDER — MORPHINE SULFATE 4 MG/ML
4 INJECTION, SOLUTION INTRAMUSCULAR; INTRAVENOUS ONCE
Status: COMPLETED | OUTPATIENT
Start: 2022-06-29 | End: 2022-06-29

## 2022-06-29 RX ORDER — ACETAMINOPHEN 650 MG/1
650 SUPPOSITORY RECTAL EVERY 6 HOURS PRN
Status: DISCONTINUED | OUTPATIENT
Start: 2022-06-29 | End: 2022-07-03 | Stop reason: HOSPADM

## 2022-06-29 RX ORDER — FLUTICASONE FUROATE, UMECLIDINIUM BROMIDE AND VILANTEROL TRIFENATATE 100; 62.5; 25 UG/1; UG/1; UG/1
POWDER RESPIRATORY (INHALATION)
COMMUNITY
Start: 2022-06-29

## 2022-06-29 RX ORDER — ONDANSETRON 4 MG/1
4 TABLET, ORALLY DISINTEGRATING ORAL EVERY 8 HOURS PRN
Status: DISCONTINUED | OUTPATIENT
Start: 2022-06-29 | End: 2022-07-03 | Stop reason: HOSPADM

## 2022-06-29 RX ORDER — SODIUM CHLORIDE 0.9 % (FLUSH) 0.9 %
5-40 SYRINGE (ML) INJECTION EVERY 12 HOURS SCHEDULED
Status: DISCONTINUED | OUTPATIENT
Start: 2022-06-29 | End: 2022-07-03 | Stop reason: HOSPADM

## 2022-06-29 RX ORDER — IPRATROPIUM BROMIDE AND ALBUTEROL SULFATE 2.5; .5 MG/3ML; MG/3ML
1 SOLUTION RESPIRATORY (INHALATION) EVERY 4 HOURS PRN
Status: DISCONTINUED | OUTPATIENT
Start: 2022-06-29 | End: 2022-07-03 | Stop reason: HOSPADM

## 2022-06-29 RX ORDER — POLYETHYLENE GLYCOL 3350 17 G/17G
17 POWDER, FOR SOLUTION ORAL DAILY
Status: DISCONTINUED | OUTPATIENT
Start: 2022-06-30 | End: 2022-07-03 | Stop reason: HOSPADM

## 2022-06-29 RX ORDER — TIZANIDINE 4 MG/1
4 TABLET ORAL EVERY 6 HOURS PRN
Status: DISCONTINUED | OUTPATIENT
Start: 2022-06-29 | End: 2022-07-03 | Stop reason: HOSPADM

## 2022-06-29 RX ORDER — DOCUSATE SODIUM AND SENNOSIDES 8.6; 5 MG/1; MG/1
TABLET, FILM COATED ORAL
COMMUNITY
Start: 2022-06-29

## 2022-06-29 RX ORDER — BLOOD SUGAR DIAGNOSTIC
STRIP MISCELLANEOUS
COMMUNITY
Start: 2022-06-29

## 2022-06-29 RX ORDER — SODIUM CHLORIDE 9 MG/ML
INJECTION, SOLUTION INTRAVENOUS PRN
Status: DISCONTINUED | OUTPATIENT
Start: 2022-06-29 | End: 2022-07-03 | Stop reason: HOSPADM

## 2022-06-29 RX ORDER — LINACLOTIDE 290 UG/1
CAPSULE, GELATIN COATED ORAL
COMMUNITY

## 2022-06-29 RX ORDER — POLYETHYLENE GLYCOL 3350 17 G/17G
17 POWDER, FOR SOLUTION ORAL DAILY PRN
Status: DISCONTINUED | OUTPATIENT
Start: 2022-06-29 | End: 2022-07-03 | Stop reason: HOSPADM

## 2022-06-29 RX ORDER — ONDANSETRON 2 MG/ML
4 INJECTION INTRAMUSCULAR; INTRAVENOUS ONCE
Status: COMPLETED | OUTPATIENT
Start: 2022-06-29 | End: 2022-06-29

## 2022-06-29 RX ORDER — ROPINIROLE 1 MG/1
2 TABLET, FILM COATED ORAL NIGHTLY
Status: DISCONTINUED | OUTPATIENT
Start: 2022-06-29 | End: 2022-07-01

## 2022-06-29 RX ORDER — DEXTROSE MONOHYDRATE 50 MG/ML
100 INJECTION, SOLUTION INTRAVENOUS PRN
Status: DISCONTINUED | OUTPATIENT
Start: 2022-06-29 | End: 2022-07-03 | Stop reason: HOSPADM

## 2022-06-29 RX ORDER — ENOXAPARIN SODIUM 100 MG/ML
40 INJECTION SUBCUTANEOUS DAILY
Status: DISCONTINUED | OUTPATIENT
Start: 2022-06-30 | End: 2022-07-03 | Stop reason: HOSPADM

## 2022-06-29 RX ORDER — SODIUM CHLORIDE 0.9 % (FLUSH) 0.9 %
5-40 SYRINGE (ML) INJECTION PRN
Status: DISCONTINUED | OUTPATIENT
Start: 2022-06-29 | End: 2022-07-03 | Stop reason: HOSPADM

## 2022-06-29 RX ORDER — ATORVASTATIN CALCIUM 10 MG/1
20 TABLET, FILM COATED ORAL DAILY
Status: DISCONTINUED | OUTPATIENT
Start: 2022-06-30 | End: 2022-07-03 | Stop reason: HOSPADM

## 2022-06-29 RX ORDER — AMLODIPINE BESYLATE 5 MG/1
TABLET ORAL
Status: ON HOLD | COMMUNITY
Start: 2022-06-29 | End: 2022-07-03 | Stop reason: HOSPADM

## 2022-06-29 RX ORDER — TIZANIDINE 4 MG/1
4 TABLET ORAL EVERY 6 HOURS PRN
COMMUNITY
Start: 2022-06-29

## 2022-06-29 RX ORDER — INSULIN LISPRO 100 [IU]/ML
0-6 INJECTION, SOLUTION INTRAVENOUS; SUBCUTANEOUS NIGHTLY
Status: DISCONTINUED | OUTPATIENT
Start: 2022-06-29 | End: 2022-07-03 | Stop reason: HOSPADM

## 2022-06-29 RX ORDER — INSULIN LISPRO 100 [IU]/ML
0-6 INJECTION, SOLUTION INTRAVENOUS; SUBCUTANEOUS
Status: DISCONTINUED | OUTPATIENT
Start: 2022-06-30 | End: 2022-06-29

## 2022-06-29 RX ORDER — AMLODIPINE BESYLATE 5 MG/1
5 TABLET ORAL DAILY
Status: DISCONTINUED | OUTPATIENT
Start: 2022-06-30 | End: 2022-07-01

## 2022-06-29 RX ORDER — SENNA AND DOCUSATE SODIUM 50; 8.6 MG/1; MG/1
2 TABLET, FILM COATED ORAL NIGHTLY
Status: DISCONTINUED | OUTPATIENT
Start: 2022-06-29 | End: 2022-07-03 | Stop reason: HOSPADM

## 2022-06-29 RX ORDER — PREGABALIN 100 MG/1
300 CAPSULE ORAL 2 TIMES DAILY
Status: DISCONTINUED | OUTPATIENT
Start: 2022-06-29 | End: 2022-07-03 | Stop reason: HOSPADM

## 2022-06-29 RX ORDER — INSULIN LISPRO 100 [IU]/ML
0-3 INJECTION, SOLUTION INTRAVENOUS; SUBCUTANEOUS NIGHTLY
Status: DISCONTINUED | OUTPATIENT
Start: 2022-06-29 | End: 2022-06-30

## 2022-06-29 RX ORDER — INSULIN LISPRO 100 [IU]/ML
0-12 INJECTION, SOLUTION INTRAVENOUS; SUBCUTANEOUS
Status: DISCONTINUED | OUTPATIENT
Start: 2022-06-30 | End: 2022-07-03 | Stop reason: HOSPADM

## 2022-06-29 RX ORDER — LANCETS
EACH MISCELLANEOUS
COMMUNITY
Start: 2022-06-29

## 2022-06-29 RX ORDER — IPRATROPIUM BROMIDE AND ALBUTEROL SULFATE 2.5; .5 MG/3ML; MG/3ML
1 SOLUTION RESPIRATORY (INHALATION) ONCE
Status: COMPLETED | OUTPATIENT
Start: 2022-06-29 | End: 2022-06-29

## 2022-06-29 RX ADMIN — SENNOSIDES AND DOCUSATE SODIUM 2 TABLET: 50; 8.6 TABLET ORAL at 23:14

## 2022-06-29 RX ADMIN — PREGABALIN 300 MG: 100 CAPSULE ORAL at 23:13

## 2022-06-29 RX ADMIN — TIZANIDINE 4 MG: 4 TABLET ORAL at 23:14

## 2022-06-29 RX ADMIN — SERTRALINE HYDROCHLORIDE 100 MG: 50 TABLET ORAL at 23:14

## 2022-06-29 RX ADMIN — ROPINIROLE HYDROCHLORIDE 2 MG: 1 TABLET, FILM COATED ORAL at 23:13

## 2022-06-29 RX ADMIN — MORPHINE SULFATE 4 MG: 4 INJECTION INTRAVENOUS at 21:50

## 2022-06-29 RX ADMIN — IPRATROPIUM BROMIDE AND ALBUTEROL SULFATE 1 AMPULE: 2.5; .5 SOLUTION RESPIRATORY (INHALATION) at 19:48

## 2022-06-29 RX ADMIN — INSULIN LISPRO 2 UNITS: 100 INJECTION, SOLUTION INTRAVENOUS; SUBCUTANEOUS at 23:21

## 2022-06-29 RX ADMIN — CEFEPIME 2000 MG: 2 INJECTION, POWDER, FOR SOLUTION INTRAVENOUS at 21:51

## 2022-06-29 RX ADMIN — ONDANSETRON HYDROCHLORIDE 4 MG: 2 INJECTION, SOLUTION INTRAMUSCULAR; INTRAVENOUS at 21:50

## 2022-06-29 RX ADMIN — VANCOMYCIN HYDROCHLORIDE 1750 MG: 10 INJECTION, POWDER, LYOPHILIZED, FOR SOLUTION INTRAVENOUS at 22:26

## 2022-06-29 ASSESSMENT — ENCOUNTER SYMPTOMS
BACK PAIN: 0
ABDOMINAL PAIN: 0
NAUSEA: 0
BLOOD IN STOOL: 0
SHORTNESS OF BREATH: 0
DIARRHEA: 0
EYE PAIN: 0
COUGH: 0
RHINORRHEA: 0
VOMITING: 0
SORE THROAT: 0

## 2022-06-29 ASSESSMENT — PAIN DESCRIPTION - DESCRIPTORS: DESCRIPTORS: ACHING

## 2022-06-29 ASSESSMENT — PAIN DESCRIPTION - ORIENTATION: ORIENTATION: LEFT

## 2022-06-29 ASSESSMENT — PAIN SCALES - GENERAL
PAINLEVEL_OUTOF10: 8
PAINLEVEL_OUTOF10: 4
PAINLEVEL_OUTOF10: 4

## 2022-06-29 ASSESSMENT — PAIN DESCRIPTION - LOCATION: LOCATION: LEG

## 2022-06-29 NOTE — ED PROVIDER NOTES
[address] ED  EMERGENCY DEPARTMENT ENCOUNTER        Pt Name: Dafne Amanda  MRN: [id number]  [place] 1955  Date of evaluation: 6/29/2022  Provider: DEVON Solorio CNP  PCP: Osman Juan  Note Started: 7:31 PM EDT      JAKE. I have evaluated this patient. My supervising physician was available for consultation. Triage CHIEF COMPLAINT       Chief Complaint   Patient presents with    Wound Check     BILAT LEGS red with open sores    Diabetes     265 accu check at triage         HISTORY OF PRESENT ILLNESS   (Location/Symptom, Timing/Onset, Context/Setting, Quality, Duration, Modifying Factors, Severity)  Note limiting factors. Chief Complaint: Swelling and redness involving the left leg     Dafne Amanda is a 74 y.o. female who presents to the emergency department symptoms of swelling involving the right left leg. Reported that symptoms of pain and erythema began about 7 days ago. States that symptoms continue to worsen. States that she is not been to see her family doctor or has been taking any antibiotics for the presumed infection. She states that she is got significant redness involving the shin and anterior aspect of that leg. She states she has foot swelling and swelling up past her knee and into her thigh. She also has some warmth and erythema noted just at the knee and above the thigh extending down to the leg and foot. States that she also has been feeling generally weak. Denies any focal weakness. States that she did have a fall approximately 4 days ago. Denies injury from the fall. States that she has a history of respiratory failure COPD. She is currently on 2 L nasal cannula at all times per her baseline. Nursing Notes were all reviewed and agreed with or any disagreements were addressed in the HPI.     REVIEW OF SYSTEMS    (2-9 systems for level 4, 10 or more for level 5)     Review of Systems   Constitutional: Negative for chills, diaphoresis and fever. HENT: Negative for congestion, ear pain, rhinorrhea and sore throat. Eyes: Negative for pain and visual disturbance. Respiratory: Negative for cough and shortness of breath. Cardiovascular: Negative for chest pain and leg swelling. Gastrointestinal: Negative for abdominal pain, blood in stool, diarrhea, nausea and vomiting. Genitourinary: Negative for difficulty urinating, dysuria, flank pain and frequency. Musculoskeletal: Negative for back pain and neck pain. Skin: Negative for rash and wound. Erythema involving the left leg from the mid thigh down past the knee to the ankle into the foot. Neurological: Negative for dizziness and light-headedness.        PAST MEDICAL HISTORY     Past Medical History:   Diagnosis Date    Anxiety     Arthritis     Cancer ([facility] [address] 1980    Cervical    Chronic back pain     Chronic pain     COPD (chronic obstructive pulmonary disease) (HCC)     Depression     DJD (degenerative joint disease), cervical     Drug-seeking behavior     GERD (gastroesophageal reflux disease)     Hypercholesteremia     Hypertension     IBS (irritable bowel syndrome)     Insomnia     K deficiency     Kidney stone     Narcotic addiction [facility])     Patient Denies    Narcotic overdose ([facility] [address]     Patient Denies    Osteoporosis     Overdose     [id number] Patient Denies on 4/3/14    PNA (pneumonia) 10/17/2016    Restless leg syndrome     Thyroid disease        SURGICAL HISTORY     Past Surgical History:   Procedure Laterality Date    APPENDECTOMY      BACK SURGERY      CERVICAL DISCECTOMY  12-14-13    anterior discetomy fusion c 3/4 c 4/5 posterior fusion C3-C5 screws and rods C3-C5    CHOLECYSTECTOMY  2012    Laparoscopic    COLONOSCOPY  2008    COLONOSCOPY  08/10/2016    FOOT SURGERY Right     FRACTURE SURGERY Right 9/14    distal radius    HAND SURGERY  08/2012    crushed right hand and has two steel plates in hand    HYSTERECTOMY (CERVIX STATUS UNKNOWN)      NECK SURGERY  2002    disc    OTHER SURGICAL HISTORY  4/9/14    TRANSFORAMINAL LUMBAR INTERBODY FUSION L4 L5       CURRENTMEDICATIONS       Previous Medications    ACCU-CHEK GUIDE STRIP    USE 1 STRIP AS DIRECTED 4 TIMES A DAY (MAY TEST LESS FREQUENT NOT INJECTING INSULIN)    ACCU-CHEK SOFTCLIX LANCETS MISC    USE 3 TO 4 TIMES A DAY    ALBUTEROL SULFATE HFA (PROVENTIL HFA) 108 (90 BASE) MCG/ACT INHALER    Inhale 2 puffs into the lungs every 4 hours as needed for Wheezing    AMLODIPINE (NORVASC) 5 MG TABLET    TAKE 1 TABLET (5 MG TOTAL) BY MOUTH DAILY    ATORVASTATIN (LIPITOR) 20 MG TABLET    Take 1 tablet by mouth daily. BREO ELLIPTA 100-25 MCG/INH AEPB INHALER    INHALE 1 PUFF BY MOUTH ONCE DAILY (RINSE MOUTH AFTER EACH USE)    FUROSEMIDE (LASIX) 20 MG TABLET    Take 1 tablet by mouth daily    LINACLOTIDE (LINZESS) 290 MCG CAPS CAPSULE    TAKE 1 CAPSULE AT LEAST 30 MINUTES BEFORE THE FIRST MEAL OF THE DAY ON AN EMPTY STOMACH ORALLY ONCE A DAY 30 DAYS for 30    METFORMIN (GLUCOPHAGE) 500 MG TABLET    TAKE 1 TABLET (500 MG TOTAL) BY MOUTH DAILY WITH BREAKFAST. OMEPRAZOLE (PRILOSEC) 40 MG DELAYED RELEASE CAPSULE    TAKE ONE CAPSULE(S) BY MOUTH EVERY MORNING    ONDANSETRON (ZOFRAN-ODT) 4 MG DISINTEGRATING TABLET    Take 1 tablet by mouth 3 times daily as needed for Nausea or Vomiting    POLYETHYLENE GLYCOL (MIRALAX) PACK PACKET    Take 17 g by mouth daily    PREGABALIN (LYRICA) 300 MG CAPSULE    Take 300 mg by mouth 2 times daily. RISPERIDONE (RISPERDAL) 0.5 MG TABLET    Take 0.5 mg by mouth 2 times daily    ROPINIROLE (REQUIP) 2 MG TABLET    Take 1 tablet by mouth nightly 5/14/22:  Amount of tablets per RX decreased from 90 (30 days supply) to 60 (30 days supply) since 11/2020. SERTRALINE (ZOLOFT) 100 MG TABLET    Take 1 tablet by mouth 2 times daily    STOOL SOFTENER/LAXATIVE 50-8.6 MG PER TABLET    TAKE 2 TABLETS BY MOUTH AT BEDTIME.     TIZANIDINE (ZANAFLEX) 4 MG TABLET Take 4 mg by mouth every 6 hours as needed     TRELEGY ELLIPTA 100-62.5-25 MCG/INH AEPB    INHALE 1 PUFF INTO THE LUNGS DAILY. ALLERGIES     Asa buff (mag [buffered aspirin] and Cymbalta [duloxetine hcl]    FAMILYHISTORY       Family History   Problem Relation Age of Onset    Heart Disease Mother     High Cholesterol Mother     Diabetes Mother     Asthma Mother     Heart Failure Mother     Hypertension Mother     Heart Disease Father     High Cholesterol Father     Emphysema Father     Heart Failure Father     Hypertension Father     Cancer Neg Hx         SOCIAL HISTORY       Social History     Socioeconomic History    Marital status:      Spouse name: None    Number of children: 3    Years of education: None    Highest education level: None   Occupational History    None   Tobacco Use    Smoking status: Current Every Day Smoker     Packs/day: 1.00     Years: 40.00     Pack years: 40.00     Types: Cigarettes     Start date: 1/1/1973    Smokeless tobacco: Never Used   Substance and Sexual Activity    Alcohol use: No    Drug use: No    Sexual activity: Not Currently   Other Topics Concern    None   Social History Narrative    None     Social Determinants of Health     Financial Resource Strain:     Difficulty of Paying Living Expenses: Not on file   Food Insecurity:     Worried About Running Out of Food in the Last Year: Not on file    [address] [place] [address] Place of Food in the Last Year: Not on file   Transportation Needs:     Lack of Transportation (Medical): Not on file    Lack of Transportation (Non-Medical):  Not on file   Physical Activity:     Days of Exercise per Week: Not on file    Minutes of Exercise per Session: Not on file   Stress:     Feeling of Stress : Not on file   Social Connections:     Frequency of Communication with Friends and Family: Not on file    Frequency of Social Gatherings with Friends and Family: Not on file    Attends [employer] Services: Not on file   Sherie Dan dorsalis pedis and posterior tibial pulses noted in both lower extremities. Skin:     General: Skin is warm and dry. Neurological:      General: No focal deficit present. Mental Status: She is alert and oriented to person, place, and time.    Psychiatric:         Mood and Affect: Mood normal.         Behavior: Behavior normal.         DIAGNOSTIC RESULTS   LABS:    Labs Reviewed   CBC WITH AUTO DIFFERENTIAL - Abnormal; Notable for the following components:       Result Value    WBC 11.4 (*)     RDW 17.3 (*)     Neutrophils Absolute 9.5 (*)     Lymphocytes Absolute 0.9 (*)     All other components within normal limits   COMPREHENSIVE METABOLIC PANEL W/ REFLEX TO MG FOR LOW K - Abnormal; Notable for the following components:    Chloride 97 (*)     Glucose 281 (*)     Alkaline Phosphatase 139 (*)     AST 10 (*)     All other components within normal limits   URINALYSIS WITH MICROSCOPIC - Abnormal; Notable for the following components:    Clarity, UA SL CLOUDY (*)     Glucose, Ur >=1000 (*)     Blood, Urine SMALL (*)     Leukocyte Esterase, Urine TRACE (*)     Bacteria, UA 1+ (*)     All other components within normal limits   BASIC METABOLIC PANEL W/ REFLEX TO MG FOR LOW K - Abnormal; Notable for the following components:    Glucose 232 (*)     All other components within normal limits   CBC WITH AUTO DIFFERENTIAL - Abnormal; Notable for the following components:    RBC 3.62 (*)     Hemoglobin 11.2 (*)     Hematocrit 34.1 (*)     RDW 16.5 (*)     Platelets 082 (*)     Lymphocytes Absolute 0.8 (*)     All other components within normal limits   POCT GLUCOSE - Abnormal; Notable for the following components:    POC Glucose 265 (*)     All other components within normal limits   POCT GLUCOSE - Abnormal; Notable for the following components:    POC Glucose 220 (*)     All other components within normal limits   POCT GLUCOSE - Abnormal; Notable for the following components:    POC Glucose 177 (*)     All other components within normal limits   POCT GLUCOSE - Abnormal; Notable for the following components:    POC Glucose 186 (*)     All other components within normal limits   POCT GLUCOSE - Normal   COVID-19, RAPID   CULTURE, BLOOD 1   CULTURE, BLOOD 2   CULTURE, RESPIRATORY   LACTIC ACID   HEMOGLOBIN A1C   POCT GLUCOSE   POCT GLUCOSE   POCT GLUCOSE       When ordered, only abnormal lab results are displayed. All other labs were within normal range or not returned as of this dictation. EKG: When ordered, EKG's are interpreted by the Emergency Department Physician in the absence of a cardiologist.  Please see their note for interpretation of EKG. RADIOLOGY:   Non-plain film images such as CT, Ultrasound and MRI are read by the radiologist. Plain radiographic images are visualized andpreliminarily interpreted by the  ED Provider with the below findings:        Interpretation perthe Radiologist below, if available at the time of this note:    XR TIBIA FIBULA LEFT (2 VIEWS)   Final Result   Diffuse soft tissue swelling seen within the subcutaneous tissues. No acute   bony abnormality. XR CHEST PORTABLE   Final Result   Right basilar opacity could represent atelectasis or infection           No results found.       PROCEDURES   Unless otherwise noted below, none     Procedures    CRITICAL CARE TIME   N/A    CONSULTS:  IP CONSULT TO HOSPITALIST      EMERGENCY DEPARTMENT COURSE and DIFFERENTIAL DIAGNOSIS/MDM:   Vitals:    Vitals:    06/30/22 0400 06/30/22 0800 06/30/22 0810 06/30/22 1200   BP: 129/67 (!) 151/58  (!) 101/47   Pulse: 80 (!) 105 82 69   Resp: 17 23  15   Temp: 98 °F (36.7 °C) 97.6 °F (36.4 °C)  98.5 °F (36.9 °C)   TempSrc: Oral Oral     SpO2: 95% 95%  96%   Weight:       Height:           Patient was given thefollowing medications:  Medications   atorvastatin (LIPITOR) tablet 20 mg (20 mg Oral Not Given 6/30/22 0910)   furosemide (LASIX) tablet 20 mg (20 mg Oral Given 6/30/22 0910)   polyethylene glycol (GLYCOLAX) packet 17 g (17 g Oral Not Given 6/30/22 0913)   pregabalin (LYRICA) capsule 300 mg (300 mg Oral Given 6/30/22 0910)   rOPINIRole (REQUIP) tablet 2 mg (2 mg Oral Given 6/29/22 2313)   sertraline (ZOLOFT) tablet 100 mg (100 mg Oral Given 6/30/22 0910)   glucose chewable tablet 16 g (has no administration in time range)   dextrose bolus 10% 125 mL (has no administration in time range)     Or   dextrose bolus 10% 250 mL (has no administration in time range)   glucagon (rDNA) injection 1 mg (has no administration in time range)   dextrose 5 % solution (has no administration in time range)   sodium chloride flush 0.9 % injection 5-40 mL (10 mLs IntraVENous Given 6/30/22 0915)   sodium chloride flush 0.9 % injection 5-40 mL (has no administration in time range)   0.9 % sodium chloride infusion (has no administration in time range)   enoxaparin (LOVENOX) injection 40 mg (40 mg SubCUTAneous Given 6/30/22 0914)   ondansetron (ZOFRAN-ODT) disintegrating tablet 4 mg (has no administration in time range)     Or   ondansetron (ZOFRAN) injection 4 mg (has no administration in time range)   polyethylene glycol (GLYCOLAX) packet 17 g (has no administration in time range)   acetaminophen (TYLENOL) tablet 650 mg (650 mg Oral Given 6/30/22 0110)     Or   acetaminophen (TYLENOL) suppository 650 mg ( Rectal See Alternative 6/30/22 0110)   ceFAZolin (ANCEF) 1,000 mg in dextrose 5 % 50 mL IVPB (mini-bag) (0 mg IntraVENous Stopped 6/30/22 1008)   insulin lispro (HUMALOG) injection vial 0-3 Units (0 Units SubCUTAneous Not Given 6/29/22 2319)   metFORMIN (GLUCOPHAGE) tablet 500 mg (500 mg Oral Given 6/30/22 0910)   sennosides-docusate sodium (SENOKOT-S) 8.6-50 MG tablet 2 tablet (2 tablets Oral Given 6/29/22 2314)   tiZANidine (ZANAFLEX) tablet 4 mg (4 mg Oral Given 6/30/22 0939)   linaclotide (LINZESS) capsule 290 mcg (290 mcg Oral Not Given 6/30/22 7697)   amLODIPine (NORVASC) tablet 5 mg (5 mg Oral Given 6/30/22 0923) ipratropium-albuterol (DUONEB) nebulizer solution 1 ampule (has no administration in time range)   insulin lispro (HUMALOG) injection vial 0-12 Units (2 Units SubCUTAneous Given 6/30/22 1257)   insulin lispro (HUMALOG) injection vial 0-6 Units (2 Units SubCUTAneous Given 6/29/22 2321)   ipratropium-albuterol (DUONEB) nebulizer solution 1 ampule (1 ampule Inhalation Given 6/30/22 1257)   guaiFENesin (MUCINEX) extended release tablet 600 mg (600 mg Oral Given 6/30/22 1256)   HYDROcodone-acetaminophen (NORCO) 5-325 MG per tablet 1 tablet (1 tablet Oral Given 6/30/22 1036)   ipratropium-albuterol (DUONEB) nebulizer solution 1 ampule (1 ampule Inhalation Given 6/29/22 1948)   cefepime (MAXIPIME) 2000 mg IVPB minibag (0 mg IntraVENous Stopped 6/29/22 2221)   vancomycin (VANCOCIN) 1,750 mg in dextrose 5 % 500 mL IVPB (0 mg IntraVENous Stopped 6/30/22 0105)   morphine sulfate (PF) injection 4 mg (4 mg IntraVENous Given 6/29/22 2150)   ondansetron (ZOFRAN) injection 4 mg (4 mg IntraVENous Given 6/29/22 2150)   predniSONE (DELTASONE) tablet 40 mg (40 mg Oral Given 6/30/22 1036)         Is this patient to be included in the SEP-1 Core Measure due to severe sepsis or septic shock? No   Exclusion criteria - the patient is NOT to be included for SEP-1 Core Measure due to:      Patient is noted to have cellulitis involving the left extremity. At this time I believe her cellulitis is to the point where she does need to be admitted. She also has general weakness and quite a few chronic illnesses including COPD with respiratory failure and hypoxia. The patient is always on supplemental oxygen at 2 L. Patient has no other acute complaints at this time and states otherwise feeling well. Patient to be admitted to the hospital service. Page was placed and they are evaluating the patient for admission. FINAL IMPRESSION      1.  Cellulitis and abscess of leg          DISPOSITION/PLAN   DISPOSITION Admitted 06/29/2022 09:13:31 PM      PATIENT REFERREDTO:  No follow-up provider specified.     DISCHARGE MEDICATIONS:  New Prescriptions    No medications on file       DISCONTINUED MEDICATIONS:  Discontinued Medications    LIDOCAINE (XYLOCAINE) 5 % OINTMENT        LISINOPRIL (PRINIVIL;ZESTRIL) 20 MG TABLET    TAKE 1/2  TABLET BY MOUTH ONCE DAILY    PREDNISONE (DELTASONE) 10 MG TABLET    30 mg x 3 days, 20 mg x 3 days, 10 mg x 3 days then stop    TRIAMCINOLONE (KENALOG) 0.1 % CREAM    APPLY TOPICALLY TWICE A DAY TO AFFECTED AREAS              (Please note that portions ofthis note were completed with a voice recognition program.  Efforts were made to edit the dictations but occasionally words are mis-transcribed.)    DEVON Lewis CNP (electronically signed)            DEVON Lewis CNP  06/30/22 [id number]

## 2022-06-30 PROBLEM — J96.11 CHRONIC RESPIRATORY FAILURE WITH HYPOXIA (HCC): Status: ACTIVE | Noted: 2022-06-30

## 2022-06-30 LAB
ANION GAP SERPL CALCULATED.3IONS-SCNC: 8 MMOL/L (ref 3–16)
BASOPHILS ABSOLUTE: 0.1 K/UL (ref 0–0.2)
BASOPHILS RELATIVE PERCENT: 1.1 %
BUN BLDV-MCNC: 12 MG/DL (ref 7–20)
CALCIUM SERPL-MCNC: 9.4 MG/DL (ref 8.3–10.6)
CHLORIDE BLD-SCNC: 101 MMOL/L (ref 99–110)
CO2: 31 MMOL/L (ref 21–32)
CREAT SERPL-MCNC: 0.7 MG/DL (ref 0.6–1.2)
EOSINOPHILS ABSOLUTE: 0 K/UL (ref 0–0.6)
EOSINOPHILS RELATIVE PERCENT: 0 %
ESTIMATED AVERAGE GLUCOSE: 165.7 MG/DL
GFR AFRICAN AMERICAN: >60
GFR NON-AFRICAN AMERICAN: >60
GLUCOSE BLD-MCNC: 177 MG/DL (ref 70–99)
GLUCOSE BLD-MCNC: 186 MG/DL (ref 70–99)
GLUCOSE BLD-MCNC: 232 MG/DL (ref 70–99)
GLUCOSE BLD-MCNC: 283 MG/DL (ref 70–99)
GLUCOSE BLD-MCNC: 296 MG/DL (ref 70–99)
HBA1C MFR BLD: 7.4 %
HCT VFR BLD CALC: 34.1 % (ref 36–48)
HEMOGLOBIN: 11.2 G/DL (ref 12–16)
LYMPHOCYTES ABSOLUTE: 0.8 K/UL (ref 1–5.1)
LYMPHOCYTES RELATIVE PERCENT: 8.4 %
MCH RBC QN AUTO: 30.8 PG (ref 26–34)
MCHC RBC AUTO-ENTMCNC: 32.7 G/DL (ref 31–36)
MCV RBC AUTO: 94 FL (ref 80–100)
MONOCYTES ABSOLUTE: 1 K/UL (ref 0–1.3)
MONOCYTES RELATIVE PERCENT: 11.2 %
NEUTROPHILS ABSOLUTE: 7.2 K/UL (ref 1.7–7.7)
NEUTROPHILS RELATIVE PERCENT: 79.3 %
PDW BLD-RTO: 16.5 % (ref 12.4–15.4)
PERFORMED ON: ABNORMAL
PLATELET # BLD: 125 K/UL (ref 135–450)
PMV BLD AUTO: 9 FL (ref 5–10.5)
POTASSIUM REFLEX MAGNESIUM: 4.7 MMOL/L (ref 3.5–5.1)
RBC # BLD: 3.62 M/UL (ref 4–5.2)
SODIUM BLD-SCNC: 140 MMOL/L (ref 136–145)
WBC # BLD: 9.1 K/UL (ref 4–11)

## 2022-06-30 PROCEDURE — 6370000000 HC RX 637 (ALT 250 FOR IP): Performed by: INTERNAL MEDICINE

## 2022-06-30 PROCEDURE — 6360000002 HC RX W HCPCS: Performed by: INTERNAL MEDICINE

## 2022-06-30 PROCEDURE — 1200000000 HC SEMI PRIVATE

## 2022-06-30 PROCEDURE — 94640 AIRWAY INHALATION TREATMENT: CPT

## 2022-06-30 PROCEDURE — 6370000000 HC RX 637 (ALT 250 FOR IP)

## 2022-06-30 PROCEDURE — 80048 BASIC METABOLIC PNL TOTAL CA: CPT

## 2022-06-30 PROCEDURE — 99232 SBSQ HOSP IP/OBS MODERATE 35: CPT

## 2022-06-30 PROCEDURE — 94669 MECHANICAL CHEST WALL OSCILL: CPT

## 2022-06-30 PROCEDURE — 94761 N-INVAS EAR/PLS OXIMETRY MLT: CPT

## 2022-06-30 PROCEDURE — 2700000000 HC OXYGEN THERAPY PER DAY

## 2022-06-30 PROCEDURE — 2580000003 HC RX 258: Performed by: INTERNAL MEDICINE

## 2022-06-30 PROCEDURE — 85025 COMPLETE CBC W/AUTO DIFF WBC: CPT

## 2022-06-30 PROCEDURE — 36415 COLL VENOUS BLD VENIPUNCTURE: CPT

## 2022-06-30 RX ORDER — IPRATROPIUM BROMIDE AND ALBUTEROL SULFATE 2.5; .5 MG/3ML; MG/3ML
1 SOLUTION RESPIRATORY (INHALATION) 4 TIMES DAILY
Status: DISCONTINUED | OUTPATIENT
Start: 2022-06-30 | End: 2022-07-03 | Stop reason: HOSPADM

## 2022-06-30 RX ORDER — PREDNISONE 20 MG/1
40 TABLET ORAL DAILY
Status: COMPLETED | OUTPATIENT
Start: 2022-06-30 | End: 2022-06-30

## 2022-06-30 RX ORDER — IPRATROPIUM BROMIDE AND ALBUTEROL SULFATE 2.5; .5 MG/3ML; MG/3ML
1 SOLUTION RESPIRATORY (INHALATION)
Status: DISCONTINUED | OUTPATIENT
Start: 2022-06-30 | End: 2022-06-30

## 2022-06-30 RX ORDER — IPRATROPIUM BROMIDE AND ALBUTEROL SULFATE 2.5; .5 MG/3ML; MG/3ML
1 SOLUTION RESPIRATORY (INHALATION) 4 TIMES DAILY
Status: DISCONTINUED | OUTPATIENT
Start: 2022-07-01 | End: 2022-06-30

## 2022-06-30 RX ORDER — HYDROCODONE BITARTRATE AND ACETAMINOPHEN 5; 325 MG/1; MG/1
1 TABLET ORAL EVERY 6 HOURS PRN
Status: DISCONTINUED | OUTPATIENT
Start: 2022-06-30 | End: 2022-07-03 | Stop reason: HOSPADM

## 2022-06-30 RX ORDER — GUAIFENESIN 600 MG/1
600 TABLET, EXTENDED RELEASE ORAL 2 TIMES DAILY
Status: DISCONTINUED | OUTPATIENT
Start: 2022-06-30 | End: 2022-07-03 | Stop reason: HOSPADM

## 2022-06-30 RX ADMIN — INSULIN LISPRO 3 UNITS: 100 INJECTION, SOLUTION INTRAVENOUS; SUBCUTANEOUS at 21:12

## 2022-06-30 RX ADMIN — Medication 10 ML: at 09:15

## 2022-06-30 RX ADMIN — GUAIFENESIN 600 MG: 600 TABLET, EXTENDED RELEASE ORAL at 21:08

## 2022-06-30 RX ADMIN — SERTRALINE HYDROCHLORIDE 100 MG: 50 TABLET ORAL at 21:08

## 2022-06-30 RX ADMIN — INSULIN LISPRO 2 UNITS: 100 INJECTION, SOLUTION INTRAVENOUS; SUBCUTANEOUS at 09:14

## 2022-06-30 RX ADMIN — FUROSEMIDE 20 MG: 20 TABLET ORAL at 09:10

## 2022-06-30 RX ADMIN — CEFAZOLIN SODIUM 1000 MG: 1 INJECTION, POWDER, FOR SOLUTION INTRAMUSCULAR; INTRAVENOUS at 09:09

## 2022-06-30 RX ADMIN — IPRATROPIUM BROMIDE AND ALBUTEROL SULFATE 1 AMPULE: 2.5; .5 SOLUTION RESPIRATORY (INHALATION) at 12:57

## 2022-06-30 RX ADMIN — PREGABALIN 300 MG: 100 CAPSULE ORAL at 09:10

## 2022-06-30 RX ADMIN — CEFAZOLIN SODIUM 1000 MG: 1 INJECTION, POWDER, FOR SOLUTION INTRAMUSCULAR; INTRAVENOUS at 01:07

## 2022-06-30 RX ADMIN — CEFAZOLIN SODIUM 1000 MG: 1 INJECTION, POWDER, FOR SOLUTION INTRAMUSCULAR; INTRAVENOUS at 16:35

## 2022-06-30 RX ADMIN — ENOXAPARIN SODIUM 40 MG: 100 INJECTION SUBCUTANEOUS at 09:14

## 2022-06-30 RX ADMIN — ROPINIROLE HYDROCHLORIDE 2 MG: 1 TABLET, FILM COATED ORAL at 21:08

## 2022-06-30 RX ADMIN — INSULIN LISPRO 2 UNITS: 100 INJECTION, SOLUTION INTRAVENOUS; SUBCUTANEOUS at 12:57

## 2022-06-30 RX ADMIN — IPRATROPIUM BROMIDE AND ALBUTEROL SULFATE 1 AMPULE: 2.5; .5 SOLUTION RESPIRATORY (INHALATION) at 19:01

## 2022-06-30 RX ADMIN — METFORMIN HYDROCHLORIDE 500 MG: 500 TABLET ORAL at 09:10

## 2022-06-30 RX ADMIN — HYDROCODONE BITARTRATE AND ACETAMINOPHEN 1 TABLET: 5; 325 TABLET ORAL at 16:33

## 2022-06-30 RX ADMIN — HYDROCODONE BITARTRATE AND ACETAMINOPHEN 1 TABLET: 5; 325 TABLET ORAL at 10:36

## 2022-06-30 RX ADMIN — INSULIN LISPRO 6 UNITS: 100 INJECTION, SOLUTION INTRAVENOUS; SUBCUTANEOUS at 16:42

## 2022-06-30 RX ADMIN — TIZANIDINE 4 MG: 4 TABLET ORAL at 21:08

## 2022-06-30 RX ADMIN — PREDNISONE 40 MG: 20 TABLET ORAL at 10:36

## 2022-06-30 RX ADMIN — PREGABALIN 300 MG: 100 CAPSULE ORAL at 21:08

## 2022-06-30 RX ADMIN — GUAIFENESIN 600 MG: 600 TABLET, EXTENDED RELEASE ORAL at 12:56

## 2022-06-30 RX ADMIN — TIZANIDINE 4 MG: 4 TABLET ORAL at 09:39

## 2022-06-30 RX ADMIN — CEFAZOLIN SODIUM 1000 MG: 1 INJECTION, POWDER, FOR SOLUTION INTRAMUSCULAR; INTRAVENOUS at 23:47

## 2022-06-30 RX ADMIN — Medication 10 ML: at 21:09

## 2022-06-30 RX ADMIN — AMLODIPINE BESYLATE 5 MG: 5 TABLET ORAL at 09:10

## 2022-06-30 RX ADMIN — SERTRALINE HYDROCHLORIDE 100 MG: 50 TABLET ORAL at 09:10

## 2022-06-30 RX ADMIN — SODIUM CHLORIDE: 9 INJECTION, SOLUTION INTRAVENOUS at 23:45

## 2022-06-30 RX ADMIN — ACETAMINOPHEN 650 MG: 325 TABLET ORAL at 01:10

## 2022-06-30 RX ADMIN — HYDROCODONE BITARTRATE AND ACETAMINOPHEN 1 TABLET: 5; 325 TABLET ORAL at 23:43

## 2022-06-30 ASSESSMENT — PAIN SCALES - GENERAL
PAINLEVEL_OUTOF10: 8
PAINLEVEL_OUTOF10: 7
PAINLEVEL_OUTOF10: 9
PAINLEVEL_OUTOF10: 8
PAINLEVEL_OUTOF10: 8

## 2022-06-30 ASSESSMENT — PAIN DESCRIPTION - DESCRIPTORS
DESCRIPTORS: ACHING
DESCRIPTORS: ACHING
DESCRIPTORS: BURNING

## 2022-06-30 ASSESSMENT — PAIN DESCRIPTION - LOCATION
LOCATION: LEG

## 2022-06-30 ASSESSMENT — PAIN DESCRIPTION - ORIENTATION
ORIENTATION: RIGHT
ORIENTATION: RIGHT;LEFT
ORIENTATION: LEFT
ORIENTATION: RIGHT

## 2022-06-30 ASSESSMENT — PAIN - FUNCTIONAL ASSESSMENT: PAIN_FUNCTIONAL_ASSESSMENT: PREVENTS OR INTERFERES SOME ACTIVE ACTIVITIES AND ADLS

## 2022-06-30 NOTE — PROGRESS NOTES
RT Inhaler-Nebulizer Bronchodilator Protocol Note    There is a bronchodilator order in the chart from a provider indicating to follow the RT Bronchodilator Protocol and there is an Initiate RT Inhaler-Nebulizer Bronchodilator Protocol order as well (see protocol at bottom of note). CXR Findings:  XR CHEST PORTABLE    Result Date: 6/29/2022  Right basilar opacity could represent atelectasis or infection       The findings from the last RT Protocol Assessment were as follows:   History Pulmonary Disease: Chronic pulmonary disease  Respiratory Pattern: Regular pattern and RR 12-20 bpm  Breath Sounds: Inspiratory and expiratory or bilateral wheezing and/or rhonchi  Cough: Strong, spontaneous, non-productive  Indication for Bronchodilator Therapy: On home bronchodilators  Bronchodilator Assessment Score: 8    Aerosolized bronchodilator medication orders have been revised according to the RT Inhaler-Nebulizer Bronchodilator Protocol below. Respiratory Therapist to perform RT Therapy Protocol Assessment initially then follow the protocol. Repeat RT Therapy Protocol Assessment PRN for score 0-3 or on second treatment, BID, and PRN for scores above 3. No Indications - adjust the frequency to every 6 hours PRN wheezing or bronchospasm, if no treatments needed after 48 hours then discontinue using Per Protocol order mode. If indication present, adjust the RT bronchodilator orders based on the Bronchodilator Assessment Score as indicated below. Use Inhaler orders unless patient has one or more of the following: on home nebulizer, not able to hold breath for 10 seconds, is not alert and oriented, cannot activate and use MDI correctly, or respiratory rate 25 breaths per minute or more, then use the equivalent nebulizer order(s) with same Frequency and PRN reasons based on the score. If a patient is on this medication at home then do not decrease Frequency below that used at home.     0-3 - enter or revise RT bronchodilator order(s) to equivalent RT Bronchodilator order with Frequency of every 4 hours PRN for wheezing or increased work of breathing using Per Protocol order mode. 4-6 - enter or revise RT Bronchodilator order(s) to two equivalent RT bronchodilator orders with one order with BID Frequency and one order with Frequency of every 4 hours PRN wheezing or increased work of breathing using Per Protocol order mode. 7-10 - enter or revise RT Bronchodilator order(s) to two equivalent RT bronchodilator orders with one order with TID Frequency and one order with Frequency of every 4 hours PRN wheezing or increased work of breathing using Per Protocol order mode. 11-13 - enter or revise RT Bronchodilator order(s) to one equivalent RT bronchodilator order with QID Frequency and an Albuterol order with Frequency of every 4 hours PRN wheezing or increased work of breathing using Per Protocol order mode. Greater than 13 - enter or revise RT Bronchodilator order(s) to one equivalent RT bronchodilator order with every 4 hours Frequency and an Albuterol order with Frequency of every 2 hours PRN wheezing or increased work of breathing using Per Protocol order mode.      Electronically signed by Kesha Haynes RCP on 6/30/2022 at 7:05 PM

## 2022-06-30 NOTE — ED NOTES
Med rec completed with information provided by reggie Mccain sent to hospitalist to notify of multiple medication changes/updates to med rec.      Nick Proctor RN  06/29/22 0887

## 2022-06-30 NOTE — H&P
Hospital Medicine History & Physical      PCP: Wilfredo Carrillo    Date of Admission: 6/29/2022    Date of Service: Pt seen/examined on 6/29/2022    Chief Complaint: Cellulitis    History Of Present Illness:    74 y.o. female who presented to the hospital with a chief complaint of bilateral lower extremity swelling and redness. Patient has a history of diabetes mellitus, chronic respiratory therapy on oxygen, COPD and diabetes mellitus. She presents to the emergency department with worsening redness and swelling of her bilateral lower extremities. Unfortunately the patient has not seen a physician about this nor is she taking antibiotics as an outpatient. She presented today accompanied by her daughter who was at bedside. She denies any fevers or chills but endorses shortness of breath which is chronic. She states that she chronically has some redness but this time it spreading up her legs. Emergency department she was started on IV antibiotics and will be admitted for inpatient antibiotic therapy.     Past Medical History:        Diagnosis Date    Anxiety     Arthritis     Cancer ([address] 1980    Cervical    Chronic back pain     Chronic pain     COPD (chronic obstructive pulmonary disease) (HCC)     Depression     DJD (degenerative joint disease), cervical     Drug-seeking behavior     GERD (gastroesophageal reflux disease)     Hypercholesteremia     Hypertension     IBS (irritable bowel syndrome)     Insomnia     K deficiency     Kidney stone     Narcotic addiction [facility])     Patient Denies    Narcotic overdose ([address]     Patient Denies    Osteoporosis     Overdose     [id number] Patient Denies on 4/3/14    PNA (pneumonia) 10/17/2016    Restless leg syndrome     Thyroid disease        Past Surgical History:          Procedure Laterality Date    APPENDECTOMY      BACK SURGERY      CERVICAL DISCECTOMY  12-14-13    anterior discetomy fusion c 3/4 c 4/5 posterior fusion C3-C5 screws and rods C3-C5    CHOLECYSTECTOMY  2012    Laparoscopic    COLONOSCOPY  2008    COLONOSCOPY  08/10/2016    FOOT SURGERY Right     FRACTURE SURGERY Right 9/14    distal radius    HAND SURGERY  08/2012    crushed right hand and has two steel plates in hand    HYSTERECTOMY (CERVIX STATUS UNKNOWN)      NECK SURGERY  2002    disc    OTHER SURGICAL HISTORY  4/9/14    TRANSFORAMINAL LUMBAR INTERBODY FUSION L4 L5       Medications Prior to Admission:      Prior to Admission medications    Medication Sig Start Date End Date Taking? Authorizing Provider   amLODIPine (NORVASC) 5 MG tablet TAKE 1 TABLET (5 MG TOTAL) BY MOUTH DAILY 6/29/22   Historical Provider, MD [address] 100-62.5-25 MCG/INH AEPB INHALE 1 PUFF INTO THE LUNGS DAILY. 6/29/22   Historical Provider, MD   ACCU-CHEK GUIDE strip USE 1 STRIP AS DIRECTED 4 TIMES A DAY (MAY TEST LESS FREQUENT NOT INJECTING INSULIN) 6/29/22   Historical Provider, MD   Accu-Chek Softclix Lancets MISC USE 3 TO 4 TIMES A DAY 6/29/22   Historical Provider, MD   linaclotide (LINZESS) 290 MCG CAPS capsule TAKE 1 CAPSULE AT LEAST 30 MINUTES BEFORE THE FIRST MEAL OF THE DAY ON AN EMPTY STOMACH ORALLY ONCE A DAY 30 DAYS for 30    Historical Provider, MD   metFORMIN (GLUCOPHAGE) 500 MG tablet TAKE 1 TABLET (500 MG TOTAL) BY MOUTH DAILY WITH BREAKFAST. 6/10/22   Historical Provider, MD   STOOL SOFTENER/LAXATIVE 50-8.6 MG per tablet TAKE 2 TABLETS BY MOUTH AT BEDTIME. 6/29/22   Historical Provider, MD   tiZANidine (ZANAFLEX) 4 MG tablet Take 4 mg by mouth every 6 hours as needed  6/29/22   Historical Provider, MD   rOPINIRole (REQUIP) 2 MG tablet Take 1 tablet by mouth nightly 5/14/22:  Amount of tablets per RX decreased from 90 (30 days supply) to 60 (30 days supply) since 11/2020.   Patient taking differently: Take 2 mg by mouth in the morning and at bedtime 5/14/22:  Amount of tablets per RX decreased from 90 (30 days supply) to 60 (30 days supply) since 11/2020. 5/18/22   Jenny Trujillo Sara Lopez MD   furosemide (LASIX) 20 MG tablet Take 1 tablet by mouth daily 5/18/22   Lindsey Farrar MD   sertraline (ZOLOFT) 100 MG tablet Take 1 tablet by mouth 2 times daily 5/18/22   Lindsey Farrar MD   ondansetron (ZOFRAN-ODT) 4 MG disintegrating tablet Take 1 tablet by mouth 3 times daily as needed for Nausea or Vomiting 3/7/21   Yong Xie MD   polyethylene glycol (MIRALAX) PACK packet Take 17 g by mouth daily    Historical Provider, MD   albuterol sulfate HFA (PROVENTIL HFA) 108 (90 Base) MCG/ACT inhaler Inhale 2 puffs into the lungs every 4 hours as needed for Wheezing 4/9/18 4/9/19  Matthias French MD   risperiDONE (RISPERDAL) 0.5 MG tablet Take 0.5 mg by mouth 2 times daily    Historical Provider, MD   BREEVA ELLIPTA 100-25 MCG/INH AEPB inhaler INHALE 1 PUFF BY MOUTH ONCE DAILY (RINSE MOUTH AFTER EACH USE) 1/30/18   Historical Provider, MD   omeprazole (PRILOSEC) 40 MG delayed release capsule TAKE ONE CAPSULE(S) BY MOUTH EVERY MORNING 1/31/18   Historical Provider, MD   atorvastatin (LIPITOR) 20 MG tablet Take 1 tablet by mouth daily. Patient taking differently: Take 60 mg by mouth daily  1/6/15   Rosezena Hashimoto, MD   pregabalin (LYRICA) 300 MG capsule Take 300 mg by mouth 2 times daily. Historical Provider, MD       Allergies:  Asa buff (mag [buffered aspirin] and Cymbalta [duloxetine hcl]    Social History:      TOBACCO:   reports that she has been smoking cigarettes. She started smoking about 49 years ago. She has a 40.00 pack-year smoking history. She has never used smokeless tobacco.  ETOH:   reports no history of alcohol use.       Family History:          Problem Relation Age of Onset    Heart Disease Mother     High Cholesterol Mother     Diabetes Mother     Asthma Mother     Heart Failure Mother     Hypertension Mother     Heart Disease Father     High Cholesterol Father     Emphysema Father     Heart Failure Father     Hypertension Father     Cancer Neg Hx REVIEW OF SYSTEMS:   Constitutional:  Negative for fever,chills or night sweats  ENT:  Negative for rhinorrhea, epistaxis, hoarseness, sore throat. Respiratory: Chronic shortness of breath  Cardiovascular:   Negative for  chest pain, palpitations   Gastrointestinal:  Negative for nausea, vomiting, diarrhea  Genitourinary:  Negative for polyuria, dysuria   Hematologic/Lymphatic:  Negative for  bleeding tendency, easy bruising  Musculoskeletal:  Negative for myalgias and arthralgias  Neurologic:  Negative for  confusion,dysarthria. Skin:   Endorses redness in bilateral lower extremity  Psychiatric:  Negative for depression,anxiety, agitation. Endocrine:  Negative for polydipsia,polyuria,heat /cold intolerance. PHYSICAL EXAM:  /64   Pulse 88   Temp 97.9 °F (36.6 °C) (Oral)   Resp 20   Ht 5' 3\" (1.6 m)   Wt 180 lb (81.6 kg)   SpO2 97%   BMI 31.89 kg/m²   General appearance:  No apparent distress, appears stated age and cooperative. HEENT:  Normal cephalic, atraumatic without obvious deformity. Pupils equal, round, and reactive to light. Extra ocular muscles intact. Conjunctivae/corneas clear. Neck: Supple, with full range of motion. No jugular venous distention. Trachea midline. Respiratory:  Normal respiratory effort. Scattered inspiratory and expiratory wheezing. Cardiovascular:  Regular rate and rhythm with normal S1/S2 without murmurs, rubs or gallops. Abdomen: Soft, non-tender, non-distended with normal bowel sounds. Musculoskeletal: 1+ pitting edema in bilateral lower extremities  Skin: Erythema bilateral lower extremities from mid shin to feet, multiple excoriations noted to bilateral lower extremities  Neurologic:  Neurovascularly intact without any focal sensory/motor deficits.  Cranial nerves: II-XII intact, grossly non-focal.  Psychiatric:  Alert and oriented, thought content appropriate, normal insight  Capillary Refill: Brisk,< 3 seconds   Peripheral Pulses: +2 palpable, equal bilaterally       Labs:   Recent Labs     06/29/22 1951   WBC 11.4*   HGB 12.3   HCT 37.6        Recent Labs     06/29/22 1951      K 4.4   CL 97*   CO2 32   BUN 14   CREATININE 0.7   CALCIUM 10.0     Recent Labs     06/29/22 1951   AST 10*   ALT 13   BILITOT <0.2   ALKPHOS 139*     No results for input(s): INR in the last 72 hours. No results for input(s): Roya Simon in the last 72 hours. Urinalysis:      Lab Results   Component Value Date/Time    NITRU Negative 06/29/2022 07:51 PM    WBCUA 3-5 06/29/2022 07:51 PM    BACTERIA 1+ 06/29/2022 07:51 PM    RBCUA 0-2 06/29/2022 07:51 PM    BLOODU SMALL 06/29/2022 07:51 PM    SPECGRAV 1.010 06/29/2022 07:51 PM    GLUCOSEU >=1000 06/29/2022 07:51 PM    GLUCOSEU NEGATIVE 01/23/2012 09:18 AM       Radiology:   XR TIBIA FIBULA LEFT (2 VIEWS)   Final Result   Diffuse soft tissue swelling seen within the subcutaneous tissues. No acute   bony abnormality. XR CHEST PORTABLE   Final Result   Right basilar opacity could represent atelectasis or infection             ASSESSMENT:  Cellulitis  Chronic respiratory failure  COPD/emphysema  Diabetes mellitus type 2  Hypertension  Tobacco abuse  Chronic pain syndrome  Anxiety disorder    PLAN:  IV cefazolin day #1, likely strep, will transition to p.o. Bactrim or Keflex on discharge.  -Sliding scale insulin therapy, breathing treatments  -Resume home medications  -Nicotine patch  -Continue supplemental oxygen    DVT Prophylaxis: Lovenox  Diet: ADULT DIET; Regular; 4 carb choices (60 gm/meal)  Code Status: Full Code    Dispo -inpatient admission      Thank you for the opportunity to be involved in this patient's care.       (Please note that portions of this note were completed with a voice recognition program. Efforts were made to edit the dictations but occasionally words are mis-transcribed.)

## 2022-06-30 NOTE — PROGRESS NOTES
RT Inhaler-Nebulizer Bronchodilator Protocol Note    There is a bronchodilator order in the chart from a provider indicating to follow the RT Bronchodilator Protocol and there is an Initiate RT Inhaler-Nebulizer Bronchodilator Protocol order as well (see protocol at bottom of note). CXR Findings:  XR CHEST PORTABLE    Result Date: 6/29/2022  Right basilar opacity could represent atelectasis or infection       The findings from the last RT Protocol Assessment were as follows:   History Pulmonary Disease: (P) Chronic pulmonary disease  Respiratory Pattern: (P) Dyspnea on exertion or RR 21-25 bpm  Breath Sounds: (P) Intermittent or unilateral wheezes  Cough: (P) Strong, productive  Indication for Bronchodilator Therapy: (P) Wheezing associated with pulm disorder  Bronchodilator Assessment Score: (P) 9    Aerosolized bronchodilator medication orders have been revised according to the RT Inhaler-Nebulizer Bronchodilator Protocol below. Respiratory Therapist to perform RT Therapy Protocol Assessment initially then follow the protocol. Repeat RT Therapy Protocol Assessment PRN for score 0-3 or on second treatment, BID, and PRN for scores above 3. No Indications - adjust the frequency to every 6 hours PRN wheezing or bronchospasm, if no treatments needed after 48 hours then discontinue using Per Protocol order mode. If indication present, adjust the RT bronchodilator orders based on the Bronchodilator Assessment Score as indicated below. Use Inhaler orders unless patient has one or more of the following: on home nebulizer, not able to hold breath for 10 seconds, is not alert and oriented, cannot activate and use MDI correctly, or respiratory rate 25 breaths per minute or more, then use the equivalent nebulizer order(s) with same Frequency and PRN reasons based on the score. If a patient is on this medication at home then do not decrease Frequency below that used at home.     0-3 - enter or revise RT bronchodilator order(s) to equivalent RT Bronchodilator order with Frequency of every 4 hours PRN for wheezing or increased work of breathing using Per Protocol order mode. 4-6 - enter or revise RT Bronchodilator order(s) to two equivalent RT bronchodilator orders with one order with BID Frequency and one order with Frequency of every 4 hours PRN wheezing or increased work of breathing using Per Protocol order mode. 7-10 - enter or revise RT Bronchodilator order(s) to two equivalent RT bronchodilator orders with one order with TID Frequency and one order with Frequency of every 4 hours PRN wheezing or increased work of breathing using Per Protocol order mode. 11-13 - enter or revise RT Bronchodilator order(s) to one equivalent RT bronchodilator order with QID Frequency and an Albuterol order with Frequency of every 4 hours PRN wheezing or increased work of breathing using Per Protocol order mode. Greater than 13 - enter or revise RT Bronchodilator order(s) to one equivalent RT bronchodilator order with every 4 hours Frequency and an Albuterol order with Frequency of every 2 hours PRN wheezing or increased work of breathing using Per Protocol order mode.          Electronically signed by Claudeen Mutter, RCP on 6/30/2022 at 4:50 PM

## 2022-06-30 NOTE — FLOWSHEET NOTE
Patient alert and watching TV at this time. Patient's respirations even and easy. Patient given call light and denies any further needs at this time. Bed in the lowest and locked position.       06/30/22 0810   Assessment   Charting Type Shift assessment   Psychosocial   Psychosocial (WDL) WDL   Neurological   Neuro (WDL) WDL   Level of Consciousness Alert (0)   [place] Coma Scale   Eye Opening 4   Best Verbal Response 5   Best Motor Response 6   [place] Coma Scale Score 15   HEENT (Head, Ears, Eyes, Nose, & Throat)   HEENT (WDL) WDL   Respiratory   Respiratory (WDL) X  (wears 2LNC at baseline )   Cardiac   Cardiac (WDL) WDL   Cardiac Rhythm Sinus rhythm   Cardiac Monitor   Telemetry Box Number [id number]   Gastrointestinal   Abdominal (WDL) WDL   Genitourinary   Genitourinary (WDL) X  (pure wick in place )   Suprapubic Tenderness No   Dysuria (Pain/Burning w/Urination) No   Difficulty Urinating/Starting Stream No   Peripheral Vascular   Peripheral Vascular (WDL) X   Edema Left lower extremity   LLE Edema +1   LLE Neurovascular Assessment   Capillary Refill Less than/Equal to 3 seconds   Color Red   Temperature Warm   LLE Sensation  Pain;Numbness   L Pedal Pulse +1   L Calf Tenderness Negative   Skin Integumentary    Skin Integumentary (WDL) WDL   Musculoskeletal   Musculoskeletal (WDL) X   RL Extremity Weakness   LL Extremity Weakness

## 2022-06-30 NOTE — ED PROVIDER NOTES
I independently examined and evaluated Jayy Wang. All diagnostic, treatment, and disposition decisions were made by myself in conjunction with the JAKE, Leigh Govea. For all further details of the patient's emergency department visit, please see their documentation. 81-year-old female presents with pain and swelling of the left lower leg. This has been present for about 8 days. No fevers. She does have a history of cellulitis. She is diabetic, poorly controlled. Vitals:    06/29/22 2101   BP: (!) 142/74   Pulse: 82   Resp: 20   Temp:    SpO2: 100%   Chronically ill-appearing, frail. Heart is regular rate and rhythm. Left leg is edematous, erythematous and warm with erythema extending from the toes just proximal to the knee joint.     Results for orders placed or performed during the hospital encounter of 06/29/22   CBC with Auto Differential   Result Value Ref Range    WBC 11.4 (H) 4.0 - 11.0 K/uL    RBC 4.02 4.00 - 5.20 M/uL    Hemoglobin 12.3 12.0 - 16.0 g/dL    Hematocrit 37.6 36.0 - 48.0 %    MCV 93.7 80.0 - 100.0 fL    MCH 30.7 26.0 - 34.0 pg    MCHC 32.8 31.0 - 36.0 g/dL    RDW 17.3 (H) 12.4 - 15.4 %    Platelets 437 520 - 738 K/uL    MPV 9.0 5.0 - 10.5 fL    Neutrophils % 84.0 %    Lymphocytes % 7.5 %    Monocytes % 7.8 %    Eosinophils % 0.0 %    Basophils % 0.7 %    Neutrophils Absolute 9.5 (H) 1.7 - 7.7 K/uL    Lymphocytes Absolute 0.9 (L) 1.0 - 5.1 K/uL    Monocytes Absolute 0.9 0.0 - 1.3 K/uL    Eosinophils Absolute 0.0 0.0 - 0.6 K/uL    Basophils Absolute 0.1 0.0 - 0.2 K/uL   Comprehensive Metabolic Panel w/ Reflex to MG   Result Value Ref Range    Sodium 139 136 - 145 mmol/L    Potassium reflex Magnesium 4.4 3.5 - 5.1 mmol/L    Chloride 97 (L) 99 - 110 mmol/L    CO2 32 21 - 32 mmol/L    Anion Gap 10 3 - 16    Glucose 281 (H) 70 - 99 mg/dL    BUN 14 7 - 20 mg/dL    CREATININE 0.7 0.6 - 1.2 mg/dL    GFR Non-African American >60 >60    GFR African American >60 >60    Calcium 10.0 8.3 - 10.6 mg/dL    Total Protein 6.9 6.4 - 8.2 g/dL    Albumin 4.7 3.4 - 5.0 g/dL    Albumin/Globulin Ratio 2.1 1.1 - 2.2    Total Bilirubin <0.2 0.0 - 1.0 mg/dL    Alkaline Phosphatase 139 (H) 40 - 129 U/L    ALT 13 10 - 40 U/L    AST 10 (L) 15 - 37 U/L   Lactic Acid   Result Value Ref Range    Lactic Acid 2.0 0.4 - 2.0 mmol/L   Urinalysis with Microscopic   Result Value Ref Range    Color, UA Yellow Straw/Yellow    Clarity, UA SL CLOUDY (A) Clear    Glucose, Ur >=1000 (A) Negative mg/dL    Bilirubin Urine Negative Negative    Ketones, Urine Negative Negative mg/dL    Specific Gravity, UA 1.010 1.005 - 1.030    Blood, Urine SMALL (A) Negative    pH, UA 8.0 5.0 - 8.0    Protein, UA Negative Negative mg/dL    Urobilinogen, Urine 0.2 <2.0 E.U./dL    Nitrite, Urine Negative Negative    Leukocyte Esterase, Urine TRACE (A) Negative    Microscopic Examination YES     Urine Type NotGiven     WBC, UA 3-5 0 - 5 /HPF    RBC, UA 0-2 0 - 4 /HPF    Epithelial Cells, UA 2-5 0 - 5 /HPF    Bacteria, UA 1+ (A) None Seen /HPF    Amorphous, UA 1+ /HPF   POCT Glucose   Result Value Ref Range    POC Glucose 265 (H) 70 - 99 mg/dl    Performed on ACCU-CHEK        XR TIBIA FIBULA LEFT (2 VIEWS)   Final Result   Diffuse soft tissue swelling seen within the subcutaneous tissues. No acute   bony abnormality. XR CHEST PORTABLE   Final Result   Right basilar opacity could represent atelectasis or infection                 I personally saw and performed a substantive portion of the visit including all aspects of the medical decision making. MDM:    Patient has significant cellulitis of her left leg extending across and proximal to the knee joint. She is very chronically ill-appearing. Lab work does show mild leukocytosis. She has been given broad-spectrum antibiotics here and we will admit her to the hospitalist.  No bony involvement or gas in the tissue on x-ray. She does not meet SIRS criteria.      Mars Jimenez MD  06/29/22 6617

## 2022-06-30 NOTE — ED NOTES
Pt is an inpatient hold in the ED at this time. Changed pt to Q4 VS per inpatient VS policy. No tele orders. All inpatient orders released. Pt denies needs at this time. Visitor at bedside.      Dorian Dance, RN  06/29/22 2123

## 2022-06-30 NOTE — PROGRESS NOTES
mcg, QAM AC  amLODIPine, 5 mg, Daily  insulin lispro, 0-12 Units, TID WC  insulin lispro, 0-6 Units, Nightly      PRN Medications:  glucose, 4 tablet, PRN  dextrose bolus, 125 mL, PRN   Or  dextrose bolus, 250 mL, PRN  glucagon (rDNA), 1 mg, PRN  dextrose, 100 mL/hr, PRN  sodium chloride flush, 5-40 mL, PRN  sodium chloride, , PRN  ondansetron, 4 mg, Q8H PRN   Or  ondansetron, 4 mg, Q6H PRN  polyethylene glycol, 17 g, Daily PRN  acetaminophen, 650 mg, Q6H PRN   Or  acetaminophen, 650 mg, Q6H PRN  tiZANidine, 4 mg, Q6H PRN  ipratropium-albuterol, 1 ampule, Q4H PRN          Data:  CBC:   Recent Labs     06/29/22 1951 06/30/22  0510   WBC 11.4* 9.1   HGB 12.3 11.2*   HCT 37.6 34.1*   MCV 93.7 94.0    125*     BMP:   Recent Labs     06/29/22 1951 06/30/22  0510    140   K 4.4 4.7   CL 97* 101   CO2 32 31   BUN 14 12   CREATININE 0.7 0.7     LIVER PROFILE:   Recent Labs     06/29/22 1951   AST 10*   ALT 13   BILITOT <0.2   ALKPHOS 139*       CULTURES  Blood cultures pending   Sputum cultures pending  COVID not detected      RADIOLOGY  XR TIBIA FIBULA LEFT (2 VIEWS)   Final Result   Diffuse soft tissue swelling seen within the subcutaneous tissues. No acute   bony abnormality.          XR CHEST PORTABLE   Final Result   Right basilar opacity could represent atelectasis or infection               Assessment/Plan:  #Left lower extremity cellulitis   -xray negative   -mild leukocytosis   -blood cultures pending  -prn norco for pain   -IV ancef     #Right basilar opacity on CXR   #COPD with acute exacerbation   #Chronic Hypoxic respiratory failure  -on 2 L O2 baseline   -continue inhalers   -duonebs  -on abx as above  -sputum culture pending  -PO prednisone   -added mucinex and acapella     #DM, uncontrolled   -continue metformin   -SSI     #HTN   -continue norvasc  -continue to monitor BP     #possible liver cirrhosis  - ECHO with normal EF and normal Diastolic function  -need workup for cirrhosis- likely fatty liver disease  -continue lasix daily   -Pt advised to see PCP    #Depression   #Anxiety   -on zoloft     #Tobacco abuse   -nicotine patch   -recommend cessation     #HLD   -continue statin     #chronic pain syndrome   #RLS   -on requip and lyrica     #IBS   -continue home regimen    DVT Prophylaxis: lovenox   Diet: ADULT DIET;  Regular; 4 carb choices (60 gm/meal)  Code Status: Full Code    DOMINIK Nguyen  06/30/22  10:53 AM

## 2022-07-01 LAB
ANION GAP SERPL CALCULATED.3IONS-SCNC: 11 MMOL/L (ref 3–16)
BASOPHILS ABSOLUTE: 0 K/UL (ref 0–0.2)
BASOPHILS RELATIVE PERCENT: 0.3 %
BUN BLDV-MCNC: 14 MG/DL (ref 7–20)
CALCIUM SERPL-MCNC: 10 MG/DL (ref 8.3–10.6)
CHLORIDE BLD-SCNC: 99 MMOL/L (ref 99–110)
CO2: 32 MMOL/L (ref 21–32)
CREAT SERPL-MCNC: 0.7 MG/DL (ref 0.6–1.2)
EOSINOPHILS ABSOLUTE: 0 K/UL (ref 0–0.6)
EOSINOPHILS RELATIVE PERCENT: 0.1 %
GFR AFRICAN AMERICAN: >60
GFR NON-AFRICAN AMERICAN: >60
GLUCOSE BLD-MCNC: 144 MG/DL (ref 70–99)
GLUCOSE BLD-MCNC: 175 MG/DL (ref 70–99)
GLUCOSE BLD-MCNC: 187 MG/DL (ref 70–99)
GLUCOSE BLD-MCNC: 396 MG/DL (ref 70–99)
GLUCOSE BLD-MCNC: 405 MG/DL (ref 70–99)
HCT VFR BLD CALC: 35.4 % (ref 36–48)
HEMOGLOBIN: 11.9 G/DL (ref 12–16)
LYMPHOCYTES ABSOLUTE: 1 K/UL (ref 1–5.1)
LYMPHOCYTES RELATIVE PERCENT: 11.2 %
MCH RBC QN AUTO: 31.4 PG (ref 26–34)
MCHC RBC AUTO-ENTMCNC: 33.6 G/DL (ref 31–36)
MCV RBC AUTO: 93.4 FL (ref 80–100)
MONOCYTES ABSOLUTE: 1 K/UL (ref 0–1.3)
MONOCYTES RELATIVE PERCENT: 11.5 %
NEUTROPHILS ABSOLUTE: 7 K/UL (ref 1.7–7.7)
NEUTROPHILS RELATIVE PERCENT: 76.9 %
PDW BLD-RTO: 16.7 % (ref 12.4–15.4)
PERFORMED ON: ABNORMAL
PLATELET # BLD: 136 K/UL (ref 135–450)
PMV BLD AUTO: 9 FL (ref 5–10.5)
POTASSIUM REFLEX MAGNESIUM: 4.4 MMOL/L (ref 3.5–5.1)
RBC # BLD: 3.79 M/UL (ref 4–5.2)
SODIUM BLD-SCNC: 142 MMOL/L (ref 136–145)
WBC # BLD: 9.1 K/UL (ref 4–11)

## 2022-07-01 PROCEDURE — 99232 SBSQ HOSP IP/OBS MODERATE 35: CPT | Performed by: INTERNAL MEDICINE

## 2022-07-01 PROCEDURE — 94640 AIRWAY INHALATION TREATMENT: CPT

## 2022-07-01 PROCEDURE — 2700000000 HC OXYGEN THERAPY PER DAY

## 2022-07-01 PROCEDURE — 2580000003 HC RX 258: Performed by: INTERNAL MEDICINE

## 2022-07-01 PROCEDURE — 36415 COLL VENOUS BLD VENIPUNCTURE: CPT

## 2022-07-01 PROCEDURE — 6370000000 HC RX 637 (ALT 250 FOR IP)

## 2022-07-01 PROCEDURE — 80048 BASIC METABOLIC PNL TOTAL CA: CPT

## 2022-07-01 PROCEDURE — 6370000000 HC RX 637 (ALT 250 FOR IP): Performed by: INTERNAL MEDICINE

## 2022-07-01 PROCEDURE — 94669 MECHANICAL CHEST WALL OSCILL: CPT

## 2022-07-01 PROCEDURE — 85025 COMPLETE CBC W/AUTO DIFF WBC: CPT

## 2022-07-01 PROCEDURE — 1200000000 HC SEMI PRIVATE

## 2022-07-01 PROCEDURE — 6360000002 HC RX W HCPCS: Performed by: INTERNAL MEDICINE

## 2022-07-01 PROCEDURE — 94761 N-INVAS EAR/PLS OXIMETRY MLT: CPT

## 2022-07-01 RX ORDER — ROPINIROLE 1 MG/1
1 TABLET, FILM COATED ORAL NIGHTLY
Status: DISCONTINUED | OUTPATIENT
Start: 2022-07-01 | End: 2022-07-03 | Stop reason: HOSPADM

## 2022-07-01 RX ORDER — PREDNISONE 20 MG/1
20 TABLET ORAL DAILY
Status: DISCONTINUED | OUTPATIENT
Start: 2022-07-01 | End: 2022-07-02

## 2022-07-01 RX ORDER — LOSARTAN POTASSIUM 25 MG/1
50 TABLET ORAL DAILY
Status: DISCONTINUED | OUTPATIENT
Start: 2022-07-02 | End: 2022-07-03 | Stop reason: HOSPADM

## 2022-07-01 RX ADMIN — CEFAZOLIN 2000 MG: 2 INJECTION, POWDER, FOR SOLUTION INTRAMUSCULAR; INTRAVENOUS at 16:45

## 2022-07-01 RX ADMIN — ENOXAPARIN SODIUM 40 MG: 100 INJECTION SUBCUTANEOUS at 08:55

## 2022-07-01 RX ADMIN — VANCOMYCIN HYDROCHLORIDE 1000 MG: 1 INJECTION, POWDER, LYOPHILIZED, FOR SOLUTION INTRAVENOUS at 22:10

## 2022-07-01 RX ADMIN — SERTRALINE HYDROCHLORIDE 100 MG: 50 TABLET ORAL at 22:03

## 2022-07-01 RX ADMIN — Medication 10 ML: at 08:59

## 2022-07-01 RX ADMIN — SERTRALINE HYDROCHLORIDE 100 MG: 50 TABLET ORAL at 08:55

## 2022-07-01 RX ADMIN — PREDNISONE 20 MG: 20 TABLET ORAL at 11:38

## 2022-07-01 RX ADMIN — GUAIFENESIN 600 MG: 600 TABLET, EXTENDED RELEASE ORAL at 22:03

## 2022-07-01 RX ADMIN — IPRATROPIUM BROMIDE AND ALBUTEROL SULFATE 1 AMPULE: 2.5; .5 SOLUTION RESPIRATORY (INHALATION) at 19:20

## 2022-07-01 RX ADMIN — HYDROCODONE BITARTRATE AND ACETAMINOPHEN 1 TABLET: 5; 325 TABLET ORAL at 06:04

## 2022-07-01 RX ADMIN — INSULIN LISPRO 10 UNITS: 100 INJECTION, SOLUTION INTRAVENOUS; SUBCUTANEOUS at 16:51

## 2022-07-01 RX ADMIN — INSULIN LISPRO 5 UNITS: 100 INJECTION, SOLUTION INTRAVENOUS; SUBCUTANEOUS at 22:13

## 2022-07-01 RX ADMIN — INSULIN LISPRO 2 UNITS: 100 INJECTION, SOLUTION INTRAVENOUS; SUBCUTANEOUS at 09:03

## 2022-07-01 RX ADMIN — IPRATROPIUM BROMIDE AND ALBUTEROL SULFATE 1 AMPULE: 2.5; .5 SOLUTION RESPIRATORY (INHALATION) at 15:14

## 2022-07-01 RX ADMIN — SODIUM CHLORIDE: 9 INJECTION, SOLUTION INTRAVENOUS at 09:02

## 2022-07-01 RX ADMIN — METFORMIN HYDROCHLORIDE 500 MG: 500 TABLET ORAL at 08:55

## 2022-07-01 RX ADMIN — AMLODIPINE BESYLATE 5 MG: 5 TABLET ORAL at 08:55

## 2022-07-01 RX ADMIN — IPRATROPIUM BROMIDE AND ALBUTEROL SULFATE 1 AMPULE: 2.5; .5 SOLUTION RESPIRATORY (INHALATION) at 07:23

## 2022-07-01 RX ADMIN — Medication 10 ML: at 22:03

## 2022-07-01 RX ADMIN — FUROSEMIDE 20 MG: 20 TABLET ORAL at 08:55

## 2022-07-01 RX ADMIN — ATORVASTATIN CALCIUM 20 MG: 10 TABLET, FILM COATED ORAL at 08:55

## 2022-07-01 RX ADMIN — PREGABALIN 300 MG: 100 CAPSULE ORAL at 08:55

## 2022-07-01 RX ADMIN — CEFAZOLIN SODIUM 1000 MG: 1 INJECTION, POWDER, FOR SOLUTION INTRAMUSCULAR; INTRAVENOUS at 09:03

## 2022-07-01 RX ADMIN — TIZANIDINE 4 MG: 4 TABLET ORAL at 22:03

## 2022-07-01 RX ADMIN — PREGABALIN 300 MG: 100 CAPSULE ORAL at 22:03

## 2022-07-01 RX ADMIN — HYDROCODONE BITARTRATE AND ACETAMINOPHEN 1 TABLET: 5; 325 TABLET ORAL at 16:42

## 2022-07-01 RX ADMIN — GUAIFENESIN 600 MG: 600 TABLET, EXTENDED RELEASE ORAL at 08:55

## 2022-07-01 RX ADMIN — INSULIN LISPRO 2 UNITS: 100 INJECTION, SOLUTION INTRAVENOUS; SUBCUTANEOUS at 11:44

## 2022-07-01 RX ADMIN — ROPINIROLE HYDROCHLORIDE 1 MG: 1 TABLET, FILM COATED ORAL at 22:03

## 2022-07-01 RX ADMIN — TIZANIDINE 4 MG: 4 TABLET ORAL at 09:07

## 2022-07-01 RX ADMIN — IPRATROPIUM BROMIDE AND ALBUTEROL SULFATE 1 AMPULE: 2.5; .5 SOLUTION RESPIRATORY (INHALATION) at 10:55

## 2022-07-01 RX ADMIN — VANCOMYCIN HYDROCHLORIDE 1000 MG: 1 INJECTION, POWDER, LYOPHILIZED, FOR SOLUTION INTRAVENOUS at 11:42

## 2022-07-01 RX ADMIN — SENNOSIDES AND DOCUSATE SODIUM 2 TABLET: 50; 8.6 TABLET ORAL at 22:03

## 2022-07-01 ASSESSMENT — PAIN SCALES - GENERAL
PAINLEVEL_OUTOF10: 7
PAINLEVEL_OUTOF10: 4
PAINLEVEL_OUTOF10: 7
PAINLEVEL_OUTOF10: 7

## 2022-07-01 ASSESSMENT — PAIN DESCRIPTION - LOCATION
LOCATION: FOOT
LOCATION: LEG
LOCATION: FOOT

## 2022-07-01 ASSESSMENT — PAIN DESCRIPTION - ORIENTATION
ORIENTATION: RIGHT
ORIENTATION: LEFT;RIGHT
ORIENTATION: RIGHT

## 2022-07-01 ASSESSMENT — PAIN DESCRIPTION - DESCRIPTORS
DESCRIPTORS: DISCOMFORT
DESCRIPTORS: DISCOMFORT

## 2022-07-01 ASSESSMENT — PAIN - FUNCTIONAL ASSESSMENT
PAIN_FUNCTIONAL_ASSESSMENT: PREVENTS OR INTERFERES SOME ACTIVE ACTIVITIES AND ADLS
PAIN_FUNCTIONAL_ASSESSMENT: PREVENTS OR INTERFERES SOME ACTIVE ACTIVITIES AND ADLS

## 2022-07-01 NOTE — PROGRESS NOTES
Progress Note    Admit Date:  6/29/2022     73 y/o female with pmhx COPD, HTN, depression, anxiety   -presented for left leg swelling and redness       Subjective:    Ms. Duyen Murray today is seen sitting up in bed., still with leg pain and now starting right leg cellulitis as well     Objective:   Patient Vitals for the past 4 hrs:   BP Temp Temp src Pulse Resp SpO2   07/01/22 0724 -- -- -- -- 18 95 %   07/01/22 0404 (!) 141/64 98.1 °F (36.7 °C) Oral 75 18 94 %            Intake/Output Summary (Last 24 hours) at 7/1/2022 8018  Last data filed at 7/1/2022 0404  Gross per 24 hour   Intake --   Output 850 ml   Net -850 ml       Physical Exam:    Gen: elderly female chronically ill appearing  No distress. Alert. +appears older than stated age   Eyes: PERRL. No sclera icterus. No conjunctival injection. Neck: No JVD. Trachea midline. Resp: No accessory muscle use. No crackles. Occasional wheezing and rhonchi in bases of  lungs bilaterally   CV: Regular rate. Regular rhythm. No murmur. No rub. Peripheral Pulses: +2 palpable, equal bilaterally   GI: Non-tender. Non-distended. Normal bowel sounds. Skin: Warm and dry. No nodule on exposed extremities. bilateral lower extremity edema, bilateral erythema with lichenification, cellulitis   on the left leg, tenderness to palpation   New cellulitis on right leg also noted  M/S: No cyanosis. No joint deformity. No clubbing. Neuro: Awake. Grossly nonfocal    Psych: Oriented x 3. No anxiety or agitation.          Medications:  guaiFENesin, 600 mg, BID  ipratropium-albuterol, 1 ampule, 4x daily  atorvastatin, 20 mg, Daily  furosemide, 20 mg, Daily  polyethylene glycol, 17 g, Daily  pregabalin, 300 mg, BID  rOPINIRole, 2 mg, Nightly  sertraline, 100 mg, BID  sodium chloride flush, 5-40 mL, 2 times per day  enoxaparin, 40 mg, Daily  ceFAZolin, 1,000 mg, Q8H  metFORMIN, 500 mg, Daily with breakfast  sennosides-docusate sodium, 2 tablet, Nightly  linaclotide, 290 mcg, QAM AC  amLODIPine, 5 mg, Daily  insulin lispro, 0-12 Units, TID WC  insulin lispro, 0-6 Units, Nightly      PRN Medications:  HYDROcodone 5 mg - acetaminophen, 1 tablet, Q6H PRN  glucose, 4 tablet, PRN  dextrose bolus, 125 mL, PRN   Or  dextrose bolus, 250 mL, PRN  glucagon (rDNA), 1 mg, PRN  dextrose, 100 mL/hr, PRN  sodium chloride flush, 5-40 mL, PRN  sodium chloride, , PRN  ondansetron, 4 mg, Q8H PRN   Or  ondansetron, 4 mg, Q6H PRN  polyethylene glycol, 17 g, Daily PRN  acetaminophen, 650 mg, Q6H PRN   Or  acetaminophen, 650 mg, Q6H PRN  tiZANidine, 4 mg, Q6H PRN  ipratropium-albuterol, 1 ampule, Q4H PRN          Data:  CBC:   Recent Labs     06/29/22 1951 06/30/22  0510 07/01/22  0524   WBC 11.4* 9.1 9.1   HGB 12.3 11.2* 11.9*   HCT 37.6 34.1* 35.4*   MCV 93.7 94.0 93.4    125* 136     BMP:   Recent Labs     06/29/22 1951 06/30/22  0510 07/01/22  0524    140 142   K 4.4 4.7 4.4   CL 97* 101 99   CO2 32 31 32   BUN 14 12 14   CREATININE 0.7 0.7 0.7     LIVER PROFILE:   Recent Labs     06/29/22 1951   AST 10*   ALT 13   BILITOT <0.2   ALKPHOS 139*       CULTURES  Blood cultures pending   Sputum cultures pending  COVID not detected      RADIOLOGY  XR TIBIA FIBULA LEFT (2 VIEWS)   Final Result   Diffuse soft tissue swelling seen within the subcutaneous tissues. No acute   bony abnormality. XR CHEST PORTABLE   Final Result   Right basilar opacity could represent atelectasis or infection               Assessment/Plan:      #bilateral  lower extremity cellulitis left > right   - recurrent issues noted with venous stasis   -xray negative   -mild leukocytosis improved with abx  -blood cultures remain neg.  No fevers  -prn norco for pain   -IV ancef , add vanc today     #Right basilar opacity on CXR   #COPD with acute exacerbation   #Chronic Hypoxic respiratory failure  -on 2 L O2 baseline   -continue inhalers   -duonebs  -on abx as above  -sputum culture pending  -PO prednisone with quick taper  -added mucinex and acapella     #DM, uncontrolled   -continue metformin   -SSI     #HTN   -continue norvasc - might need to change given peripheral edema  Will change to losartan  -continue to monitor BP     #possible liver cirrhosis  - ECHO with normal EF and normal Diastolic function  -need workup for cirrhosis- likely fatty liver disease  -continue lasix daily   -Pt advised to see PCP    #Depression   #Anxiety   -on zoloft     #Tobacco abuse   -nicotine patch   -recommend cessation     #HLD   -continue statin     #chronic pain syndrome   #RLS   -on requip and lyrica     #IBS   -continue home regimen    DVT Prophylaxis: lovenox   Diet: ADULT DIET;  Regular; 4 carb choices (60 gm/meal)  Code Status: Full Code    Delmar Paredes MD  07/01/22  7:42 AM     Out of bed

## 2022-07-01 NOTE — PROGRESS NOTES
RT Inhaler-Nebulizer Bronchodilator Protocol Note    There is a bronchodilator order in the chart from a provider indicating to follow the RT Bronchodilator Protocol and there is an Initiate RT Inhaler-Nebulizer Bronchodilator Protocol order as well (see protocol at bottom of note). CXR Findings:  XR CHEST PORTABLE    Result Date: 6/29/2022  Right basilar opacity could represent atelectasis or infection       The findings from the last RT Protocol Assessment were as follows:   History Pulmonary Disease: Chronic pulmonary disease  Respiratory Pattern: Regular pattern and RR 12-20 bpm  Breath Sounds: Inspiratory and expiratory or bilateral wheezing and/or rhonchi  Cough: Strong, spontaneous, non-productive  Indication for Bronchodilator Therapy: On home bronchodilators  Bronchodilator Assessment Score: 8    Aerosolized bronchodilator medication orders have been revised according to the RT Inhaler-Nebulizer Bronchodilator Protocol below. Respiratory Therapist to perform RT Therapy Protocol Assessment initially then follow the protocol. Repeat RT Therapy Protocol Assessment PRN for score 0-3 or on second treatment, BID, and PRN for scores above 3. No Indications - adjust the frequency to every 6 hours PRN wheezing or bronchospasm, if no treatments needed after 48 hours then discontinue using Per Protocol order mode. If indication present, adjust the RT bronchodilator orders based on the Bronchodilator Assessment Score as indicated below. Use Inhaler orders unless patient has one or more of the following: on home nebulizer, not able to hold breath for 10 seconds, is not alert and oriented, cannot activate and use MDI correctly, or respiratory rate 25 breaths per minute or more, then use the equivalent nebulizer order(s) with same Frequency and PRN reasons based on the score. If a patient is on this medication at home then do not decrease Frequency below that used at home.     0-3 - enter or revise RT bronchodilator order(s) to equivalent RT Bronchodilator order with Frequency of every 4 hours PRN for wheezing or increased work of breathing using Per Protocol order mode. 4-6 - enter or revise RT Bronchodilator order(s) to two equivalent RT bronchodilator orders with one order with BID Frequency and one order with Frequency of every 4 hours PRN wheezing or increased work of breathing using Per Protocol order mode. 7-10 - enter or revise RT Bronchodilator order(s) to two equivalent RT bronchodilator orders with one order with TID Frequency and one order with Frequency of every 4 hours PRN wheezing or increased work of breathing using Per Protocol order mode. 11-13 - enter or revise RT Bronchodilator order(s) to one equivalent RT bronchodilator order with QID Frequency and an Albuterol order with Frequency of every 4 hours PRN wheezing or increased work of breathing using Per Protocol order mode. Greater than 13 - enter or revise RT Bronchodilator order(s) to one equivalent RT bronchodilator order with every 4 hours Frequency and an Albuterol order with Frequency of every 2 hours PRN wheezing or increased work of breathing using Per Protocol order mode. RT to enter RT Home Evaluation for COPD & MDI Assessment order using Per Protocol order mode.     Electronically signed by Juana Lerner on 7/1/2022 at 7:27 AM

## 2022-07-01 NOTE — PROGRESS NOTES
AM assessment complete. Gave am meds due see mar. A/O x 4. Pain level 7/10 gave xanaflex. Ice water given. Bed check. Bed locked/lowest position. Call light/tray table in reach.

## 2022-07-01 NOTE — PLAN OF CARE
Problem: Safety - Adult  Goal: Free from fall injury  7/1/2022 0911 by Anjum Rueda RN  Outcome: Progressing  6/30/2022 2314 by Lesia Arthur RN  Outcome: Progressing     Problem: Discharge Planning  Goal: Discharge to home or other facility with appropriate resources  7/1/2022 0911 by Anjum Rueda RN  Outcome: Hurtis Sensor  6/30/2022 2314 by Lesia Arthur RN  Outcome: Progressing     Problem: Pain  Goal: Verbalizes/displays adequate comfort level or baseline comfort level  7/1/2022 0911 by Anjum Rueda RN  Outcome: Progressing  6/30/2022 2314 by Lesia Arthur RN  Outcome: Progressing

## 2022-07-01 NOTE — PROGRESS NOTES
Bedside report given and pt care transferred to [facility]. Pt denies needs at this time. Call light within reach.

## 2022-07-01 NOTE — FLOWSHEET NOTE
06/30/22 1915   Vital Signs   Temp 97.9 °F (36.6 °C)   Temp Source Oral   Heart Rate 87   Heart Rate Source Monitor   Resp 18   BP (!) 148/62   BP Location Right upper arm   BP Method Automatic   MAP (Calculated) 90.67   Patient Position Semi fowlers   Level of Consciousness Alert (0)   MEWS Score 1   Oxygen Therapy   SpO2 95 %   O2 Device Nasal cannula   O2 Flow Rate (L/min) 2 L/min   Pt A/O assessment completed. BLE cellulitis marked on left leg. Meds given per MAR. Pt denies any needs.  Call light within reach and bed alarm on

## 2022-07-01 NOTE — PROGRESS NOTES
Bedside report given to [facility]  pt in stable condition no needs at this time.  Call light within reach Statement Selected

## 2022-07-01 NOTE — ACP (ADVANCE CARE PLANNING)
Advance Care Planning     General Advance Care Planning (ACP) Conversation    Date of Conversation: 6/29/2022  Conducted with: Patient with Decision Making Capacity    Healthcare Decision Maker:    Primary Decision Maker: Randy Cox - [phone]  Click here to complete Healthcare Decision Makers including selection of the Healthcare Decision Maker Relationship (ie \"Primary\"). Today we documented Decision Maker(s) consistent with Legal Next of Kin hierarchy.  She stated her daughter is POA; requested copies and she stated understanding    Content/Action Overview:    Reviewed DNR/DNI and patient elects Full Code (Attempt Resuscitation)    Length of Voluntary ACP Conversation in minutes:  <16 minutes (Non-Billable)    Caro Ramires MSW, ARMANDOS

## 2022-07-01 NOTE — CONSULTS
Pharmacy Note  Vancomycin Consult    Michele Rock is a 74 y.o. female started on Vancomycin for SSTI; consult received from Dr. Vlad Shoemaker to manage therapy. Also receiving the following antibiotics: Ancef. Allergies:  Asa buff (mag [buffered aspirin] and Cymbalta [duloxetine hcl]       Recent Labs     06/30/22  0510 07/01/22  0524   CREATININE 0.7 0.7       Recent Labs     06/30/22  0510 07/01/22  0524   WBC 9.1 9.1       Estimated Creatinine Clearance: 81 mL/min (based on SCr of 0.7 mg/dL). Intake/Output Summary (Last 24 hours) at 7/1/2022 1005  Last data filed at 7/1/2022 0404  Gross per 24 hour   Intake --   Output 850 ml   Net -850 ml       Wt Readings from Last 1 Encounters:   06/30/22 180 lb 1.6 oz (81.7 kg)         Body mass index is 31.9 kg/m². Culture Date      Source                       Results      Loading dose (critically ill or in ICU, require dialysis or renal replacement therapy): Vancomycin 25 mg/kg IVPB x 1 (maximum 2500 mg). Maintenance dose: 15 mg/kg (maximum: 2000 mg/dose and 4500 mg/day) starting at the next dosing interval determined by renal function  Pulse dose: fluctuating renal function, BRITNI, ESRD   Goal Vancomycin trough: 10-15 mcg/mL or 15-20 mcg/mL   Goal Vancomycin AUC: 400-600     Assessment/Plan:  Will start Vancomycin  1000 mg IV every 12 hours. Calculated . Vancomycin trough ordered for 7/2 @ 2230. Timing of trough level will be determined based on culture results, renal function, and clinical response. Thank you for the consult.

## 2022-07-01 NOTE — PROGRESS NOTES
RT Inhaler-Nebulizer Bronchodilator Protocol Note    There is a bronchodilator order in the chart from a provider indicating to follow the RT Bronchodilator Protocol and there is an Initiate RT Inhaler-Nebulizer Bronchodilator Protocol order as well (see protocol at bottom of note). CXR Findings:  XR CHEST PORTABLE    Result Date: 6/29/2022  Right basilar opacity could represent atelectasis or infection       The findings from the last RT Protocol Assessment were as follows:   History Pulmonary Disease: Chronic pulmonary disease  Respiratory Pattern: Regular pattern and RR 12-20 bpm  Breath Sounds: Inspiratory and expiratory or bilateral wheezing and/or rhonchi  Cough: Strong, spontaneous, non-productive  Indication for Bronchodilator Therapy: On home bronchodilators  Bronchodilator Assessment Score: 8    Aerosolized bronchodilator medication orders have been revised according to the RT Inhaler-Nebulizer Bronchodilator Protocol below. Respiratory Therapist to perform RT Therapy Protocol Assessment initially then follow the protocol. Repeat RT Therapy Protocol Assessment PRN for score 0-3 or on second treatment, BID, and PRN for scores above 3. No Indications - adjust the frequency to every 6 hours PRN wheezing or bronchospasm, if no treatments needed after 48 hours then discontinue using Per Protocol order mode. If indication present, adjust the RT bronchodilator orders based on the Bronchodilator Assessment Score as indicated below. Use Inhaler orders unless patient has one or more of the following: on home nebulizer, not able to hold breath for 10 seconds, is not alert and oriented, cannot activate and use MDI correctly, or respiratory rate 25 breaths per minute or more, then use the equivalent nebulizer order(s) with same Frequency and PRN reasons based on the score. If a patient is on this medication at home then do not decrease Frequency below that used at home.     0-3 - enter or revise RT

## 2022-07-01 NOTE — CARE COORDINATION
Case Management Assessment  Initial Evaluation      Patient Name: Tien Blanco  YOB: 1955  Diagnosis: Cellulitis [L03.90]  Cellulitis and abscess of leg [L03.119, L02.419]  Date / Time: 6/29/2022  5:02 PM    Admission status/Date:06/29/2022 Inpatient  Chart Reviewed: Yes      Patient Interviewed: Yes   Family Interviewed:  No      Hospitalization in the last 30 days:  No    Health Care Decision Maker :   Primary Decision Maker:  Helene Jones - Child - [phone]    (CM - must 1st enter selection under Navigator - emergency contact- [place] Relationship and pick relationship)   Who do you trust or have selected to make healthcare decisions for you    Met with: pt at bedside    Current PCP: Christ Bean required for SNF : Y          3 night stay required -  N    ADLS  Support Systems/Care Needs: Children  Transportation: family    Meal Preparation: family    Housing  Living Arrangements: pt stated she lives at home with her son and daughter  Steps: 4  Intent for return to present living arrangements: Yes  Identified Issues: [address] [place] [address] with [address] : No Agency:(Services)  Type of Home Care Services: None  Passport/Waiver : No  :                      Phone Number:    Passport/Waiver Services: n/a          Durable Medical Equiptment   DME Provider: Johny Montero [phone] confirmed pt has o2 with them at 2 liters continuous and stated only needs new orders if the liter flow changes   Equipment:   Walker__x_Cane___RTS___ BSC___Shower Chair___Hospital Bed___W/C____Other________  02 at _2___Liter(s)---wears(frequency)___cont___ HHN _x__ CPAP___ BiPap___   N/A____    Home O2 Use :  Yes    If No for home O2---if presently on O2 during hospitalization:  Yes  if yes CM to follow for potential DC O2 need  Informed of need for care provider to bring portable home O2 tank on day of discharge for nursing to connect prior to leaving:   Yes  Verbalized agreement/Understanding:   Yes    Community Service Affiliation  Dialysis:  No    · Agency:  · Location:  · Dialysis Schedule:  · Phone:   · Fax: Other Community Services:n/a    DISCHARGE PLAN: Explained Case Management role/services. Chart review completed. Met with pt at bedside. Pt stated she is independent at home and will return on discharge. She stated she is unsure if she will need skilled [facility] on discharge. She denied needs for CM. CM will follow. Please notify CM if needs or concerns arise.      Conor Gonsales MSW, LUIS E-S

## 2022-07-01 NOTE — PLAN OF CARE
Problem: Safety - Adult  Goal: Free from fall injury  Outcome: Progressing     Problem: Discharge Planning  Goal: Discharge to home or other facility with appropriate resources  Outcome: Progressing     Problem: Pain  Goal: Verbalizes/displays adequate comfort level or baseline comfort level  Outcome: Progressing

## 2022-07-02 LAB
ANION GAP SERPL CALCULATED.3IONS-SCNC: 11 MMOL/L (ref 3–16)
BASOPHILS ABSOLUTE: 0 K/UL (ref 0–0.2)
BASOPHILS RELATIVE PERCENT: 0.4 %
BUN BLDV-MCNC: 15 MG/DL (ref 7–20)
CALCIUM SERPL-MCNC: 9.7 MG/DL (ref 8.3–10.6)
CHLORIDE BLD-SCNC: 98 MMOL/L (ref 99–110)
CO2: 32 MMOL/L (ref 21–32)
CREAT SERPL-MCNC: 0.7 MG/DL (ref 0.6–1.2)
EOSINOPHILS ABSOLUTE: 0 K/UL (ref 0–0.6)
EOSINOPHILS RELATIVE PERCENT: 0 %
GFR AFRICAN AMERICAN: >60
GFR NON-AFRICAN AMERICAN: >60
GLUCOSE BLD-MCNC: 122 MG/DL (ref 70–99)
GLUCOSE BLD-MCNC: 156 MG/DL (ref 70–99)
GLUCOSE BLD-MCNC: 162 MG/DL (ref 70–99)
GLUCOSE BLD-MCNC: 225 MG/DL (ref 70–99)
GLUCOSE BLD-MCNC: 349 MG/DL (ref 70–99)
HCT VFR BLD CALC: 35 % (ref 36–48)
HEMOGLOBIN: 11.4 G/DL (ref 12–16)
LYMPHOCYTES ABSOLUTE: 1 K/UL (ref 1–5.1)
LYMPHOCYTES RELATIVE PERCENT: 13.1 %
MCH RBC QN AUTO: 30.5 PG (ref 26–34)
MCHC RBC AUTO-ENTMCNC: 32.6 G/DL (ref 31–36)
MCV RBC AUTO: 93.4 FL (ref 80–100)
MONOCYTES ABSOLUTE: 1 K/UL (ref 0–1.3)
MONOCYTES RELATIVE PERCENT: 12.6 %
NEUTROPHILS ABSOLUTE: 5.9 K/UL (ref 1.7–7.7)
NEUTROPHILS RELATIVE PERCENT: 73.9 %
PDW BLD-RTO: 16.4 % (ref 12.4–15.4)
PERFORMED ON: ABNORMAL
PLATELET # BLD: 130 K/UL (ref 135–450)
PMV BLD AUTO: 8.6 FL (ref 5–10.5)
POTASSIUM REFLEX MAGNESIUM: 4.2 MMOL/L (ref 3.5–5.1)
RBC # BLD: 3.75 M/UL (ref 4–5.2)
SODIUM BLD-SCNC: 141 MMOL/L (ref 136–145)
VANCOMYCIN TROUGH: 19.3 UG/ML (ref 10–20)
WBC # BLD: 7.9 K/UL (ref 4–11)

## 2022-07-02 PROCEDURE — 94761 N-INVAS EAR/PLS OXIMETRY MLT: CPT

## 2022-07-02 PROCEDURE — 80202 ASSAY OF VANCOMYCIN: CPT

## 2022-07-02 PROCEDURE — 6360000002 HC RX W HCPCS: Performed by: INTERNAL MEDICINE

## 2022-07-02 PROCEDURE — 36415 COLL VENOUS BLD VENIPUNCTURE: CPT

## 2022-07-02 PROCEDURE — 6370000000 HC RX 637 (ALT 250 FOR IP): Performed by: INTERNAL MEDICINE

## 2022-07-02 PROCEDURE — 6370000000 HC RX 637 (ALT 250 FOR IP)

## 2022-07-02 PROCEDURE — 94640 AIRWAY INHALATION TREATMENT: CPT

## 2022-07-02 PROCEDURE — 80048 BASIC METABOLIC PNL TOTAL CA: CPT

## 2022-07-02 PROCEDURE — 85025 COMPLETE CBC W/AUTO DIFF WBC: CPT

## 2022-07-02 PROCEDURE — 1200000000 HC SEMI PRIVATE

## 2022-07-02 PROCEDURE — 2580000003 HC RX 258: Performed by: INTERNAL MEDICINE

## 2022-07-02 PROCEDURE — 99232 SBSQ HOSP IP/OBS MODERATE 35: CPT | Performed by: INTERNAL MEDICINE

## 2022-07-02 PROCEDURE — 2700000000 HC OXYGEN THERAPY PER DAY

## 2022-07-02 PROCEDURE — 94669 MECHANICAL CHEST WALL OSCILL: CPT

## 2022-07-02 RX ORDER — FUROSEMIDE 40 MG/1
40 TABLET ORAL DAILY
Status: DISCONTINUED | OUTPATIENT
Start: 2022-07-02 | End: 2022-07-03 | Stop reason: HOSPADM

## 2022-07-02 RX ADMIN — TIZANIDINE 4 MG: 4 TABLET ORAL at 21:18

## 2022-07-02 RX ADMIN — SERTRALINE HYDROCHLORIDE 100 MG: 50 TABLET ORAL at 08:30

## 2022-07-02 RX ADMIN — CEFAZOLIN 2000 MG: 2 INJECTION, POWDER, FOR SOLUTION INTRAMUSCULAR; INTRAVENOUS at 17:26

## 2022-07-02 RX ADMIN — LOSARTAN POTASSIUM 50 MG: 25 TABLET, FILM COATED ORAL at 08:30

## 2022-07-02 RX ADMIN — INSULIN LISPRO 2 UNITS: 100 INJECTION, SOLUTION INTRAVENOUS; SUBCUTANEOUS at 21:24

## 2022-07-02 RX ADMIN — VANCOMYCIN HYDROCHLORIDE 1000 MG: 1 INJECTION, POWDER, LYOPHILIZED, FOR SOLUTION INTRAVENOUS at 23:12

## 2022-07-02 RX ADMIN — TIZANIDINE 4 MG: 4 TABLET ORAL at 08:30

## 2022-07-02 RX ADMIN — IPRATROPIUM BROMIDE AND ALBUTEROL SULFATE 1 AMPULE: 2.5; .5 SOLUTION RESPIRATORY (INHALATION) at 11:20

## 2022-07-02 RX ADMIN — LINACLOTIDE 290 MCG: 145 CAPSULE, GELATIN COATED ORAL at 08:32

## 2022-07-02 RX ADMIN — IPRATROPIUM BROMIDE AND ALBUTEROL SULFATE 1 AMPULE: 2.5; .5 SOLUTION RESPIRATORY (INHALATION) at 07:44

## 2022-07-02 RX ADMIN — IPRATROPIUM BROMIDE AND ALBUTEROL SULFATE 1 AMPULE: 2.5; .5 SOLUTION RESPIRATORY (INHALATION) at 15:44

## 2022-07-02 RX ADMIN — Medication 10 ML: at 21:19

## 2022-07-02 RX ADMIN — Medication 10 ML: at 08:32

## 2022-07-02 RX ADMIN — SODIUM CHLORIDE: 9 INJECTION, SOLUTION INTRAVENOUS at 17:23

## 2022-07-02 RX ADMIN — ATORVASTATIN CALCIUM 20 MG: 10 TABLET, FILM COATED ORAL at 08:30

## 2022-07-02 RX ADMIN — SENNOSIDES AND DOCUSATE SODIUM 2 TABLET: 50; 8.6 TABLET ORAL at 21:18

## 2022-07-02 RX ADMIN — INSULIN LISPRO 2 UNITS: 100 INJECTION, SOLUTION INTRAVENOUS; SUBCUTANEOUS at 08:40

## 2022-07-02 RX ADMIN — VANCOMYCIN HYDROCHLORIDE 1000 MG: 1 INJECTION, POWDER, LYOPHILIZED, FOR SOLUTION INTRAVENOUS at 12:09

## 2022-07-02 RX ADMIN — SODIUM CHLORIDE 25 ML/HR: 9 INJECTION, SOLUTION INTRAVENOUS at 08:38

## 2022-07-02 RX ADMIN — SERTRALINE HYDROCHLORIDE 100 MG: 50 TABLET ORAL at 21:18

## 2022-07-02 RX ADMIN — ROPINIROLE HYDROCHLORIDE 1 MG: 1 TABLET, FILM COATED ORAL at 21:18

## 2022-07-02 RX ADMIN — CEFAZOLIN 2000 MG: 2 INJECTION, POWDER, FOR SOLUTION INTRAMUSCULAR; INTRAVENOUS at 08:39

## 2022-07-02 RX ADMIN — CEFAZOLIN 2000 MG: 2 INJECTION, POWDER, FOR SOLUTION INTRAMUSCULAR; INTRAVENOUS at 00:45

## 2022-07-02 RX ADMIN — FUROSEMIDE 40 MG: 40 TABLET ORAL at 08:31

## 2022-07-02 RX ADMIN — PREGABALIN 300 MG: 100 CAPSULE ORAL at 08:30

## 2022-07-02 RX ADMIN — INSULIN LISPRO 2 UNITS: 100 INJECTION, SOLUTION INTRAVENOUS; SUBCUTANEOUS at 12:10

## 2022-07-02 RX ADMIN — GUAIFENESIN 600 MG: 600 TABLET, EXTENDED RELEASE ORAL at 21:18

## 2022-07-02 RX ADMIN — IPRATROPIUM BROMIDE AND ALBUTEROL SULFATE 1 AMPULE: 2.5; .5 SOLUTION RESPIRATORY (INHALATION) at 19:03

## 2022-07-02 RX ADMIN — PREGABALIN 300 MG: 100 CAPSULE ORAL at 21:18

## 2022-07-02 RX ADMIN — HYDROCODONE BITARTRATE AND ACETAMINOPHEN 1 TABLET: 5; 325 TABLET ORAL at 08:30

## 2022-07-02 RX ADMIN — ENOXAPARIN SODIUM 40 MG: 100 INJECTION SUBCUTANEOUS at 08:30

## 2022-07-02 RX ADMIN — INSULIN LISPRO 8 UNITS: 100 INJECTION, SOLUTION INTRAVENOUS; SUBCUTANEOUS at 17:29

## 2022-07-02 RX ADMIN — GUAIFENESIN 600 MG: 600 TABLET, EXTENDED RELEASE ORAL at 08:30

## 2022-07-02 RX ADMIN — METFORMIN HYDROCHLORIDE 500 MG: 500 TABLET ORAL at 08:31

## 2022-07-02 RX ADMIN — HYDROCODONE BITARTRATE AND ACETAMINOPHEN 1 TABLET: 5; 325 TABLET ORAL at 18:29

## 2022-07-02 ASSESSMENT — PAIN DESCRIPTION - DESCRIPTORS: DESCRIPTORS: ACHING

## 2022-07-02 ASSESSMENT — PAIN DESCRIPTION - LOCATION: LOCATION: FOOT

## 2022-07-02 ASSESSMENT — PAIN DESCRIPTION - ORIENTATION: ORIENTATION: RIGHT

## 2022-07-02 ASSESSMENT — PAIN - FUNCTIONAL ASSESSMENT: PAIN_FUNCTIONAL_ASSESSMENT: PREVENTS OR INTERFERES SOME ACTIVE ACTIVITIES AND ADLS

## 2022-07-02 ASSESSMENT — PAIN SCALES - GENERAL
PAINLEVEL_OUTOF10: 9
PAINLEVEL_OUTOF10: 7

## 2022-07-02 NOTE — FLOWSHEET NOTE
07/02/22 0815   Vital Signs   Temp 97.5 °F (36.4 °C)   Temp Source Oral   Heart Rate 86   Heart Rate Source Monitor   Resp 18   BP (!) 141/64   BP Location Right upper arm   MAP (Calculated) 89.67   Patient Position Semi fowlers   Level of Consciousness Alert (0)   MEWS Score 1   Pain Assessment   Pain Assessment 0-10   Pain Level 9   Patient's Stated Pain Goal 4   Opioid-Induced Sedation   POSS Score 1   Oxygen Therapy   SpO2 92 %   O2 Device Nasal cannula   O2 Flow Rate (L/min) 2 L/min   Assessment complete and morning medications administered

## 2022-07-02 NOTE — PROGRESS NOTES
increased work of breathing using Per Protocol order mode. 4-6 - enter or revise RT Bronchodilator order(s) to two equivalent RT bronchodilator orders with one order with BID Frequency and one order with Frequency of every 4 hours PRN wheezing or increased work of breathing using Per Protocol order mode. 7-10 - enter or revise RT Bronchodilator order(s) to two equivalent RT bronchodilator orders with one order with TID Frequency and one order with Frequency of every 4 hours PRN wheezing or increased work of breathing using Per Protocol order mode. 11-13 - enter or revise RT Bronchodilator order(s) to one equivalent RT bronchodilator order with QID Frequency and an Albuterol order with Frequency of every 4 hours PRN wheezing or increased work of breathing using Per Protocol order mode. Greater than 13 - enter or revise RT Bronchodilator order(s) to one equivalent RT bronchodilator order with every 4 hours Frequency and an Albuterol order with Frequency of every 2 hours PRN wheezing or increased work of breathing using Per Protocol order mode.      Electronically signed by Willa Schwartz RCP on 7/2/2022 at 7:08 PM

## 2022-07-02 NOTE — PROGRESS NOTES
Physician Progress Note      PATIENT:               Osmany Leung  CSN #:                  [id number]  :                       1955  ADMIT DATE:       2022 5:02 PM  [address] DATE:  RESPONDING  PROVIDER #:        Odette Porras MD          QUERY TEXT:    Pt admitted with bilateral lower extremity cellulitis. Pt noted to have DM 2. If possible, please document in progress notes and discharge summary the   relationship, if any, between cellulitis and DM. The medical record reflects the following:  Risk Factors: bilateral lower extremity cellulitis, DM 2  Clinical Indicators: H&P notes- Skin:   Endorses redness in bilateral lower   extremity. A1C 7.4, Glucose 165.7  Treatment: IV ancef , vancomycicn, prn norco, monitor labs    Thank You Marilee Juarez RN, [email or web address]  Options provided:  -- Bilateral lower extremity cellulitis associated with Diabetes  -- Bilateral lower extremity cellulitis unrelated to Diabetes  -- Other - I will add my own diagnosis  -- Disagree - Not applicable / Not valid  -- Disagree - Clinically unable to determine / Unknown  -- Refer to Clinical Documentation Reviewer    PROVIDER RESPONSE TEXT:    Bilateral lower extremity cellulitis unrelated to Diabetes.     Query created by: Tyrell Tellez on 2022 11:39 AM      Electronically signed by:  Odette Porras MD 2022 7:35 AM

## 2022-07-02 NOTE — PLAN OF CARE
Problem: Safety - Adult  Goal: Free from fall injury  7/1/2022 2211 by Diona Shone, RN  Outcome: Progressing  Flowsheets (Taken 7/1/2022 0912 by Cely Foster RN)  Free From Fall Injury: Instruct family/caregiver on patient safety  7/1/2022 0911 by Cely Foster RN  Outcome: Progressing     Problem: Discharge Planning  Goal: Discharge to home or other facility with appropriate resources  7/1/2022 2211 by Diona Shone, RN  Outcome: Progressing  7/1/2022 0911 by Cely Foster RN  Outcome: Progressing     Problem: Pain  Goal: Verbalizes/displays adequate comfort level or baseline comfort level  7/1/2022 2211 by Diona Shone, RN  Outcome: Progressing  7/1/2022 0911 by Cely Foster RN  Outcome: Progressing

## 2022-07-02 NOTE — PROGRESS NOTES
Progress Note    Admit Date:  6/29/2022     73 y/o female with pmhx COPD, HTN, depression, anxiety   -presented for left leg swelling and redness       Subjective:    Ms. Warden John today is seen sitting up in bed., no fevers, leg edema and redness improved slightly per pt  No diarrhea  Pain controlled  Sugars high yesterday but improved now     Objective:   Patient Vitals for the past 4 hrs:   BP Temp Temp src Pulse Resp SpO2   07/02/22 0409 134/64 97.9 °F (36.6 °C) Oral 71 18 98 %            Intake/Output Summary (Last 24 hours) at 7/2/2022 0735  Last data filed at 7/2/2022 0409  Gross per 24 hour   Intake 360 ml   Output 2300 ml   Net -1940 ml       Physical Exam:    Gen: elderly female chronically ill appearing  No distress. Alert. +appears older than stated age   Eyes: PERRL. No sclera icterus. No conjunctival injection. Neck: No JVD. Trachea midline. Resp: No accessory muscle use. No crackles. Occasional wheezing and rhonchi in bases of  lungs bilaterally   CV: Regular rate. Regular rhythm. No murmur. No rub. Peripheral Pulses: +2 palpable, equal bilaterally   GI: Non-tender. Non-distended. Normal bowel sounds. Skin: Warm and dry. No nodule on exposed extremities. bilateral lower extremity edema, bilateral erythema with lichenification, cellulitis   on the left leg, tenderness to palpation   New cellulitis on right leg also noted  M/S: No cyanosis. No joint deformity. No clubbing. Neuro: Awake. Grossly nonfocal    Psych: Oriented x 3. No anxiety or agitation.          Medications:  losartan, 50 mg, Daily  rOPINIRole, 1 mg, Nightly  predniSONE, 20 mg, Daily  ceFAZolin, 2,000 mg, Q8H  vancomycin, 1,000 mg, Q12H  guaiFENesin, 600 mg, BID  ipratropium-albuterol, 1 ampule, 4x daily  atorvastatin, 20 mg, Daily  furosemide, 20 mg, Daily  polyethylene glycol, 17 g, Daily  pregabalin, 300 mg, BID  sertraline, 100 mg, BID  sodium chloride flush, 5-40 mL, 2 times per day  enoxaparin, 40 mg, Daily  metFORMIN, 500 mg, Daily with breakfast  sennosides-docusate sodium, 2 tablet, Nightly  linaclotide, 290 mcg, QAM AC  insulin lispro, 0-12 Units, TID WC  insulin lispro, 0-6 Units, Nightly      PRN Medications:  HYDROcodone 5 mg - acetaminophen, 1 tablet, Q6H PRN  glucose, 4 tablet, PRN  dextrose bolus, 125 mL, PRN   Or  dextrose bolus, 250 mL, PRN  glucagon (rDNA), 1 mg, PRN  dextrose, 100 mL/hr, PRN  sodium chloride flush, 5-40 mL, PRN  sodium chloride, , PRN  ondansetron, 4 mg, Q8H PRN   Or  ondansetron, 4 mg, Q6H PRN  polyethylene glycol, 17 g, Daily PRN  acetaminophen, 650 mg, Q6H PRN   Or  acetaminophen, 650 mg, Q6H PRN  tiZANidine, 4 mg, Q6H PRN  ipratropium-albuterol, 1 ampule, Q4H PRN          Data:  CBC:   Recent Labs     06/30/22  0510 07/01/22  0524 07/02/22  0519   WBC 9.1 9.1 7.9   HGB 11.2* 11.9* 11.4*   HCT 34.1* 35.4* 35.0*   MCV 94.0 93.4 93.4   * 136 130*     BMP:   Recent Labs     06/30/22  0510 07/01/22  0524 07/02/22  0519    142 141   K 4.7 4.4 4.2    99 98*   CO2 31 32 32   BUN 12 14 15   CREATININE 0.7 0.7 0.7     LIVER PROFILE:   Recent Labs     06/29/22 1951   AST 10*   ALT 13   BILITOT <0.2   ALKPHOS 139*       CULTURES  Blood cultures pending   Sputum cultures pending  COVID not detected      RADIOLOGY  XR TIBIA FIBULA LEFT (2 VIEWS)   Final Result   Diffuse soft tissue swelling seen within the subcutaneous tissues. No acute   bony abnormality. XR CHEST PORTABLE   Final Result   Right basilar opacity could represent atelectasis or infection               Assessment/Plan:      #bilateral  lower extremity cellulitis left > right   - recurrent issues noted with venous stasis   -xray negative   -mild leukocytosis improved with abx  -blood cultures remain neg.  No fevers  -prn norco for pain   -IV ancef and IV vanc  - continue diuresis improving    #Right basilar opacity on CXR   #COPD with acute exacerbation   #Chronic Hypoxic respiratory failure  -on 2 L O2 baseline   -continue inhalers   -duonebs  -on abx as above  -sputum culture pending  -PO prednisone given for 2 days with quick improvement - stop for high sugars  -added mucinex and acapella     #DM, uncontrolled   -continue metformin   - stop prednisone   -SSI     #HTN   -continue norvasc - might need to change given peripheral edema   changed to losartan  -continue to monitor BP     #possible liver cirrhosis  - ECHO with normal EF and normal Diastolic function  -need workup for cirrhosis- likely fatty liver disease  -continue lasix daily   -Pt advised to see PCP    #Depression   #Anxiety   -on zoloft     #Tobacco abuse   -nicotine patch   -recommend cessation     #HLD   -continue statin     #chronic pain syndrome   #RLS   -on requip and lyrica     #IBS   -continue home regimen    DVT Prophylaxis: lovenox   Diet: ADULT DIET;  Regular; 4 carb choices (60 gm/meal)  Code Status: Full Code    Selam Gil MD  07/02/22  7:35 AM     Out of bed

## 2022-07-03 VITALS
HEART RATE: 76 BPM | TEMPERATURE: 97 F | HEIGHT: 63 IN | WEIGHT: 180.1 LBS | DIASTOLIC BLOOD PRESSURE: 62 MMHG | SYSTOLIC BLOOD PRESSURE: 110 MMHG | BODY MASS INDEX: 31.91 KG/M2 | OXYGEN SATURATION: 95 % | RESPIRATION RATE: 18 BRPM

## 2022-07-03 LAB
ANION GAP SERPL CALCULATED.3IONS-SCNC: 8 MMOL/L (ref 3–16)
BUN BLDV-MCNC: 16 MG/DL (ref 7–20)
CALCIUM SERPL-MCNC: 9.5 MG/DL (ref 8.3–10.6)
CHLORIDE BLD-SCNC: 97 MMOL/L (ref 99–110)
CO2: 35 MMOL/L (ref 21–32)
CREAT SERPL-MCNC: 0.8 MG/DL (ref 0.6–1.2)
CULTURE, BLOOD 2: NORMAL
GFR AFRICAN AMERICAN: >60
GFR NON-AFRICAN AMERICAN: >60
GLUCOSE BLD-MCNC: 181 MG/DL (ref 70–99)
GLUCOSE BLD-MCNC: 189 MG/DL (ref 70–99)
PERFORMED ON: ABNORMAL
POTASSIUM REFLEX MAGNESIUM: 4.4 MMOL/L (ref 3.5–5.1)
SODIUM BLD-SCNC: 140 MMOL/L (ref 136–145)

## 2022-07-03 PROCEDURE — 6370000000 HC RX 637 (ALT 250 FOR IP): Performed by: INTERNAL MEDICINE

## 2022-07-03 PROCEDURE — 2700000000 HC OXYGEN THERAPY PER DAY

## 2022-07-03 PROCEDURE — 6370000000 HC RX 637 (ALT 250 FOR IP)

## 2022-07-03 PROCEDURE — 36415 COLL VENOUS BLD VENIPUNCTURE: CPT

## 2022-07-03 PROCEDURE — 6360000002 HC RX W HCPCS: Performed by: INTERNAL MEDICINE

## 2022-07-03 PROCEDURE — 2580000003 HC RX 258: Performed by: INTERNAL MEDICINE

## 2022-07-03 PROCEDURE — 94669 MECHANICAL CHEST WALL OSCILL: CPT

## 2022-07-03 PROCEDURE — 99239 HOSP IP/OBS DSCHRG MGMT >30: CPT | Performed by: INTERNAL MEDICINE

## 2022-07-03 PROCEDURE — 80048 BASIC METABOLIC PNL TOTAL CA: CPT

## 2022-07-03 PROCEDURE — 94640 AIRWAY INHALATION TREATMENT: CPT

## 2022-07-03 PROCEDURE — 94761 N-INVAS EAR/PLS OXIMETRY MLT: CPT

## 2022-07-03 RX ORDER — SULFAMETHOXAZOLE AND TRIMETHOPRIM 800; 160 MG/1; MG/1
1 TABLET ORAL 2 TIMES DAILY
Qty: 14 TABLET | Refills: 0 | Status: SHIPPED | OUTPATIENT
Start: 2022-07-03 | End: 2022-07-10

## 2022-07-03 RX ORDER — LOSARTAN POTASSIUM 50 MG/1
50 TABLET ORAL DAILY
Qty: 30 TABLET | Refills: 3 | Status: SHIPPED | OUTPATIENT
Start: 2022-07-04

## 2022-07-03 RX ORDER — SULFAMETHOXAZOLE AND TRIMETHOPRIM 800; 160 MG/1; MG/1
1 TABLET ORAL 2 TIMES DAILY
Qty: 14 TABLET | Refills: 0 | Status: SHIPPED | OUTPATIENT
Start: 2022-07-03 | End: 2022-07-03 | Stop reason: SDUPTHER

## 2022-07-03 RX ORDER — CEPHALEXIN 500 MG/1
500 CAPSULE ORAL 3 TIMES DAILY
Qty: 15 CAPSULE | Refills: 0 | Status: SHIPPED | OUTPATIENT
Start: 2022-07-03 | End: 2022-07-03 | Stop reason: SDUPTHER

## 2022-07-03 RX ORDER — CEPHALEXIN 500 MG/1
500 CAPSULE ORAL 3 TIMES DAILY
Qty: 15 CAPSULE | Refills: 0 | Status: SHIPPED | OUTPATIENT
Start: 2022-07-03 | End: 2022-07-08

## 2022-07-03 RX ADMIN — TIZANIDINE 4 MG: 4 TABLET ORAL at 08:40

## 2022-07-03 RX ADMIN — CEFAZOLIN 2000 MG: 2 INJECTION, POWDER, FOR SOLUTION INTRAMUSCULAR; INTRAVENOUS at 01:07

## 2022-07-03 RX ADMIN — INSULIN LISPRO 2 UNITS: 100 INJECTION, SOLUTION INTRAVENOUS; SUBCUTANEOUS at 08:50

## 2022-07-03 RX ADMIN — FUROSEMIDE 40 MG: 40 TABLET ORAL at 08:40

## 2022-07-03 RX ADMIN — Medication 10 ML: at 08:41

## 2022-07-03 RX ADMIN — GUAIFENESIN 600 MG: 600 TABLET, EXTENDED RELEASE ORAL at 08:41

## 2022-07-03 RX ADMIN — IPRATROPIUM BROMIDE AND ALBUTEROL SULFATE 1 AMPULE: 2.5; .5 SOLUTION RESPIRATORY (INHALATION) at 11:19

## 2022-07-03 RX ADMIN — PREGABALIN 300 MG: 100 CAPSULE ORAL at 08:41

## 2022-07-03 RX ADMIN — IPRATROPIUM BROMIDE AND ALBUTEROL SULFATE 1 AMPULE: 2.5; .5 SOLUTION RESPIRATORY (INHALATION) at 07:44

## 2022-07-03 RX ADMIN — ATORVASTATIN CALCIUM 20 MG: 10 TABLET, FILM COATED ORAL at 08:41

## 2022-07-03 RX ADMIN — HYDROCODONE BITARTRATE AND ACETAMINOPHEN 1 TABLET: 5; 325 TABLET ORAL at 08:40

## 2022-07-03 RX ADMIN — LOSARTAN POTASSIUM 50 MG: 25 TABLET, FILM COATED ORAL at 08:40

## 2022-07-03 RX ADMIN — SERTRALINE HYDROCHLORIDE 100 MG: 50 TABLET ORAL at 08:41

## 2022-07-03 RX ADMIN — SODIUM CHLORIDE 25 ML: 9 INJECTION, SOLUTION INTRAVENOUS at 08:48

## 2022-07-03 RX ADMIN — ENOXAPARIN SODIUM 40 MG: 100 INJECTION SUBCUTANEOUS at 08:41

## 2022-07-03 RX ADMIN — CEFAZOLIN 2000 MG: 2 INJECTION, POWDER, FOR SOLUTION INTRAMUSCULAR; INTRAVENOUS at 08:50

## 2022-07-03 RX ADMIN — LINACLOTIDE 290 MCG: 145 CAPSULE, GELATIN COATED ORAL at 08:40

## 2022-07-03 RX ADMIN — METFORMIN HYDROCHLORIDE 500 MG: 500 TABLET ORAL at 08:41

## 2022-07-03 ASSESSMENT — PAIN SCALES - GENERAL: PAINLEVEL_OUTOF10: 8

## 2022-07-03 NOTE — PROGRESS NOTES
7/2  Vanc trough = 19.3 mcg/mL at 2237. Calculated AUC of 626. Decrease vancomycin to 750 mg q12. Recheck vanc trough 7/4 at 1100.   Ranjit Davila, PharmD  7/2/2022 11:24 PM

## 2022-07-03 NOTE — PROGRESS NOTES
Progress Note    Admit Date:  6/29/2022     73 y/o female with pmhx COPD, HTN, depression, anxiety   -presented for left leg swelling and redness       Subjective:    Ms. Eula Roque today is seen sitting up in bed., no fevers, leg edema and redness improved significantly per pt  No diarrhea  Pain controlled      Objective:   No data found. Intake/Output Summary (Last 24 hours) at 7/3/2022 0731  Last data filed at 7/3/2022 0134  Gross per 24 hour   Intake 180 ml   Output 1300 ml   Net -1120 ml       Physical Exam:    Gen: elderly female chronically ill appearing  No distress. Alert. +appears older than stated age   Eyes: PERRL. No sclera icterus. No conjunctival injection. Neck: No JVD. Trachea midline. Resp: No accessory muscle use. No crackles. Occasional wheezing and rhonchi in bases of  lungs bilaterally   CV: Regular rate. Regular rhythm. No murmur. No rub. Peripheral Pulses: +2 palpable, equal bilaterally   GI: Non-tender. Non-distended. Normal bowel sounds. Skin: Warm and dry. No nodule on exposed extremities. bilateral lower extremity edema, bilateral erythema with lichenification, cellulitis   on the left leg improved significantly tenderness to palpation   New cellulitis on right leg also noted  M/S: No cyanosis. No joint deformity. No clubbing. Neuro: Awake. Grossly nonfocal    Psych: Oriented x 3. No anxiety or agitation.          Medications:  furosemide, 40 mg, Daily  vancomycin, 750 mg, Q12H  losartan, 50 mg, Daily  rOPINIRole, 1 mg, Nightly  ceFAZolin, 2,000 mg, Q8H  guaiFENesin, 600 mg, BID  ipratropium-albuterol, 1 ampule, 4x daily  atorvastatin, 20 mg, Daily  polyethylene glycol, 17 g, Daily  pregabalin, 300 mg, BID  sertraline, 100 mg, BID  sodium chloride flush, 5-40 mL, 2 times per day  enoxaparin, 40 mg, Daily  metFORMIN, 500 mg, Daily with breakfast  sennosides-docusate sodium, 2 tablet, Nightly  linaclotide, 290 mcg, QAM AC  insulin lispro, 0-12 Units, TID WC  insulin lispro, 0-6 Units, Nightly      PRN Medications:  HYDROcodone 5 mg - acetaminophen, 1 tablet, Q6H PRN  glucose, 4 tablet, PRN  dextrose bolus, 125 mL, PRN   Or  dextrose bolus, 250 mL, PRN  glucagon (rDNA), 1 mg, PRN  dextrose, 100 mL/hr, PRN  sodium chloride flush, 5-40 mL, PRN  sodium chloride, , PRN  ondansetron, 4 mg, Q8H PRN   Or  ondansetron, 4 mg, Q6H PRN  polyethylene glycol, 17 g, Daily PRN  acetaminophen, 650 mg, Q6H PRN   Or  acetaminophen, 650 mg, Q6H PRN  tiZANidine, 4 mg, Q6H PRN  ipratropium-albuterol, 1 ampule, Q4H PRN          Data:  CBC:   Recent Labs     07/01/22 0524 07/02/22 0519   WBC 9.1 7.9   HGB 11.9* 11.4*   HCT 35.4* 35.0*   MCV 93.4 93.4    130*     BMP:   Recent Labs     07/01/22 0524 07/02/22 0519 07/03/22 0512    141 140   K 4.4 4.2 4.4   CL 99 98* 97*   CO2 32 32 35*   BUN 14 15 16   CREATININE 0.7 0.7 0.8     LIVER PROFILE:   No results for input(s): AST, ALT, LIPASE, BILIDIR, BILITOT, ALKPHOS in the last 72 hours. Invalid input(s): AMYLASE,  ALB    CULTURES  Blood cultures pending   Sputum cultures pending  COVID not detected      RADIOLOGY  XR TIBIA FIBULA LEFT (2 VIEWS)   Final Result   Diffuse soft tissue swelling seen within the subcutaneous tissues. No acute   bony abnormality. XR CHEST PORTABLE   Final Result   Right basilar opacity could represent atelectasis or infection               Assessment/Plan:      #bilateral  lower extremity cellulitis left > right   - recurrent issues noted with venous stasis   -xray negative   -mild leukocytosis improved with abx  -blood cultures remain neg.  No fevers  -prn norco for pain   -IV ancef and IV vanc  Given   -aggressive diuresis with lasix as her issue is venous stasis- neg of 3.4 L   - improved edema and cellulitis  - dc home on oral abx  - recommend to increase lasix     #Right basilar opacity on CXR   #COPD with acute exacerbation   #Chronic Hypoxic respiratory failure  -on 2 L O2 baseline   -continue inhalers   -duonebs  -on abx as above  -sputum culture ordered   -PO prednisone given for 2 days with quick improvement - stop for high sugars  -added mucinex and acapella     #DM, uncontrolled   -continue metformin   - stop prednisone   -SSI     #HTN   -continue norvasc - might need to change given peripheral edema   changed to losartan  -continue to monitor BP     #possible liver cirrhosis  - ECHO with normal EF and normal Diastolic function  -need workup for cirrhosis- likely fatty liver disease  -continue lasix daily   -Pt advised to see PCP    #Depression   #Anxiety   -on zoloft     #Tobacco abuse   -nicotine patch   -recommend cessation     #HLD   -continue statin     #chronic pain syndrome   #RLS   -on requip and lyrica     #IBS   -continue home regimen    DVT Prophylaxis: lovenox   Diet: ADULT DIET;  Regular; 4 carb choices (60 gm/meal)  Code Status: Full Code     Dc to home    Harpal Danielle MD  07/03/22  7:31 AM

## 2022-07-03 NOTE — FLOWSHEET NOTE
07/03/22 0830   Vital Signs   Temp 97 °F (36.1 °C)   Temp Source Oral   Heart Rate 85   Heart Rate Source Monitor   Resp 16   /62   BP Location Right upper arm   MAP (Calculated) 78   Patient Position High fowlers   Level of Consciousness Alert (0)   MEWS Score 1   Pain Assessment   Pain Assessment 0-10   Pain Level 8   Patient's Stated Pain Goal 3   Opioid-Induced Sedation   POSS Score 1   Oxygen Therapy   SpO2 94 %   O2 Device Nasal cannula   O2 Flow Rate (L/min) 2 L/min   Assessment complete and morning medications administered

## 2022-07-03 NOTE — PLAN OF CARE
Problem: Safety - Adult  Goal: Free from fall injury  Outcome: Progressing  Flowsheets (Taken 7/3/2022 1040)  Free From Fall Injury:   Instruct family/caregiver on patient safety   Based on caregiver fall risk screen, instruct family/caregiver to ask for assistance with transferring infant if caregiver noted to have fall risk factors     Problem: Discharge Planning  Goal: Discharge to home or other facility with appropriate resources  Outcome: Progressing     Problem: Pain  Goal: Verbalizes/displays adequate comfort level or baseline comfort level  Outcome: Progressing     Problem: Skin/Tissue Integrity  Goal: Absence of new skin breakdown  Description: 1. Monitor for areas of redness and/or skin breakdown  2. Assess vascular access sites hourly  3. Every 4-6 hours minimum:  Change oxygen saturation probe site  4. Every 4-6 hours:  If on nasal continuous positive airway pressure, respiratory therapy assess nares and determine need for appliance change or resting period.   Outcome: Progressing

## 2022-07-03 NOTE — CARE COORDINATION
DISCHARGE ORDER  Date/Time 7/3/2022 3:12 PM  Completed by: Roxie Geller RN, Case Management    Patient Name: Michele Rock      : 1955  Admitting Diagnosis: Cellulitis [L03.90]  Cellulitis and abscess of leg [R33.358, Z47.277]      Admit order Date and Status:2022  (verify MD's last order for status of admission)      Noted discharge order. If applicable PT/OT recommendation at Discharge: n/a  DME recommendation by PT/OT:n/a  Confirmed discharge plan  (pt): Yes  with whom__________pt_____  If pt confirmed DC plan does family need to be contacted by CM No if yes who____n/a__  Discharge Plan: Reviewed chart. Role of discharge planner explained and patient verbalized understanding. Discharge order is noted. Pt is being d/c'd to home today. Pt's O2 sats are 95% on 2L. [facility] Pt sates that family will pick her up and are bringing an O2 tank. CM offered HC and pt declined at this time. Pt states she will call her PCP if she decides she wants HC later. No further discharge needs needed or noted. Date of Last IMM Given: 2022    Reviewed chart. Role of discharge planner explained and patient verbalized understanding. Discharge order is noted. Has Home O2 in place on admit:  Yes  Informed of need to bring portable home O2 tank on day of discharge for nursing to connect prior to leaving:   Yes  Verbalized agreement/Understanding:   Yes     Discharge timeout done with Flaquito Barber. All discharge needs and concerns addressed.

## 2022-07-04 LAB — BLOOD CULTURE, ROUTINE: NORMAL

## 2022-07-04 NOTE — DISCHARGE SUMMARY
Name:  Olegario Thomason  Room:  1564/3609-07  MRN:    [id number]    Discharge Summary      This discharge summary is in conjunction with a complete physical exam done on the day of discharge. Discharging Physician: Dr. Archer Siemens: 6/29/2022  Discharge:  7/3/2022    HPI:  74 y.o. female who presented to the hospital with a chief complaint of bilateral lower extremity swelling and redness. Patient has a history of diabetes mellitus, chronic respiratory therapy on oxygen, COPD and diabetes mellitus. She presents to the emergency department with worsening redness and swelling of her bilateral lower extremities. Unfortunately the patient has not seen a physician about this nor is she taking antibiotics as an outpatient. She presented today accompanied by her daughter who was at bedside. She denies any fevers or chills but endorses shortness of breath which is chronic. She states that she chronically has some redness but this time it spreading up her legs. Emergency department she was started on IV antibiotics and will be admitted for inpatient antibiotic therapy. Diagnoses this Admission and Hospital Course     #bilateral  lower extremity cellulitis left > right   - recurrent issues noted with venous stasis   -xray negative   -mild leukocytosis improved with abx  -blood cultures remain neg.  No fevers  -prn norco for pain   -IV ancef and IV vanc  Given   -aggressive diuresis with lasix as her issue is venous stasis- neg of 3.4 L   - improved edema and cellulitis  - dc home on oral abx  - recommend to increase lasix      #Right basilar opacity on CXR   #COPD with acute exacerbation   #Chronic Hypoxic respiratory failure  -on 2 L O2 baseline   -continue inhalers   -duonebs  -on abx as above  -sputum culture ordered   -PO prednisone given for 2 days with quick improvement - stop for high sugars  -added mucinex and acapella     #DM, uncontrolled   -continue metformin   - stop prednisone   -SSI      #HTN -continue norvasc - might need to change given peripheral edema   changed to losartan  -continue to monitor BP      #possible liver cirrhosis  - ECHO with normal EF and normal Diastolic function  -need workup for cirrhosis- likely fatty liver disease  -continue lasix daily   -Pt advised to see PCP     #Depression   #Anxiety   -on zoloft      #Tobacco abuse   -nicotine patch   -recommend cessation      #HLD   -continue statin      #chronic pain syndrome   #RLS   -on requip and lyrica      #IBS   -continue home regimen     DVT Prophylaxis: lovenox     Procedures (Please Review Full Report for Details)  N/A    Consults    N/A      Physical Exam at Discharge:    /62   Pulse 76   Temp 97 °F (36.1 °C) (Oral)   Resp 18   Ht 5' 3\" (1.6 m)   Wt 180 lb 1.6 oz (81.7 kg)   SpO2 95%   BMI 31.90 kg/m²     See today's progress note    CBC: Recent Labs     07/02/22 0519   WBC 7.9   HGB 11.4*   HCT 35.0*   MCV 93.4   *     BMP:   Recent Labs     07/02/22 0519 07/03/22  0512    140   K 4.2 4.4   CL 98* 97*   CO2 32 35*   BUN 15 16   CREATININE 0.7 0.8         CULTURES  Blood cultures NGTD  Sputum cultures pending  COVID not detected        RADIOLOGY  XR TIBIA FIBULA LEFT (2 VIEWS)   Final Result   Diffuse soft tissue swelling seen within the subcutaneous tissues. No acute   bony abnormality.          XR CHEST PORTABLE   Final Result   Right basilar opacity could represent atelectasis or infection               Discharge Medications     Medication List      START taking these medications    cephALEXin 500 MG capsule  Commonly known as: KEFLEX  Take 1 capsule by mouth 3 times daily for 5 days     losartan 50 MG tablet  Commonly known as: COZAAR  Take 1 tablet by mouth daily     sulfamethoxazole-trimethoprim 800-160 MG per tablet  Commonly known as: BACTRIM DS;SEPTRA DS  Take 1 tablet by mouth 2 times daily for 7 days        CHANGE how you take these medications    atorvastatin 20 MG tablet  Commonly known as: LIPITOR  Take 1 tablet by mouth daily. What changed: how much to take     rOPINIRole 2 MG tablet  Commonly known as: REQUIP  Take 1 tablet by mouth nightly 5/14/22:  Amount of tablets per RX decreased from 90 (30 days supply) to 60 (30 days supply) since 11/2020.   What changed: when to take this        CONTINUE taking these medications    Accu-Chek Guide strip  Generic drug: blood glucose test strips     Accu-Chek Softclix Lancets Misc     albuterol sulfate  (90 Base) MCG/ACT inhaler  Commonly known as: Proventil HFA  Inhale 2 puffs into the lungs every 4 hours as needed for Wheezing     Breo Ellipta 100-25 MCG/INH Aepb inhaler  Generic drug: fluticasone-vilanterol     furosemide 20 MG tablet  Commonly known as: LASIX  Take 1 tablet by mouth daily     Linzess 290 MCG Caps capsule  Generic drug: linaclotide     Lyrica 300 MG capsule  Generic drug: pregabalin     metFORMIN 500 MG tablet  Commonly known as: GLUCOPHAGE     omeprazole 40 MG delayed release capsule  Commonly known as: PRILOSEC     ondansetron 4 MG disintegrating tablet  Commonly known as: ZOFRAN-ODT  Take 1 tablet by mouth 3 times daily as needed for Nausea or Vomiting     polyethylene glycol 17 g Pack packet  Commonly known as: MIRALAX     risperiDONE 0.5 MG tablet  Commonly known as: RISPERDAL     sertraline 100 MG tablet  Commonly known as: ZOLOFT  Take 1 tablet by mouth 2 times daily     Stool Softener/Laxative 8.6-50 MG per tablet  Generic drug: senna-docusate     tiZANidine 4 MG tablet  Commonly known as: ZANAFLEX     Trelegy Ellipta 100-62.5-25 MCG/INH Aepb  Generic drug: fluticasone-umeclidin-vilant        STOP taking these medications    amLODIPine 5 MG tablet  Commonly known as: NORVASC           Where to Get Your Medications      These medications were sent to [facility]/pharmacy [facility]    Phone: [phone] · cephALEXin 500 MG capsule  · sulfamethoxazole-trimethoprim 800-160 MG per tablet     Information about where to get these medications is not yet available    Ask your nurse or doctor about these medications  · losartan 50 MG tablet           Discharged in stable condition to home. Follow Up: Follow up with PCP.     Miguel Recinos MD, 7/14/2022 12:46 PM

## 2023-02-02 ENCOUNTER — APPOINTMENT (OUTPATIENT)
Dept: GENERAL RADIOLOGY | Age: 68
DRG: 207 | End: 2023-02-02
Payer: MEDICARE

## 2023-02-02 ENCOUNTER — HOSPITAL ENCOUNTER (INPATIENT)
Age: 68
LOS: 14 days | Discharge: NURSING, REHAB OR LONG-TERM CARE | DRG: 207 | End: 2023-02-16
Attending: EMERGENCY MEDICINE | Admitting: INTERNAL MEDICINE
Payer: MEDICARE

## 2023-02-02 DIAGNOSIS — J44.1 COPD EXACERBATION (HCC): ICD-10-CM

## 2023-02-02 DIAGNOSIS — J96.02 ACUTE RESPIRATORY FAILURE WITH HYPERCAPNIA (HCC): Primary | ICD-10-CM

## 2023-02-02 DIAGNOSIS — G93.40 ACUTE ENCEPHALOPATHY: ICD-10-CM

## 2023-02-02 DIAGNOSIS — R52 PAIN: ICD-10-CM

## 2023-02-02 LAB
A/G RATIO: 1.3 (ref 1.1–2.2)
ALBUMIN SERPL-MCNC: 3.9 G/DL (ref 3.4–5)
ALP BLD-CCNC: 134 U/L (ref 40–129)
ALT SERPL-CCNC: 16 U/L (ref 10–40)
AMORPHOUS: ABNORMAL /HPF
AMPHETAMINE SCREEN, URINE: ABNORMAL
ANION GAP SERPL CALCULATED.3IONS-SCNC: 12 MMOL/L (ref 3–16)
AST SERPL-CCNC: 18 U/L (ref 15–37)
BARBITURATE SCREEN URINE: ABNORMAL
BASE EXCESS ARTERIAL: 4.5 MMOL/L (ref -3–3)
BASE EXCESS ARTERIAL: 5.1 MMOL/L (ref -3–3)
BASE EXCESS VENOUS: 4.8 MMOL/L (ref -3–3)
BASOPHILS ABSOLUTE: 0 K/UL (ref 0–0.2)
BASOPHILS RELATIVE PERCENT: 0.1 %
BENZODIAZEPINE SCREEN, URINE: ABNORMAL
BILIRUB SERPL-MCNC: 0.3 MG/DL (ref 0–1)
BILIRUBIN URINE: NEGATIVE
BLOOD, URINE: NEGATIVE
BUN BLDV-MCNC: 19 MG/DL (ref 7–20)
CALCIUM SERPL-MCNC: 9.4 MG/DL (ref 8.3–10.6)
CANNABINOID SCREEN URINE: POSITIVE
CARBOXYHEMOGLOBIN ARTERIAL: 1.3 % (ref 0–1.5)
CARBOXYHEMOGLOBIN ARTERIAL: 1.6 % (ref 0–1.5)
CARBOXYHEMOGLOBIN: 6.8 % (ref 0–1.5)
CHLORIDE BLD-SCNC: 101 MMOL/L (ref 99–110)
CLARITY: CLEAR
CO2: 28 MMOL/L (ref 21–32)
COCAINE METABOLITE SCREEN URINE: ABNORMAL
COLOR: YELLOW
CREAT SERPL-MCNC: 0.7 MG/DL (ref 0.6–1.2)
EKG ATRIAL RATE: 90 BPM
EKG DIAGNOSIS: NORMAL
EKG P AXIS: 76 DEGREES
EKG P-R INTERVAL: 124 MS
EKG Q-T INTERVAL: 328 MS
EKG QRS DURATION: 80 MS
EKG QTC CALCULATION (BAZETT): 401 MS
EKG R AXIS: 85 DEGREES
EKG T AXIS: 56 DEGREES
EKG VENTRICULAR RATE: 90 BPM
EOSINOPHILS ABSOLUTE: 0 K/UL (ref 0–0.6)
EOSINOPHILS RELATIVE PERCENT: 0 %
EPITHELIAL CELLS, UA: ABNORMAL /HPF (ref 0–5)
FENTANYL SCREEN, URINE: ABNORMAL
GFR SERPL CREATININE-BSD FRML MDRD: >60 ML/MIN/{1.73_M2}
GLUCOSE BLD-MCNC: 129 MG/DL (ref 70–99)
GLUCOSE BLD-MCNC: 203 MG/DL (ref 70–99)
GLUCOSE URINE: NEGATIVE MG/DL
HCO3 ARTERIAL: 34.2 MMOL/L (ref 21–29)
HCO3 ARTERIAL: 34.5 MMOL/L (ref 21–29)
HCO3 VENOUS: 34.3 MMOL/L (ref 23–29)
HCT VFR BLD CALC: 36.5 % (ref 36–48)
HEMOGLOBIN, ART, EXTENDED: 12.3 G/DL (ref 12–16)
HEMOGLOBIN, ART, EXTENDED: 12.3 G/DL (ref 12–16)
HEMOGLOBIN: 11.8 G/DL (ref 12–16)
HYALINE CASTS: ABNORMAL /LPF (ref 0–2)
INFLUENZA A: NOT DETECTED
INFLUENZA B: NOT DETECTED
KETONES, URINE: NEGATIVE MG/DL
LACTIC ACID: 1.3 MMOL/L (ref 0.4–2)
LEUKOCYTE ESTERASE, URINE: ABNORMAL
LYMPHOCYTES ABSOLUTE: 0.4 K/UL (ref 1–5.1)
LYMPHOCYTES RELATIVE PERCENT: 5 %
Lab: ABNORMAL
MCH RBC QN AUTO: 31.3 PG (ref 26–34)
MCHC RBC AUTO-ENTMCNC: 32.3 G/DL (ref 31–36)
MCV RBC AUTO: 96.8 FL (ref 80–100)
METHADONE SCREEN, URINE: ABNORMAL
METHEMOGLOBIN ARTERIAL: 0.3 %
METHEMOGLOBIN ARTERIAL: 0.3 %
METHEMOGLOBIN VENOUS: 0.3 %
MICROSCOPIC EXAMINATION: YES
MONOCYTES ABSOLUTE: 0.7 K/UL (ref 0–1.3)
MONOCYTES RELATIVE PERCENT: 8.5 %
MUCUS: ABNORMAL /LPF
NEUTROPHILS ABSOLUTE: 7.5 K/UL (ref 1.7–7.7)
NEUTROPHILS RELATIVE PERCENT: 86.4 %
NITRITE, URINE: NEGATIVE
O2 SAT, ARTERIAL: 67.5 %
O2 SAT, ARTERIAL: 89.7 %
O2 SAT, VEN: 71 %
O2 THERAPY: ABNORMAL
OPIATE SCREEN URINE: POSITIVE
OXYCODONE URINE: ABNORMAL
PCO2 ARTERIAL: 79.1 MMHG (ref 35–45)
PCO2 ARTERIAL: 80.8 MMHG (ref 35–45)
PCO2, VEN: 77.9 MMHG (ref 40–50)
PDW BLD-RTO: 16 % (ref 12.4–15.4)
PERFORMED ON: ABNORMAL
PH ARTERIAL: 7.24 (ref 7.35–7.45)
PH ARTERIAL: 7.26 (ref 7.35–7.45)
PH UA: 5.5
PH UA: 5.5 (ref 5–8)
PH VENOUS: 7.26 (ref 7.35–7.45)
PHENCYCLIDINE SCREEN URINE: ABNORMAL
PLATELET # BLD: 126 K/UL (ref 135–450)
PMV BLD AUTO: 8.8 FL (ref 5–10.5)
PO2 ARTERIAL: 41.8 MMHG (ref 75–108)
PO2 ARTERIAL: 68.5 MMHG (ref 75–108)
PO2, VEN: 43.7 MMHG (ref 25–40)
POTASSIUM REFLEX MAGNESIUM: 4.9 MMOL/L (ref 3.5–5.1)
PRO-BNP: 40 PG/ML (ref 0–124)
PROCALCITONIN: 0.05 NG/ML (ref 0–0.15)
PROTEIN UA: NEGATIVE MG/DL
RBC # BLD: 3.77 M/UL (ref 4–5.2)
RBC UA: ABNORMAL /HPF (ref 0–4)
SARS-COV-2 RNA, RT PCR: NOT DETECTED
SODIUM BLD-SCNC: 141 MMOL/L (ref 136–145)
SPECIFIC GRAVITY UA: 1.02 (ref 1–1.03)
TCO2 ARTERIAL: 36.6 MMOL/L
TCO2 ARTERIAL: 37 MMOL/L
TCO2 CALC VENOUS: 37 MMOL/L
TOTAL PROTEIN: 6.9 G/DL (ref 6.4–8.2)
TROPONIN: <0.01 NG/ML
URINE REFLEX TO CULTURE: ABNORMAL
URINE TYPE: ABNORMAL
UROBILINOGEN, URINE: 0.2 E.U./DL
WBC # BLD: 8.7 K/UL (ref 4–11)
WBC UA: ABNORMAL /HPF (ref 0–5)

## 2023-02-02 PROCEDURE — 81001 URINALYSIS AUTO W/SCOPE: CPT

## 2023-02-02 PROCEDURE — 6360000002 HC RX W HCPCS: Performed by: EMERGENCY MEDICINE

## 2023-02-02 PROCEDURE — 6370000000 HC RX 637 (ALT 250 FOR IP): Performed by: INTERNAL MEDICINE

## 2023-02-02 PROCEDURE — 0BCB8ZZ EXTIRPATION OF MATTER FROM LEFT LOWER LOBE BRONCHUS, VIA NATURAL OR ARTIFICIAL OPENING ENDOSCOPIC: ICD-10-PCS | Performed by: INTERNAL MEDICINE

## 2023-02-02 PROCEDURE — 94660 CPAP INITIATION&MGMT: CPT

## 2023-02-02 PROCEDURE — 6360000002 HC RX W HCPCS: Performed by: INTERNAL MEDICINE

## 2023-02-02 PROCEDURE — 87106 FUNGI IDENTIFICATION YEAST: CPT

## 2023-02-02 PROCEDURE — 84478 ASSAY OF TRIGLYCERIDES: CPT

## 2023-02-02 PROCEDURE — 2500000003 HC RX 250 WO HCPCS: Performed by: INTERNAL MEDICINE

## 2023-02-02 PROCEDURE — 31622 DX BRONCHOSCOPE/WASH: CPT

## 2023-02-02 PROCEDURE — 80307 DRUG TEST PRSMV CHEM ANLYZR: CPT

## 2023-02-02 PROCEDURE — 83880 ASSAY OF NATRIURETIC PEPTIDE: CPT

## 2023-02-02 PROCEDURE — 84145 PROCALCITONIN (PCT): CPT

## 2023-02-02 PROCEDURE — 36415 COLL VENOUS BLD VENIPUNCTURE: CPT

## 2023-02-02 PROCEDURE — 96375 TX/PRO/DX INJ NEW DRUG ADDON: CPT

## 2023-02-02 PROCEDURE — 84484 ASSAY OF TROPONIN QUANT: CPT

## 2023-02-02 PROCEDURE — 2580000003 HC RX 258: Performed by: STUDENT IN AN ORGANIZED HEALTH CARE EDUCATION/TRAINING PROGRAM

## 2023-02-02 PROCEDURE — 87070 CULTURE OTHR SPECIMN AEROBIC: CPT

## 2023-02-02 PROCEDURE — 71045 X-RAY EXAM CHEST 1 VIEW: CPT

## 2023-02-02 PROCEDURE — 94761 N-INVAS EAR/PLS OXIMETRY MLT: CPT

## 2023-02-02 PROCEDURE — 93010 ELECTROCARDIOGRAM REPORT: CPT | Performed by: INTERNAL MEDICINE

## 2023-02-02 PROCEDURE — 99291 CRITICAL CARE FIRST HOUR: CPT

## 2023-02-02 PROCEDURE — 93005 ELECTROCARDIOGRAM TRACING: CPT | Performed by: EMERGENCY MEDICINE

## 2023-02-02 PROCEDURE — 87116 MYCOBACTERIA CULTURE: CPT

## 2023-02-02 PROCEDURE — 82803 BLOOD GASES ANY COMBINATION: CPT

## 2023-02-02 PROCEDURE — 99291 CRITICAL CARE FIRST HOUR: CPT | Performed by: INTERNAL MEDICINE

## 2023-02-02 PROCEDURE — 2580000003 HC RX 258: Performed by: EMERGENCY MEDICINE

## 2023-02-02 PROCEDURE — 0BH17EZ INSERTION OF ENDOTRACHEAL AIRWAY INTO TRACHEA, VIA NATURAL OR ARTIFICIAL OPENING: ICD-10-PCS | Performed by: INTERNAL MEDICINE

## 2023-02-02 PROCEDURE — C9113 INJ PANTOPRAZOLE SODIUM, VIA: HCPCS | Performed by: INTERNAL MEDICINE

## 2023-02-02 PROCEDURE — 96374 THER/PROPH/DIAG INJ IV PUSH: CPT

## 2023-02-02 PROCEDURE — 31645 BRNCHSC W/THER ASPIR 1ST: CPT | Performed by: INTERNAL MEDICINE

## 2023-02-02 PROCEDURE — 83036 HEMOGLOBIN GLYCOSYLATED A1C: CPT

## 2023-02-02 PROCEDURE — 2500000003 HC RX 250 WO HCPCS: Performed by: STUDENT IN AN ORGANIZED HEALTH CARE EDUCATION/TRAINING PROGRAM

## 2023-02-02 PROCEDURE — 83605 ASSAY OF LACTIC ACID: CPT

## 2023-02-02 PROCEDURE — 80053 COMPREHEN METABOLIC PANEL: CPT

## 2023-02-02 PROCEDURE — 0B9J8ZX DRAINAGE OF LEFT LOWER LUNG LOBE, VIA NATURAL OR ARTIFICIAL OPENING ENDOSCOPIC, DIAGNOSTIC: ICD-10-PCS | Performed by: INTERNAL MEDICINE

## 2023-02-02 PROCEDURE — 6370000000 HC RX 637 (ALT 250 FOR IP): Performed by: EMERGENCY MEDICINE

## 2023-02-02 PROCEDURE — 87206 SMEAR FLUORESCENT/ACID STAI: CPT

## 2023-02-02 PROCEDURE — 5A1955Z RESPIRATORY VENTILATION, GREATER THAN 96 CONSECUTIVE HOURS: ICD-10-PCS | Performed by: INTERNAL MEDICINE

## 2023-02-02 PROCEDURE — 2500000003 HC RX 250 WO HCPCS

## 2023-02-02 PROCEDURE — 87040 BLOOD CULTURE FOR BACTERIA: CPT

## 2023-02-02 PROCEDURE — 94002 VENT MGMT INPAT INIT DAY: CPT

## 2023-02-02 PROCEDURE — 2580000003 HC RX 258: Performed by: INTERNAL MEDICINE

## 2023-02-02 PROCEDURE — 06HY33Z INSERTION OF INFUSION DEVICE INTO LOWER VEIN, PERCUTANEOUS APPROACH: ICD-10-PCS | Performed by: STUDENT IN AN ORGANIZED HEALTH CARE EDUCATION/TRAINING PROGRAM

## 2023-02-02 PROCEDURE — 94640 AIRWAY INHALATION TREATMENT: CPT

## 2023-02-02 PROCEDURE — 36556 INSERT NON-TUNNEL CV CATH: CPT

## 2023-02-02 PROCEDURE — 6360000002 HC RX W HCPCS

## 2023-02-02 PROCEDURE — 2000000000 HC ICU R&B

## 2023-02-02 PROCEDURE — 2700000000 HC OXYGEN THERAPY PER DAY

## 2023-02-02 PROCEDURE — 85025 COMPLETE CBC W/AUTO DIFF WBC: CPT

## 2023-02-02 PROCEDURE — 87636 SARSCOV2 & INF A&B AMP PRB: CPT

## 2023-02-02 PROCEDURE — 87102 FUNGUS ISOLATION CULTURE: CPT

## 2023-02-02 PROCEDURE — 02HV33Z INSERTION OF INFUSION DEVICE INTO SUPERIOR VENA CAVA, PERCUTANEOUS APPROACH: ICD-10-PCS | Performed by: INTERNAL MEDICINE

## 2023-02-02 PROCEDURE — 0BC78ZZ EXTIRPATION OF MATTER FROM LEFT MAIN BRONCHUS, VIA NATURAL OR ARTIFICIAL OPENING ENDOSCOPIC: ICD-10-PCS | Performed by: INTERNAL MEDICINE

## 2023-02-02 PROCEDURE — 87205 SMEAR GRAM STAIN: CPT

## 2023-02-02 PROCEDURE — 31500 INSERT EMERGENCY AIRWAY: CPT

## 2023-02-02 RX ORDER — KETAMINE HYDROCHLORIDE 100 MG/ML
100 INJECTION INTRAMUSCULAR; INTRAVENOUS ONCE
Status: COMPLETED | OUTPATIENT
Start: 2023-02-02 | End: 2023-02-02

## 2023-02-02 RX ORDER — LIDOCAINE HYDROCHLORIDE 40 MG/ML
SOLUTION TOPICAL
Status: DISPENSED
Start: 2023-02-02 | End: 2023-02-02

## 2023-02-02 RX ORDER — KETAMINE HYDROCHLORIDE 100 MG/ML
INJECTION INTRAMUSCULAR; INTRAVENOUS
Status: COMPLETED
Start: 2023-02-02 | End: 2023-02-02

## 2023-02-02 RX ORDER — MIDAZOLAM HYDROCHLORIDE 1 MG/ML
2 INJECTION INTRAMUSCULAR; INTRAVENOUS
Status: DISCONTINUED | OUTPATIENT
Start: 2023-02-02 | End: 2023-02-02

## 2023-02-02 RX ORDER — ACETAMINOPHEN 325 MG/1
650 TABLET ORAL EVERY 6 HOURS PRN
Status: DISCONTINUED | OUTPATIENT
Start: 2023-02-02 | End: 2023-02-16 | Stop reason: HOSPADM

## 2023-02-02 RX ORDER — SODIUM CHLORIDE 0.9 % (FLUSH) 0.9 %
5-40 SYRINGE (ML) INJECTION EVERY 12 HOURS SCHEDULED
Status: DISCONTINUED | OUTPATIENT
Start: 2023-02-02 | End: 2023-02-16 | Stop reason: HOSPADM

## 2023-02-02 RX ORDER — ALBUTEROL SULFATE 90 UG/1
4 AEROSOL, METERED RESPIRATORY (INHALATION) EVERY 4 HOURS PRN
Status: DISCONTINUED | OUTPATIENT
Start: 2023-02-02 | End: 2023-02-08

## 2023-02-02 RX ORDER — METHYLPREDNISOLONE SODIUM SUCCINATE 40 MG/ML
40 INJECTION, POWDER, LYOPHILIZED, FOR SOLUTION INTRAMUSCULAR; INTRAVENOUS EVERY 12 HOURS
Status: DISCONTINUED | OUTPATIENT
Start: 2023-02-02 | End: 2023-02-03

## 2023-02-02 RX ORDER — NICOTINE 21 MG/24HR
1 PATCH, TRANSDERMAL 24 HOURS TRANSDERMAL DAILY
Status: DISCONTINUED | OUTPATIENT
Start: 2023-02-02 | End: 2023-02-16 | Stop reason: HOSPADM

## 2023-02-02 RX ORDER — PANTOPRAZOLE SODIUM 40 MG/10ML
40 INJECTION, POWDER, LYOPHILIZED, FOR SOLUTION INTRAVENOUS DAILY
Status: DISCONTINUED | OUTPATIENT
Start: 2023-02-02 | End: 2023-02-10

## 2023-02-02 RX ORDER — CHLORHEXIDINE GLUCONATE 0.12 MG/ML
15 RINSE ORAL 2 TIMES DAILY
Status: DISCONTINUED | OUTPATIENT
Start: 2023-02-02 | End: 2023-02-08

## 2023-02-02 RX ORDER — IPRATROPIUM BROMIDE AND ALBUTEROL SULFATE 2.5; .5 MG/3ML; MG/3ML
1 SOLUTION RESPIRATORY (INHALATION)
Status: DISCONTINUED | OUTPATIENT
Start: 2023-02-02 | End: 2023-02-02

## 2023-02-02 RX ORDER — ROCURONIUM BROMIDE 10 MG/ML
INJECTION, SOLUTION INTRAVENOUS DAILY PRN
Status: COMPLETED | OUTPATIENT
Start: 2023-02-02 | End: 2023-02-02

## 2023-02-02 RX ORDER — MIDAZOLAM HYDROCHLORIDE 1 MG/ML
2 INJECTION INTRAMUSCULAR; INTRAVENOUS
Status: DISCONTINUED | OUTPATIENT
Start: 2023-02-02 | End: 2023-02-08

## 2023-02-02 RX ORDER — SERTRALINE HYDROCHLORIDE 100 MG/1
100 TABLET, FILM COATED ORAL 2 TIMES DAILY
Status: DISCONTINUED | OUTPATIENT
Start: 2023-02-02 | End: 2023-02-16 | Stop reason: HOSPADM

## 2023-02-02 RX ORDER — POLYETHYLENE GLYCOL 3350 17 G/17G
17 POWDER, FOR SOLUTION ORAL DAILY PRN
Status: DISCONTINUED | OUTPATIENT
Start: 2023-02-02 | End: 2023-02-16 | Stop reason: HOSPADM

## 2023-02-02 RX ORDER — ENOXAPARIN SODIUM 100 MG/ML
40 INJECTION SUBCUTANEOUS DAILY
Status: DISCONTINUED | OUTPATIENT
Start: 2023-02-02 | End: 2023-02-16 | Stop reason: HOSPADM

## 2023-02-02 RX ORDER — KETAMINE HYDROCHLORIDE 50 MG/ML
INJECTION, SOLUTION, CONCENTRATE INTRAMUSCULAR; INTRAVENOUS DAILY PRN
Status: COMPLETED | OUTPATIENT
Start: 2023-02-02 | End: 2023-02-02

## 2023-02-02 RX ORDER — AMLODIPINE BESYLATE 5 MG/1
5 TABLET ORAL DAILY
Status: ON HOLD | COMMUNITY
End: 2023-02-16 | Stop reason: HOSPADM

## 2023-02-02 RX ORDER — SODIUM CHLORIDE 0.9 % (FLUSH) 0.9 %
5-40 SYRINGE (ML) INJECTION PRN
Status: DISCONTINUED | OUTPATIENT
Start: 2023-02-02 | End: 2023-02-16 | Stop reason: HOSPADM

## 2023-02-02 RX ORDER — ALBUTEROL SULFATE 2.5 MG/3ML
2.5 SOLUTION RESPIRATORY (INHALATION)
Status: DISCONTINUED | OUTPATIENT
Start: 2023-02-02 | End: 2023-02-02

## 2023-02-02 RX ORDER — FENTANYL CITRATE-0.9 % NACL/PF 10 MCG/ML
25-200 PLASTIC BAG, INJECTION (ML) INTRAVENOUS CONTINUOUS
Status: DISCONTINUED | OUTPATIENT
Start: 2023-02-02 | End: 2023-02-08

## 2023-02-02 RX ORDER — IPRATROPIUM BROMIDE AND ALBUTEROL SULFATE 2.5; .5 MG/3ML; MG/3ML
1 SOLUTION RESPIRATORY (INHALATION) EVERY 4 HOURS
Status: DISCONTINUED | OUTPATIENT
Start: 2023-02-02 | End: 2023-02-10

## 2023-02-02 RX ORDER — FENTANYL CITRATE 50 UG/ML
100 INJECTION, SOLUTION INTRAMUSCULAR; INTRAVENOUS ONCE
Status: COMPLETED | OUTPATIENT
Start: 2023-02-02 | End: 2023-02-02

## 2023-02-02 RX ORDER — PROPOFOL 10 MG/ML
INJECTION, EMULSION INTRAVENOUS
Status: COMPLETED
Start: 2023-02-02 | End: 2023-02-02

## 2023-02-02 RX ORDER — SODIUM CHLORIDE 9 MG/ML
INJECTION, SOLUTION INTRAVENOUS PRN
Status: DISCONTINUED | OUTPATIENT
Start: 2023-02-02 | End: 2023-02-16 | Stop reason: HOSPADM

## 2023-02-02 RX ORDER — PROPOFOL 10 MG/ML
5-50 INJECTION, EMULSION INTRAVENOUS CONTINUOUS
Status: DISCONTINUED | OUTPATIENT
Start: 2023-02-02 | End: 2023-02-07

## 2023-02-02 RX ORDER — ACETAMINOPHEN 650 MG/1
650 SUPPOSITORY RECTAL EVERY 6 HOURS PRN
Status: DISCONTINUED | OUTPATIENT
Start: 2023-02-02 | End: 2023-02-16 | Stop reason: HOSPADM

## 2023-02-02 RX ORDER — FENTANYL CITRATE 50 UG/ML
25 INJECTION, SOLUTION INTRAMUSCULAR; INTRAVENOUS
Status: DISCONTINUED | OUTPATIENT
Start: 2023-02-02 | End: 2023-02-09

## 2023-02-02 RX ORDER — PREDNISONE 20 MG/1
40 TABLET ORAL DAILY
Status: DISCONTINUED | OUTPATIENT
Start: 2023-02-04 | End: 2023-02-03

## 2023-02-02 RX ORDER — FENTANYL CITRATE 50 UG/ML
25 INJECTION, SOLUTION INTRAMUSCULAR; INTRAVENOUS
Status: DISCONTINUED | OUTPATIENT
Start: 2023-02-02 | End: 2023-02-02

## 2023-02-02 RX ORDER — ALBUTEROL SULFATE 2.5 MG/3ML
15 SOLUTION RESPIRATORY (INHALATION)
Status: DISCONTINUED | OUTPATIENT
Start: 2023-02-02 | End: 2023-02-02

## 2023-02-02 RX ORDER — SODIUM CHLORIDE 9 MG/ML
INJECTION, SOLUTION INTRAVENOUS CONTINUOUS
Status: DISCONTINUED | OUTPATIENT
Start: 2023-02-02 | End: 2023-02-03

## 2023-02-02 RX ORDER — LEVOFLOXACIN 5 MG/ML
750 INJECTION, SOLUTION INTRAVENOUS EVERY 24 HOURS
Status: DISCONTINUED | OUTPATIENT
Start: 2023-02-02 | End: 2023-02-03

## 2023-02-02 RX ORDER — INSULIN LISPRO 100 [IU]/ML
0-8 INJECTION, SOLUTION INTRAVENOUS; SUBCUTANEOUS EVERY 4 HOURS
Status: DISCONTINUED | OUTPATIENT
Start: 2023-02-02 | End: 2023-02-08

## 2023-02-02 RX ORDER — DEXTROSE MONOHYDRATE 100 MG/ML
INJECTION, SOLUTION INTRAVENOUS CONTINUOUS PRN
Status: DISCONTINUED | OUTPATIENT
Start: 2023-02-02 | End: 2023-02-16 | Stop reason: HOSPADM

## 2023-02-02 RX ORDER — METHYLPREDNISOLONE SODIUM SUCCINATE 125 MG/2ML
60 INJECTION, POWDER, LYOPHILIZED, FOR SOLUTION INTRAMUSCULAR; INTRAVENOUS ONCE
Status: COMPLETED | OUTPATIENT
Start: 2023-02-02 | End: 2023-02-02

## 2023-02-02 RX ORDER — ONDANSETRON 2 MG/ML
4 INJECTION INTRAMUSCULAR; INTRAVENOUS EVERY 6 HOURS PRN
Status: DISCONTINUED | OUTPATIENT
Start: 2023-02-02 | End: 2023-02-16 | Stop reason: HOSPADM

## 2023-02-02 RX ORDER — ONDANSETRON 4 MG/1
4 TABLET, ORALLY DISINTEGRATING ORAL EVERY 8 HOURS PRN
Status: DISCONTINUED | OUTPATIENT
Start: 2023-02-02 | End: 2023-02-16 | Stop reason: HOSPADM

## 2023-02-02 RX ADMIN — ACETAMINOPHEN 650 MG: 650 SUPPOSITORY RECTAL at 22:55

## 2023-02-02 RX ADMIN — SODIUM CHLORIDE 0.7 MG/KG/HR: 9 INJECTION, SOLUTION INTRAVENOUS at 08:32

## 2023-02-02 RX ADMIN — ROCURONIUM BROMIDE 50 MG: 10 INJECTION, SOLUTION INTRAVENOUS at 06:33

## 2023-02-02 RX ADMIN — MUPIROCIN: 20 OINTMENT TOPICAL at 10:59

## 2023-02-02 RX ADMIN — ENOXAPARIN SODIUM 40 MG: 100 INJECTION SUBCUTANEOUS at 20:06

## 2023-02-02 RX ADMIN — MIDAZOLAM 2 MG: 1 INJECTION INTRAMUSCULAR; INTRAVENOUS at 07:30

## 2023-02-02 RX ADMIN — FENTANYL CITRATE 25 MCG: 50 INJECTION INTRAMUSCULAR; INTRAVENOUS at 07:11

## 2023-02-02 RX ADMIN — MIDAZOLAM 2 MG: 1 INJECTION INTRAMUSCULAR; INTRAVENOUS at 08:27

## 2023-02-02 RX ADMIN — IPRATROPIUM BROMIDE AND ALBUTEROL SULFATE 1 AMPULE: 2.5; .5 SOLUTION RESPIRATORY (INHALATION) at 05:13

## 2023-02-02 RX ADMIN — PROPOFOL 10 MCG/KG/MIN: 10 INJECTION, EMULSION INTRAVENOUS at 07:47

## 2023-02-02 RX ADMIN — PROPOFOL 5 MCG/KG/MIN: 10 INJECTION, EMULSION INTRAVENOUS at 06:41

## 2023-02-02 RX ADMIN — PROPOFOL 20 MCG/KG/MIN: 10 INJECTION, EMULSION INTRAVENOUS at 07:30

## 2023-02-02 RX ADMIN — Medication 100 MCG/HR: at 10:30

## 2023-02-02 RX ADMIN — METHYLPREDNISOLONE SODIUM SUCCINATE 60 MG: 125 INJECTION, POWDER, FOR SOLUTION INTRAMUSCULAR; INTRAVENOUS at 05:13

## 2023-02-02 RX ADMIN — PROPOFOL 10 MCG/KG/MIN: 10 INJECTION, EMULSION INTRAVENOUS at 08:29

## 2023-02-02 RX ADMIN — PROPOFOL 25 MCG/KG/MIN: 10 INJECTION, EMULSION INTRAVENOUS at 07:37

## 2023-02-02 RX ADMIN — IPRATROPIUM BROMIDE AND ALBUTEROL SULFATE 1 AMPULE: .5; 2.5 SOLUTION RESPIRATORY (INHALATION) at 22:46

## 2023-02-02 RX ADMIN — SODIUM CHLORIDE 0.2 MG/KG/HR: 9 INJECTION, SOLUTION INTRAVENOUS at 08:09

## 2023-02-02 RX ADMIN — FENTANYL CITRATE 25 MCG: 50 INJECTION INTRAMUSCULAR; INTRAVENOUS at 08:22

## 2023-02-02 RX ADMIN — CHLORHEXIDINE GLUCONATE 0.12% ORAL RINSE 15 ML: 1.2 LIQUID ORAL at 20:07

## 2023-02-02 RX ADMIN — FENTANYL CITRATE 100 MCG: 50 INJECTION, SOLUTION INTRAMUSCULAR; INTRAVENOUS at 10:25

## 2023-02-02 RX ADMIN — NOREPINEPHRINE BITARTRATE 10 MCG/MIN: 1 INJECTION, SOLUTION, CONCENTRATE INTRAVENOUS at 07:55

## 2023-02-02 RX ADMIN — MUPIROCIN: 20 OINTMENT TOPICAL at 20:07

## 2023-02-02 RX ADMIN — IPRATROPIUM BROMIDE AND ALBUTEROL SULFATE 1 AMPULE: 2.5; .5 SOLUTION RESPIRATORY (INHALATION) at 07:23

## 2023-02-02 RX ADMIN — IPRATROPIUM BROMIDE AND ALBUTEROL SULFATE 1 AMPULE: .5; 2.5 SOLUTION RESPIRATORY (INHALATION) at 11:32

## 2023-02-02 RX ADMIN — KETAMINE HYDROCHLORIDE 100 MG: 50 INJECTION INTRAMUSCULAR; INTRAVENOUS at 06:32

## 2023-02-02 RX ADMIN — KETAMINE HYDROCHLORIDE 100 MG: 100 INJECTION INTRAMUSCULAR; INTRAVENOUS at 08:05

## 2023-02-02 RX ADMIN — PROPOFOL 30 MCG/KG/MIN: 10 INJECTION, EMULSION INTRAVENOUS at 18:24

## 2023-02-02 RX ADMIN — LEVOFLOXACIN 750 MG: 5 INJECTION, SOLUTION INTRAVENOUS at 20:25

## 2023-02-02 RX ADMIN — IPRATROPIUM BROMIDE AND ALBUTEROL SULFATE 1 AMPULE: .5; 2.5 SOLUTION RESPIRATORY (INHALATION) at 15:54

## 2023-02-02 RX ADMIN — SODIUM CHLORIDE: 9 INJECTION, SOLUTION INTRAVENOUS at 18:28

## 2023-02-02 RX ADMIN — METHYLPREDNISOLONE SODIUM SUCCINATE 40 MG: 40 INJECTION, POWDER, FOR SOLUTION INTRAMUSCULAR; INTRAVENOUS at 20:06

## 2023-02-02 RX ADMIN — MIDAZOLAM 2 MG: 1 INJECTION INTRAMUSCULAR; INTRAVENOUS at 10:22

## 2023-02-02 RX ADMIN — Medication 150 MCG/HR: at 22:48

## 2023-02-02 RX ADMIN — SODIUM CHLORIDE 0.7 MG/KG/HR: 9 INJECTION, SOLUTION INTRAVENOUS at 19:26

## 2023-02-02 RX ADMIN — IPRATROPIUM BROMIDE AND ALBUTEROL SULFATE 1 AMPULE: .5; 2.5 SOLUTION RESPIRATORY (INHALATION) at 19:45

## 2023-02-02 RX ADMIN — SODIUM CHLORIDE: 9 INJECTION, SOLUTION INTRAVENOUS at 05:36

## 2023-02-02 RX ADMIN — PROPOFOL 15 MCG/KG/MIN: 10 INJECTION, EMULSION INTRAVENOUS at 07:21

## 2023-02-02 RX ADMIN — PROPOFOL 20 MCG/KG/MIN: 10 INJECTION, EMULSION INTRAVENOUS at 07:43

## 2023-02-02 RX ADMIN — PANTOPRAZOLE SODIUM 40 MG: 40 INJECTION, POWDER, FOR SOLUTION INTRAVENOUS at 20:12

## 2023-02-02 RX ADMIN — PROPOFOL 30 MCG/KG/MIN: 10 INJECTION, EMULSION INTRAVENOUS at 11:38

## 2023-02-02 RX ADMIN — Medication 150 MCG/HR: at 16:09

## 2023-02-02 RX ADMIN — SODIUM CHLORIDE, PRESERVATIVE FREE 10 ML: 5 INJECTION INTRAVENOUS at 20:08

## 2023-02-02 ASSESSMENT — PULMONARY FUNCTION TESTS
PIF_VALUE: 31
PIF_VALUE: 29
PIF_VALUE: 32
PIF_VALUE: 28

## 2023-02-02 ASSESSMENT — PAIN SCALES - GENERAL
PAINLEVEL_OUTOF10: 0
PAINLEVEL_OUTOF10: 0
PAINLEVEL_OUTOF10: 10

## 2023-02-02 ASSESSMENT — PAIN - FUNCTIONAL ASSESSMENT: PAIN_FUNCTIONAL_ASSESSMENT: NONE - DENIES PAIN

## 2023-02-02 NOTE — ED NOTES
Writer sent message to Hospitalist group regarding more sedation due to critical care not being available yet this AM. Awaiting call back. Leo Rivera RN  02/02/23 8148    1976- Dr. Lady Tobias messaged back stating to page critical care at this time.           Leo Rivera RN  02/02/23 5717

## 2023-02-02 NOTE — ED NOTES
Pt intubated by Dr. Terry Alex. See Kevin Salinas RN intubation charting.      Phillip Collins RN  02/02/23 [id number]

## 2023-02-02 NOTE — ED NOTES
Message sent to hospitalist group regarding critical Co2 level.       Zoran Mistry, RN  02/02/23 1007

## 2023-02-02 NOTE — ED PROVIDER NOTES
Emergency Physician Note  [address] ED  288 [facility] [place]. [address]  Dept: [phone]  Loc: [phone]  Open Note Time:  4:56 AM EST    Chief Complaint  Shortness of Breath (Pt arrives with complaint of SOB x 1 hour. O2 was 70% on RA. Got duoneb en route. )       History of Present Illness  Maci Alexis is a 79 y.o. female  has a past medical history of Anxiety, Arthritis, Cancer ([address] Chronic back pain, Chronic pain, COPD (chronic obstructive pulmonary disease) ([address] Depression, DJD (degenerative joint disease), cervical, Drug-seeking behavior, GERD (gastroesophageal reflux disease), Hypercholesteremia, Hypertension, IBS (irritable bowel syndrome), Insomnia, K deficiency, Kidney stone, Narcotic addiction ([address] Narcotic overdose ([address] Osteoporosis, Overdose, PNA (pneumonia), Restless leg syndrome, and Thyroid disease. who presents to the ED for Hartness of breath. Patient is a poor historian as she is able unable to answer any question due to altered mental status    Unable to assess review of systems as patient is altered    Unless otherwise stated in this report or unable to obtain because of the patient's clinical or mental status as evidenced by the medical record, this patient's positive and negative responses for review of systems, constitutional, psych, eyes, ENT, cardiovascular, respiratory, gastrointestinal, neurological, genitourinary, musculoskeletal, integument systems and systems related to the presenting problem are either stated in the preceding paragraph or were not pertinent or were negative for the symptoms and/or complaints related to the medical problem. I have reviewed the following from the nursing documentation:      Prior to Admission medications    Medication Sig Start Date End Date Taking?  Authorizing Provider   losartan (COZAAR) 50 MG tablet Take 1 tablet by mouth daily 7/4/22   MD Petros Meléndez 100-62.5-25 MCG/INH AEPB INHALE 1 PUFF INTO THE LUNGS DAILY. 6/29/22   Historical Provider, MD   ACCU-CHEK GUIDE strip USE 1 STRIP AS DIRECTED 4 TIMES A DAY (MAY TEST LESS FREQUENT NOT INJECTING INSULIN) 6/29/22   Historical Provider, MD Ruiz Softclix Lancets MISC USE 3 TO 4 TIMES A DAY 6/29/22   Historical Provider, MD   linaclotide (LINZESS) 290 MCG CAPS capsule TAKE 1 CAPSULE AT LEAST 30 MINUTES BEFORE THE FIRST MEAL OF THE DAY ON AN EMPTY STOMACH ORALLY ONCE A DAY 30 DAYS for 30    Historical Provider, MD   metFORMIN (GLUCOPHAGE) 500 MG tablet TAKE 1 TABLET (500 MG TOTAL) BY MOUTH DAILY WITH BREAKFAST. 6/10/22   Historical Provider, MD   STOOL SOFTENER/LAXATIVE 50-8.6 MG per tablet TAKE 2 TABLETS BY MOUTH AT BEDTIME. 6/29/22   Historical Provider, MD   tiZANidine (ZANAFLEX) 4 MG tablet Take 4 mg by mouth every 6 hours as needed  6/29/22   Historical Provider, MD   rOPINIRole (REQUIP) 2 MG tablet Take 1 tablet by mouth nightly 5/14/22:  Amount of tablets per RX decreased from 90 (30 days supply) to 60 (30 days supply) since 11/2020.   Patient taking differently: Take 2 mg by mouth in the morning and at bedtime 5/14/22:  Amount of tablets per RX decreased from 90 (30 days supply) to 60 (30 days supply) since 11/2020. 5/18/22   Ahsan Baird MD   furosemide (LASIX) 20 MG tablet Take 1 tablet by mouth daily 5/18/22   Ahsan Baird MD   sertraline (ZOLOFT) 100 MG tablet Take 1 tablet by mouth 2 times daily 5/18/22   Ahsan Baird MD   ondansetron (ZOFRAN-ODT) 4 MG disintegrating tablet Take 1 tablet by mouth 3 times daily as needed for Nausea or Vomiting 3/7/21   Neida Burkitt, MD   polyethylene glycol (MIRALAX) PACK packet Take 17 g by mouth daily    Historical Provider, MD   albuterol sulfate HFA (PROVENTIL HFA) 108 (90 Base) MCG/ACT inhaler Inhale 2 puffs into the lungs every 4 hours as needed for Wheezing 4/9/18 4/9/19  Cain Heimlich, MD   risperiDONE (RISPERDAL) 0.5 MG tablet Take 0.5 mg by mouth 2 times daily    Historical Provider, MD MARINA ELLIPTA 100-25 MCG/INH AEPB inhaler INHALE 1 PUFF BY MOUTH ONCE DAILY (RINSE MOUTH AFTER EACH USE) 1/30/18   Historical Provider, MD   omeprazole (PRILOSEC) 40 MG delayed release capsule TAKE ONE CAPSULE(S) BY MOUTH EVERY MORNING 1/31/18   Historical Provider, MD   atorvastatin (LIPITOR) 20 MG tablet Take 1 tablet by mouth daily. Patient taking differently: Take 60 mg by mouth daily  1/6/15   Melina Casey MD   pregabalin (LYRICA) 300 MG capsule Take 300 mg by mouth 2 times daily.      Historical Provider, MD       Allergies as of 02/02/2023 - Reviewed 02/02/2023   Allergen Reaction Noted    Asa buff (mag [buffered aspirin] Hives 04/19/2012    Cymbalta [duloxetine hcl] Nausea And Vomiting 08/10/2016       Past Medical History:   Diagnosis Date    Anxiety     Arthritis     Cancer ([facility] 1980    Cervical    Chronic back pain     Chronic pain     COPD (chronic obstructive pulmonary disease) ([facility])     Depression     DJD (degenerative joint disease), cervical     Drug-seeking behavior     GERD (gastroesophageal reflux disease)     Hypercholesteremia     Hypertension     IBS (irritable bowel syndrome)     Insomnia     K deficiency     Kidney stone     Narcotic addiction [facility])     Patient Denies    Narcotic overdose [facility])     Patient Denies    Osteoporosis     Overdose     [id number] Patient Denies on 4/3/14    PNA (pneumonia) 10/17/2016    Restless leg syndrome     Thyroid disease         Surgical History:   Past Surgical History:   Procedure Laterality Date    APPENDECTOMY      BACK SURGERY      CERVICAL DISCECTOMY  12-14-13    anterior discetomy fusion c 3/4 c 4/5 posterior fusion C3-C5 screws and rods C3-C5    CHOLECYSTECTOMY  2012    Laparoscopic    COLONOSCOPY  2008    COLONOSCOPY  08/10/2016    FOOT SURGERY Right     FRACTURE SURGERY Right 9/14    distal radius    HAND SURGERY  08/2012    crushed right hand and has two steel plates in hand HYSTERECTOMY (CERVIX STATUS UNKNOWN)      NECK SURGERY  2002    disc    OTHER SURGICAL HISTORY  4/9/14    TRANSFORAMINAL LUMBAR INTERBODY FUSION L4 L5        Family History:    Family History   Problem Relation Age of Onset    Heart Disease Mother     High Cholesterol Mother     Diabetes Mother     Asthma Mother     Heart Failure Mother     Hypertension Mother     Heart Disease Father     High Cholesterol Father     Emphysema Father     Heart Failure Father     Hypertension Father     Cancer Neg Hx        Social History     Socioeconomic History    Marital status:      Spouse name: Not on file    Number of children: 3    Years of education: Not on file    Highest education level: Not on file   Occupational History    Not on file   Tobacco Use    Smoking status: Every Day     Packs/day: 1.00     Years: 40.00     Pack years: 40.00     Types: Cigarettes     Start date: 1/1/1973    Smokeless tobacco: Never   Substance and Sexual Activity    Alcohol use: No    Drug use: No    Sexual activity: Not Currently   Other Topics Concern    Not on file   Social History Narrative    Not on file     Social Determinants of Health     Financial Resource Strain: Not on file   Food Insecurity: Not on file   Transportation Needs: Not on file   Physical Activity: Not on file   Stress: Not on file   Social Connections: Not on file   Intimate Partner Violence: Not on file   Housing Stability: Not on file      has a past medical history of Anxiety, Arthritis, Cancer ([address] (1980), Chronic back pain, Chronic pain, COPD (chronic obstructive pulmonary disease) ([address] Depression, DJD (degenerative joint disease), cervical, Drug-seeking behavior, GERD (gastroesophageal reflux disease), Hypercholesteremia, Hypertension, IBS (irritable bowel syndrome), Insomnia, K deficiency, Kidney stone, Narcotic addiction ([address] Narcotic overdose ([address] Osteoporosis, Overdose, PNA (pneumonia) (10/17/2016), Restless leg syndrome, and Thyroid disease. Nursing notes reviewed. ED Triage Vitals   Enc Vitals Group      BP 02/02/23 0510 106/69      Heart Rate 02/02/23 0459 100      Resp 02/02/23 0459 22      Temp 02/02/23 0510 97.7 °F (36.5 °C)      Temp Source 02/02/23 0510 Axillary      SpO2 02/02/23 0459 (!) 86 %      Weight 02/02/23 0459 180 lb (81.6 kg)      Height 02/02/23 0514 5' 3\" (1.6 m)      Head Circumference --       Peak Flow --       Pain Score --       Pain Loc --       Pain Edu? --       Excl. in GC? --        GENERAL:   Body mass index is 31.89 kg/m². Somnolent, and obtunded however does somewhat respond to verbal stimuli. Well developed, well nourished with no apparent distress. toxic-appearing, ill-appearing. HENT:   Normocephalic, Atraumatic, no lacerations. No ENT exam due to PPE. EYES:   Conjunctiva normal,   Pupils equal round and sluggishly reactive to light,   NECK:  Trachea is midline. No stridor. CHEST:  Regular rate and regular rhythm, no murmurs/rubs/gallops,  normal S1/S2, chest wall non-tender. LUNGS:  No respiratory distress. + abdominal retractions, no sternal retractions  Jose Ramon breath sounds bilaterally with coarse rhonchi and coarse wheezing  Unable to speak comfortably in full sentences  ABDOMEN:  Soft, non-tender, non-distended. No guarding. No rebound. Normal BS, no organomegaly, no abdominal masses  EXTREMITIES:  No tenderness, no deformity,  distal pulses present and equal bilaterally. BACK:  SKIN:  Warm, dry and intact. NEUROLOGIC:  Altered mental status. Moving all extremities to command. Open eyes to verbal and tactile stimuli however is obtunded and easily falls asleep  without focal motor deficit or gross sensory deficit. LABS and DIAGNOSTIC RESULTS  EKG  The Ekg interpreted by me shows  normal sinus rhythm with a rate of 90  Axis is   Normal  QTc is  normal  Intervals and Durations are unremarkable.       ST Segments: no acute change and normal  Delta waves, Brugada Syndrome, and Short MD are not present. Prior EKG to compare with was available. No significant changes compared to prior EKG from May 14 2022    RADIOLOGY  X-RAYS:  I have reviewed radiologic plain film image(s). ALL OTHER NON-PLAIN FILM IMAGES SUCH AS CT, ULTRASOUND AND MRI HAVE BEEN READ BY THE RADIOLOGIST. XR CHEST PORTABLE   Final Result   1. Findings suggestive of partial right upper lobe atelectasis. 2. Small left pleural effusion. 3. Central pulmonary vascular congestion without overt pulmonary edema. 4. Left basilar atelectasis. XR CHEST PORTABLE    (Results Pending)        Imaging from past 7 days  No results found.       Chest X-Ray  Interpreted by: Emergency Department Physician  View: Portable chest xray  Findings: See radiology report    LABS  Results for orders placed or performed during the hospital encounter of 02/02/23   CBC with Auto Differential   Result Value Ref Range    WBC 8.7 4.0 - 11.0 K/uL    RBC 3.77 (L) 4.00 - 5.20 M/uL    Hemoglobin 11.8 (L) 12.0 - 16.0 g/dL    Hematocrit 36.5 36.0 - 48.0 %    MCV 96.8 80.0 - 100.0 fL    MCH 31.3 26.0 - 34.0 pg    MCHC 32.3 31.0 - 36.0 g/dL    RDW 16.0 (H) 12.4 - 15.4 %    Platelets 274 (L) 213 - 450 K/uL    MPV 8.8 5.0 - 10.5 fL    Neutrophils % 86.4 %    Lymphocytes % 5.0 %    Monocytes % 8.5 %    Eosinophils % 0.0 %    Basophils % 0.1 %    Neutrophils Absolute 7.5 1.7 - 7.7 K/uL    Lymphocytes Absolute 0.4 (L) 1.0 - 5.1 K/uL    Monocytes Absolute 0.7 0.0 - 1.3 K/uL    Eosinophils Absolute 0.0 0.0 - 0.6 K/uL    Basophils Absolute 0.0 0.0 - 0.2 K/uL   Comprehensive Metabolic Panel w/ Reflex to MG   Result Value Ref Range    Sodium 141 136 - 145 mmol/L    Potassium reflex Magnesium 4.9 3.5 - 5.1 mmol/L    Chloride 101 99 - 110 mmol/L    CO2 28 21 - 32 mmol/L    Anion Gap 12 3 - 16    Glucose 203 (H) 70 - 99 mg/dL    BUN 19 7 - 20 mg/dL    Creatinine 0.7 0.6 - 1.2 mg/dL    Est, Glom Filt Rate >60 >60    Calcium 9.4 8.3 - 10.6 mg/dL    Total Protein 6.9 6.4 - 8.2 g/dL    Albumin 3.9 3.4 - 5.0 g/dL    Albumin/Globulin Ratio 1.3 1.1 - 2.2    Total Bilirubin 0.3 0.0 - 1.0 mg/dL    Alkaline Phosphatase 134 (H) 40 - 129 U/L    ALT 16 10 - 40 U/L    AST 18 15 - 37 U/L   Blood gas, venous   Result Value Ref Range    pH, Don 7.261 (L) 7.350 - 7.450    pCO2, Don 77.9 (H) 40.0 - 50.0 mmHg    pO2, Don 43.7 (H) 25.0 - 40.0 mmHg    HCO3, Venous 34.3 (H) 23.0 - 29.0 mmol/L    Base Excess, Don 4.8 (H) -3.0 - 3.0 mmol/L    O2 Sat, Don 71 Not Established %    Carboxyhemoglobin 6.8 (H) 0.0 - 1.5 %    MetHgb, Don 0.3 <1.5 %    TC02 (Calc), Don 37 Not Established mmol/L    O2 Therapy Unknown    Brain Natriuretic Peptide   Result Value Ref Range    Pro-BNP 40 0 - 124 pg/mL   Troponin   Result Value Ref Range    Troponin <0.01 <0.01 ng/mL   Lactic Acid   Result Value Ref Range    Lactic Acid 1.3 0.4 - 2.0 mmol/L   Blood gas, arterial   Result Value Ref Range    pH, Arterial 7.244 (L) 7.350 - 7.450    pCO2, Arterial 80.8 (HH) 35.0 - 45.0 mmHg    pO2, Arterial 68.5 (L) 75.0 - 108.0 mmHg    HCO3, Arterial 34.2 (H) 21.0 - 29.0 mmol/L    Base Excess, Arterial 4.5 (H) -3.0 - 3.0 mmol/L    Hemoglobin, Art, Extended 12.3 12.0 - 16.0 g/dL    O2 Sat, Arterial 89.7 (L) >92 %    Carboxyhgb, Arterial 1.6 (H) 0.0 - 1.5 %    Methemoglobin, Arterial 0.3 <1.5 %    TCO2, Arterial 36.6 Not Established mmol/L    O2 Therapy Unknown        SCREENINGS  NIH Score     [place]  [place] Coma Scale  Eye Opening: Spontaneous  Best Verbal Response: Confused  Best Motor Response: Obeys commands  [place] Coma Scale Score: 14  [facility]    Heart Score         PROCEDURES  Unless otherwise noted below, none    Procedures    MEDICAL DECISION MAKING  I am the Primary Clinician of Record.     Procedures/interventions/images ordered for this visit  Orders Placed This Encounter   Procedures    COVID-19 & Influenza Combo    Blood Culture 1    Culture, Blood 2    Culture, Respiratory    XR CHEST PORTABLE    XR CHEST PORTABLE    CBC with Auto Differential    Comprehensive Metabolic Panel w/ Reflex to MG    Blood gas, venous    Brain Natriuretic Peptide    Troponin    Lactic Acid    Blood gas, arterial    Procalcitonin    Initiate Oxygen Therapy Protocol    Initiate ED RT Bronchospasm Protocol    Adult NIV/Positive Airway Pressure    EKG 12 Lead    Saline lock IV    Insert peripheral IV       Medications ordered for this visit  Medications   0.9 % sodium chloride infusion ( IntraVENous New Bag 2/2/23 7491)   ipratropium-albuterol (DUONEB) nebulizer solution 1 ampule (1 ampule Inhalation Given 2/2/23 0113)     Or   albuterol (PROVENTIL) nebulizer solution 2.5 mg ( Nebulization See Alternative 2/2/23 5013)   propofol 1000 MG/100ML injection (has no administration in time range)   ketamine (KETALAR) injection (100 mg IntraVENous Given 2/2/23 8724)   rocuronium (ZEMURON) injection (50 mg IntraVENous Given 2/2/23 6827)   methylPREDNISolone sodium (SOLU-MEDROL) injection 60 mg (60 mg IntraVENous Given 2/2/23 0916)       ED course notes for this visit       Is this patient to be included in the SEP-1 Core Measure due to severe sepsis or septic shock? No   Exclusion criteria - the patient is NOT to be included for SEP-1 Core Measure due to:  May have criteria for sepsis, but does not meet criteria for severe sepsis or septic shock    I evaluated the patient in room 02/02      Consults ordered:  None  Discussion with Other Professionals : None    Social Determinants : None    Chronic Conditions:  See HPI and PMHx    Records Reviewed : External ED Note was seen at [address] [place] [address] in [facility] on January 13. At that time she was complaining of bilateral lower extremity swelling and redness as well as shortness of breath work-up was unremarkable for any acute findings that required hospitalization.   Patient was discharged on Augmentin for cellulitis and given a steroid taper for likely COPD exacerbation    Patient requires admission for acute respiratory failure         Disposition Considerations   (include 1 Tests not done, Shared Decision Making, Pt expectation of Test or Tx.):      I spoke with Dr. Randee Smith. We thoroughly discussed the history, physical exam, laboratory and imaging studies, as well as, emergency department course. Based upon that discussion, we've decided to admit Ana Kulkarni for further observation and evaluation of Gaviota Jones's respiratory failure. As I have deemed necessary from their history, physical, and studies, I have considered and evaluated Ana Kulkarni for the following diagnoses:  Differential Diagnosis: Acute Coronary Syndrome, Congestive Heart Failure, Myocardial Infarction, Pulmonary Embolus, Pneumonia, Pneumothorax, other        FINAL IMPRESSION  1. Acute respiratory failure with hypercapnia (HCC)    2. COPD exacerbation ([address]    3. Acute encephalopathy        Vitals:  Blood pressure 125/62, pulse 81, temperature 97.7 °F (36.5 °C), temperature source Axillary, resp. rate 14, height 5' 3\" (1.6 m), weight 180 lb (81.6 kg), SpO2 96 %, not currently breastfeeding. Disposition  Please note, critical care time was at least 15 minutes, obtaining history, conducting a physical exam, performing and monitoring interventions, ordering, collecting and interpreting tests, and establishing medical decision-making and discussion with the patient and/or family, specifically for management of the presenting complaint and symptoms initially, direct bedside care, reevaluation, review of records, and consultation. There was a high probability of clinically significant life-threatening deterioration in the patient's condition, which required my urgent intervention. This time does not include separately billable procedures. Pt is in critical condition upon Admit to CCU/ICU. Pt was seen during the [place] 19 pandemic. Appropriate PPE worn by this writer during patient encounters.  Pt seen during a time with constrained hospital bed capacity and other potential inpatient and outpatient resources were constrained due to the viral pandemic. The note was completed using Dragon voice recognition transcription. Every effort was made to ensure accuracy; however, inadvertent transcription errors may be present despite my best efforts to edit errors.     Mike Delarosa MD  [address] [place] [address] MD Jorge Luis  02/02/23 9559

## 2023-02-02 NOTE — ED NOTES
Patient's ET tube adjusted 3 cm by RT. Tube now secure at 21 @ lips. Dr. Vivi Guo at bedside.       Dorota Velazquez RN  02/02/23 8420

## 2023-02-02 NOTE — ED NOTES
Pts Daughter/POA at bedside. Dr. Garcia Deter talking to her about all results and POC.      Nick Proctor, RN  02/02/23 5346

## 2023-02-02 NOTE — ED NOTES
[facility] Kaufman 7. [facility]  02/02/23 9729

## 2023-02-02 NOTE — CONSULTS
[facility]                                                                    CHIEF COMPLAINT: SOB ,leg swelling     Consulting provider: Dr Jeffy Soares     HPI:   74Year old with 48 PPY smoking hsitory   Follow with [facility]  She is on home O2 2 L   Comes in with SOB and HANKINS as well as chest tightness   She also has bilateral leg pain . She is at home albuterol        ED Course- Elevated d dimer. VBG mild respiratory acidosis. EKG sinus tachycardia. CXR with mild bilateral interstitial opacities could be atelectasis vs pulmonary edema. Xray left tib fib with moderate cellulitis or edema in soft tissues around calf extending into ankle, no bony abnormality. Given decadron and duonebs.  IV ceftriaxone and zithromax given    CT chest stable when comapre with old CT  On Breo at home   ABG shows some hypercapnia andAnd Co2     Past Medical History:   Diagnosis Date    Anxiety     Arthritis     Cancer ([facility] [address] 1980    Cervical    Chronic back pain     Chronic pain     COPD (chronic obstructive pulmonary disease) (HCC)     Depression     DJD (degenerative joint disease), cervical     Drug-seeking behavior     GERD (gastroesophageal reflux disease)     Hypercholesteremia     Hypertension     IBS (irritable bowel syndrome)     Insomnia     K deficiency     Kidney stone     Narcotic addiction [facility])     Patient Denies    Narcotic overdose [facility])     Patient Denies    Osteoporosis     Overdose     [id number] Patient Denies on 4/3/14    PNA (pneumonia) 10/17/2016    Restless leg syndrome     Thyroid disease        Past Surgical History:        Procedure Laterality Date    APPENDECTOMY      BACK SURGERY      CERVICAL DISCECTOMY  12-14-13    anterior discetomy fusion c 3/4 c 4/5 posterior fusion C3-C5 screws and rods C3-C5    CHOLECYSTECTOMY  2012    Laparoscopic    COLONOSCOPY  2008    COLONOSCOPY  08/10/2016    FOOT SURGERY Right     FRACTURE SURGERY Right 9/14    distal radius    HAND SURGERY  08/2012    crushed right hand and has two steel plates in hand    HYSTERECTOMY (CERVIX STATUS UNKNOWN)      NECK SURGERY  2002    disc    OTHER SURGICAL HISTORY  4/9/14    TRANSFORAMINAL LUMBAR INTERBODY FUSION L4 L5       Allergies:  is allergic to asa buff (mag [buffered aspirin] and cymbalta [duloxetine hcl]. Social History:    TOBACCO:   reports that she has been smoking cigarettes. She started smoking about 50 years ago. She has a 40.00 pack-year smoking history. She has never used smokeless tobacco.  ETOH:   reports no history of alcohol use.       Family History:       Problem Relation Age of Onset    Heart Disease Mother     High Cholesterol Mother     Diabetes Mother     Asthma Mother     Heart Failure Mother     Hypertension Mother     Heart Disease Father     High Cholesterol Father     Emphysema Father     Heart Failure Father     Hypertension Father     Cancer Neg Hx        Current Medications:    Current Facility-Administered Medications:     0.9 % sodium chloride infusion, , IntraVENous, Continuous, Swapna Silverio MD, Last Rate: 100 mL/hr at 02/02/23 0536, New Bag at 02/02/23 0536    ipratropium-albuterol (DUONEB) nebulizer solution 1 ampule, 1 ampule, Inhalation, Q20 Min PRN, 1 ampule at 02/02/23 0723 **OR** [DISCONTINUED] albuterol (PROVENTIL) nebulizer solution 2.5 mg, 2.5 mg, Nebulization, Q20 Min PRN, Swapna Silverio MD    propofol injection, 5-50 mcg/kg/min, IntraVENous, Continuous, Bailee Olvear MD, Last Rate: 4.9 mL/hr at 02/02/23 0829, 10 mcg/kg/min at 02/02/23 0829    midazolam (VERSED) injection 2 mg, 2 mg, IntraVENous, Q1H PRN, Bailee Olvera MD, 2 mg at 02/02/23 0827    fentaNYL (SUBLIMAZE) injection 25 mcg, 25 mcg, IntraVENous, Q1H PRN, Bailee Olvera MD, 25 mcg at 02/02/23 8886    norepinephrine (LEVOPHED) 16 mg in sodium chloride 0.9 % 250 mL infusion, 1-100 mcg/min, IntraVENous, Continuous, Bunny Brown MD, Last Rate: 9.4 mL/hr at 02/02/23 0755, 10 mcg/min at 02/02/23 0755    ketamine (KETALAR) 500 mg in sodium chloride 0.9 % 100 mL infusion, 0.05-1 mg/kg/hr (Ideal), IntraVENous, Continuous, Ngoziiitodiego Brown MD, Last Rate: 7.3 mL/hr at 02/02/23 0832, 0.7 mg/kg/hr at 02/02/23 0832    albuterol (PROVENTIL) nebulizer solution 2.5 mg, 2.5 mg, Nebulization, Q20 Min PRN, João May MD      REVIEW OF SYSTEMS:  Constitutional: Negative for fever  HENT: Negative for sore throat  Eyes: Negative for redness   Respiratory: Shortness of breath, cough  Cardiovascular: Negative for chest pain  Gastrointestinal: Negative for vomiting, diarrhea   Genitourinary: Negative for hematuria   Musculoskeletal: Negative for arthralgias   Skin: Negative for rash  Neurological: Negative for syncope  Hematological: Negative for adenopathy  Psychiatric/Behavorial: Negative for anxiety      Objective:   PHYSICAL EXAM:    Blood pressure 127/64, pulse 74, temperature 97.7 °F (36.5 °C), temperature source Axillary, resp. rate 24, height 5' 3\" (1.6 m), weight 180 lb (81.6 kg), SpO2 91 %, not currently breastfeeding.' on RA  Gen: No distress.   Eyes: PERRL. No sclera icterus. No conjunctival injection.   ENT: No discharge. Pharynx clear.   Neck: Trachea midline. No obvious mass.   Resp: Rhonchi bilaterally more in the right   CV: Regular rate. Regular rhythm. No murmur or rub. No edema.   GI: Non-tender. Non-distended. No hernia.   Skin: Warm and dry. No nodule on exposed extremities.   Lymph: No cervical LAD. No supraclavicular LAD.   M/S: No cyanosis. No joint deformity. No clubbing.   Neuro: Awake. Alert. Moves all four extremities.   Psych: Oriented x 3. No anxiety.           DATA reviewed by me:       Assessment:       Acute COPD exacerbation   Bilateral lower extremity cellulitis  Possible CHF/fluid overload      Plan:         *-IV steroids   *-IV abx, Rocephin and Zithromax  *-negative  viral panel   *- Sputum culture   *_Wean O2  *-  procalcitonin  *-BD  ICS   Watch K level   Strep pneum  legionella  Incentive spirmetery and flutter valve   Watch IVF   CT chest :stable GGO lungs   Check o2 at ambulation before discharge  May needed NIMV    Pending ultrasound lower extremities to rule out the  We will follow along. Care reviewed with nursing staff, medical and surgical specialty care, primary care and the patient's family as available. Chart review/lab review/X-ray viewing/documentation and had long Conversation with patient/family re: prognosis, care options and any end of life issues:      Critical care time spent reviewing labs/films, examining patient, collaborating with other physicians more than 35  Minutes  excluding procedures . Didi Denney M.D.   2/3/2023  8:02 PM      Thank you very much for allowing me to participate in the care of this pleasant patient , should you have any questions ,please do not hesitate to contact me      Benedict Womack MD,[facility]  Pulmonary&Critical Care Medicine    Daniel Johnson    NOTE: This report was transcribed using voice recognition software. Every effort was made to ensure accuracy; however, inadvertent computerized transcription errors may be present.

## 2023-02-02 NOTE — ACP (ADVANCE CARE PLANNING)
Advance Care Planning     General Advance Care Planning (ACP) Conversation    Date of Conversation: 2/2/2023  Conducted with: pt's [place] Decision Maker:    Primary Decision Maker: Brad Hardy - Child - [phone]    Secondary Decision Maker: Alanson Ormond - Child - [phone]  Click here to complete Healthcare Decision Makers including selection of the Healthcare Decision Maker Relationship (ie \"Primary\"). Content/Action Overview:    Per pt's daughter,Helene, Pt to remain Full Code.       Length of Voluntary ACP Conversation in minutes:  <16 minutes (Non-Billable)    Leon Borden RN

## 2023-02-02 NOTE — H&P
Hospital Medicine History & Physical      PCP: Tanisha Mcclellan    Date of Service: Pt seen/examined on 2/2/23 and admitted on 2/2/23 to Inpatient    Chief Complaint   Patient presents with    Shortness of Breath     Pt arrives with complaint of SOB x 1 hour. O2 was 70% on RA. Got duoneb en route. History Of Present Illness: The patient is a 79 y.o. female with PMH below, presents with reported shortness of breath. I am unable to obtain HPI as the patient is obtunded. She failed NIV in the ER with worsening hypercapnia and mental status. She was intubated emergently by me. She has a history of COPD. I do not see record that she is on O2 at home. She does have a history of narcotic overdose in the past.  Added on a urine tox. No additional info is available at this time.     Past Medical History:        Diagnosis Date    Anxiety     Arthritis     Cancer ([facility] [address] 1980    Cervical    Chronic back pain     Chronic pain     COPD (chronic obstructive pulmonary disease) (HCC)     Depression     DJD (degenerative joint disease), cervical     Drug-seeking behavior     GERD (gastroesophageal reflux disease)     Hypercholesteremia     Hypertension     IBS (irritable bowel syndrome)     Insomnia     K deficiency     Kidney stone     Narcotic addiction [facility])     Patient Denies    Narcotic overdose [facility])     Patient Denies    Osteoporosis     Overdose     [id number] Patient Denies on 4/3/14    PNA (pneumonia) 10/17/2016    Restless leg syndrome     Thyroid disease        Past Surgical History:        Procedure Laterality Date    APPENDECTOMY      BACK SURGERY      CERVICAL DISCECTOMY  12-14-13    anterior discetomy fusion c 3/4 c 4/5 posterior fusion C3-C5 screws and rods C3-C5    CHOLECYSTECTOMY  2012    Laparoscopic    COLONOSCOPY  2008    COLONOSCOPY  08/10/2016    FOOT SURGERY Right     FRACTURE SURGERY Right 9/14    distal radius    HAND SURGERY  08/2012    crushed right hand and has two steel plates in hand    HYSTERECTOMY (CERVIX STATUS UNKNOWN)      NECK SURGERY  2002    disc    OTHER SURGICAL HISTORY  4/9/14    TRANSFORAMINAL LUMBAR INTERBODY FUSION L4 L5       Medications Prior to Admission:    Prior to Admission medications    Medication Sig Start Date End Date Taking? Authorizing Provider   losartan (COZAAR) 50 MG tablet Take 1 tablet by mouth daily 7/4/22   Jaspal Guerra MD   TRELEGY ELLIPTA 100-62.5-25 MCG/INH AEPB INHALE 1 PUFF INTO THE LUNGS DAILY. 6/29/22   Historical Provider, MD   ACCU-CHEK GUIDE strip USE 1 STRIP AS DIRECTED 4 TIMES A DAY (MAY TEST LESS FREQUENT NOT INJECTING INSULIN) 6/29/22   Historical Provider, MD   Accu-Chegeovanny Softclix Lancets MISC USE 3 TO 4 TIMES A DAY 6/29/22   Historical Provider, MD   linaclotide (LINZESS) 290 MCG CAPS capsule TAKE 1 CAPSULE AT LEAST 30 MINUTES BEFORE THE FIRST MEAL OF THE DAY ON AN EMPTY STOMACH ORALLY ONCE A DAY 30 DAYS for 30    Historical Provider, MD   metFORMIN (GLUCOPHAGE) 500 MG tablet TAKE 1 TABLET (500 MG TOTAL) BY MOUTH DAILY WITH BREAKFAST. 6/10/22   Historical Provider, MD   STOOL SOFTENER/LAXATIVE 50-8.6 MG per tablet TAKE 2 TABLETS BY MOUTH AT BEDTIME. 6/29/22   Historical Provider, MD   tiZANidine (ZANAFLEX) 4 MG tablet Take 4 mg by mouth every 6 hours as needed  6/29/22   Historical Provider, MD   rOPINIRole (REQUIP) 2 MG tablet Take 1 tablet by mouth nightly 5/14/22:  Amount of tablets per RX decreased from 90 (30 days supply) to 60 (30 days supply) since 11/2020.   Patient taking differently: Take 2 mg by mouth in the morning and at bedtime 5/14/22:  Amount of tablets per RX decreased from 90 (30 days supply) to 60 (30 days supply) since 11/2020. 5/18/22   Jaspal Guerra MD   furosemide (LASIX) 20 MG tablet Take 1 tablet by mouth daily 5/18/22   Jaspal Guerra MD   sertraline (ZOLOFT) 100 MG tablet Take 1 tablet by mouth 2 times daily 5/18/22   Jaspal Guerra MD   ondansetron (ZOFRAN-ODT) 4 MG disintegrating tablet Take 1 tablet by mouth 3 times daily as needed for Nausea or Vomiting 3/7/21   Alejandra Avila MD   polyethylene glycol (MIRALAX) PACK packet Take 17 g by mouth daily    Historical Provider, MD   albuterol sulfate HFA (PROVENTIL HFA) 108 (90 Base) MCG/ACT inhaler Inhale 2 puffs into the lungs every 4 hours as needed for Wheezing 4/9/18 4/9/19  Todd Grande MD   risperiDONE (RISPERDAL) 0.5 MG tablet Take 0.5 mg by mouth 2 times daily    Historical Provider, MD MARINA ELLIPTA 100-25 MCG/INH AEPB inhaler INHALE 1 PUFF BY MOUTH ONCE DAILY (RINSE MOUTH AFTER EACH USE) 1/30/18   Historical Provider, MD   omeprazole (PRILOSEC) 40 MG delayed release capsule TAKE ONE CAPSULE(S) BY MOUTH EVERY MORNING 1/31/18   Historical Provider, MD   atorvastatin (LIPITOR) 20 MG tablet Take 1 tablet by mouth daily.  Patient taking differently: Take 60 mg by mouth daily  1/6/15   Pancho Mathews MD   pregabalin (LYRICA) 300 MG capsule Take 300 mg by mouth 2 times daily.     Historical Provider, MD       Allergies:  Asa buff (mag [buffered aspirin] and Cymbalta [duloxetine hcl]    Social History:    TOBACCO:   reports that she has been smoking cigarettes. She started smoking about 50 years ago. She has a 40.00 pack-year smoking history. She has never used smokeless tobacco.  ETOH:   reports no history of alcohol use.    Family History:  Reviewed in detail and negative for DM, Early CAD, Cancer (except as below). Positive as follows:        Problem Relation Age of Onset    Heart Disease Mother     High Cholesterol Mother     Diabetes Mother     Asthma Mother     Heart Failure Mother     Hypertension Mother     Heart Disease Father     High Cholesterol Father     Emphysema Father     Heart Failure Father     Hypertension Father     Cancer Neg Hx        REVIEW OF SYSTEMS:   Unable to obtain secondary to patient being obtunded.    PHYSICAL EXAM PERFORMED:  BP (!) 151/73   Pulse 97   Temp 97.7 °F (36.5 °C) (Axillary)   Resp 24   Ht 5' 3\"  (1.6 m)   Wt 180 lb (81.6 kg)   SpO2 92%   BMI 31.89 kg/m²   GEN:  Obtunded and unarousable on NIV. Decision made to emergently intubate at bedside. HEENT:  NC/AT,EOMI, dry MM, no erythema/exudates or visible masses. CVS:  Normal S1,S2. Mild tachy RRR. Without M/G/R.   LUNG:   CTA-B. No wheezes, rales or rhonchi. Copious thick white secretions in larynx noted during intubation. ABD:  Soft, ND/NT, BS+ x4. Without G/R.  EXT: 2+ pulses, no c/c.  Bilat lower legs are erythematous and marked venous stasis changes. Brisk cap refill. PSY:  Thought process obtunded, affect obtunded. MONICA:  Does not follow commands. No visually obvious CN deficit. SKIN: No rash or lesions on visible skin except as above. Chart review shows recent radiographs:  XR CHEST PORTABLE    Result Date: 2/2/2023  EXAMINATION: ONE XRAY VIEW OF THE CHEST 2/2/2023 5:13 am COMPARISON: 06/29/2022 HISTORY: ORDERING SYSTEM PROVIDED HISTORY: SOB TECHNOLOGIST PROVIDED HISTORY: Reason for exam:->SOB Reason for Exam: respitory distress FINDINGS: The lungs are underinflated, resulting in vascular crowding and subsegmental atelectasis. Small left pleural effusion. Streaky left basilar airspace opacities likely reflect atelectasis. Central pulmonary vascular congestion without overt pulmonary edema. Right upper lung zone opacity with a curvilinear portion inferiorly suggestive of partial right upper lobe atelectasis. The cardiac silhouette and mediastinal contours are stable. No acute bony abnormality. 1. Findings suggestive of partial right upper lobe atelectasis. 2. Small left pleural effusion. 3. Central pulmonary vascular congestion without overt pulmonary edema. 4. Left basilar atelectasis.      CBC:  Recent Labs     02/02/23  0505   WBC 8.7   HGB 11.8*   HCT 36.5   *      RENAL  Recent Labs     02/02/23  0505      K 4.9      CO2 28   BUN 19   CREATININE 0.7   GLUCOSE 203*     Hemoglobin a1c:  Lab Results Component Value Date    LABA1C 7.4 06/29/2022    LABA1C 6.5 05/17/2022    LABA1C 5.3 08/30/2013       LFT'S:  Recent Labs     02/02/23  0505   AST 18   ALT 16   BILITOT 0.3   ALKPHOS 134*     CARDIAC ENZYMES:   Recent Labs     02/02/23  0505   TROPONINI <0.01     Lab Results   Component Value Date    PROBNP 40 02/02/2023    PROBNP 35 05/14/2022    PROBNP 88 03/07/2021     LACTIC ACID:  Recent Labs     02/02/23  0506   LACTA 1.3     U/A:  Pending    Urine Tox Screen:  Added on    ABG:   Recent Labs     02/02/23  0544   PHART 7.244*   AZS9IIM 80.8*   PO2ART 68.5*   OVY4OLW 34.2*   J0DTYSBB 89.7*   XAZ2ZIF 36.6   BEART 4.5*     VBG:  Recent Labs     02/02/23  0506   PHVEN 7.261*   LIJ4DTN 77.9*   YUH0DAM 34.3*   PO2VEN 43.7*   S1FMFNEB 71        PHYSICIAN CERTIFICATION  I certify that Edna Mcconnell is expected to be hospitalized for 2 midnights based on the following assessment and plan:    ASSESSMENT/PLAN:  Acute respiratory failure with hypoxia and hypercapnia, likely related to aeCOPD. Supplemental O2 to maintain SPO2 ? 92%, continuous pulse ox. O2 sat 86% on 5L in the ER. Initially placed on NIV. Too obtunded for NIV and PCO2 worsened after ~1 h on NIV. Intubated by me. Wean as tolerated. Continue vent and sedation per my orders until seen by Pulm Service. Will run vent at elevated RR for now to blow off some CO2. Pulm c/s. aeCOPD, IV solumedrol, IV levofloxacin, PRN/GIDEON intensive NEB therapy. Check respiratory culture and procalcitonin. If procalcitonin is positive antibiotics may need expanded. Pulm consult. Hold home regimen for now. Acute encephalopathy, obtunded, likely metabolic 2/2 hypercarbia. DM2, hold oral Rx, chk A1c, add Mod SSI q4h. BLE venous stasis changes. Tobacco Abuse, unable to  cessation 2/2 MS, 21 mg nicotine patch. DVT Prophylaxis: Lx  Diet: NPO  Code Status: Full Code   PT/OT Eval Status:  Will order if needed and as patient condition allows  Dispo - Admit to inpatient ICU    Total critical care time caring for this patient with life threatening, unstable organ failure, including direct patient contact, management of life support systems, review of data including imaging and labs, discussions with other team members and physicians is 35 minutes so far today, excluding procedures. Maren Pinon MD    Thank you Sandra Yanelyde for the opportunity to be involved in this patient's care. If you have any questions or concerns please feel free to contact me via the [employer] at [phone]. This chart was generated using the [address] [place], [place] [address]  dictation system. I created this record but it may contain dictation errors given the limitations of this technology.

## 2023-02-02 NOTE — PROCEDURES
BROCHOSCOPY PROCEDURE NOTE    DATE OF PROCEDURE: 2/ 2 / 2023    INDICATIONS & HISTORY:  White out lung     PREOPERATIVE DIAGNOSIS:    same    POSTOPERATIVE DIAGNOSES:  Mucus plugs       PROCEDURE PERFORMED:   1-Diagnotic, Fiberoptic Bronchoscopy  2-Bronchial alveolar lavage from LLL  3-removal of large mucus plug left lower lobe /main bronchus   4-             SURGEON:    Bebeto Miller     ASSISTANT:   Bronchoscopy Nursing/Technical Team/OR Team as available i    SEDATION:     Regional Anesthesia,       ANESTHESIA:    Lidocaine 2% ,       ANESTHESIOLOGIST:  Per EPIC Note    SPECIMENS:  [x] Bronchial sample(s) for      Fungal smear & culture,   Acid-fast bacillus Smear and Culture,    Gram stain, C&S,    PCP               Cytology                   Description of Procedure: The patient  identified Dafne Amanda  and the procedure verified as Flexible Fiberoptic Bronchoscopy with BAl and removal of mucus plug         After the patient was controlled with sedation bronchoscope was introduced through ET without difficulty. The scope was then passed into the trachea. Additional 2% lidocaine was used topically within the airway. Careful inspection of the tracheal lumen was accomplished. The scope was sequentially passed into all bronchial airways.            Endobronchial findings:   Trachea:  Normal mucosa, he the part seen outside the ET tube  Janet  Normal mucosa     Right Main Stem Bronchus  Normal mucosa  Right Upper Lobe Bronchi Normal mucosa  Right Middle Lobe Bronchi  Normal mucosa  Right Lower Lobe Bronchi (including the Superior segment)  Normal mucosa     Left Main Stem Bronchus Normal mucosa  Left Upper Lobe Bronchus, Upper Division Normal mucosa  Left Upper Lobe Bronchus, Lingula large ,mucus plug with blood clot   Left Lower Lobe Bronchus (including the Superior segment)             BAL : LLL    COMPLICATIONS:  Estimated blood loss less than 0CC          RECOMMENDATIONS:   The Patient was in good condition ,monitor vitals   Await final test/sample results.         Liya Arellano MD

## 2023-02-02 NOTE — ED NOTES
First set of blood cultures collected by ELDER Clemente.    Second set collected by this RN.     Shirin Chapman RN  02/02/23 0675

## 2023-02-02 NOTE — FLOWSHEET NOTE
02/02/23 0715   Vital Signs   Heart Rate (!) 115   Resp 19   BP (!) 141/94   MAP (Calculated) 110   MAP (mmHg) 103   Oxygen Therapy   SpO2 (!) 82 %     Writer called RT due to patient's o2 dropping to 72% even after writer increased fi02 to 100%.

## 2023-02-02 NOTE — ED PROVIDER NOTES
Emergency Department Encounter  Location: [place] [address] ED    Patient: Amy Robertson  MRN: [id number]  : 1955  Date of evaluation: 2023  ED Provider: Antonio Workman MD      Amy Robertson was checked out to me by Dr. Marisol Welch. Please see his/her initial documentation for details of the patient's initial ED presentation, physical exam and completed studies. In brief, Amy Robertson is a 79 y.o. female that presented to the emergency department shortness of breath. Patient has been evaluated and had to be intubated. She was in the emergency room waiting to be transferred to the ICU. Her oxygen sats dropped and her blood pressures also dropped. Rescue therapy was then consulted and came down to the emergency room patient was suction. Vent settings were adjusted patient his PEEP was up to 7.5. Because of low blood pressures which I believe is secondary to propofol Levophed was started. Central line was placed. I also believe that the difficulties with the vent setting was because patient was not adequately sedated. I added a ketamine drip. After doing on this patient was much stable and shortly after was taken to the ICU for further evaluation and treatment. .               Central Line Procedure Note  INDICATION: Hemodynamics  PROCEDURE : Stephanie  ATTENDING PHYSICIAN: Stephanie  Ultrasound Used: Y     CONSENT:   Consent was obtained from yes prior to the procedure. Indications, risks, and benefits were explained at length. PROCEDURE SUMMARY:   The Central Line Insertion was performed using sterile technique. A time out was performed. The right fem region was prepped using chlorhexidine scrub and draped in sterile fashion using a full drape and sterile probe cover employed. The fem pulse was identified. Anesthesia was achieved using 1% lidocaine.  Palpating the fem pulse throughout the procedure, the introducer needle was inserted medial to the femoral artery, inferior to the inguinal crease and into the femoral vein. Venous blood was withdrawn. The syringe was removed and a guidewire was advanced into the introducer needle. A small incision was made at the skin surface with a scalpel and the introducer needle was exchanged for a dilator over the guidewire. After appropriate dilation was obtained, the dilator was exchanged over the wire for a fem central venous catheter. The wire was removed and the catheter was sutured in place a cm. A sterile sorbaview shield was placed over the catheter at the insertion site. The patient tolerated the procedure without any hemodynamic compromise. At time of procedure completion, all ports aspirated and flushed properly.   Estimated blood loss is minimal.      I have reviewed and interpreted all of the currently available lab results and diagnostics from this visit:  Results for orders placed or performed during the hospital encounter of 02/02/23   COVID-19 & Influenza Combo    Specimen: Nasopharyngeal Swab   Result Value Ref Range    SARS-CoV-2 RNA, RT PCR NOT DETECTED NOT DETECTED    INFLUENZA A NOT DETECTED NOT DETECTED    INFLUENZA B NOT DETECTED NOT DETECTED   CBC with Auto Differential   Result Value Ref Range    WBC 8.7 4.0 - 11.0 K/uL    RBC 3.77 (L) 4.00 - 5.20 M/uL    Hemoglobin 11.8 (L) 12.0 - 16.0 g/dL    Hematocrit 36.5 36.0 - 48.0 %    MCV 96.8 80.0 - 100.0 fL    MCH 31.3 26.0 - 34.0 pg    MCHC 32.3 31.0 - 36.0 g/dL    RDW 16.0 (H) 12.4 - 15.4 %    Platelets 457 (L) 651 - 450 K/uL    MPV 8.8 5.0 - 10.5 fL    Neutrophils % 86.4 %    Lymphocytes % 5.0 %    Monocytes % 8.5 %    Eosinophils % 0.0 %    Basophils % 0.1 %    Neutrophils Absolute 7.5 1.7 - 7.7 K/uL    Lymphocytes Absolute 0.4 (L) 1.0 - 5.1 K/uL    Monocytes Absolute 0.7 0.0 - 1.3 K/uL    Eosinophils Absolute 0.0 0.0 - 0.6 K/uL    Basophils Absolute 0.0 0.0 - 0.2 K/uL   Comprehensive Metabolic Panel w/ Reflex to MG   Result Value Ref Range    Sodium 141 136 - 145 mmol/L Potassium reflex Magnesium 4.9 3.5 - 5.1 mmol/L    Chloride 101 99 - 110 mmol/L    CO2 28 21 - 32 mmol/L    Anion Gap 12 3 - 16    Glucose 203 (H) 70 - 99 mg/dL    BUN 19 7 - 20 mg/dL    Creatinine 0.7 0.6 - 1.2 mg/dL    Est, Glom Filt Rate >60 >60    Calcium 9.4 8.3 - 10.6 mg/dL    Total Protein 6.9 6.4 - 8.2 g/dL    Albumin 3.9 3.4 - 5.0 g/dL    Albumin/Globulin Ratio 1.3 1.1 - 2.2    Total Bilirubin 0.3 0.0 - 1.0 mg/dL    Alkaline Phosphatase 134 (H) 40 - 129 U/L    ALT 16 10 - 40 U/L    AST 18 15 - 37 U/L   Blood gas, venous   Result Value Ref Range    pH, Don 7.261 (L) 7.350 - 7.450    pCO2, Don 77.9 (H) 40.0 - 50.0 mmHg    pO2, Don 43.7 (H) 25.0 - 40.0 mmHg    HCO3, Venous 34.3 (H) 23.0 - 29.0 mmol/L    Base Excess, Don 4.8 (H) -3.0 - 3.0 mmol/L    O2 Sat, Don 71 Not Established %    Carboxyhemoglobin 6.8 (H) 0.0 - 1.5 %    MetHgb, Don 0.3 <1.5 %    TC02 (Calc), Don 37 Not Established mmol/L    O2 Therapy Unknown    Brain Natriuretic Peptide   Result Value Ref Range    Pro-BNP 40 0 - 124 pg/mL   Troponin   Result Value Ref Range    Troponin <0.01 <0.01 ng/mL   Lactic Acid   Result Value Ref Range    Lactic Acid 1.3 0.4 - 2.0 mmol/L   Blood gas, arterial   Result Value Ref Range    pH, Arterial 7.244 (L) 7.350 - 7.450    pCO2, Arterial 80.8 (HH) 35.0 - 45.0 mmHg    pO2, Arterial 68.5 (L) 75.0 - 108.0 mmHg    HCO3, Arterial 34.2 (H) 21.0 - 29.0 mmol/L    Base Excess, Arterial 4.5 (H) -3.0 - 3.0 mmol/L    Hemoglobin, Art, Extended 12.3 12.0 - 16.0 g/dL    O2 Sat, Arterial 89.7 (L) >92 %    Carboxyhgb, Arterial 1.6 (H) 0.0 - 1.5 %    Methemoglobin, Arterial 0.3 <1.5 %    TCO2, Arterial 36.6 Not Established mmol/L    O2 Therapy Unknown    Procalcitonin   Result Value Ref Range    Procalcitonin 0.05 0.00 - 0.15 ng/mL   Blood gas, arterial   Result Value Ref Range    pH, Arterial 7.258 (L) 7.350 - 7.450    pCO2, Arterial 79.1 (HH) 35.0 - 45.0 mmHg    pO2, Arterial 41.8 (L) 75.0 - 108.0 mmHg    HCO3, Arterial 34.5 (H) 21.0 - 29.0 mmol/L    Base Excess, Arterial 5.1 (H) -3.0 - 3.0 mmol/L    Hemoglobin, Art, Extended 12.3 12.0 - 16.0 g/dL    O2 Sat, Arterial 67.5 (L) >92 %    Carboxyhgb, Arterial 1.3 0.0 - 1.5 %    Methemoglobin, Arterial 0.3 <1.5 %    TCO2, Arterial 37.0 Not Established mmol/L    O2 Therapy Unknown    Urinalysis with Reflex to Culture    Specimen: Urine   Result Value Ref Range    Color, UA Yellow Straw/Yellow    Clarity, UA Clear Clear    Glucose, Ur Negative Negative mg/dL    Bilirubin Urine Negative Negative    Ketones, Urine Negative Negative mg/dL    Specific Gravity, UA 1.025 1.005 - 1.030    Blood, Urine Negative Negative    pH, UA 5.5 5.0 - 8.0    Protein, UA Negative Negative mg/dL    Urobilinogen, Urine 0.2 <2.0 E.U./dL    Nitrite, Urine Negative Negative    Leukocyte Esterase, Urine TRACE (A) Negative    Microscopic Examination YES     Urine Reflex to Culture Not Indicated    Microscopic Urinalysis   Result Value Ref Range    Hyaline Casts, UA 0-2 0 - 2 /LPF    Mucus, UA Rare (A) None Seen /LPF    WBC, UA 3-5 0 - 5 /HPF    RBC, UA 0-2 0 - 4 /HPF    Epithelial Cells, UA 0-1 0 - 5 /HPF    Amorphous, UA Rare /HPF   Drug screen multi urine   Result Value Ref Range    Amphetamine Screen, Urine Neg Negative <1000ng/mL    Barbiturate Screen, Ur Neg Negative <200 ng/mL    Benzodiazepine Screen, Urine Neg Negative <200 ng/mL    Cannabinoid Scrn, Ur POSITIVE (A) Negative <50 ng/mL    Cocaine Metabolite Screen, Urine Neg Negative <300 ng/mL    Opiate Scrn, Ur POSITIVE (A) Negative <300 ng/mL    PCP Screen, Urine Neg Negative <25 ng/mL    Methadone Screen, Urine Neg Negative <300 ng/mL    Oxycodone Urine Neg Negative <100 ng/ml    FENTANYL SCREEN, URINE Neg Negative <5 ng/mL    pH, UA 5.5     Drug Screen Comment: see below    EKG 12 Lead   Result Value Ref Range    Ventricular Rate 90 BPM    Atrial Rate 90 BPM    P-R Interval 124 ms    QRS Duration 80 ms    Q-T Interval 328 ms    QTc Calculation (Bazett) 401 ms P Axis 76 degrees    R Axis 85 degrees    T Axis 56 degrees    Diagnosis       Undetermined rhythmOtherwise normal ECGWhen compared with ECG of 14-MAY-2022 15:02,Current undetermined rhythm precludes rhythm comparison, needs review     XR CHEST PORTABLE    Result Date: 2/2/2023  EXAMINATION: ONE XRAY VIEW OF THE CHEST 2/2/2023 7:28 am COMPARISON: 02/02/2023 HISTORY: ORDERING SYSTEM PROVIDED HISTORY: ET tube reposition TECHNOLOGIST PROVIDED HISTORY: Reason for exam:->ET tube reposition Reason for Exam: ETT reposition FINDINGS: Endotracheal tube and nasogastric tube are again demonstrated. Diffuse opacity is seen throughout the left lung, increased as compared to prior. Patchy opacity is seen within the mid to lower right lung, similar to prior. No gross pneumothorax. Cardiac and mediastinal silhouette evaluation is limited secondary to extensive left-sided opacity. Complete opacification has developed of the left hemithorax, which can reflect combination of atelectasis, airspace disease, and/or pleural effusion. Right-sided airspace disease is not substantially changed. XR CHEST PORTABLE    Result Date: 2/2/2023  EXAMINATION: ONE XRAY VIEW OF THE CHEST 2/2/2023 6:38 am COMPARISON: 02/02/2023 HISTORY: ORDERING SYSTEM PROVIDED HISTORY: s/p intubation TECHNOLOGIST PROVIDED HISTORY: Reason for exam:->s/p intubation Reason for Exam: intubated FINDINGS: The patient is rotated the endotracheal tube is likely within the right mainstem bronchus. The nasogastric tube reaches the gastric body. No change in the small left pleural effusion with bibasilar atelectasis. There is pulmonary vascular congestion. The cardiac silhouette is stable. There is no pneumothorax. Status post posterior decompression, stabilization and anterior discectomy and fusion of the lower cervical spine. 1. Endotracheal tube likely within the right mainstem bronchus. Recommend 3 cm of retraction and repeat chest radiograph.      XR CHEST PORTABLE    Result Date: 2/2/2023  EXAMINATION: ONE XRAY VIEW OF THE CHEST 2/2/2023 5:13 am COMPARISON: 06/29/2022 HISTORY: ORDERING SYSTEM PROVIDED HISTORY: SOB TECHNOLOGIST PROVIDED HISTORY: Reason for exam:->SOB Reason for Exam: respitory distress FINDINGS: The lungs are underinflated, resulting in vascular crowding and subsegmental atelectasis. Small left pleural effusion. Streaky left basilar airspace opacities likely reflect atelectasis. Central pulmonary vascular congestion without overt pulmonary edema. Right upper lung zone opacity with a curvilinear portion inferiorly suggestive of partial right upper lobe atelectasis. The cardiac silhouette and mediastinal contours are stable. No acute bony abnormality. 1. Findings suggestive of partial right upper lobe atelectasis. 2. Small left pleural effusion. 3. Central pulmonary vascular congestion without overt pulmonary edema. 4. Left basilar atelectasis. ED Medication Orders (From admission, onward)      Start Ordered     Status Ordering Provider    02/02/23 4899 02/02/23 0848  albuterol (PROVENTIL) nebulizer solution 2.5 mg  EVERY 20 MIN PRN        Question:  Initiate RT Bronchodilator Protocol  Answer:  Yes - ED protocol    Acknowledged [facility]    02/02/23 0800 02/02/23 0749  norepinephrine (LEVOPHED) 16 mg in sodium chloride 0.9 % 250 mL infusion  CONTINUOUS        Question Answer Comment   Titrate Infusion?  Yes    Initial Infusion Dose: 5 mcg/min    Goal of Therapy is: MAP greater than 65 mmHg    Contact Provider if: Patient is receiving the maximum dose and is not achieving the goal of therapy        Last MAR action: New Bag - by Elia Esteves on 02/02/23 at [address] [place] [address], NSE[place]NIITOOH A    02/02/23 0800 02/02/23 0752  ketamine (KETALAR) injection 100 mg  ONCE         Last MAR action: Given - by Debby ACEVEDO on 02/02/23 at [address] [facility] A    02/02/23 0800 02/02/23 0753  ketamine (KETALAR) 500 mg in sodium chloride 0.9 % 100 mL infusion  CONTINUOUS        Question Answer Comment   Titrate infusion? Yes    Initial Infusion Dose: 0.2 mg/kg/hr    Titrate in increments of: 0.05 mg/kg/hr    Titrate no faster than: Every 15 minutes    Goal RASS: -1 to 0    Contact Provider if: SBP greater than 160 mmHg        Last MAR action: New Bag - by Natalia ACEVEDOlekayce AhujaLake Worth on 02/02/23 at [address], [place] A    02/02/23 0659 02/02/23 0700  fentaNYL (SUBLIMAZE) injection 25 mcg  EVERY 1 HOUR PRN         Last MAR action: Given - by ANTONIO ACEVEDO on 02/02/23 at 0822 [facility]    02/02/23 0659 02/02/23 0700  midazolam (VERSED) injection 2 mg  EVERY 1 HOUR PRN         Last MAR action: Given - by Latoya ACEVEDO on 02/02/23 at [address] [place] [address]    02/02/23 0645 02/02/23 0641  propofol injection  CONTINUOUS        Question Answer Comment   Titrate Infusion?  Yes    Initial Infusion Dose: 20 mcg/kg/min    Goal of Therapy: RASS of -1 to 0    Contact Provider if: New onset HR less than 50 bpm    Contact Provider if: New onset SBP less than 90 mmHg    Contact Provider if: Patient is receiving maximum dose and is not achieving the goal of therapy    Contact Provider if: Triglycerides greater than 500 mg/dL        Last MAR action: New Bag - by Shy ACEVEDOe [place] on 02/02/23 at [address]    02/02/23 0633 02/02/23 0635  rocuronium (ZEMURON) injection  DAILY PRN         Last MAR action: Given - by [place] on 02/02/23 at 0633 [facility]    02/02/23 1322 02/02/23 0633  ketamine (KETALAR) injection  DAILY PRN         Last MAR action: Given - by Mar [place] on 02/02/23 at [address] [place] [address]    02/02/23 0500 02/02/23 0458  0.9 % sodium chloride infusion  CONTINUOUS         Last MAR action: New Bag - by Ishan VELA on 02/02/23 at [address]    02/02/23 0500 02/02/23 0458  methylPREDNISolone sodium (SOLU-MEDROL) injection 60 mg  ONCE         Last MAR action: Given - by Oneida Hicks on 02/02/23 at [address]    02/02/23 0458 02/02/23 0458  ipratropium-albuterol (DUONEB) nebulizer solution 1 ampule  EVERY 20 MIN PRN        Question:  Initiate RT Bronchodilator Protocol  Answer:  Yes - ED protocol   See Emely for full Linked Orders Report. Last MAR action: Given - by KRISTINA Debby Joshua on 02/02/23 at [address], [address] M            Final Impression      1. Acute respiratory failure with hypercapnia (HCC)    2. COPD exacerbation ([facility]    3. Acute encephalopathy        CRITICAL CARE NOTE:  There was a high probability of clinically significant life-threatening deterioration of the patient's condition requiring my urgent intervention. This includes multiple reevaluations, vital sign monitoring, pulse oximetry monitoring, telemetry monitoring, clinical response to the IV medications, reviewing the nursing notes, consultation time, dictation/documentation time, and interpretation of the labwork. (This time excludes time spent performing procedures). I personally saw the patient and independently provided 35 minutes of non-concurrent critical care out of the total shared critical care time provided.     DISPOSITION Admitted 02/02/2023 06:56:41 AM     (Please note that portions of this note may have been completed with a voice recognition program. Efforts were made to edit the dictations but occasionally words are mis-transcribed.)    MD Nisha Santiago MD  02/02/23 6376

## 2023-02-02 NOTE — PROGRESS NOTES
02/02/23 0746   NICU Vent Information   Skin Assessment Clean, dry, & intact   Vent Mode AC/VC   Vt (Set, mL) 330 mL   Vt (Measured) 315 mL   Rate Measured 24 br/min   FiO2  100 %   PEEP/CPAP (cmH2O) 5   Cough/Sputum   Sputum How Obtained Endotracheal;Oral   Cough Productive   Sputum Amount Large   Sputum Color Cloudy   Tenacity Thick   Breath Sounds   Right Upper Lobe Expiratory Wheezes   Right Middle Lobe Expiratory Wheezes   Right Lower Lobe Expiratory Wheezes   Left Upper Lobe Expiratory Wheezes   Left Lower Lobe Expiratory Wheezes   Additional Respiratory Assessments   Heart Rate 81   Resp 24   SpO2 94 %   ETT    Placement Date/Time: 02/02/23 0640   Placed By: In ED  Placement Verified By: Colorimetric ETCO2 device; Chest X-ray  Preoxygenation: Yes  Airway Tube Size: 8 mm  Laryngoscope: GlideScope  Blade Size: 3  Location: Oral  Secured At: 23 cm  Measured From. ..    Secured At 21 cm   Measured From Lips   ETT Placement Center   Secured By Commercial tube orellana   Site Assessment Dry   Treatment   $Treatment Type $Inhaled Therapy/Meds

## 2023-02-02 NOTE — CARE COORDINATION
Case Management Assessment  Initial Evaluation    Date/Time of Evaluation: 2/2/2023 11:39 AM  Assessment Completed by: Heidi Mckeon RN    If patient is discharged prior to next notation, then this note serves as note for discharge by case management. Patient Name: Hodan Coe                   YOB: 1955  Diagnosis: COPD exacerbation ([address] [J44.1]  Acute respiratory failure with hypercapnia ([address] [J96.02]  Acute encephalopathy [G93.40]  Acute on chronic respiratory failure with hypoxia and hypercapnia ([address] [V69.24, J96.22]                   Date / Time: 2/2/2023  4:53 AM    Patient Admission Status: Inpatient   Readmission Risk (Low < 19, Mod (19-27), High > 27): Readmission Risk Score: 14.3    Current PCP: Usha Cano  PCP verified by CM? Yes (Dr. Lu Garg)    Chart Reviewed: Yes      History Provided by: Child/Family  Patient Orientation: Unresponsive , on Ventilator   Patient Cognition: Other (see comment) (pt on Ventilator)    Hospitalization in the last 30 days (Readmission):  No    If yes, Readmission Assessment in CM Navigator will be completed. Advance Directives:      Code Status: Prior   Patient's Primary Decision Maker is:  Elisha [place]    Primary Decision Maker: Pb Frye - Child - [phone]    Secondary Decision Maker:  Cecelia Balderas - Child - [phone]    Discharge Planning:    Patient lives with: Children Type of Home: Trailer/Mobile Home  Primary Care Giver: Family  Patient Support Systems include: Children   Current Financial resources: Medicare (BCBS)  Current community resources:    Current services prior to admission: Durable Medical Equipment, Oxygen Therapy (Ottadalgisa Upton, 3 liters at baseline continuous)            Current DME: Walker, Shower Chair, Oxygen Therapy (Comment), Glucometer            Type of Home Care services:       ADLS  Prior functional level: Bathing, Dressing, Toileting, [address], Cooking, Assistance with the following:, Mobility, Shopping  Current functional level: Assistance with the following:, Bathing, Dressing, Toileting, Cooking, Housework, Mobility, Shopping    PT AM-PAC:   /24  OT AM-PAC:   /24    Family can provide assistance at DC: Yes  Would you like Case Management to discuss the discharge plan with any other family members/significant others, and if so, who? Yes (Herminia Enrique)  Plans to Return to Present Housing: Unknown at present  Other Identified Issues/Barriers to RETURNING to current housing: family overwhelmed with pt's care, total assist from family with ADLs  Potential Assistance needed at discharge:              Potential DME:    Patient expects to discharge to: Unknown  Plan for transportation at discharge: Family    Financial    Payor: Estrella Coe / Plan: Patt Gooden ESSENTIAL/PLUS / Product Type: *No Product type* /     Does insurance require precert for SNF: Yes    Potential assistance Purchasing Medications: No  Meds-to-Beds request:        [address] [facility], [address] [facility] [address] [facility] [phone] - F [phone]  [address] [place] [address]  Phone: [phone] Fax: [phone]    [facility] [phone] - [address], [address] [facility] [phone] [place] [phone]  [address] [facility]  [address] [address]  Phone: [phone] Fax: [phone]    [facility] [place], [address] [facility] [phone] [place] Ulises [phone]  [address]  [address] [place] [address]  Phone: [phone] Fax: [phone]    [facility] [address] [place] [phone] [place] [phone]  [address] [facility] [address]  Phone: [phone] Fax: [phone]      Notes:    Factors facilitating achievement of predicted outcomes: Family support    Barriers to discharge: Long standing deficits    Additional Case Management Notes: Reviewed chart. Pt in ICU on Ventilator. Met with pt's daughter and son at bedside.  Per family pt has been declining at home and is chair bound, family has chair lift. Per daughter,ANTHONY Narayan, pt will likely need rehab. Rehab list provided to the family. Pt does not have home care PTA, daughter,Helene, is caregiver. Following. The Plan for Transition of Care is related to the following treatment goals of COPD exacerbation ([address] [J44.1]  Acute respiratory failure with hypercapnia (HCC) [J96.02]  Acute encephalopathy [G93.40]  Acute on chronic respiratory failure with hypoxia and hypercapnia ([address] [J96.21, G71.12]    IF APPLICABLE: The Patient and/or patient representative Anita John and her family were provided with a choice of provider and agrees with the discharge plan. Freedom of choice list with basic dialogue that supports the patient's individualized plan of care/goals and shares the quality data associated with the providers was provided to: Patient Representative   Patient Representative Name:       The Patient and/or Patient Representative Agree with the Discharge Plan?  Yes    Maria M Davison RN  Case Management Department  Ph: [phone]  Fax: [phone]

## 2023-02-02 NOTE — PROGRESS NOTES
02/02/23 0600   NIV Type   $NIV $Daily Charge   Skin Assessment Clean, dry, & intact   Skin Protection for O2 Device Yes   Location Nose   NIV Started/Stopped On   Equipment Type V60   Mode Bilevel   Mask Type Full face mask   Mask Size Medium   Settings/Measurements   PIP Observed 21 cm H20   IPAP 22 cmH20   CPAP/EPAP 5 cmH2O   Vt (Measured) 548 mL   Rate Ordered 16   Resp 18   Insp Rise Time (%) 1 %   FiO2  40 %   I Time/ I Time % 1.1 s   Minute Volume (L/min) 10.8 Liters   Mask Leak (lpm) 4 lpm   Comfort Level Good   Using Accessory Muscles No   SpO2 93   Patient's Home Machine No   Alarm Settings   Alarms On Y   Low Pressure (cmH2O) 5 cmH2O   High Pressure (cmH2O) 30 cmH2O   Delay Alarm 20 sec(s)   RR Low (bpm) 16   RR High (bpm) 40 br/min

## 2023-02-02 NOTE — PROGRESS NOTES
Pharmacy Medication History Note     List of current medications patient is taking is complete. Source of information:  RX navigator/ RX bottles : [address] [place] [address] pharmacy(provided by daughter) and med list from daughter,verified by [facility]    Medications notes:   1. Added Norvasc 5mg daily  2.  Please see all other meds   Dulcy Elder Pharm D 2/2/202311:59 AM  .

## 2023-02-02 NOTE — PROCEDURES
Pt was emergently intubated secondary Lethargy and acute resp failure. Pt was sedated with 100 mg ketamine and 50 mg rocuronium. Pt was intubated in a single attempt using a Glide scope with a size 3 MAC in place. The tip of a size 8 ETT was inserted between the cords and then advanced off of the stylette into the air way to 23 cm from the lip. Cuff was inflated and secured. Good CO2 color change and bilateral breath sounds auscultated. Vent settings provided to respiratory staff. OG was passed to 65 cm from the lip. CXR ordered and pending to verify tube placement.

## 2023-02-02 NOTE — ED NOTES
Writer spoke to Dr. Jamila Rousseau ER doc regarding blood pressures, awaiting Levo gtt from [facility], RN  02/02/23 4331

## 2023-02-02 NOTE — PROGRESS NOTES
0930  Pt arrived via stretcher to ICU 10 from ER. Pt currently intubated and sedated. Ketamine, Propofol, Levophed and NS currently infusing. Pt continues to have episodes of desaturation, plan is for MD (Dr. Leslie Carter) to perform bedside bronchoscopy this AM. Pt's family present and aware, agreeable. Pt currently has R femoral line and PIV x2, all remain in place and patent. CHG bath given upon arrival to ICU. Wound care consult put in d/t areas on coccyx. 4 Eyes Skin Assessment     The patient is being assess for   Admission    I agree that 2 RN's have performed a thorough Head to Toe Skin Assessment on the patient. ALL assessment sites listed below have been assessed. Areas assessed for pressure by both nurses:   [x]   Head, Face, and Ears   [x]   Shoulders, Back, and Chest, Abdomen  [x]   Arms, Elbows, and Hands   [x]   Coccyx, Sacrum, and Ischium  [x]   Legs, Feet, and Heels        Skin Assessed Under all Medical Devices by both nurses:  ETT, poole, and OG              All Mepilex Borders were peeled back and area peeked at by both nurses:  No: None present, one applied to coccyx now. Please list where Mepilex Borders are located:  Coccyx. **SHARE this note so that the co-signing nurse is able to place an eSignature**    Co-signer eSignature: Electronically signed by Gabe Swan RN on 2/2/23 at 10:49 AM EST    Does the Patient have Skin Breakdown related to pressure?   Yes LDA WOUND CARE was Initiated documentation include the Sue-wound, Wound Assessment, Measurements, Dressing Treatment, Drainage, and Color\",          Zack Prevention initiated:  Yes   Wound Care Orders initiated:  awaiting wound care nurse to eval.      [facility] nurse consulted for Pressure Injury (Stage 3,4, Unstageable, DTI, NWPT, Complex wounds)and New or Established Ostomies:  Yes      Primary Nurse eSignature: Electronically signed by Dioni Marcano RN on 2/2/23 at 10:48 AM EST    1612  Reassessment completed, see flowsheets, unchanged from prior. VSS. All ICU lines and monitoring remain in place. Bed locked in lowest position. Call light within reach.

## 2023-02-02 NOTE — ED NOTES
Pt bagged via ETT tube on 15 liters for decreased spo2, , oxygen saturation currently 75%, MD at the bedside.      Natalie France RN  02/02/23 [id number]

## 2023-02-02 NOTE — CONSULTS
[address] [id number] Continence Nurse  Consult Note       NAME:  Louise Loja  MEDICAL RECORD NUMBER:  [id number]  AGE: 79 y.o. GENDER: female  : 1955  TODAY'S DATE:  2023    Subjective pt is intubated and sedated in ICU setting. Reason for WOCN Evaluation and Assessment: Sacrum      Louise Loja is a 79 y.o. female referred by:   [] Physician  [x] Nursing  [] Other:     Wound Identification:  Wound Type:  MASD  Contributing Factors: decreased mobility and obesity    Pt seen for wound care. Per staff RN, daughter states pt sits in a chair a most of the day at home. The injury to her sacrum appears to be moisture and friction/shearing related due to linear openings. Foam dressing applied by ICU staff.       Patient Goal of Care: LORIN  [] Wound Healing  [] Odor Control  [] Palliative Care  [] Pain Control   [] Other:         PAST MEDICAL HISTORY        Diagnosis Date    Anxiety     Arthritis     Cancer ([facility] [address]     Cervical    Chronic back pain     Chronic pain     COPD (chronic obstructive pulmonary disease) (HCC)     Depression     DJD (degenerative joint disease), cervical     Drug-seeking behavior     GERD (gastroesophageal reflux disease)     Hypercholesteremia     Hypertension     IBS (irritable bowel syndrome)     Insomnia     K deficiency     Kidney stone     Narcotic addiction [facility])     Patient Denies    Narcotic overdose ([facility] [address]     Patient Denies    Osteoporosis     Overdose     [id number] Patient Denies on 4/3/14    PNA (pneumonia) 10/17/2016    Restless leg syndrome     Thyroid disease        PAST SURGICAL HISTORY    Past Surgical History:   Procedure Laterality Date    APPENDECTOMY      BACK SURGERY      CERVICAL DISCECTOMY  13    anterior discetomy fusion c 3/4 c 4/5 posterior fusion C3-C5 screws and rods C3-C5    CHOLECYSTECTOMY      Laparoscopic    COLONOSCOPY      COLONOSCOPY  08/10/2016    FOOT SURGERY Right     FRACTURE SURGERY Right     distal radius    HAND SURGERY  08/2012    crushed right hand and has two steel plates in hand    HYSTERECTOMY (CERVIX STATUS UNKNOWN)      NECK SURGERY  2002    disc    OTHER SURGICAL HISTORY  4/9/14    TRANSFORAMINAL LUMBAR INTERBODY FUSION L4 L5       FAMILY HISTORY    Family History   Problem Relation Age of Onset    Heart Disease Mother     High Cholesterol Mother     Diabetes Mother     Asthma Mother     Heart Failure Mother     Hypertension Mother     Heart Disease Father     High Cholesterol Father     Emphysema Father     Heart Failure Father     Hypertension Father     Cancer Neg Hx        SOCIAL HISTORY    Social History     Tobacco Use    Smoking status: Every Day     Packs/day: 1.00     Years: 40.00     Pack years: 40.00     Types: Cigarettes     Start date: 1/1/1973    Smokeless tobacco: Never   Substance Use Topics    Alcohol use: No    Drug use: No       ALLERGIES    Allergies   Allergen Reactions    Asa Buff (Mag [Buffered Aspirin] Hives    Cymbalta [Duloxetine Hcl] Nausea And Vomiting       MEDICATIONS    No current facility-administered medications on file prior to encounter. Current Outpatient Medications on File Prior to Encounter   Medication Sig Dispense Refill    amLODIPine (NORVASC) 5 MG tablet Take 5 mg by mouth daily      TRELEGY ELLIPTA 100-62.5-25 MCG/INH AEPB INHALE 1 PUFF INTO THE LUNGS DAILY. ACCU-CHEK GUIDE strip USE 1 STRIP AS DIRECTED 4 TIMES A DAY (MAY TEST LESS FREQUENT NOT INJECTING INSULIN)      Accu-Chek Softclix Lancets MISC USE 3 TO 4 TIMES A DAY      metFORMIN (GLUCOPHAGE) 500 MG tablet 1,000 mg 2 times daily (with meals)      STOOL SOFTENER/LAXATIVE 50-8.6 MG per tablet TAKE 2 TABLETS BY MOUTH AT BEDTIME. tiZANidine (ZANAFLEX) 4 MG tablet Take 4 mg by mouth every 8 hours as needed      rOPINIRole (REQUIP) 2 MG tablet Take 1 tablet by mouth nightly 5/14/22:  Amount of tablets per RX decreased from 90 (30 days supply) to 60 (30 days supply) since 11/2020.  (Patient taking differently: Take 2 mg by mouth in the morning and at bedtime 5/14/22:  Amount of tablets per RX decreased from 90 (30 days supply) to 60 (30 days supply) since 11/2020.) 90 tablet 3    sertraline (ZOLOFT) 100 MG tablet Take 1 tablet by mouth 2 times daily (Patient taking differently: Take 200 mg by mouth daily) 30 tablet 3    ondansetron (ZOFRAN-ODT) 4 MG disintegrating tablet Take 1 tablet by mouth 3 times daily as needed for Nausea or Vomiting 6 tablet 0    albuterol sulfate HFA (PROVENTIL HFA) 108 (90 Base) MCG/ACT inhaler Inhale 2 puffs into the lungs every 4 hours as needed for Wheezing 1 Inhaler 0    risperiDONE (RISPERDAL) 0.5 MG tablet Take 1 mg by mouth 2 times daily      omeprazole (PRILOSEC) 40 MG delayed release capsule TAKE ONE CAPSULE(S) BY MOUTH EVERY MORNING  11    atorvastatin (LIPITOR) 20 MG tablet Take 1 tablet by mouth daily. (Patient taking differently: Take 60 mg by mouth daily ) 30 tablet 5    pregabalin (LYRICA) 300 MG capsule Take 300 mg by mouth 2 times daily.           Objective    BP (!) 115/52   Pulse 100   Temp 97.7 °F (36.5 °C) (Axillary)   Resp 24   Ht 5' 3\" (1.6 m)   Wt 180 lb (81.6 kg)   SpO2 96%   BMI 31.89 kg/m²     LABS:  WBC:    Lab Results   Component Value Date/Time    WBC 8.7 02/02/2023 05:05 AM     H/H:    Lab Results   Component Value Date/Time    HGB 11.8 02/02/2023 05:05 AM    HCT 36.5 02/02/2023 05:05 AM     PTT:    Lab Results   Component Value Date/Time    APTT 31.7 09/12/2020 03:57 PM   [APTT}  PT/INR:    Lab Results   Component Value Date/Time    PROTIME 10.9 09/12/2020 03:57 PM    INR 0.94 09/12/2020 03:57 PM     HgBA1c:    Lab Results   Component Value Date/Time    LABA1C 7.4 06/29/2022 07:51 PM       Assessment   Zack Risk Score:      Patient Active Problem List   Diagnosis Code    GERD (gastroesophageal reflux disease) K21.9    Osteoporosis M81.0    Insomnia G47.00    Restless leg syndrome G25.81    DJD (degenerative joint disease), cervical M47.812    Chronic pain G89.29    IBS (irritable bowel syndrome) K58.9    Hyperlipidemia E78.5    Depression with anxiety F41.8    Bipolar disorder (HCC) F31.9    Anorexia nervosa F50.00    B12 deficiency E53.8    Generalized anxiety disorder F41.1    Fatigue R53.83    Lumbosacral radiculopathy due to degenerative joint disease of spine M47.27    Low back pain M54.50    Low back pain radiating to both legs M54.50, M79.604, M79.605    Constipation due to pain medication K59.03    Vitamin D deficiency E55.9    Spinal stenosis M48.00    COPD (chronic obstructive pulmonary disease) (HCC) J44.9    Gait disturbance R26.9    Weakness R53.1    TIA (transient ischemic attack) G45.9    Paresthesia R20.2    Thyroid nodule E04.1    PNA (pneumonia) J18.9    Post traumatic stress disorder (PTSD) F43.10    Thoracic compression fracture, closed, initial encounter ([facility] [address] S22.000A    COPD exacerbation ([facility]) J44.1    Acute respiratory failure with hypoxia and hypercapnia (HCC) J96.01, J96.02    Acute on chronic respiratory failure with hypoxia and hypercapnia (HCC) J96.21, J96.22    Essential hypertension I10    Community acquired pneumonia J18.9    Cellulitis of left lower extremity L03.116    Tobacco abuse Z72.0    Cirrhosis of liver without ascites ([facility]) K74.60    Hyperglycemia R73.9    Cellulitis L03.90    Chronic respiratory failure with hypoxia ([facility]) J96.11    Cellulitis and abscess of leg L03.119, L02.419       Sacrum:  partial thickness skin breakdown , linear, surrounding skin red and blanchable. Appears to be moisture and friction injury. Friction injury noted to right buttock. Plan  Mepilex foam dressing, change every 3 days and prn. Apply mepilex sacral border. Gently peel back and assess skin each shift. Change every 3 days and PRN. Discontinue if dressing needs changed daily due to incontinence.            Specialty Bed Required : No   [] Low Air Loss   [] Pressure Redistribution  [] Fluid Immersion  [] Bariatric  [] Total Pressure Relief  [] Other:     Current Diet: No diet orders on file  Dietician consult:  N/A    Discharge Plan:  Placement for patient upon discharge: tbd    Patient appropriate for Outpatient [address]: tbd    Referrals:  [x]   [] [facility]  [] Supplies  [] Other    Patient/Caregiver Teaching:  Level of patient/caregiver understanding able to:   [] Indicates understanding       [] Needs reinforcement  [] Unsuccessful      [] Verbal Understanding  [] Demonstrated understanding       [] No evidence of learning  [] Refused teaching         [x] N/A       Electronically signed by Rebeka Mckeon RN, CWOCN on 2/2/2023 at 12:46 PM

## 2023-02-02 NOTE — FLOWSHEET NOTE
02/02/23 0745   Vital Signs   Heart Rate 79   Resp 26   BP (!) 78/40   MAP (Calculated) 53   MAP (mmHg) (!) 52   Oxygen Therapy   SpO2 94 %     ER Dr. Marlea Leyden aware of bp and put in order for Levo at this time

## 2023-02-02 NOTE — ED NOTES
Writer spoke with Mainor Cantrell from ICU regarding if Rex Hunt was in and left message for him to call ER when he arrives.       Marychuy Stahl RN  02/02/23 2680

## 2023-02-02 NOTE — PROGRESS NOTES
Comprehensive Nutrition Assessment    Type and Reason for Visit:  Initial (no + screen or consult but patient is intubated at this time)    Nutrition Recommendations/Plan:   Continue NPO status until patient is medically cleared to receive nutrition therapy. TF recommendations - ADULT TUBE FEEDING; Orogastric; Peptide-Based High-Protein formula - Vital High-Protein with a goal rate of 50 ml/hr x 20 hours. Start with 20 ml/hr and increase by 15 ml every 4 hours, as tolerated by patient, until goal rate can be achieved and maintained. Water flushes, 30 ml every 4 hours for tube patency. Please administer one proteinex P2Go once daily via feeding tube. Monitor respiratory/vent status, sedation type/amount (propofol is currently infusing at 10 mcg x 24 hours which = 130 kcals from lipids), TG checks, and plan of care. Please obtain an actual, current weight for this patient - only an estimated weight was obtained upon admission. Monitor nutrition-related labs, bowel function, and weight trends. Malnutrition Assessment:  Malnutrition Status:   At risk for malnutrition (02/02/23 1316)    Context:  Acute Illness     Findings of the 6 clinical characteristics of malnutrition:  Energy Intake:  Mild decrease in energy intake  Weight Loss:  No significant weight loss     Body Fat Loss:  Unable to assess     Muscle Mass Loss:  Unable to assess    Fluid Accumulation:  Moderate to Severe Extremities (BLE + 2 pitting edema noted)   Strength:  Not Performed    Nutrition Assessment:    patient is nutritionally compromised AEB patient was declining at home PTA and decreased appetite/po intake r/t SOB PTA and she is at risk for further compromise d/t NPO status, intubation status, and altered nutrition-related labs; will continue NPO status and provide TF recommendations    Nutrition Related Findings:    patient is intubated and sedated with 10 mcg propofol at this time; BLE + 2 pitting edema; patient presented to ED with c/o SOB and was intubated in the ED on 2/2/23; NS infusing at 100 ml/hr currently; + bronch this am during rounds was completed; daughter reported to wound care RN that patient sits in a chair most of the day at home; patient's partial thickness breakdown on sacrum is r/t moisture and friction, per wound care RN note Wound Type: None       Current Nutrition Intake & Therapies:    Average Meal Intake: NPO  Average Supplements Intake: NPO  No diet orders on file    Anthropometric Measures:  Height: 5' 3\" (160 cm)  Ideal Body Weight (IBW): 115 lbs (52 kg)    Admission Body Weight: 180 lb (81.6 kg) (obtained on 2/2/23; estimated weight)  Current Body Weight: 180 lb (81.6 kg) (obtained on 2/2/23; estimated weight), 156.5 % IBW. Weight Source: Other (Comment) (estimated weight)  Current BMI (kg/m2): 31.9  Usual Body Weight: 167 lb 10 oz (76 kg) (obtained on 5/18/22; actual weight)  % Weight Change (Calculated): 7.4  Weight Adjustment For: No Adjustment                 BMI Categories: Obese Class 1 (BMI 30.0-34. 9)    Estimated Daily Nutrient Needs:  Energy Requirements Based On: Kcal/kg  Weight Used for Energy Requirements: Current (based on an estimated weight of 180#)  Energy (kcal/day): 902 - 1148 kcals based on 11-14 kcals/kg/CBW  Weight Used for Protein Requirements: Ideal  Protein (g/day): 104 - 114 g protein based on 2.0-2.2 g/kg/IBW  Method Used for Fluid Requirements: 1 ml/kcal  Fluid (ml/day): 902 - 1148 ml    Nutrition Diagnosis:   Inadequate oral intake related to inadequate protein-energy intake, impaired respiratory function as evidenced by NPO or clear liquid status due to medical condition, intubation, lab values    Nutrition Interventions:   Food and/or Nutrient Delivery: Continue NPO, Start Tube Feeding  Nutrition Education/Counseling: No recommendation at this time  Coordination of Nutrition Care: Continue to monitor while inpatient, Interdisciplinary Rounds, [address] [place] [address] discussed with: ICU Team    Goals:     Goals: Initiate nutrition support       Nutrition Monitoring and Evaluation:   Behavioral-Environmental Outcomes: None Identified  Food/Nutrient Intake Outcomes: Diet Advancement/Tolerance, IVF Intake  Physical Signs/Symptoms Outcomes: Biochemical Data, Hemodynamic Status, Nutrition Focused Physical Findings, Skin, Weight    Discharge Planning:     Too soon to determine     Cam Rogers, [address] [place] [address], LD  Contact: [phone]

## 2023-02-02 NOTE — ED NOTES
Pts SpO2 dropped to 77% while at 40% FiO2. Increased FiO2 to 100%, called RT and suctioned pt. RT at bedside. Pts SpO2 currently 98% on 100% FiO2.      Chantal Grimaldo RN  02/02/23 1986

## 2023-02-03 ENCOUNTER — APPOINTMENT (OUTPATIENT)
Dept: VASCULAR LAB | Age: 68
DRG: 207 | End: 2023-02-03
Payer: MEDICARE

## 2023-02-03 ENCOUNTER — APPOINTMENT (OUTPATIENT)
Dept: GENERAL RADIOLOGY | Age: 68
DRG: 207 | End: 2023-02-03
Payer: MEDICARE

## 2023-02-03 PROBLEM — L03.115 CELLULITIS OF BOTH LOWER EXTREMITIES: Status: ACTIVE | Noted: 2022-06-29

## 2023-02-03 PROBLEM — L03.116 CELLULITIS OF BOTH LOWER EXTREMITIES: Status: ACTIVE | Noted: 2022-06-29

## 2023-02-03 LAB
A/G RATIO: 1.3 (ref 1.1–2.2)
ALBUMIN SERPL-MCNC: 3.3 G/DL (ref 3.4–5)
ALP BLD-CCNC: 99 U/L (ref 40–129)
ALT SERPL-CCNC: 12 U/L (ref 10–40)
ANION GAP SERPL CALCULATED.3IONS-SCNC: 10 MMOL/L (ref 3–16)
AST SERPL-CCNC: 13 U/L (ref 15–37)
BASE EXCESS ARTERIAL: 3.3 MMOL/L (ref -3–3)
BASOPHILS ABSOLUTE: 0.1 K/UL (ref 0–0.2)
BASOPHILS RELATIVE PERCENT: 0.8 %
BILIRUB SERPL-MCNC: 0.3 MG/DL (ref 0–1)
BUN BLDV-MCNC: 18 MG/DL (ref 7–20)
CALCIUM SERPL-MCNC: 8.2 MG/DL (ref 8.3–10.6)
CARBOXYHEMOGLOBIN ARTERIAL: 0.3 % (ref 0–1.5)
CHLORIDE BLD-SCNC: 104 MMOL/L (ref 99–110)
CO2: 27 MMOL/L (ref 21–32)
CREAT SERPL-MCNC: 0.9 MG/DL (ref 0.6–1.2)
EOSINOPHILS ABSOLUTE: 0 K/UL (ref 0–0.6)
EOSINOPHILS RELATIVE PERCENT: 0 %
ESTIMATED AVERAGE GLUCOSE: 139.9 MG/DL
GFR SERPL CREATININE-BSD FRML MDRD: >60 ML/MIN/{1.73_M2}
GLUCOSE BLD-MCNC: 170 MG/DL (ref 70–99)
GLUCOSE BLD-MCNC: 187 MG/DL (ref 70–99)
GLUCOSE BLD-MCNC: 202 MG/DL (ref 70–99)
GLUCOSE BLD-MCNC: 212 MG/DL (ref 70–99)
GLUCOSE BLD-MCNC: 212 MG/DL (ref 70–99)
GLUCOSE BLD-MCNC: 218 MG/DL (ref 70–99)
GLUCOSE BLD-MCNC: 229 MG/DL (ref 70–99)
HBA1C MFR BLD: 6.5 %
HCO3 ARTERIAL: 27.3 MMOL/L (ref 21–29)
HCT VFR BLD CALC: 29.6 % (ref 36–48)
HEMOGLOBIN, ART, EXTENDED: 10.3 G/DL (ref 12–16)
HEMOGLOBIN: 9.5 G/DL (ref 12–16)
LYMPHOCYTES ABSOLUTE: 0.3 K/UL (ref 1–5.1)
LYMPHOCYTES RELATIVE PERCENT: 3.1 %
MCH RBC QN AUTO: 30.5 PG (ref 26–34)
MCHC RBC AUTO-ENTMCNC: 32 G/DL (ref 31–36)
MCV RBC AUTO: 95.3 FL (ref 80–100)
METHEMOGLOBIN ARTERIAL: 0.3 %
MONOCYTES ABSOLUTE: 0.9 K/UL (ref 0–1.3)
MONOCYTES RELATIVE PERCENT: 9 %
NEUTROPHILS ABSOLUTE: 8.3 K/UL (ref 1.7–7.7)
NEUTROPHILS RELATIVE PERCENT: 87.1 %
O2 SAT, ARTERIAL: 90.8 %
O2 THERAPY: ABNORMAL
PCO2 ARTERIAL: 39.1 MMHG (ref 35–45)
PDW BLD-RTO: 15.9 % (ref 12.4–15.4)
PERFORMED ON: ABNORMAL
PH ARTERIAL: 7.46 (ref 7.35–7.45)
PLATELET # BLD: 113 K/UL (ref 135–450)
PMV BLD AUTO: 8.7 FL (ref 5–10.5)
PO2 ARTERIAL: 56 MMHG (ref 75–108)
POTASSIUM REFLEX MAGNESIUM: 4 MMOL/L (ref 3.5–5.1)
RBC # BLD: 3.1 M/UL (ref 4–5.2)
SODIUM BLD-SCNC: 141 MMOL/L (ref 136–145)
TCO2 ARTERIAL: 28.5 MMOL/L
TOTAL PROTEIN: 5.8 G/DL (ref 6.4–8.2)
TRIGL SERPL-MCNC: 164 MG/DL (ref 0–150)
WBC # BLD: 9.5 K/UL (ref 4–11)

## 2023-02-03 PROCEDURE — 85025 COMPLETE CBC W/AUTO DIFF WBC: CPT

## 2023-02-03 PROCEDURE — 6370000000 HC RX 637 (ALT 250 FOR IP): Performed by: INTERNAL MEDICINE

## 2023-02-03 PROCEDURE — 82803 BLOOD GASES ANY COMBINATION: CPT

## 2023-02-03 PROCEDURE — 80053 COMPREHEN METABOLIC PANEL: CPT

## 2023-02-03 PROCEDURE — 94640 AIRWAY INHALATION TREATMENT: CPT

## 2023-02-03 PROCEDURE — 2700000000 HC OXYGEN THERAPY PER DAY

## 2023-02-03 PROCEDURE — 2500000003 HC RX 250 WO HCPCS: Performed by: INTERNAL MEDICINE

## 2023-02-03 PROCEDURE — 2000000000 HC ICU R&B

## 2023-02-03 PROCEDURE — C9113 INJ PANTOPRAZOLE SODIUM, VIA: HCPCS | Performed by: INTERNAL MEDICINE

## 2023-02-03 PROCEDURE — 94761 N-INVAS EAR/PLS OXIMETRY MLT: CPT

## 2023-02-03 PROCEDURE — 87641 MR-STAPH DNA AMP PROBE: CPT

## 2023-02-03 PROCEDURE — 2580000003 HC RX 258: Performed by: INTERNAL MEDICINE

## 2023-02-03 PROCEDURE — 2580000003 HC RX 258: Performed by: STUDENT IN AN ORGANIZED HEALTH CARE EDUCATION/TRAINING PROGRAM

## 2023-02-03 PROCEDURE — 6360000002 HC RX W HCPCS

## 2023-02-03 PROCEDURE — 2500000003 HC RX 250 WO HCPCS

## 2023-02-03 PROCEDURE — 94003 VENT MGMT INPAT SUBQ DAY: CPT

## 2023-02-03 PROCEDURE — 6360000002 HC RX W HCPCS: Performed by: INTERNAL MEDICINE

## 2023-02-03 PROCEDURE — 71045 X-RAY EXAM CHEST 1 VIEW: CPT

## 2023-02-03 PROCEDURE — 99233 SBSQ HOSP IP/OBS HIGH 50: CPT | Performed by: INTERNAL MEDICINE

## 2023-02-03 PROCEDURE — 99291 CRITICAL CARE FIRST HOUR: CPT | Performed by: INTERNAL MEDICINE

## 2023-02-03 PROCEDURE — 2500000003 HC RX 250 WO HCPCS: Performed by: STUDENT IN AN ORGANIZED HEALTH CARE EDUCATION/TRAINING PROGRAM

## 2023-02-03 PROCEDURE — 93970 EXTREMITY STUDY: CPT

## 2023-02-03 RX ORDER — PREGABALIN 100 MG/1
300 CAPSULE ORAL 2 TIMES DAILY
Status: DISCONTINUED | OUTPATIENT
Start: 2023-02-03 | End: 2023-02-04

## 2023-02-03 RX ORDER — METHYLPREDNISOLONE SODIUM SUCCINATE 125 MG/2ML
60 INJECTION, POWDER, LYOPHILIZED, FOR SOLUTION INTRAMUSCULAR; INTRAVENOUS EVERY 6 HOURS
Status: DISCONTINUED | OUTPATIENT
Start: 2023-02-03 | End: 2023-02-05

## 2023-02-03 RX ORDER — MIDAZOLAM HYDROCHLORIDE 1 MG/ML
5 INJECTION INTRAMUSCULAR; INTRAVENOUS ONCE
Status: COMPLETED | OUTPATIENT
Start: 2023-02-03 | End: 2023-02-03

## 2023-02-03 RX ORDER — RISPERIDONE 1 MG/1
1 TABLET ORAL 2 TIMES DAILY
Status: DISCONTINUED | OUTPATIENT
Start: 2023-02-03 | End: 2023-02-16 | Stop reason: HOSPADM

## 2023-02-03 RX ORDER — KETAMINE HYDROCHLORIDE 100 MG/ML
100 INJECTION INTRAMUSCULAR; INTRAVENOUS ONCE
Status: COMPLETED | OUTPATIENT
Start: 2023-02-03 | End: 2023-02-03

## 2023-02-03 RX ORDER — KETAMINE HYDROCHLORIDE 50 MG/ML
100 INJECTION, SOLUTION, CONCENTRATE INTRAMUSCULAR; INTRAVENOUS ONCE
Status: DISCONTINUED | OUTPATIENT
Start: 2023-02-03 | End: 2023-02-03

## 2023-02-03 RX ORDER — CISATRACURIUM BESYLATE 2 MG/ML
10 INJECTION, SOLUTION INTRAVENOUS ONCE
Status: COMPLETED | OUTPATIENT
Start: 2023-02-03 | End: 2023-02-03

## 2023-02-03 RX ORDER — ROPINIROLE 1 MG/1
2 TABLET, FILM COATED ORAL 2 TIMES DAILY
Status: DISCONTINUED | OUTPATIENT
Start: 2023-02-03 | End: 2023-02-04

## 2023-02-03 RX ADMIN — CISATRACURIUM BESYLATE 10 MG: 2 INJECTION INTRAVENOUS at 10:20

## 2023-02-03 RX ADMIN — INSULIN LISPRO 2 UNITS: 100 INJECTION, SOLUTION INTRAVENOUS; SUBCUTANEOUS at 05:14

## 2023-02-03 RX ADMIN — FENTANYL CITRATE 25 MCG: 50 INJECTION INTRAMUSCULAR; INTRAVENOUS at 08:21

## 2023-02-03 RX ADMIN — PROPOFOL 35 MCG/KG/MIN: 10 INJECTION, EMULSION INTRAVENOUS at 11:40

## 2023-02-03 RX ADMIN — INSULIN LISPRO 2 UNITS: 100 INJECTION, SOLUTION INTRAVENOUS; SUBCUTANEOUS at 00:55

## 2023-02-03 RX ADMIN — ROPINIROLE HYDROCHLORIDE 2 MG: 1 TABLET, FILM COATED ORAL at 20:12

## 2023-02-03 RX ADMIN — ENOXAPARIN SODIUM 40 MG: 100 INJECTION SUBCUTANEOUS at 08:06

## 2023-02-03 RX ADMIN — IPRATROPIUM BROMIDE AND ALBUTEROL SULFATE 1 AMPULE: .5; 2.5 SOLUTION RESPIRATORY (INHALATION) at 19:31

## 2023-02-03 RX ADMIN — INSULIN LISPRO 2 UNITS: 100 INJECTION, SOLUTION INTRAVENOUS; SUBCUTANEOUS at 17:24

## 2023-02-03 RX ADMIN — METHYLPREDNISOLONE SODIUM SUCCINATE 40 MG: 40 INJECTION, POWDER, FOR SOLUTION INTRAMUSCULAR; INTRAVENOUS at 08:07

## 2023-02-03 RX ADMIN — MUPIROCIN: 20 OINTMENT TOPICAL at 08:06

## 2023-02-03 RX ADMIN — CHLORHEXIDINE GLUCONATE 0.12% ORAL RINSE 15 ML: 1.2 LIQUID ORAL at 20:09

## 2023-02-03 RX ADMIN — VANCOMYCIN HYDROCHLORIDE 750 MG: 750 INJECTION, POWDER, LYOPHILIZED, FOR SOLUTION INTRAVENOUS at 14:38

## 2023-02-03 RX ADMIN — PREGABALIN 300 MG: 100 CAPSULE ORAL at 20:12

## 2023-02-03 RX ADMIN — METHYLPREDNISOLONE SODIUM SUCCINATE 60 MG: 125 INJECTION, POWDER, FOR SOLUTION INTRAMUSCULAR; INTRAVENOUS at 20:13

## 2023-02-03 RX ADMIN — IPRATROPIUM BROMIDE AND ALBUTEROL SULFATE 1 AMPULE: .5; 2.5 SOLUTION RESPIRATORY (INHALATION) at 07:51

## 2023-02-03 RX ADMIN — IPRATROPIUM BROMIDE AND ALBUTEROL SULFATE 1 AMPULE: .5; 2.5 SOLUTION RESPIRATORY (INHALATION) at 23:25

## 2023-02-03 RX ADMIN — PREGABALIN 300 MG: 100 CAPSULE ORAL at 11:09

## 2023-02-03 RX ADMIN — MUPIROCIN: 20 OINTMENT TOPICAL at 20:09

## 2023-02-03 RX ADMIN — PROPOFOL 35 MCG/KG/MIN: 10 INJECTION, EMULSION INTRAVENOUS at 05:54

## 2023-02-03 RX ADMIN — Medication 2 MG/HR: at 11:05

## 2023-02-03 RX ADMIN — INSULIN LISPRO 2 UNITS: 100 INJECTION, SOLUTION INTRAVENOUS; SUBCUTANEOUS at 20:13

## 2023-02-03 RX ADMIN — Medication 175 MCG/HR: at 18:41

## 2023-02-03 RX ADMIN — ROPINIROLE HYDROCHLORIDE 2 MG: 1 TABLET, FILM COATED ORAL at 11:09

## 2023-02-03 RX ADMIN — CEFEPIME 2000 MG: 2 INJECTION, POWDER, FOR SOLUTION INTRAVENOUS at 11:13

## 2023-02-03 RX ADMIN — SODIUM CHLORIDE 0.8 MG/KG/HR: 9 INJECTION, SOLUTION INTRAVENOUS at 10:20

## 2023-02-03 RX ADMIN — INSULIN LISPRO 2 UNITS: 100 INJECTION, SOLUTION INTRAVENOUS; SUBCUTANEOUS at 11:51

## 2023-02-03 RX ADMIN — RISPERIDONE 1 MG: 1 TABLET ORAL at 11:09

## 2023-02-03 RX ADMIN — SERTRALINE 100 MG: 100 TABLET, FILM COATED ORAL at 08:07

## 2023-02-03 RX ADMIN — SERTRALINE 100 MG: 100 TABLET, FILM COATED ORAL at 20:13

## 2023-02-03 RX ADMIN — ATORVASTATIN CALCIUM 60 MG: 40 TABLET, FILM COATED ORAL at 08:07

## 2023-02-03 RX ADMIN — Medication 175 MCG/HR: at 12:42

## 2023-02-03 RX ADMIN — PROPOFOL 35 MCG/KG/MIN: 10 INJECTION, EMULSION INTRAVENOUS at 18:42

## 2023-02-03 RX ADMIN — METHYLPREDNISOLONE SODIUM SUCCINATE 60 MG: 125 INJECTION, POWDER, FOR SOLUTION INTRAMUSCULAR; INTRAVENOUS at 15:43

## 2023-02-03 RX ADMIN — RISPERIDONE 1 MG: 1 TABLET ORAL at 20:12

## 2023-02-03 RX ADMIN — IPRATROPIUM BROMIDE AND ALBUTEROL SULFATE 1 AMPULE: .5; 2.5 SOLUTION RESPIRATORY (INHALATION) at 11:07

## 2023-02-03 RX ADMIN — CHLORHEXIDINE GLUCONATE 0.12% ORAL RINSE 15 ML: 1.2 LIQUID ORAL at 08:06

## 2023-02-03 RX ADMIN — Medication 150 MCG/HR: at 06:03

## 2023-02-03 RX ADMIN — IPRATROPIUM BROMIDE AND ALBUTEROL SULFATE 1 AMPULE: .5; 2.5 SOLUTION RESPIRATORY (INHALATION) at 02:53

## 2023-02-03 RX ADMIN — SODIUM CHLORIDE, PRESERVATIVE FREE 10 ML: 5 INJECTION INTRAVENOUS at 08:07

## 2023-02-03 RX ADMIN — MIDAZOLAM 2 MG: 1 INJECTION INTRAMUSCULAR; INTRAVENOUS at 08:20

## 2023-02-03 RX ADMIN — PANTOPRAZOLE SODIUM 40 MG: 40 INJECTION, POWDER, FOR SOLUTION INTRAVENOUS at 08:07

## 2023-02-03 RX ADMIN — PROPOFOL 35 MCG/KG/MIN: 10 INJECTION, EMULSION INTRAVENOUS at 00:58

## 2023-02-03 RX ADMIN — SODIUM CHLORIDE, PRESERVATIVE FREE 10 ML: 5 INJECTION INTRAVENOUS at 20:09

## 2023-02-03 RX ADMIN — KETAMINE HYDROCHLORIDE 100 MG: 100 INJECTION, SOLUTION, CONCENTRATE INTRAMUSCULAR; INTRAVENOUS at 05:57

## 2023-02-03 RX ADMIN — IPRATROPIUM BROMIDE AND ALBUTEROL SULFATE 1 AMPULE: .5; 2.5 SOLUTION RESPIRATORY (INHALATION) at 14:54

## 2023-02-03 RX ADMIN — MIDAZOLAM 5 MG: 1 INJECTION INTRAMUSCULAR; INTRAVENOUS at 05:56

## 2023-02-03 RX ADMIN — CEFEPIME 2000 MG: 2 INJECTION, POWDER, FOR SOLUTION INTRAVENOUS at 17:27

## 2023-02-03 ASSESSMENT — PULMONARY FUNCTION TESTS
PIF_VALUE: 34
PIF_VALUE: 29
PIF_VALUE: 28
PIF_VALUE: 38
PIF_VALUE: 27
PIF_VALUE: 29

## 2023-02-03 ASSESSMENT — PAIN SCALES - GENERAL
PAINLEVEL_OUTOF10: 0

## 2023-02-03 NOTE — PROGRESS NOTES
RT Inhaler-Nebulizer Bronchodilator Protocol Note    There is a bronchodilator order in the chart from a provider indicating to follow the RT Bronchodilator Protocol and there is an Initiate RT Inhaler-Nebulizer Bronchodilator Protocol order as well (see protocol at bottom of note). CXR Findings:  XR CHEST PORTABLE    Result Date: 2/3/2023  Exam limited by patient positioning. No marked interval change in bilateral airspace disease as compared to prior. XR CHEST PORTABLE    Result Date: 2/2/2023  Significantly limited evaluation secondary to patient positioning with no signs to suggest pneumothorax. XR CHEST PORTABLE    Result Date: 2/2/2023  Complete opacification has developed of the left hemithorax, which can reflect combination of atelectasis, airspace disease, and/or pleural effusion. Right-sided airspace disease is not substantially changed. XR CHEST PORTABLE    Result Date: 2/2/2023  1. Endotracheal tube likely within the right mainstem bronchus. Recommend 3 cm of retraction and repeat chest radiograph. XR CHEST PORTABLE    Result Date: 2/2/2023  1. Findings suggestive of partial right upper lobe atelectasis. 2. Small left pleural effusion. 3. Central pulmonary vascular congestion without overt pulmonary edema. 4. Left basilar atelectasis. The findings from the last RT Protocol Assessment were as follows:   History Pulmonary Disease: Chronic pulmonary disease  Respiratory Pattern: Severe use of accessory muscle or RR>30 bpm  Breath Sounds: Intermittent or unilateral wheezes  Cough: Strong, productive  Indication for Bronchodilator Therapy: Wheezing associated with pulm disorder  Bronchodilator Assessment Score: 15    Aerosolized bronchodilator medication orders have been revised according to the RT Inhaler-Nebulizer Bronchodilator Protocol below. Respiratory Therapist to perform RT Therapy Protocol Assessment initially then follow the protocol.   Repeat RT Therapy Protocol Assessment PRN for score 0-3 or on second treatment, BID, and PRN for scores above 3. No Indications - adjust the frequency to every 6 hours PRN wheezing or bronchospasm, if no treatments needed after 48 hours then discontinue using Per Protocol order mode. If indication present, adjust the RT bronchodilator orders based on the Bronchodilator Assessment Score as indicated below. Use Inhaler orders unless patient has one or more of the following: on home nebulizer, not able to hold breath for 10 seconds, is not alert and oriented, cannot activate and use MDI correctly, or respiratory rate 25 breaths per minute or more, then use the equivalent nebulizer order(s) with same Frequency and PRN reasons based on the score. If a patient is on this medication at home then do not decrease Frequency below that used at home. 0-3 - enter or revise RT bronchodilator order(s) to equivalent RT Bronchodilator order with Frequency of every 4 hours PRN for wheezing or increased work of breathing using Per Protocol order mode. 4-6 - enter or revise RT Bronchodilator order(s) to two equivalent RT bronchodilator orders with one order with BID Frequency and one order with Frequency of every 4 hours PRN wheezing or increased work of breathing using Per Protocol order mode. 7-10 - enter or revise RT Bronchodilator order(s) to two equivalent RT bronchodilator orders with one order with TID Frequency and one order with Frequency of every 4 hours PRN wheezing or increased work of breathing using Per Protocol order mode. 11-13 - enter or revise RT Bronchodilator order(s) to one equivalent RT bronchodilator order with QID Frequency and an Albuterol order with Frequency of every 4 hours PRN wheezing or increased work of breathing using Per Protocol order mode.       Greater than 13 - enter or revise RT Bronchodilator order(s) to one equivalent RT bronchodilator order with every 4 hours Frequency and an Albuterol order with Frequency of every 2 hours PRN wheezing or increased work of breathing using Per Protocol order mode. RT to enter RT Home Evaluation for COPD & MDI Assessment order using Per Protocol order mode.     Electronically signed by Aleksandra Mclean RCP on 2/3/2023 at 11:13 AM

## 2023-02-03 NOTE — PROGRESS NOTES
Wasted 65ml of Ketamine from gtt d/t no longer infusing/needed with Krzysztof Jara RN.     Electronically signed by Gabriel Reid RN on 2/3/2023 at 5:31 PM    Electronically signed by Georgia Mcknight RN on 2/3/2023 at 5:32 PM

## 2023-02-03 NOTE — PROGRESS NOTES
[address] [facility] Pharmacokinetic Monitoring Service - Vancomycin     Alec Locke is a 79 y.o. female starting on vancomycin therapy for HAP. Pharmacy consulted by Dr Chyna Javier for monitoring and adjustment. Target Concentration: Goal AUC/SHAHBAZ 400-600 mg*hr/L    Additional Antimicrobials: Cefepime    Pertinent Laboratory Values: Wt Readings from Last 1 Encounters:   02/03/23 190 lb 14.7 oz (86.6 kg)     Temp Readings from Last 1 Encounters:   02/03/23 99.1 °F (37.3 °C) (Bladder)     Estimated Creatinine Clearance: 64 mL/min (based on SCr of 0.9 mg/dL). Recent Labs     02/02/23  0505 02/03/23  0513   CREATININE 0.7 0.9   WBC 8.7 9.5     Procalcitonin: 0.05    Pertinent Cultures:  Culture Date Source Results   2/2/2023 Bronchial Wash Gm positive  cocci   MRSA Nasal Swab: not ordered. Order placed by pharmacy.     Plan:  Dosing recommendations based on Bayesian software  Start vancomycin 750mg q12h  Anticipated AUC of 417 and trough concentration of 13.8 at steady state  Renal labs as indicated   Vancomycin concentration ordered for 2/4 @ 1200   Pharmacy will continue to monitor patient and adjust therapy as indicated    Thank you for the consult,  Jac Scott, [facility]  2/3/2023 12:06 PM

## 2023-02-03 NOTE — PROGRESS NOTES
All discharge instructions gone over with pt & . Provided additional education on Formula prep at home & included booklet. All questions answered. Mom walked out with baby in [facility] with NA.    Linda Bhatia RN     [facility]                                                                    CHIEF COMPLAINT: SOB ,leg swelling         HPI:   Has been hypoxic this am  Vent adjusted  She is requiring high sedation   CXR looks ok  Had hypoxia last night  Peak pressure high     S  Redness legs and oozing green stuff     Past Medical History:   Diagnosis Date    Anxiety     Arthritis     Cancer ([address] 1980    Cervical    Chronic back pain     Chronic pain     COPD (chronic obstructive pulmonary disease) (HCC)     Depression     DJD (degenerative joint disease), cervical     Drug-seeking behavior     GERD (gastroesophageal reflux disease)     Hypercholesteremia     Hypertension     IBS (irritable bowel syndrome)     Insomnia     K deficiency     Kidney stone     Narcotic addiction [facility])     Patient Denies    Narcotic overdose [facility])     Patient Denies    Osteoporosis     Overdose     [id number] Patient Denies on 4/3/14    PNA (pneumonia) 10/17/2016    Restless leg syndrome     Thyroid disease        Past Surgical History:        Procedure Laterality Date    APPENDECTOMY      BACK SURGERY      CERVICAL DISCECTOMY  12-14-13    anterior discetomy fusion c 3/4 c 4/5 posterior fusion C3-C5 screws and rods C3-C5    CHOLECYSTECTOMY  2012    Laparoscopic    COLONOSCOPY  2008    COLONOSCOPY  08/10/2016    FOOT SURGERY Right     FRACTURE SURGERY Right 9/14    distal radius    HAND SURGERY  08/2012    crushed right hand and has two steel plates in hand    HYSTERECTOMY (CERVIX STATUS UNKNOWN)      NECK SURGERY  2002    disc    OTHER SURGICAL HISTORY  4/9/14    TRANSFORAMINAL LUMBAR INTERBODY FUSION L4 L5       Allergies:  is allergic to asa buff (mag [buffered aspirin] and cymbalta [duloxetine hcl]. Social History:    TOBACCO:   reports that she has been smoking cigarettes. She started smoking about 50 years ago. She has a 40.00 pack-year smoking history.  She has never used smokeless tobacco.  ETOH:   reports no history of alcohol use.       Family History:       Problem Relation Age of Onset    Heart Disease Mother     High Cholesterol Mother     Diabetes Mother     Asthma Mother     Heart Failure Mother     Hypertension Mother     Heart Disease Father     High Cholesterol Father     Emphysema Father     Heart Failure Father     Hypertension Father     Cancer Neg Hx        Current Medications:    Current Facility-Administered Medications:     0.9 % sodium chloride infusion, , IntraVENous, Continuous, Dori Valencia MD, Last Rate: 100 mL/hr at 02/03/23 0635, Rate Verify at 02/03/23 0635    propofol injection, 5-50 mcg/kg/min, IntraVENous, Continuous, Dori Valencia MD, Last Rate: 14.7 mL/hr at 02/03/23 0635, 30 mcg/kg/min at 02/03/23 0635    atorvastatin (LIPITOR) tablet 60 mg, 60 mg, Oral, Daily, Dori Valencia MD, 60 mg at 02/03/23 0807    pantoprazole (PROTONIX) injection 40 mg, 40 mg, IntraVENous, Daily, Dori Valencia MD, 40 mg at 02/03/23 1249    sertraline (ZOLOFT) tablet 100 mg, 100 mg, Oral, BID, Dori Valencia MD, 100 mg at 02/03/23 0807    glucose chewable tablet 16 g, 4 tablet, Oral, PRN, Dori Valencia MD    dextrose bolus 10% 125 mL, 125 mL, IntraVENous, PRN **OR** dextrose bolus 10% 250 mL, 250 mL, IntraVENous, PRN, Dori Valencia MD    glucagon (rDNA) injection 1 mg, 1 mg, SubCUTAneous, PRN, Dori Valencia MD    dextrose 10 % infusion, , IntraVENous, Continuous PRN, Droi Valencia MD    chlorhexidine (PERIDEX) 0.12 % solution 15 mL, 15 mL, Mouth/Throat, BID, Dori Valencia MD, 15 mL at 02/03/23 0806    sodium chloride flush 0.9 % injection 5-40 mL, 5-40 mL, IntraVENous, 2 times per day, Dori Valencia MD, 10 mL at 02/03/23 0807    sodium chloride flush 0.9 % injection 5-40 mL, 5-40 mL, IntraVENous, PRN, Dori Valencia MD    0.9 % sodium chloride infusion, , IntraVENous, PRN, Dori Valencia MD    ondansetron (ZOFRAN-ODT) disintegrating tablet 4 mg, 4 mg, Oral, Q8H PRN **OR** ondansetron (ZOFRAN) injection 4 mg, 4 mg, IntraVENous, Q6H PRN, Julius Bennett MD    polyethylene glycol (GLYCOLAX) packet 17 g, 17 g, Oral, Daily PRN, Julius Bennett MD    enoxaparin (LOVENOX) injection 40 mg, 40 mg, SubCUTAneous, Daily, Julius Bennett MD, 40 mg at 02/03/23 0632    acetaminophen (TYLENOL) tablet 650 mg, 650 mg, Oral, Q6H PRN **OR** acetaminophen (TYLENOL) suppository 650 mg, 650 mg, Rectal, Q6H PRN, Julius Bennett MD, 650 mg at 02/02/23 2255    nicotine (NICODERM CQ) 21 MG/24HR 1 patch, 1 patch, TransDERmal, Daily, Julius Bennett MD, 1 patch at 02/02/23 2012    methylPREDNISolone sodium (SOLU-MEDROL) injection 40 mg, 40 mg, IntraVENous, Q12H, 40 mg at 02/03/23 0807 **FOLLOWED BY** [START ON 2/4/2023] predniSONE (DELTASONE) tablet 40 mg, 40 mg, Oral, Daily, Julius Bennett MD    insulin lispro (HUMALOG) injection vial 0-8 Units, 0-8 Units, SubCUTAneous, Q4H, Julius Bennett MD, 2 Units at 02/03/23 0514    albuterol sulfate HFA (PROVENTIL;VENTOLIN;PROAIR) 108 (90 Base) MCG/ACT inhaler 4 puff, 4 puff, Inhalation, Q4H PRN, Julius Bennett MD    levoFLOXacin (LEVAQUIN) 750 MG/150ML infusion 750 mg, 750 mg, IntraVENous, Q24H, Julius Bennett MD, Stopped at 02/02/23 2155    norepinephrine (LEVOPHED) 16 mg in sodium chloride 0.9 % 250 mL infusion, 1-100 mcg/min, IntraVENous, Continuous, Bunny Brown MD, Last Rate: 1.9 mL/hr at 02/02/23 1331, 2 mcg/min at 02/02/23 1331    ketamine (KETALAR) 500 mg in sodium chloride 0.9 % 100 mL infusion, 0.05-1 mg/kg/hr (Ideal), IntraVENous, Continuous, Bunny Brown MD, Last Rate: 7.3 mL/hr at 02/03/23 0635, 0.7 mg/kg/hr at 02/03/23 0635    mupirocin (BACTROBAN) 2 % ointment, , Nasal, BID, Esther Pratt MD, Given at 02/03/23 0806    fentaNYL (SUBLIMAZE) 1,000 mcg in sodium chloride 0.9% 100 mL infusion,  mcg/hr, IntraVENous, Continuous, Bebeto Almodovar MD, Last Rate: 15 mL/hr at 02/03/23 0635, 150 mcg/hr at 02/03/23 7551    ipratropium-albuterol (DUONEB) nebulizer solution 1 ampule, 1 ampule, Inhalation, Q4H, Bebeto Miller MD, 1 ampule at 02/03/23 0751    albuterol (PROVENTIL) nebulizer solution 2.5 mg, 2.5 mg, Nebulization, Q2H PRN, Bebeto Miller MD    fentaNYL (SUBLIMAZE) injection 25 mcg, 25 mcg, IntraVENous, Q1H PRN, Christy Bustos MD, 25 mcg at 02/03/23 0064    midazolam (VERSED) injection 2 mg, 2 mg, IntraVENous, Q1H PRN, Christy Bustos MD, 2 mg at 02/03/23 0820      REVIEW OF SYSTEMS:  Constitutional: Negative for fever  HENT: Negative for sore throat  Eyes: Negative for redness   Respiratory: Shortness of breath, cough  Cardiovascular: Negative for chest pain  Gastrointestinal: Negative for vomiting, diarrhea   Genitourinary: Negative for hematuria   Musculoskeletal: Negative for arthralgias   Skin: Negative for rash  Neurological: Negative for syncope  Hematological: Negative for adenopathy  Psychiatric/Behavorial: Negative for anxiety      Objective:   PHYSICAL EXAM:    Blood pressure (!) 113/56, pulse 73, temperature 98.8 °F (37.1 °C), resp. rate 24, height 5' 3\" (1.6 m), weight 190 lb 14.7 oz (86.6 kg), SpO2 (!) 88 %, not currently breastfeeding.' on RA  Gen: No distress. Eyes: PERRL. No sclera icterus. No conjunctival injection. ENT: No discharge. Pharynx clear. Neck: Trachea midline. No obvious mass. Resp: Rhonchi bilaterally more in the right   CV: Regular rate. Regular rhythm. No murmur or rub. No edema. GI: Non-tender. Non-distended. No hernia. Skin: redness and oozing  right> left extremities. Lymph: No cervical LAD. No supraclavicular LAD. M/S: No cyanosis. No joint deformity. No clubbing.    Neuro: sedated  Psych: sedated           DATA reviewed by me:       Assessment:       Acute COPD exacerbation   Bilateral lower extremity cellulitis  Possible CHF/fluid overload  OGHS/SHAZIA  Acute hypercapnic res failure      Plan:       Sputum gram + cocci  Vent adjusted v ne '  Off Levophed  Adjust bent  Dose of paralytic as has high peak and desat 80    Start versed drip  Wean ketamine   *-IV steroids   *-IV abx cefepime and vancomycin   *-negative  viral panel   *- Sputum culture follow  *_Wean O2  *-BD  ICS   Watch K level  Watch IVF   Femoral line  Start tube feeing   Stop IVF   CT chest :stable GGO lungs   Check o2 at ambulation before discharge  May needed NIMV  Adjust sedation   No DVT  Giving her hypoxia ,will get CTA r/o PE       Care reviewed with nursing staff, medical and surgical specialty care, primary care and the patient's family as available. Chart review/lab review/X-ray viewing/documentation and had long Conversation with patient/family re: prognosis, care options and any end of life issues:      Critical care time spent reviewing labs/films, examining patient, collaborating with other physicians more than 35  Minutes  excluding procedures . Arsh Calderon M.D.   2/3/2023  7:59 PM      Thank you very much for allowing me to participate in the care of this pleasant patient , should you have any questions ,please do not hesitate to contact me      Beny Womack MD,[facility]  Pulmonary&Critical Care Medicine    Rudy Bonner    NOTE: This report was transcribed using voice recognition software. Every effort was made to ensure accuracy; however, inadvertent computerized transcription errors may be present.

## 2023-02-03 NOTE — CARE COORDINATION
INTERDISCIPLINARY PLAN OF CARE CONFERENCE    Date/Time: 2/3/2023 11:27 AM  Completed by: MARQUITA Leal   Case Management      Patient Name:  Nicole Pickett  YOB: 1955  Admitting Diagnosis: COPD exacerbation ([address] [J44.1]  Acute respiratory failure with hypercapnia ([address] [J96.02]  Acute encephalopathy [G93.40]  Acute on chronic respiratory failure with hypoxia and hypercapnia ([address] [C92.80, J96.22]     Admit Date/Time:  2/2/2023  4:53 AM    Chart reviewed. Interdisciplinary team contacted or reviewed plan related to patient progress and discharge plans. Disciplines included Case Management, Nursing, and Dietitian. Current Status:ongoing   PT/OT recommendation for discharge plan of care: TBD    Expected D/C Disposition:  TBD    Discharge Plan Comments: Chart review completed. Pt remains in the ICU on a Vent. No vistors at bedside this AM.     CM spoke with pt's daughter Jael Cat yesterday and she has the rehab list.     CM will continue to follow and assist with developing discharge plans pending medical status. Please notify CM if needs or concerns arise.      Home O2 in place on admit: Yes

## 2023-02-03 NOTE — PROGRESS NOTES
02/02/23 2247   Patient Observation   Heart Rate 94   Resp 24   SpO2 98 %   Breath Sounds   Right Upper Lobe Expiratory Wheezes   Right Middle Lobe Expiratory Wheezes   Right Lower Lobe Expiratory Wheezes   Left Upper Lobe Expiratory Wheezes   Left Lower Lobe Expiratory Wheezes   Vent Information   Vent Mode AC/VC   Ventilator Settings   FiO2  (S)  80 %  (decreased to 70%)   Vt (Set, mL) 400 mL   Resp Rate (Set) 24 bmp   PEEP/CPAP (cmH2O) 8   Vent Patient Data (Readings)   Vt (Measured) 401 mL   Peak Inspiratory Pressure (cmH2O) 32 cmH2O   Rate Measured 24 br/min   Minute Volume (L/min) 9.6 Liters   Mean Airway Pressure (cmH2O) 15 cmH20   I:E Ratio 1:2.5   Flow Sensitivity 3 L/min   Vent Alarm Settings   High Pressure (cmH2O) 40 cmH2O   Low Minute Volume (lpm) 3 L/min   Low Exhaled Vt (ml) 250 mL   RR High (bpm) 40 br/min   Apnea (secs) 20 secs   Additional Respiratoray Assessments   Humidification Source Heated wire   Humidification Temp 36.8   Circuit Condensation Drained   Ambu Bag With Mask At Bedside Yes   Airway Clearance   Suction ET Tube   Suction Device Inline suction catheter   Sputum Method Obtained Endotracheal   Sputum Amount Moderate   Sputum Color/Odor Yellow; White   Sputum Consistency Thick   ETT    Placement Date/Time: 02/02/23 0640   Placed By: In ED  Placement Verified By: Colorimetric ETCO2 device; Chest X-ray  Preoxygenation: Yes  Airway Tube Size: 8 mm  Laryngoscope: GlideScope  Blade Size: 3  Location: Oral  Secured At: 23 cm  Measured From. ..    Secured At 21 cm   Measured From Lips   ETT Placement Right   Secured By Commercial tube orellana   Site Assessment Dry   Treatment   $Treatment Type $Inhaled Therapy/Meds

## 2023-02-03 NOTE — PROGRESS NOTES
Comprehensive Nutrition Assessment    Type and Reason for Visit:  Reassess, Consult (consult for TF ordering and management)    Nutrition Recommendations/Plan:   Start TF - ADULT TUBE FEEDING; Orogastric; Peptide-Based High-Protein formula - Vital High-Protein with a goal rate of 35 ml/hr x 20 hours. Start with 20 ml/hr and increase by 15 ml every 4 hours, as tolerated by patient, until goal rate can be achieved and maintained. Water flushes, 30 ml every 4 hours for tube patency. Please administer one proteinex P2Go once daily via feeding tube. Monitor TF start, rate, intake, and tolerance + water flushes + administration of one proteinex P2Go once daily via feeding tube. Monitor respiratory/vent status, sedation type/amount (propofol is currently infusing at 30 mcg x 24 hours which = 409 kcals from lipids), TG checks, and plan of care. Monitor nutrition-related labs, bowel function, and weight trends. Malnutrition Assessment:  Malnutrition Status:   At risk for malnutrition (02/03/23 1201)    Context:  Acute Illness     Findings of the 6 clinical characteristics of malnutrition:  Energy Intake:  Mild decrease in energy intake   Weight Loss:  No significant weight loss     Body Fat Loss:  No significant body fat loss     Muscle Mass Loss:  No significant muscle mass loss    Fluid Accumulation:  Unable to assess  (legs oozing)   Strength:  Not Performed    Nutrition Assessment:    patient remains unchanged from a nutritional standpoint since last RD assessment; patient remains at risk for further compromise d/t need for EN as sole source of nutrition at this time, altered nutrition-related labs, and respiratory dysfunction; will start TF today    Nutrition Related Findings:    patient remains intubated and sedated with 30 mcg propofol at this time; okay for TF to be started today; no documented BM at this time; abdomen is round, soft, and bowel sounds are hypoactive; + elevated BS this am; + blood blister on 2nd digit of L foot and fissure in crack of buttocks Wound Type: None (legs oozing fluid and blood blister on 2nd digit of L foot)       Current Nutrition Intake & Therapies:    Average Meal Intake: NPO  Average Supplements Intake: NPO  Current Tube Feeding (TF) Orders:  Feeding Route: Orogastric  Formula: Peptide Based High Protein  Schedule: Continuous  Feeding Regimen: Vital High-Protein with a goal rate of 35 ml/hr x 20 hours  Additives/Modulars: Protein (one proteinex P2Go once daily)  Water Flushes: 30 ml water flushes every 4 hours  Current TF & Flush Orders Provides: TF to be started today  Goal TF & Flush Orders Provides: Vital High-Protein with a goal rate of 35 ml/hr x 20 hours = 700 ml TV, 700 kcals, 61 g protein, and 585 ml free water + 30 ml water flushes every 4 hours + 26 g protein and 104 kcals from one proteinex P2Go once daily via feeding tube (87 g protein and 804 kcals total)    Anthropometric Measures:  Height: 5' 3\" (160 cm)  Ideal Body Weight (IBW): 115 lbs (52 kg)    Admission Body Weight: 180 lb (81.6 kg) (obtained on 2/2/23; estimated weight)  Current Body Weight: 190 lb 14.7 oz (86.6 kg) (obtained on 2/3/23; actual weight), 166 % IBW. Weight Source: Bed Scale  Current BMI (kg/m2): 33.8  Usual Body Weight: 167 lb 10 oz (76 kg) (obtained on 5/18/22; actual weight)  % Weight Change (Calculated): 13.9  Weight Adjustment For: No Adjustment                 BMI Categories: Obese Class 1 (BMI 30.0-34. 9)    Estimated Daily Nutrient Needs:  Energy Requirements Based On: Kcal/kg  Weight Used for Energy Requirements: Current  Energy (kcal/day): 946 - 5009 kcals based on 11-14 kcals/kg/CBW  Weight Used for Protein Requirements: Ideal  Protein (g/day): 104 - 114 g protein based on 2.0-2.2 g/kg/IBW  Method Used for Fluid Requirements: 1 ml/kcal  Fluid (ml/day): 946 - 1204 ml    Nutrition Diagnosis:   Inadequate oral intake related to inadequate protein-energy intake, impaired respiratory function as evidenced by NPO or clear liquid status due to medical condition, intubation, lab values    Nutrition Interventions:   Food and/or Nutrient Delivery: Continue NPO, Start Tube Feeding  Nutrition Education/Counseling: No recommendation at this time  Coordination of Nutrition Care: Continue to monitor while inpatient, Interdisciplinary Rounds, Coordination of Care  Plan of Care discussed with: ICU Team    Goals:  Previous Goal Met: No Progress toward Goal(s)  Goals: Initiate nutrition support       Nutrition Monitoring and Evaluation:   Behavioral-Environmental Outcomes: None Identified  Food/Nutrient Intake Outcomes: Enteral Nutrition Intake/Tolerance  Physical Signs/Symptoms Outcomes: Biochemical Data, Hemodynamic Status, Fluid Status or Edema, Nutrition Focused Physical Findings, Skin, Weight    Discharge Planning:     Too soon to determine     Javier Grey, [address] [place] [address], LD  Contact: [phone]

## 2023-02-03 NOTE — PROGRESS NOTES
02/02/23 1945   Patient Observation   Heart Rate 92   Resp 24   SpO2 96 %   Breath Sounds   Right Upper Lobe Rhonchi;Expiratory Wheezes   Right Middle Lobe Rhonchi;Expiratory Wheezes   Right Lower Lobe Expiratory Wheezes   Left Upper Lobe Rhonchi;Expiratory Wheezes   Left Lower Lobe Expiratory Wheezes   Vent Information   Vent Mode AC/VC   Ventilator Settings   FiO2  (S)  90 %  (decreased to 80%)   Vt (Set, mL) 400 mL   Resp Rate (Set) 24 bmp   PEEP/CPAP (cmH2O) 8   Vent Patient Data (Readings)   Vt (Measured) 405 mL   Peak Inspiratory Pressure (cmH2O) 29 cmH2O   Rate Measured 24 br/min   Minute Volume (L/min) 9.65 Liters   Mean Airway Pressure (cmH2O) 15 cmH20   Plateau Pressure (cm H2O) 24 cm H2O   I:E Ratio 1:2.5   Flow Sensitivity 3 L/min   Static Compliance (L/cm H2O) 25   Dynamic Compliance (L/cm H2O) 19 L/cm H2O   Vent Alarm Settings   High Pressure (cmH2O) 40 cmH2O   Low Minute Volume (lpm) 3 L/min   Low Exhaled Vt (ml) 250 mL   RR High (bpm) 40 br/min   Apnea (secs) 20 secs   Additional Respiratoray Assessments   Humidification Source Heated wire   Humidification Temp 36.7   Circuit Condensation Drained   Ambu Bag With Mask At Bedside Yes   Airway Clearance   Suction ET Tube   Suction Device Inline suction catheter   Sputum Method Obtained Endotracheal   Sputum Amount Moderate   Sputum Color/Odor White;Yellow   Sputum Consistency Thick   ETT    Placement Date/Time: 02/02/23 0640   Placed By: In ED  Placement Verified By: Colorimetric ETCO2 device; Chest X-ray  Preoxygenation: Yes  Airway Tube Size: 8 mm  Laryngoscope: GlideScope  Blade Size: 3  Location: Oral  Secured At: 23 cm  Measured From. ..    Secured At 21 cm   Measured From Lips   ETT Placement Right   Secured By Commercial tube orellana   Site Assessment Dry   Treatment   $Treatment Type $Inhaled Therapy/Meds

## 2023-02-03 NOTE — PROGRESS NOTES
02/03/23 0253   Patient Observation   Heart Rate 83   Resp 24   SpO2 95 %   Breath Sounds   Right Upper Lobe Expiratory Wheezes   Right Middle Lobe Expiratory Wheezes   Right Lower Lobe Expiratory Wheezes   Left Upper Lobe Expiratory Wheezes   Left Lower Lobe Expiratory Wheezes   Vent Information   Vent Mode AC/VC   Ventilator Settings   FiO2  70 %   Vt (Set, mL) 400 mL   Resp Rate (Set) 24 bmp   PEEP/CPAP (cmH2O) 8   Vent Patient Data (Readings)   Vt (Measured) 406 mL   Peak Inspiratory Pressure (cmH2O) 29 cmH2O   Rate Measured 24 br/min   Minute Volume (L/min) 9.7 Liters   Mean Airway Pressure (cmH2O) 15 cmH20   I:E Ratio 1:2.5   Flow Sensitivity 3 L/min   Vent Alarm Settings   High Pressure (cmH2O) 40 cmH2O   Low Minute Volume (lpm) 3 L/min   Low Exhaled Vt (ml) 250 mL   RR High (bpm) 40 br/min   Apnea (secs) 20 secs   Additional Respiratoray Assessments   Humidification Source Heated wire   Humidification Temp 36.7   Circuit Condensation Drained   Ambu Bag With Mask At Bedside Yes   ETT    Placement Date/Time: 02/02/23 0640   Placed By: In ED  Placement Verified By: Colorimetric ETCO2 device; Chest X-ray  Preoxygenation: Yes  Airway Tube Size: 8 mm  Laryngoscope: GlideScope  Blade Size: 3  Location: Oral  Secured At: 23 cm  Measured From. ..    Secured At 21 cm   Measured From Lips   ETT Placement Right   Secured By Commercial tube orellana   Site Assessment Dry   Treatment   $Treatment Type $Inhaled Therapy/Meds

## 2023-02-03 NOTE — PROGRESS NOTES
5812  Shift assessment, completed, see flow sheet. Pt remains intubated. Following commands. Respirations are easy, even, and unlabored. Bilateral lung sounds are noted with rhonchi and diminished on L side. OG in place at 72, currently connected to LIWS. R femoral and PIV x2 remain in place and patent with sites and dressings C/D/I. Arias in place and patent with STAT lock. Bilateral soft wrist restraints in place for pt safety. Call light within reach. Bed in lowest position. Bed alarm on. Will continue to monitor. [id number] rounds completed with Dr. Octaviano Fabry and multidisciplinary team. Reviewed labs, meds, VS, assessment, & plan of care for today. See dictated note and new orders for details. New orders received:  -d/c IVF. -resume all home medications.  -change ATB's to Vancomycin & Cefepime.  -doppler of BLE.  -give 10mg IVP Nimbex. -weam off Ketamine and replace with Versed gtt. -start TF today. 1249  Reassessment completed, see flowsheets, unchanged from prior. VSS. All ICU lines and monitoring remain in place. Bed locked in lowest position. Call light within reach. 1628  Reassessment completed, see flowsheets, unchanged from prior. VSS. All ICU lines and monitoring remain in place. Bed locked in lowest position. Call light within reach. Vascular was just at bedside at performed duplex of BLE, no DVT noted.

## 2023-02-03 NOTE — PROGRESS NOTES
IM Progress Note    Admit Date:  2/2/2023  1    Interval history:  copd exacerbation , failed bipap and intubated in ER   Imaging with left hemithorax opacification,s/p bronch with clearance of mucous plugs       Subjective:  Ms. Mike Tovar seen in ICU bed, sedated on vent, no distress  Fevers resolved  BP stable  Remains on high Fio2 of 70 % and PEEP of 8      Objective:   BP (!) 92/47   Pulse 79   Temp 99 °F (37.2 °C) (Bladder)   Resp 24   Ht 5' 3\" (1.6 m)   Wt 190 lb 14.7 oz (86.6 kg)   SpO2 (!) 89%   BMI 33.82 kg/m²     Intake/Output Summary (Last 24 hours) at 2/3/2023 0656  Last data filed at 2/3/2023 6588  Gross per 24 hour   Intake 4103.31 ml   Output 2425 ml   Net 1678.31 ml       Physical Exam:      General:  elderly female on vent support  Oral ETT and OG noted  .  Appears to be not in any distress  Mucous Membranes:  Pink , anicteric  Neck: No JVD, no carotid bruit, no thyromegaly  Chest: bilateral air entry with scattered rhonchi and improving left sided airentry  Cardiovascular:  RRR S1S2 heard, no murmurs or gallops  Abdomen:  Soft, undistended, non tender, no organomegaly, BS present  Extremities: chronic 2 +  brawny edema of both LE with diffuse celluitis of ext Bilaterally  Right fem TLC noted  Distal pulses well felt  Neurological : sedated      Medications:   Scheduled Medications:    atorvastatin  60 mg Oral Daily    pantoprazole  40 mg IntraVENous Daily    sertraline  100 mg Oral BID    chlorhexidine  15 mL Mouth/Throat BID    sodium chloride flush  5-40 mL IntraVENous 2 times per day    enoxaparin  40 mg SubCUTAneous Daily    nicotine  1 patch TransDERmal Daily    methylPREDNISolone  40 mg IntraVENous Q12H    Followed by    Lyssa Hoskins ON 2/4/2023] predniSONE  40 mg Oral Daily    insulin lispro  0-8 Units SubCUTAneous Q4H    levofloxacin  750 mg IntraVENous Q24H    mupirocin   Nasal BID    ipratropium-albuterol  1 ampule Inhalation Q4H     I   sodium chloride 100 mL/hr at 02/03/23 0635    propofol 30 mcg/kg/min (02/03/23 1199)    dextrose      sodium chloride      norepinephrine 2 mcg/min (02/02/23 1331)    ketamine (KETALAR) 5 mg/mL infusion for analgosedation 0.7 mg/kg/hr (02/03/23 0635)    fentaNYL 150 mcg/hr (02/03/23 0635)     glucose, dextrose bolus **OR** dextrose bolus, glucagon (rDNA), dextrose, sodium chloride flush, sodium chloride, ondansetron **OR** ondansetron, polyethylene glycol, acetaminophen **OR** acetaminophen, albuterol sulfate HFA, albuterol, fentanNYL, midazolam    Lab Data:  Recent Labs     02/02/23  0505 02/03/23  0513   WBC 8.7 9.5   HGB 11.8* 9.5*   HCT 36.5 29.6*   MCV 96.8 95.3   * 113*     Recent Labs     02/02/23  0505 02/03/23  0513    141   K 4.9 4.0    104   CO2 28 27   BUN 19 18   CREATININE 0.7 0.9     Recent Labs     02/02/23  0505   TROPONINI <0.01       Coagulation:   Lab Results   Component Value Date/Time    INR 0.94 09/12/2020 03:57 PM    APTT 31.7 09/12/2020 03:57 PM     Cardiac markers:   Lab Results   Component Value Date/Time    CKMB 10.3 07/14/2010 07:25 AM    CKTOTAL 74 10/17/2014 02:46 AM    TROPONINI <0.01 02/02/2023 05:05 AM         Lab Results   Component Value Date    ALT 12 02/03/2023    AST 13 (L) 02/03/2023    ALKPHOS 99 02/03/2023    BILITOT 0.3 02/03/2023       Lab Results   Component Value Date    INR 0.94 09/12/2020    INR 0.99 07/01/2018    INR 1.01 06/05/2018    PROTIME 10.9 09/12/2020    PROTIME 11.3 07/01/2018    PROTIME 11.5 06/05/2018       Radiology    Cxr    Complete opacification has developed of the left hemithorax, which can   reflect combination of atelectasis, airspace disease, and/or pleural   effusion. Right-sided airspace disease is not substantially changed. Chest xray   1. Findings suggestive of partial right upper lobe atelectasis. 2. Small left pleural effusion. 3. Central pulmonary vascular congestion without overt pulmonary edema. 4. Left basilar atelectasis.      Cxr    Significantly limited evaluation secondary to patient positioning with no   signs to suggest pneumothorax. Cultures:   Cultures  Blood- NGTD  Sputum - pending  BAL pending      Assessment & Plan:    Acute respiratory failure with hypoxia and hypercapnia  Copd exacerbation   Left hemithorax opacification with mucous plugging    . O2 sat 86% on 5L in the ER. Initially placed on NIV. Too obtunded for NIV and PCO2 worsened after ~1 h on NIV. And eventually placed on vent  Admitted to ICU   Had emergency bronch with removal of mucous plugs  Started on steroids, abx -cefepime and vanc   Critical care following  Await BAL cx     COPD exacerbation   , IV solumedrol, IV abx as above , PRN/GIDEON intensive NEB therapy. Acute encephalopathy, obtunded, likely metabolic 2/2 hypercarbia. Now on vent support    DM2, hold oral Rx, chk A1c, add Mod SSI q4h. Bilateral LE cellulitis - has chronic lymphedema, holding diuretics  started on cefepime and added vanc  - await cx     Tobacco Abuse, unable to  cessation 2/2 MS, 21 mg nicotine patch.      Consider to remove femoral TLC     DVT Prophylaxis: Lx  Diet: NPO- can start TF  Code Status: Full Code     Helen Slaughter MD, 2/3/2023 6:56 AM

## 2023-02-03 NOTE — PROGRESS NOTES
Pt was getting agitated and restless during her bath this morning, grabbing at staff and pushing back when trying to turn her. Attempting to sit up in bed, pulling at tubes. Dr. Avelina Velázquez was on floor and gave pt prn one time dose of ketamine and versed. Waste 4ml ketamine via two nurses and md at the bedside.      New pictures from bath this am:  BLE red, crusted, weeping, draining  2nd digit of L foot, blood blister to the tip

## 2023-02-04 ENCOUNTER — APPOINTMENT (OUTPATIENT)
Dept: GENERAL RADIOLOGY | Age: 68
DRG: 207 | End: 2023-02-04
Payer: MEDICARE

## 2023-02-04 ENCOUNTER — APPOINTMENT (OUTPATIENT)
Dept: CT IMAGING | Age: 68
DRG: 207 | End: 2023-02-04
Payer: MEDICARE

## 2023-02-04 LAB
ANION GAP SERPL CALCULATED.3IONS-SCNC: 12 MMOL/L (ref 3–16)
APTT: 27.6 SEC (ref 23–34.3)
BASE EXCESS ARTERIAL: -0.2 MMOL/L (ref -3–3)
BASOPHILS ABSOLUTE: 0.1 K/UL (ref 0–0.2)
BASOPHILS RELATIVE PERCENT: 1 %
BUN BLDV-MCNC: 22 MG/DL (ref 7–20)
CALCIUM SERPL-MCNC: 8.2 MG/DL (ref 8.3–10.6)
CARBOXYHEMOGLOBIN ARTERIAL: 0.3 % (ref 0–1.5)
CHLORIDE BLD-SCNC: 104 MMOL/L (ref 99–110)
CO2: 22 MMOL/L (ref 21–32)
CREAT SERPL-MCNC: 0.8 MG/DL (ref 0.6–1.2)
CULTURE, RESPIRATORY: NORMAL
CULTURE, RESPIRATORY: NORMAL
EOSINOPHILS ABSOLUTE: 0 K/UL (ref 0–0.6)
EOSINOPHILS RELATIVE PERCENT: 0 %
GFR SERPL CREATININE-BSD FRML MDRD: >60 ML/MIN/{1.73_M2}
GLUCOSE BLD-MCNC: 263 MG/DL (ref 70–99)
GLUCOSE BLD-MCNC: 264 MG/DL (ref 70–99)
GLUCOSE BLD-MCNC: 268 MG/DL (ref 70–99)
GLUCOSE BLD-MCNC: 269 MG/DL (ref 70–99)
GLUCOSE BLD-MCNC: 301 MG/DL (ref 70–99)
GLUCOSE BLD-MCNC: 317 MG/DL (ref 70–99)
GLUCOSE BLD-MCNC: 346 MG/DL (ref 70–99)
GRAM STAIN RESULT: NORMAL
GRAM STAIN RESULT: NORMAL
HCO3 ARTERIAL: 22.8 MMOL/L (ref 21–29)
HCT VFR BLD CALC: 36 % (ref 36–48)
HEMOGLOBIN, ART, EXTENDED: 11.2 G/DL (ref 12–16)
HEMOGLOBIN: 11.5 G/DL (ref 12–16)
INR BLD: 1.04 (ref 0.87–1.14)
LYMPHOCYTES ABSOLUTE: 0.3 K/UL (ref 1–5.1)
LYMPHOCYTES RELATIVE PERCENT: 4.2 %
MAGNESIUM: 1.8 MG/DL (ref 1.8–2.4)
MCH RBC QN AUTO: 30.2 PG (ref 26–34)
MCHC RBC AUTO-ENTMCNC: 32.1 G/DL (ref 31–36)
MCV RBC AUTO: 94 FL (ref 80–100)
METHEMOGLOBIN ARTERIAL: 0.3 %
MONOCYTES ABSOLUTE: 0.5 K/UL (ref 0–1.3)
MONOCYTES RELATIVE PERCENT: 7.5 %
MRSA SCREEN RT-PCR: NORMAL
NEUTROPHILS ABSOLUTE: 5.9 K/UL (ref 1.7–7.7)
NEUTROPHILS RELATIVE PERCENT: 87.3 %
O2 SAT, ARTERIAL: 97.8 %
O2 THERAPY: ABNORMAL
PCO2 ARTERIAL: 31.7 MMHG (ref 35–45)
PDW BLD-RTO: 16.2 % (ref 12.4–15.4)
PERFORMED ON: ABNORMAL
PH ARTERIAL: 7.47 (ref 7.35–7.45)
PLATELET # BLD: 104 K/UL (ref 135–450)
PMV BLD AUTO: 9.5 FL (ref 5–10.5)
PO2 ARTERIAL: 96.6 MMHG (ref 75–108)
POTASSIUM REFLEX MAGNESIUM: 3.4 MMOL/L (ref 3.5–5.1)
PROTHROMBIN TIME: 13.5 SEC (ref 11.7–14.5)
RBC # BLD: 3.83 M/UL (ref 4–5.2)
SARS-COV-2, NAAT: NOT DETECTED
SODIUM BLD-SCNC: 138 MMOL/L (ref 136–145)
TCO2 ARTERIAL: 23.7 MMOL/L
VANCOMYCIN TROUGH: 14.5 UG/ML (ref 10–20)
WBC # BLD: 6.8 K/UL (ref 4–11)

## 2023-02-04 PROCEDURE — 2700000000 HC OXYGEN THERAPY PER DAY

## 2023-02-04 PROCEDURE — 94003 VENT MGMT INPAT SUBQ DAY: CPT

## 2023-02-04 PROCEDURE — 6370000000 HC RX 637 (ALT 250 FOR IP): Performed by: INTERNAL MEDICINE

## 2023-02-04 PROCEDURE — 6360000002 HC RX W HCPCS: Performed by: INTERNAL MEDICINE

## 2023-02-04 PROCEDURE — 36592 COLLECT BLOOD FROM PICC: CPT

## 2023-02-04 PROCEDURE — 2500000003 HC RX 250 WO HCPCS: Performed by: INTERNAL MEDICINE

## 2023-02-04 PROCEDURE — 85025 COMPLETE CBC W/AUTO DIFF WBC: CPT

## 2023-02-04 PROCEDURE — 80202 ASSAY OF VANCOMYCIN: CPT

## 2023-02-04 PROCEDURE — 2580000003 HC RX 258: Performed by: INTERNAL MEDICINE

## 2023-02-04 PROCEDURE — 99233 SBSQ HOSP IP/OBS HIGH 50: CPT | Performed by: INTERNAL MEDICINE

## 2023-02-04 PROCEDURE — 94761 N-INVAS EAR/PLS OXIMETRY MLT: CPT

## 2023-02-04 PROCEDURE — 82803 BLOOD GASES ANY COMBINATION: CPT

## 2023-02-04 PROCEDURE — 6360000004 HC RX CONTRAST MEDICATION: Performed by: INTERNAL MEDICINE

## 2023-02-04 PROCEDURE — 71045 X-RAY EXAM CHEST 1 VIEW: CPT

## 2023-02-04 PROCEDURE — 85730 THROMBOPLASTIN TIME PARTIAL: CPT

## 2023-02-04 PROCEDURE — 2000000000 HC ICU R&B

## 2023-02-04 PROCEDURE — 87635 SARS-COV-2 COVID-19 AMP PRB: CPT

## 2023-02-04 PROCEDURE — 83735 ASSAY OF MAGNESIUM: CPT

## 2023-02-04 PROCEDURE — 84478 ASSAY OF TRIGLYCERIDES: CPT

## 2023-02-04 PROCEDURE — 94640 AIRWAY INHALATION TREATMENT: CPT

## 2023-02-04 PROCEDURE — 80048 BASIC METABOLIC PNL TOTAL CA: CPT

## 2023-02-04 PROCEDURE — 85610 PROTHROMBIN TIME: CPT

## 2023-02-04 PROCEDURE — 71275 CT ANGIOGRAPHY CHEST: CPT

## 2023-02-04 PROCEDURE — 36415 COLL VENOUS BLD VENIPUNCTURE: CPT

## 2023-02-04 PROCEDURE — C9113 INJ PANTOPRAZOLE SODIUM, VIA: HCPCS | Performed by: INTERNAL MEDICINE

## 2023-02-04 PROCEDURE — 99291 CRITICAL CARE FIRST HOUR: CPT | Performed by: INTERNAL MEDICINE

## 2023-02-04 RX ORDER — SODIUM CHLORIDE 0.9 % (FLUSH) 0.9 %
5-40 SYRINGE (ML) INJECTION EVERY 12 HOURS SCHEDULED
Status: DISCONTINUED | OUTPATIENT
Start: 2023-02-04 | End: 2023-02-10 | Stop reason: SDUPTHER

## 2023-02-04 RX ORDER — SODIUM CHLORIDE 0.9 % (FLUSH) 0.9 %
5-40 SYRINGE (ML) INJECTION PRN
Status: DISCONTINUED | OUTPATIENT
Start: 2023-02-04 | End: 2023-02-10 | Stop reason: SDUPTHER

## 2023-02-04 RX ORDER — ROPINIROLE 1 MG/1
1 TABLET, FILM COATED ORAL 2 TIMES DAILY
Status: DISCONTINUED | OUTPATIENT
Start: 2023-02-04 | End: 2023-02-16 | Stop reason: HOSPADM

## 2023-02-04 RX ORDER — SODIUM CHLORIDE 9 MG/ML
25 INJECTION, SOLUTION INTRAVENOUS PRN
Status: DISCONTINUED | OUTPATIENT
Start: 2023-02-04 | End: 2023-02-16 | Stop reason: HOSPADM

## 2023-02-04 RX ORDER — FUROSEMIDE 10 MG/ML
40 INJECTION INTRAMUSCULAR; INTRAVENOUS ONCE
Status: COMPLETED | OUTPATIENT
Start: 2023-02-04 | End: 2023-02-04

## 2023-02-04 RX ORDER — POTASSIUM CHLORIDE 29.8 MG/ML
20 INJECTION INTRAVENOUS
Status: COMPLETED | OUTPATIENT
Start: 2023-02-04 | End: 2023-02-04

## 2023-02-04 RX ORDER — LIDOCAINE HYDROCHLORIDE 10 MG/ML
5 INJECTION, SOLUTION INFILTRATION; PERINEURAL ONCE
Status: DISCONTINUED | OUTPATIENT
Start: 2023-02-04 | End: 2023-02-16 | Stop reason: HOSPADM

## 2023-02-04 RX ORDER — PREGABALIN 100 MG/1
200 CAPSULE ORAL 2 TIMES DAILY
Status: DISCONTINUED | OUTPATIENT
Start: 2023-02-04 | End: 2023-02-16 | Stop reason: HOSPADM

## 2023-02-04 RX ORDER — INSULIN GLARGINE 100 [IU]/ML
15 INJECTION, SOLUTION SUBCUTANEOUS ONCE
Status: COMPLETED | OUTPATIENT
Start: 2023-02-04 | End: 2023-02-04

## 2023-02-04 RX ADMIN — INSULIN LISPRO 4 UNITS: 100 INJECTION, SOLUTION INTRAVENOUS; SUBCUTANEOUS at 00:29

## 2023-02-04 RX ADMIN — PREGABALIN 200 MG: 100 CAPSULE ORAL at 20:29

## 2023-02-04 RX ADMIN — ATORVASTATIN CALCIUM 60 MG: 40 TABLET, FILM COATED ORAL at 07:59

## 2023-02-04 RX ADMIN — SODIUM CHLORIDE, PRESERVATIVE FREE 10 ML: 5 INJECTION INTRAVENOUS at 08:01

## 2023-02-04 RX ADMIN — Medication 10 ML: at 20:00

## 2023-02-04 RX ADMIN — INSULIN LISPRO 4 UNITS: 100 INJECTION, SOLUTION INTRAVENOUS; SUBCUTANEOUS at 03:55

## 2023-02-04 RX ADMIN — Medication 175 MCG/HR: at 07:58

## 2023-02-04 RX ADMIN — CEFEPIME 2000 MG: 2 INJECTION, POWDER, FOR SOLUTION INTRAVENOUS at 19:03

## 2023-02-04 RX ADMIN — MUPIROCIN: 20 OINTMENT TOPICAL at 20:01

## 2023-02-04 RX ADMIN — INSULIN LISPRO 6 UNITS: 100 INJECTION, SOLUTION INTRAVENOUS; SUBCUTANEOUS at 17:05

## 2023-02-04 RX ADMIN — PROPOFOL 30 MCG/KG/MIN: 10 INJECTION, EMULSION INTRAVENOUS at 10:55

## 2023-02-04 RX ADMIN — IPRATROPIUM BROMIDE AND ALBUTEROL SULFATE 1 AMPULE: .5; 2.5 SOLUTION RESPIRATORY (INHALATION) at 19:36

## 2023-02-04 RX ADMIN — IPRATROPIUM BROMIDE AND ALBUTEROL SULFATE 1 AMPULE: .5; 2.5 SOLUTION RESPIRATORY (INHALATION) at 15:27

## 2023-02-04 RX ADMIN — METHYLPREDNISOLONE SODIUM SUCCINATE 60 MG: 125 INJECTION, POWDER, FOR SOLUTION INTRAMUSCULAR; INTRAVENOUS at 20:29

## 2023-02-04 RX ADMIN — METHYLPREDNISOLONE SODIUM SUCCINATE 60 MG: 125 INJECTION, POWDER, FOR SOLUTION INTRAMUSCULAR; INTRAVENOUS at 02:59

## 2023-02-04 RX ADMIN — INSULIN LISPRO 6 UNITS: 100 INJECTION, SOLUTION INTRAVENOUS; SUBCUTANEOUS at 11:43

## 2023-02-04 RX ADMIN — PROPOFOL 35 MCG/KG/MIN: 10 INJECTION, EMULSION INTRAVENOUS at 00:19

## 2023-02-04 RX ADMIN — METHYLPREDNISOLONE SODIUM SUCCINATE 60 MG: 125 INJECTION, POWDER, FOR SOLUTION INTRAMUSCULAR; INTRAVENOUS at 17:10

## 2023-02-04 RX ADMIN — CHLORHEXIDINE GLUCONATE 0.12% ORAL RINSE 15 ML: 1.2 LIQUID ORAL at 20:28

## 2023-02-04 RX ADMIN — RISPERIDONE 1 MG: 1 TABLET ORAL at 07:59

## 2023-02-04 RX ADMIN — PROPOFOL 25 MCG/KG/MIN: 10 INJECTION, EMULSION INTRAVENOUS at 23:49

## 2023-02-04 RX ADMIN — VANCOMYCIN HYDROCHLORIDE 750 MG: 750 INJECTION, POWDER, LYOPHILIZED, FOR SOLUTION INTRAVENOUS at 00:23

## 2023-02-04 RX ADMIN — INSULIN GLARGINE 15 UNITS: 100 INJECTION, SOLUTION SUBCUTANEOUS at 08:02

## 2023-02-04 RX ADMIN — PANTOPRAZOLE SODIUM 40 MG: 40 INJECTION, POWDER, FOR SOLUTION INTRAVENOUS at 08:01

## 2023-02-04 RX ADMIN — CEFEPIME 2000 MG: 2 INJECTION, POWDER, FOR SOLUTION INTRAVENOUS at 03:01

## 2023-02-04 RX ADMIN — METHYLPREDNISOLONE SODIUM SUCCINATE 60 MG: 125 INJECTION, POWDER, FOR SOLUTION INTRAMUSCULAR; INTRAVENOUS at 07:59

## 2023-02-04 RX ADMIN — SERTRALINE 100 MG: 100 TABLET, FILM COATED ORAL at 20:29

## 2023-02-04 RX ADMIN — CHLORHEXIDINE GLUCONATE 0.12% ORAL RINSE 15 ML: 1.2 LIQUID ORAL at 08:00

## 2023-02-04 RX ADMIN — PREGABALIN 300 MG: 100 CAPSULE ORAL at 07:59

## 2023-02-04 RX ADMIN — POTASSIUM CHLORIDE 20 MEQ: 400 INJECTION, SOLUTION INTRAVENOUS at 10:51

## 2023-02-04 RX ADMIN — PROPOFOL 30 MCG/KG/MIN: 10 INJECTION, EMULSION INTRAVENOUS at 16:43

## 2023-02-04 RX ADMIN — IPRATROPIUM BROMIDE AND ALBUTEROL SULFATE 1 AMPULE: .5; 2.5 SOLUTION RESPIRATORY (INHALATION) at 23:04

## 2023-02-04 RX ADMIN — VANCOMYCIN HYDROCHLORIDE 1250 MG: 10 INJECTION, POWDER, LYOPHILIZED, FOR SOLUTION INTRAVENOUS at 05:45

## 2023-02-04 RX ADMIN — IPRATROPIUM BROMIDE AND ALBUTEROL SULFATE 1 AMPULE: .5; 2.5 SOLUTION RESPIRATORY (INHALATION) at 07:54

## 2023-02-04 RX ADMIN — Medication 175 MCG/HR: at 01:52

## 2023-02-04 RX ADMIN — FUROSEMIDE 40 MG: 10 INJECTION, SOLUTION INTRAMUSCULAR; INTRAVENOUS at 09:56

## 2023-02-04 RX ADMIN — Medication 4 MG/HR: at 13:50

## 2023-02-04 RX ADMIN — IPRATROPIUM BROMIDE AND ALBUTEROL SULFATE 1 AMPULE: .5; 2.5 SOLUTION RESPIRATORY (INHALATION) at 10:37

## 2023-02-04 RX ADMIN — POTASSIUM CHLORIDE 20 MEQ: 400 INJECTION, SOLUTION INTRAVENOUS at 11:50

## 2023-02-04 RX ADMIN — Medication 175 MCG/HR: at 13:50

## 2023-02-04 RX ADMIN — ROPINIROLE HYDROCHLORIDE 1 MG: 1 TABLET, FILM COATED ORAL at 20:29

## 2023-02-04 RX ADMIN — RISPERIDONE 1 MG: 1 TABLET ORAL at 20:29

## 2023-02-04 RX ADMIN — IPRATROPIUM BROMIDE AND ALBUTEROL SULFATE 1 AMPULE: .5; 2.5 SOLUTION RESPIRATORY (INHALATION) at 02:49

## 2023-02-04 RX ADMIN — MUPIROCIN: 20 OINTMENT TOPICAL at 08:00

## 2023-02-04 RX ADMIN — ROPINIROLE HYDROCHLORIDE 2 MG: 1 TABLET, FILM COATED ORAL at 07:59

## 2023-02-04 RX ADMIN — IOPAMIDOL 85 ML: 755 INJECTION, SOLUTION INTRAVENOUS at 05:00

## 2023-02-04 RX ADMIN — Medication 150 MCG/HR: at 20:38

## 2023-02-04 RX ADMIN — SERTRALINE 100 MG: 100 TABLET, FILM COATED ORAL at 07:59

## 2023-02-04 RX ADMIN — ENOXAPARIN SODIUM 40 MG: 100 INJECTION SUBCUTANEOUS at 08:00

## 2023-02-04 RX ADMIN — CEFEPIME 2000 MG: 2 INJECTION, POWDER, FOR SOLUTION INTRAVENOUS at 10:49

## 2023-02-04 RX ADMIN — INSULIN LISPRO 6 UNITS: 100 INJECTION, SOLUTION INTRAVENOUS; SUBCUTANEOUS at 20:01

## 2023-02-04 RX ADMIN — INSULIN LISPRO 4 UNITS: 100 INJECTION, SOLUTION INTRAVENOUS; SUBCUTANEOUS at 23:57

## 2023-02-04 RX ADMIN — INSULIN LISPRO 4 UNITS: 100 INJECTION, SOLUTION INTRAVENOUS; SUBCUTANEOUS at 08:01

## 2023-02-04 RX ADMIN — PROPOFOL 35 MCG/KG/MIN: 10 INJECTION, EMULSION INTRAVENOUS at 04:10

## 2023-02-04 RX ADMIN — POTASSIUM CHLORIDE 20 MEQ: 400 INJECTION, SOLUTION INTRAVENOUS at 13:08

## 2023-02-04 ASSESSMENT — PULMONARY FUNCTION TESTS
PIF_VALUE: 24
PIF_VALUE: 26
PIF_VALUE: 26
PIF_VALUE: 30
PIF_VALUE: 27
PIF_VALUE: 28

## 2023-02-04 ASSESSMENT — PAIN SCALES - GENERAL
PAINLEVEL_OUTOF10: 0
PAINLEVEL_OUTOF10: 0

## 2023-02-04 NOTE — PROGRESS NOTES
IM Progress Note    Admit Date:  2/2/2023  2    Interval history:  copd exacerbation , failed bipap and intubated in ER   Imaging with left hemithorax opacification,s/p bronch with clearance of mucous plugs     Agitation issues noted on vent   CTA chest with no PE , bilateral pneumonia     Subjective:    Ms. Chuck Johnson seen in ICU bed, sedated on vent, no distress  Fevers resolved  BP stable  Remains on high Fio2 of 60 % and PEEP of 8      Objective:   BP (!) 159/77   Pulse 75   Temp 97.9 °F (36.6 °C) (Bladder)   Resp 24   Ht 5' 3\" (1.6 m)   Wt 192 lb 7.4 oz (87.3 kg)   SpO2 95%   BMI 34.09 kg/m²     Intake/Output Summary (Last 24 hours) at 2/4/2023 0976  Last data filed at 2/4/2023 5514  Gross per 24 hour   Intake 2650.46 ml   Output 2150 ml   Net 500.46 ml         Physical Exam:      General:  elderly female on vent support  Oral ETT and OG noted   Appears to be not in any distress  Mucous Membranes:  Pink , anicteric  Neck: No JVD, no carotid bruit, no thyromegaly  Chest: bilateral air entry with scattered rhonchi and improving left sided airentry  Cardiovascular:  RRR S1S2 heard, no murmurs or gallops  Abdomen:  Soft, undistended, non tender, no organomegaly, BS present  Extremities: chronic 2 +  brawny edema of both LE with diffuse celluitis of ext Bilaterally  Right fem TLC noted  Distal pulses well felt  Neurological : sedated      Medications:   Scheduled Medications:    vancomycin  1,250 mg IntraVENous Q18H    cefepime  2,000 mg IntraVENous Q8H    risperiDONE  1 mg Oral BID    rOPINIRole  2 mg Oral BID    pregabalin  300 mg Oral BID    methylPREDNISolone  60 mg IntraVENous Q6H    atorvastatin  60 mg Oral Daily    pantoprazole  40 mg IntraVENous Daily    sertraline  100 mg Oral BID    chlorhexidine  15 mL Mouth/Throat BID    sodium chloride flush  5-40 mL IntraVENous 2 times per day    enoxaparin  40 mg SubCUTAneous Daily    nicotine  1 patch TransDERmal Daily    insulin lispro  0-8 Units SubCUTAneous Q4H mupirocin   Nasal BID    ipratropium-albuterol  1 ampule Inhalation Q4H     I   midazolam 5 mg/hr (02/04/23 0635)    propofol 35 mcg/kg/min (02/04/23 1704)    dextrose      sodium chloride      norepinephrine 5.333 mcg/min (02/02/23 2024)    ketamine (KETALAR) 5 mg/mL infusion for analgosedation 0.2 mg/kg/hr (02/03/23 1545)    fentaNYL 175 mcg/hr (02/04/23 0635)     glucose, dextrose bolus **OR** dextrose bolus, glucagon (rDNA), dextrose, sodium chloride flush, sodium chloride, ondansetron **OR** ondansetron, polyethylene glycol, acetaminophen **OR** acetaminophen, albuterol sulfate HFA, albuterol, fentanNYL, midazolam    Lab Data:  Recent Labs     02/02/23  0505 02/03/23  0513 02/04/23  0345   WBC 8.7 9.5 6.8   HGB 11.8* 9.5* 11.5*   HCT 36.5 29.6* 36.0   MCV 96.8 95.3 94.0   * 113* 104*       Recent Labs     02/02/23  0505 02/03/23  0513 02/04/23  0345    141 138   K 4.9 4.0 3.4*    104 104   CO2 28 27 22   BUN 19 18 22*   CREATININE 0.7 0.9 0.8       Recent Labs     02/02/23  0505   TROPONINI <0.01         Coagulation:   Lab Results   Component Value Date/Time    INR 0.94 09/12/2020 03:57 PM    APTT 31.7 09/12/2020 03:57 PM     Cardiac markers:   Lab Results   Component Value Date/Time    CKMB 10.3 07/14/2010 07:25 AM    CKTOTAL 74 10/17/2014 02:46 AM    TROPONINI <0.01 02/02/2023 05:05 AM         Lab Results   Component Value Date    ALT 12 02/03/2023    AST 13 (L) 02/03/2023    ALKPHOS 99 02/03/2023    BILITOT 0.3 02/03/2023       Lab Results   Component Value Date    INR 0.94 09/12/2020    INR 0.99 07/01/2018    INR 1.01 06/05/2018    PROTIME 10.9 09/12/2020    PROTIME 11.3 07/01/2018    PROTIME 11.5 06/05/2018       Radiology    Cxr    Complete opacification has developed of the left hemithorax, which can   reflect combination of atelectasis, airspace disease, and/or pleural   effusion. Right-sided airspace disease is not substantially changed. Chest xray   1.  Findings suggestive of partial right upper lobe atelectasis. 2. Small left pleural effusion. 3. Central pulmonary vascular congestion without overt pulmonary edema. 4. Left basilar atelectasis. Cxr    Significantly limited evaluation secondary to patient positioning with no   signs to suggest pneumothorax. CTA chest    1. No CT evidence of a pulmonary embolism. 2. Bilateral lower lobe airspace consolidation, left worse than right,   representing either pneumonia or aspiration. 3. Additional scattered curvilinear, consolidative, nodular opacities   throughout the upper lobes and right middle lobe likely reflect additional   multifocal pneumonia and/or pulmonary edema. 4. Small bilateral pleural effusions. 5. Enlarged nodular appearance of the left thyroid lobe. Consider further   characterization with a routine non urgent follow-up thyroid ultrasound. See   below. 6. Findings suggestive of cirrhosis and portal hypertension    Cultures:   Cultures  Blood- NGTD  Sputum - pending  BAL pending      Assessment & Plan:    Acute respiratory failure with hypoxia and hypercapnia  Copd exacerbation   Left hemithorax opacification with mucous plugging    . O2 sat 86% on 5L in the ER. Initially placed on NIV. Too obtunded for NIV and PCO2 worsened after ~1 h on NIV. And eventually placed on vent  Admitted to ICU   CTA chest with bilateral pna, no PE  Had emergency bronch with removal of mucous plugs  Started on steroids, abx -cefepime and vanc   Critical care following  Await BAL cx     COPD exacerbation   IV solumedrol, IV abx as above , PRN/GIDEON intensive NEB therapy. Can wean steroids     Acute encephalopathy, obtunded, likely metabolic 2/2 hypercarbia. Now on vent support    DM2, hold oral Rx, chk A1c, add Mod SSI q4h.       Bilateral LE cellulitis - has chronic lymphedema, holding diuretics  started on cefepime and added vanc  - await cx   Start on lasix     Tobacco Abuse, unable to  cessation 2/2 MS, 21 mg nicotine patch.      Hx of cirrhosis with portal HTN - resume home diuretics    Thyroid enlargement on left side - need f/w US as outpt    Consider to remove femoral TLC- place picc     DVT Prophylaxis: Lx  Diet:  start TF  Code Status: Full Code     Vasu Ortega MD, 2/4/2023 7:37 AM

## 2023-02-04 NOTE — PROGRESS NOTES
Pharmacy Vancomycin Consult     Vancomycin Day:   Current Dosin mg q12h  Current indication: HAP    Temp max:  98    Recent Labs     23  0513 23  0345   BUN 18 22*   CREATININE 0.9 0.8   WBC 9.5 6.8       Intake/Output Summary (Last 24 hours) at 2023 0517  Last data filed at 2023 2239  Gross per 24 hour   Intake 2237.6 ml   Output 2375 ml   Net -137.4 ml     Culture Date      Source                       Results      Ht Readings from Last 1 Encounters:   23 5' 3\" (1.6 m)        Wt Readings from Last 1 Encounters:   23 192 lb 7.4 oz (87.3 kg)       Body mass index is 34.09 kg/m². Estimated Creatinine Clearance: 73 mL/min (based on SCr of 0.8 mg/dL). Random: 14.5 mcg/ml    Assessment/Plan:  Current dosing puts AUC at the low end of therapeutic. Change vancomycin dose to 1250 mg q18h. Give dose @ 0600. New predicted AUC = 456 with a trough at steady state of 12.8 mcg/ml  Obtain another vancomycin level  @ 1100.

## 2023-02-04 NOTE — PROGRESS NOTES
Pt intubated and sedated. Intubated with # 8 at the # 22 LL. Vent settings are AC 24 / 400 / 70% / 10. Breath sounds are rhonchorous. Heart rhythm is NSR on the bedside monitor with rates in the 60-80s. Generalized edema noted. Scheduled meds given per orders. OG at 65cm. TF running at goal rate of 35ml/hr. Pt tolerating well. Right fem CVC patent and working well. Propofol infusing. Fentanyl infusing. Versed infusing. RASS -3  CPOT 0    Arias patent and draining without issues.

## 2023-02-04 NOTE — PROGRESS NOTES
02/03/23 1932   Patient Observation   Heart Rate 60   Resp 24   SpO2 93 %   Observations 8ett 21 at lip   Breath Sounds   Right Upper Lobe Diminished   Right Middle Lobe Diminished   Right Lower Lobe Diminished   Left Upper Lobe Diminished   Left Lower Lobe Diminished   Vent Information   Vent Mode AC/VC   Ventilator Settings   FiO2  70 %   Vt (Set, mL) 400 mL   Resp Rate (Set) 24 bmp   PEEP/CPAP (cmH2O) 10   Vent Patient Data (Readings)   Vt (Measured) 407 mL   Peak Inspiratory Pressure (cmH2O) 28 cmH2O   Rate Measured 24 br/min   Minute Volume (L/min) 9.7 Liters   Mean Airway Pressure (cmH2O) 16 cmH20   Plateau Pressure (cm H2O) 26 cm H2O   I:E Ratio 1:2.7   Flow Sensitivity 3 L/min   Static Compliance (L/cm H2O) 25   Dynamic Compliance (L/cm H2O) 21 L/cm H2O   Vent Alarm Settings   High Pressure (cmH2O) 45 cmH2O   Low Minute Volume (lpm) 3 L/min   Low Exhaled Vt (ml) 250 mL   RR High (bpm) 40 br/min   Apnea (secs) 20 secs   Additional Respiratoray Assessments   Humidification Source Heated wire   Humidification Temp 36.8   Circuit Condensation Drained   Ambu Bag With Mask At Bedside Yes   ETT    Placement Date/Time: 02/02/23 0640   Placed By: In ED  Placement Verified By: Colorimetric ETCO2 device; Chest X-ray  Preoxygenation: Yes  Airway Tube Size: 8 mm  Laryngoscope: GlideScope  Blade Size: 3  Location: Oral  Secured At: 23 cm  Measured From. ..    Secured At 21 cm   Measured From Lips   ETT Placement Right   Secured By Commercial tube orellana

## 2023-02-04 NOTE — PROGRESS NOTES
Peep decraesed to 8cwp         02/04/23 1527   Patient Observation   Heart Rate 66   Resp 24   SpO2 97 %   Breath Sounds   Right Upper Lobe Rhonchi   Right Middle Lobe Rhonchi   Right Lower Lobe Rhonchi   Left Upper Lobe Rhonchi   Left Lower Lobe Rhonchi   Ventilator Settings   FiO2  50 %   Vt (Set, mL) 400 mL   Resp Rate (Set) 24 bmp   PEEP/CPAP (cmH2O) 8   Vent Patient Data (Readings)   Vt (Measured) 408 mL   Peak Inspiratory Pressure (cmH2O) 24 cmH2O   Rate Measured 24 br/min   Minute Volume (L/min) 9.8 Liters   Mean Airway Pressure (cmH2O) 13 cmH20   I:E Ratio 1:2.7   Vent Alarm Settings   High Pressure (cmH2O) 40 cmH2O   Low Minute Volume (lpm) 3 L/min   Low Exhaled Vt (ml) 250 mL   RR High (bpm) 40 br/min   Apnea (secs) 20 secs   Additional Respiratoray Assessments   Humidification Source Heated wire   Humidification Temp 37   Non-Surgical Airway 02/02/23 Endotracheal tube   Placement Date/Time: 02/02/23 0636   Placed By: In ED  Placement Verified By: Capnometry  Mask Ventilation: Ventilated by mask (1)  Insertion attempts: 1  Airway Device: Endotracheal tube  Size: 7.5   Secured At 21 cm   Measured From Lips   Secured Location Left   Secured By Commercial tube orellana   Site Assessment Dry   Skin Assessment   Skin Assessment Clean, dry, & intact   Treatment   $Treatment Type $Inhaled Therapy/Meds

## 2023-02-04 NOTE — PROGRESS NOTES
02/03/23 1900   RT Protocol   History Pulmonary Disease 2   Respiratory pattern 6   Breath sounds 4   Cough 1   Indications for Bronchodilator Therapy Wheezing associated with pulm disorder   Bronchodilator Assessment Score 13   RT Inhaler-Nebulizer Bronchodilator Protocol Note    There is a bronchodilator order in the chart from a provider indicating to follow the RT Bronchodilator Protocol and there is an Initiate RT Inhaler-Nebulizer Bronchodilator Protocol order as well (see protocol at bottom of note). CXR Findings:  XR CHEST PORTABLE    Result Date: 2/3/2023  Exam limited by patient positioning. No marked interval change in bilateral airspace disease as compared to prior. XR CHEST PORTABLE    Result Date: 2/2/2023  Significantly limited evaluation secondary to patient positioning with no signs to suggest pneumothorax. XR CHEST PORTABLE    Result Date: 2/2/2023  Complete opacification has developed of the left hemithorax, which can reflect combination of atelectasis, airspace disease, and/or pleural effusion. Right-sided airspace disease is not substantially changed. XR CHEST PORTABLE    Result Date: 2/2/2023  1. Endotracheal tube likely within the right mainstem bronchus. Recommend 3 cm of retraction and repeat chest radiograph. XR CHEST PORTABLE    Result Date: 2/2/2023  1. Findings suggestive of partial right upper lobe atelectasis. 2. Small left pleural effusion. 3. Central pulmonary vascular congestion without overt pulmonary edema. 4. Left basilar atelectasis.        The findings from the last RT Protocol Assessment were as follows:   History Pulmonary Disease: Chronic pulmonary disease  Respiratory Pattern: Use of accessory muscles, prolonged exhalation, or RR 26-30 bpm  Breath Sounds: Intermittent or unilateral wheezes  Cough: Strong, productive  Indication for Bronchodilator Therapy: Wheezing associated with pulm disorder  Bronchodilator Assessment Score: 13    Aerosolized bronchodilator medication orders have been revised according to the RT Inhaler-Nebulizer Bronchodilator Protocol below.    Respiratory Therapist to perform RT Therapy Protocol Assessment initially then follow the protocol.  Repeat RT Therapy Protocol Assessment PRN for score 0-3 or on second treatment, BID, and PRN for scores above 3.    No Indications - adjust the frequency to every 6 hours PRN wheezing or bronchospasm, if no treatments needed after 48 hours then discontinue using Per Protocol order mode.     If indication present, adjust the RT bronchodilator orders based on the Bronchodilator Assessment Score as indicated below.  Use Inhaler orders unless patient has one or more of the following: on home nebulizer, not able to hold breath for 10 seconds, is not alert and oriented, cannot activate and use MDI correctly, or respiratory rate 25 breaths per minute or more, then use the equivalent nebulizer order(s) with same Frequency and PRN reasons based on the score.  If a patient is on this medication at home then do not decrease Frequency below that used at home.    0-3 - enter or revise RT bronchodilator order(s) to equivalent RT Bronchodilator order with Frequency of every 4 hours PRN for wheezing or increased work of breathing using Per Protocol order mode.        4-6 - enter or revise RT Bronchodilator order(s) to two equivalent RT bronchodilator orders with one order with BID Frequency and one order with Frequency of every 4 hours PRN wheezing or increased work of breathing using Per Protocol order mode.        7-10 - enter or revise RT Bronchodilator order(s) to two equivalent RT bronchodilator orders with one order with TID Frequency and one order with Frequency of every 4 hours PRN wheezing or increased work of breathing using Per Protocol order mode.       11-13 - enter or revise RT Bronchodilator order(s) to one equivalent RT bronchodilator order with QID Frequency and an Albuterol order with Frequency  of every 4 hours PRN wheezing or increased work of breathing using Per Protocol order mode. Greater than 13 - enter or revise RT Bronchodilator order(s) to one equivalent RT bronchodilator order with every 4 hours Frequency and an Albuterol order with Frequency of every 2 hours PRN wheezing or increased work of breathing using Per Protocol order mode.          Electronically signed by Dameon Moreno RCP on 2/3/2023 at 7:42 PM

## 2023-02-04 NOTE — PROGRESS NOTES
Pt dtr and granddaughter at the bedside at shift change. Pt daughter tearful, stating that she feels like her mom \"getting sick is her fault, that she should have paid better attention. \" Emotional support provided. Pt granddaughter asking what full code vs dnr means, explanation given and they plan to meet as a family to discuss pt wishes. Pt intubated and sedated. Pt resting comfortably, not fighting the vent. Pt afebrile at this time. Did require tylenol the night before for temp 101.4.

## 2023-02-04 NOTE — PROGRESS NOTES
FiO2 decraesed to 50%       02/04/23 1035   NICU Vent Information   Vent Type 980   Vent Mode AC/VC   Vt (Set, mL) 400 mL   Vt (Measured) 405 mL   Resp Rate (Set) 24 bmp   Rate Measured 24 br/min   Minute Volume (L/min) 9.8 Liters   Peak Flow 65 L/min   FiO2  50 %   Peak Inspiratory Pressure (cmH2O) 26 cmH2O   I:E Ratio 1:2.7   Sensitivity 3   PEEP/CPAP (cmH2O) 10

## 2023-02-04 NOTE — PROGRESS NOTES
[facility]                                 Pulmonary Consult /Progress Note :                                                                        CHIEF COMPLAINT: SOB ,leg swelling         HPI:   Seems better  On 60 %  CXR better  Vent adjusted  She is requiring high sedation   CXR looks ok  Had hypoxia last night  Propofol 30 ,versed 4 and fenat 175 ,off ketamine   Redness legs and oozing green stuff           Current Medications:    Current Facility-Administered Medications:     vancomycin (VANCOCIN) 1,250 mg in sodium chloride 0.9 % 250 mL IVPB, 1,250 mg, IntraVENous, Q18H, Rosalba Stewart MD, Stopped at 02/04/23 0715    pregabalin (LYRICA) capsule 200 mg, 200 mg, Oral, BID, Kristen Raya MD    rOPINIRole (REQUIP) tablet 1 mg, 1 mg, Oral, BID, Kristen Raya MD    potassium chloride 20 mEq/50 mL IVPB (Central Line), 20 mEq, IntraVENous, Q1H, Bebeto Miller MD    midazolam (VERSED) 1 mg/mL in NS infusion, 1-10 mg/hr, IntraVENous, Continuous, Bebeto Milelr MD, Last Rate: 4 mL/hr at 02/04/23 0934, 4 mg/hr at 02/04/23 0934    cefepime (MAXIPIME) 2,000 mg in sodium chloride 0.9 % 100 mL IVPB (mini-bag), 2,000 mg, IntraVENous, Q8H, Bebeto Miller MD, Stopped at 02/04/23 0714    risperiDONE (RISPERDAL) tablet 1 mg, 1 mg, Oral, BID, Bebeto Miller MD, 1 mg at 02/04/23 0759    methylPREDNISolone sodium (SOLU-MEDROL) injection 60 mg, 60 mg, IntraVENous, Q6H, 60 mg at 02/04/23 0759 **FOLLOWED BY** [DISCONTINUED] predniSONE (DELTASONE) tablet 40 mg, 40 mg, Oral, Daily, Dilia Ramirez MD    propofol injection, 5-50 mcg/kg/min, IntraVENous, Continuous, Dilia Ramirez MD, Last Rate: 14.7 mL/hr at 02/04/23 0827, 30 mcg/kg/min at 02/04/23 0827    atorvastatin (LIPITOR) tablet 60 mg, 60 mg, Oral, Daily, Dilia Ramirez MD, 60 mg at 02/04/23 0759    pantoprazole (PROTONIX) injection 40 mg, 40 mg, IntraVENous, Daily, Naga Dickinson MD, 40 mg at 02/04/23 0801    sertraline (ZOLOFT) tablet 100 mg, 100 mg, Oral, BID, Naga Dickinson MD, 100 mg at 02/04/23 0759    glucose chewable tablet 16 g, 4 tablet, Oral, PRN, Naga Dickinson MD    dextrose bolus 10% 125 mL, 125 mL, IntraVENous, PRN **OR** dextrose bolus 10% 250 mL, 250 mL, IntraVENous, PRN, Naga Dickinson MD    glucagon (rDNA) injection 1 mg, 1 mg, SubCUTAneous, PRN, Naga Dickinson MD    dextrose 10 % infusion, , IntraVENous, Continuous PRN, Ngaa Dickinson MD    chlorhexidine (PERIDEX) 0.12 % solution 15 mL, 15 mL, Mouth/Throat, BID, Naga Dickinson MD, 15 mL at 02/04/23 0800    sodium chloride flush 0.9 % injection 5-40 mL, 5-40 mL, IntraVENous, 2 times per day, Naga Dickinson MD, 10 mL at 02/04/23 0801    sodium chloride flush 0.9 % injection 5-40 mL, 5-40 mL, IntraVENous, PRN, Naga Dickinson MD    0.9 % sodium chloride infusion, , IntraVENous, PRN, Naga Dickinson MD    ondansetron (ZOFRAN-ODT) disintegrating tablet 4 mg, 4 mg, Oral, Q8H PRN **OR** ondansetron (ZOFRAN) injection 4 mg, 4 mg, IntraVENous, Q6H PRN, Naga Dickinson MD    polyethylene glycol (GLYCOLAX) packet 17 g, 17 g, Oral, Daily PRN, Naga Dickinson MD    enoxaparin (LOVENOX) injection 40 mg, 40 mg, SubCUTAneous, Daily, Naga Dickinson MD, 40 mg at 02/04/23 0800    acetaminophen (TYLENOL) tablet 650 mg, 650 mg, Oral, Q6H PRN **OR** acetaminophen (TYLENOL) suppository 650 mg, 650 mg, Rectal, Q6H PRN, Naga Dickinson MD, 650 mg at 02/02/23 7225    nicotine (NICODERM CQ) 21 MG/24HR 1 patch, 1 patch, TransDERmal, Daily, Naga Dickinson MD, 1 patch at 02/02/23 2012    insulin lispro (HUMALOG) injection vial 0-8 Units, 0-8 Units, SubCUTAneous, Q4H, Naga Dickinson MD, 4 Units at 02/04/23 0801    albuterol sulfate HFA (PROVENTIL;VENTOLIN;PROAIR) 108 (90 Base) MCG/ACT inhaler 4 puff, 4 puff, Inhalation, Q4H PRN, Naga Dickinson MD    norepinephrine (LEVOPHED) 16 mg in sodium chloride 0.9 % 250 mL infusion, 1-100 mcg/min, IntraVENous, Continuous, Ngoziiitodiego Brown MD, Last Rate: 5 mL/hr at 02/02/23 2024, 5.333 mcg/min at 02/02/23 2024    ketamine (KETALAR) 500 mg in sodium chloride 0.9 % 100 mL infusion, 0.05-1 mg/kg/hr (Ideal), IntraVENous, Continuous, Ngoziiitodiego Brown MD, Last Rate: 2.1 mL/hr at 02/03/23 1545, 0.2 mg/kg/hr at 02/03/23 1545    mupirocin (BACTROBAN) 2 % ointment, , Nasal, BID, Briseyda Guerrero MD, Given at 02/04/23 0800    fentaNYL (SUBLIMAZE) 1,000 mcg in sodium chloride 0.9% 100 mL infusion,  mcg/hr, IntraVENous, Continuous, Bebeto Miller MD, Last Rate: 17.5 mL/hr at 02/04/23 0820, 175 mcg/hr at 02/04/23 0820    ipratropium-albuterol (DUONEB) nebulizer solution 1 ampule, 1 ampule, Inhalation, Q4H, Bebeto Miller MD, 1 ampule at 02/04/23 0754    albuterol (PROVENTIL) nebulizer solution 2.5 mg, 2.5 mg, Nebulization, Q2H PRN, Bebeto Miller MD    fentaNYL (SUBLIMAZE) injection 25 mcg, 25 mcg, IntraVENous, Q1H PRN, Vy Calderon MD, 25 mcg at 02/03/23 0821    midazolam (VERSED) injection 2 mg, 2 mg, IntraVENous, Q1H PRN, Vy Calderon MD, 2 mg at 02/03/23 0820      REVIEW OF SYSTEMS:  Unable to obtain       Objective:   PHYSICAL EXAM:    Blood pressure (!) 151/74, pulse 78, temperature 99.2 °F (37.3 °C), temperature source Core, resp. rate 24, height 5' 3\" (1.6 m), weight 192 lb 7.4 oz (87.3 kg), SpO2 98 %, not currently breastfeeding.' on RA  Gen: No distress. Sedated   Eyes: PERRL. No sclera icterus. No conjunctival injection. ENT: No discharge. Pharynx clear. Neck: Trachea midline. No obvious mass. Resp: Rhonchi bilaterally more in the right   CV: Regular rate. Regular rhythm. No murmur or rub. No edema. GI: Non-tender. Non-distended. No hernia. Skin: redness and oozing  right> left extremities. Lymph: No cervical LAD. No supraclavicular LAD. M/S: No cyanosis. No joint deformity. No clubbing.    Neuro: sedated  Psych: sedated DATA reviewed by me:   CXR looks better     Assessment:       Acute COPD exacerbation   Bilateral lower extremity cellulitis  Possible CHF/fluid overload  OGHS/SHAZIA  Acute hypoxic  hypercapnic res failure      Plan:       Sputum gram + cocci ? N madhavi   Vent adjusted v ne '  Off Levophed  versed drip  Stooped ketamine   *-IV steroids   *-IV abx cefepime and will stop vancomycin ,culture neg so far for MRSA  *-negative  viral panel   *- Sputum culture follow  *_Wean O2  *-BD  ICS   Watch K level  Watch IVF   Femoral line  Start tube feeing   Stop IVF   CT chest :stable GGO lungs ,will check COVID again   Adjust sedation   No DVT  Giving her hypoxia ,will get CTA r/o PE ,shows no PE GGO   Replace K   Start aggressive pulmonary toilet   Hypertonic sakline    Care reviewed with nursing staff, medical and surgical specialty care, primary care and the patient's family as available. Chart review/lab review/X-ray viewing/documentation and had long Conversation with patient/family re: prognosis, care options and any end of life issues:      Critical care time spent reviewing labs/films, examining patient, collaborating with other physicians more than 35  Minutes  excluding procedures . Nilesh Michel M.D.   2/4/2023  10:35 AM      Thank you very much for allowing me to participate in the care of this pleasant patient , should you have any questions ,please do not hesitate to contact me      Joon Womack MD,[facility]  Pulmonary&Critical Care Medicine    Sou[facility] Thompson    NOTE: This report was transcribed using voice recognition software. Every effort was made to ensure accuracy; however, inadvertent computerized transcription errors may be present.

## 2023-02-04 NOTE — PROGRESS NOTES
Phone consent obtained from daughter at this time for PICC line to be placed. Pt updated on current POC and questions answered.

## 2023-02-04 NOTE — PROGRESS NOTES
02/04/23 0249   Patient Observation   Heart Rate 64   Resp 24   SpO2 96 %   Observations ETT SIZE 8.0, SECURED AT 22 LIP LINE. AMBU BAG AT HEAD OF BED. WATER GOOD. Breath Sounds   Right Upper Lobe Diminished   Right Middle Lobe Diminished   Right Lower Lobe Diminished   Left Upper Lobe Diminished   Left Lower Lobe Diminished   Vent Information   Vent Mode AC/VC   Ventilator Settings   FiO2  70 %   Vt (Set, mL) 400 mL   Resp Rate (Set) 24 bmp   PEEP/CPAP (cmH2O) 10   Vent Patient Data (Readings)   Vt (Measured) 407 mL   Peak Inspiratory Pressure (cmH2O) 27 cmH2O   Rate Measured 24 br/min   Minute Volume (L/min) 9.75 Liters   Peak Inspiratory Flow (lpm) 65 L/sec   Mean Airway Pressure (cmH2O) 16 cmH20   Plateau Pressure (cm H2O) 24 cm H2O   I:E Ratio 1:2.7   Flow Sensitivity 3 L/min   Vent Alarm Settings   High Pressure (cmH2O) 45 cmH2O   Low Minute Volume (lpm) 3 L/min   High Minute Volume (lpm) 20 L/min   Low Exhaled Vt (ml) 250 mL   High Exhaled Vt (ml) 1000 mL   RR High (bpm) 40 br/min   Apnea (secs) 20 secs   Additional Respiratoray Assessments   Humidification Source Heated wire   Humidification Temp 37   Circuit Condensation Drained   Ambu Bag With Mask At Bedside Yes   Airway Clearance   Suction ET Tube   Suction Device Inline suction catheter   Sputum Method Obtained Endotracheal   Sputum Amount Small   Sputum Color/Odor White   Sputum Consistency Thick   ETT    Placement Date/Time: 02/02/23 0640   Placed By: In ED  Placement Verified By: Colorimetric ETCO2 device; Chest X-ray  Preoxygenation: Yes  Airway Tube Size: 8 mm  Laryngoscope: GlideScope  Blade Size: 3  Location: Oral  Secured At: 23 cm  Measured From. ..    Secured At 22 cm   Measured From Lips   ETT Placement Right   Secured By Commercial tube orellana   Site Assessment Dry

## 2023-02-04 NOTE — PROGRESS NOTES
Took pt to get CTA of chest, results in epic. Pt did wake up and became agitated trying to pull at lines and biting the tube during transport. Medication increased per order, documented on mar.

## 2023-02-04 NOTE — PROGRESS NOTES
02/03/23 2326   Patient Observation   Heart Rate 61   Resp 24   SpO2 96 %   Observations 8ett 21 at lip   Breath Sounds   Right Upper Lobe Diminished   Right Middle Lobe Diminished   Right Lower Lobe Diminished   Left Upper Lobe Diminished   Left Lower Lobe Diminished   Vent Information   Vent Mode AC/VC   Ventilator Settings   FiO2  70 %   Vt (Set, mL) 400 mL   Resp Rate (Set) 24 bmp   PEEP/CPAP (cmH2O) 10   Vent Patient Data (Readings)   Vt (Measured) 406 mL   Peak Inspiratory Pressure (cmH2O) 27 cmH2O   Rate Measured 24 br/min   Minute Volume (L/min) 9.7 Liters   Mean Airway Pressure (cmH2O) 16 cmH20   I:E Ratio 1:2.7   Flow Sensitivity 3 L/min   Vent Alarm Settings   High Pressure (cmH2O) 45 cmH2O   Low Minute Volume (lpm) 3 L/min   Low Exhaled Vt (ml) 250 mL   RR High (bpm) 40 br/min   Apnea (secs) 20 secs   Additional Respiratoray Assessments   Humidification Source Heated wire   Humidification Temp 36.6   Circuit Condensation Drained   Ambu Bag With Mask At Bedside Yes   Airway Clearance   Suction ET Tube   Suction Device Inline suction catheter   Sputum Method Obtained Endotracheal   Sputum Amount Scant   ETT    Placement Date/Time: 02/02/23 0640   Placed By: In ED  Placement Verified By: Colorimetric ETCO2 device; Chest X-ray  Preoxygenation: Yes  Airway Tube Size: 8 mm  Laryngoscope: GlideScope  Blade Size: 3  Location: Oral  Secured At: 23 cm  Measured From. ..    Secured At 21 cm   Measured From Lips   ETT Placement Center   Secured By Commercial tube orellana

## 2023-02-04 NOTE — PROGRESS NOTES
Upon arrival to place PICC line assessed chart for issues related to picc placement, check for consent, and did time out with RN Carlos López. Pt. Tolerated PICC placement well, no difficulty accessing basilic vein and 3CG technology used to verify PICC tip placement. Positive P wave with no negative deflection. Printed wave form and placed In chart.  Reported off to [facility]

## 2023-02-05 ENCOUNTER — APPOINTMENT (OUTPATIENT)
Dept: GENERAL RADIOLOGY | Age: 68
DRG: 207 | End: 2023-02-05
Payer: MEDICARE

## 2023-02-05 LAB
ANION GAP SERPL CALCULATED.3IONS-SCNC: 13 MMOL/L (ref 3–16)
BASE EXCESS ARTERIAL: -0.3 MMOL/L (ref -3–3)
BASOPHILS ABSOLUTE: 0.1 K/UL (ref 0–0.2)
BASOPHILS RELATIVE PERCENT: 0.8 %
BUN BLDV-MCNC: 38 MG/DL (ref 7–20)
CALCIUM SERPL-MCNC: 8.3 MG/DL (ref 8.3–10.6)
CARBOXYHEMOGLOBIN ARTERIAL: 0.2 % (ref 0–1.5)
CHLORIDE BLD-SCNC: 106 MMOL/L (ref 99–110)
CO2: 23 MMOL/L (ref 21–32)
CREAT SERPL-MCNC: 0.7 MG/DL (ref 0.6–1.2)
EOSINOPHILS ABSOLUTE: 0 K/UL (ref 0–0.6)
EOSINOPHILS RELATIVE PERCENT: 0 %
GFR SERPL CREATININE-BSD FRML MDRD: >60 ML/MIN/{1.73_M2}
GLUCOSE BLD-MCNC: 288 MG/DL (ref 70–99)
GLUCOSE BLD-MCNC: 298 MG/DL (ref 70–99)
GLUCOSE BLD-MCNC: 301 MG/DL (ref 70–99)
GLUCOSE BLD-MCNC: 310 MG/DL (ref 70–99)
GLUCOSE BLD-MCNC: 311 MG/DL (ref 70–99)
GLUCOSE BLD-MCNC: 311 MG/DL (ref 70–99)
GLUCOSE BLD-MCNC: 313 MG/DL (ref 70–99)
GLUCOSE BLD-MCNC: 314 MG/DL (ref 70–99)
HCO3 ARTERIAL: 22.4 MMOL/L (ref 21–29)
HCT VFR BLD CALC: 32.7 % (ref 36–48)
HEMOGLOBIN, ART, EXTENDED: 11.8 G/DL (ref 12–16)
HEMOGLOBIN: 10.6 G/DL (ref 12–16)
LYMPHOCYTES ABSOLUTE: 0.2 K/UL (ref 1–5.1)
LYMPHOCYTES RELATIVE PERCENT: 2.2 %
MCH RBC QN AUTO: 30.3 PG (ref 26–34)
MCHC RBC AUTO-ENTMCNC: 32.4 G/DL (ref 31–36)
MCV RBC AUTO: 93.5 FL (ref 80–100)
METHEMOGLOBIN ARTERIAL: 0.3 %
MONOCYTES ABSOLUTE: 1 K/UL (ref 0–1.3)
MONOCYTES RELATIVE PERCENT: 9.9 %
NEUTROPHILS ABSOLUTE: 8.7 K/UL (ref 1.7–7.7)
NEUTROPHILS RELATIVE PERCENT: 87.1 %
O2 SAT, ARTERIAL: 89.7 %
O2 THERAPY: ABNORMAL
PCO2 ARTERIAL: 30.3 MMHG (ref 35–45)
PDW BLD-RTO: 15.9 % (ref 12.4–15.4)
PERFORMED ON: ABNORMAL
PH ARTERIAL: 7.49 (ref 7.35–7.45)
PLATELET # BLD: 126 K/UL (ref 135–450)
PMV BLD AUTO: 9.1 FL (ref 5–10.5)
PO2 ARTERIAL: 51.8 MMHG (ref 75–108)
POTASSIUM REFLEX MAGNESIUM: 4 MMOL/L (ref 3.5–5.1)
RBC # BLD: 3.5 M/UL (ref 4–5.2)
SODIUM BLD-SCNC: 142 MMOL/L (ref 136–145)
TCO2 ARTERIAL: 23.3 MMOL/L
TRIGL SERPL-MCNC: 270 MG/DL (ref 0–150)
WBC # BLD: 10 K/UL (ref 4–11)

## 2023-02-05 PROCEDURE — 36600 WITHDRAWAL OF ARTERIAL BLOOD: CPT

## 2023-02-05 PROCEDURE — 94761 N-INVAS EAR/PLS OXIMETRY MLT: CPT

## 2023-02-05 PROCEDURE — 2580000003 HC RX 258: Performed by: INTERNAL MEDICINE

## 2023-02-05 PROCEDURE — 82803 BLOOD GASES ANY COMBINATION: CPT

## 2023-02-05 PROCEDURE — 99291 CRITICAL CARE FIRST HOUR: CPT | Performed by: INTERNAL MEDICINE

## 2023-02-05 PROCEDURE — 6360000002 HC RX W HCPCS: Performed by: INTERNAL MEDICINE

## 2023-02-05 PROCEDURE — 6370000000 HC RX 637 (ALT 250 FOR IP): Performed by: INTERNAL MEDICINE

## 2023-02-05 PROCEDURE — 80048 BASIC METABOLIC PNL TOTAL CA: CPT

## 2023-02-05 PROCEDURE — 94003 VENT MGMT INPAT SUBQ DAY: CPT

## 2023-02-05 PROCEDURE — 36592 COLLECT BLOOD FROM PICC: CPT

## 2023-02-05 PROCEDURE — 85025 COMPLETE CBC W/AUTO DIFF WBC: CPT

## 2023-02-05 PROCEDURE — 99233 SBSQ HOSP IP/OBS HIGH 50: CPT | Performed by: INTERNAL MEDICINE

## 2023-02-05 PROCEDURE — 71045 X-RAY EXAM CHEST 1 VIEW: CPT

## 2023-02-05 PROCEDURE — 2700000000 HC OXYGEN THERAPY PER DAY

## 2023-02-05 PROCEDURE — C9113 INJ PANTOPRAZOLE SODIUM, VIA: HCPCS | Performed by: INTERNAL MEDICINE

## 2023-02-05 PROCEDURE — 2500000003 HC RX 250 WO HCPCS: Performed by: INTERNAL MEDICINE

## 2023-02-05 PROCEDURE — 2000000000 HC ICU R&B

## 2023-02-05 PROCEDURE — 94640 AIRWAY INHALATION TREATMENT: CPT

## 2023-02-05 RX ORDER — FUROSEMIDE 10 MG/ML
40 INJECTION INTRAMUSCULAR; INTRAVENOUS ONCE
Status: COMPLETED | OUTPATIENT
Start: 2023-02-05 | End: 2023-02-05

## 2023-02-05 RX ORDER — INSULIN GLARGINE 100 [IU]/ML
15 INJECTION, SOLUTION SUBCUTANEOUS ONCE
Status: COMPLETED | OUTPATIENT
Start: 2023-02-05 | End: 2023-02-05

## 2023-02-05 RX ORDER — METHYLPREDNISOLONE SODIUM SUCCINATE 125 MG/2ML
60 INJECTION, POWDER, LYOPHILIZED, FOR SOLUTION INTRAMUSCULAR; INTRAVENOUS EVERY 12 HOURS
Status: DISCONTINUED | OUTPATIENT
Start: 2023-02-05 | End: 2023-02-06

## 2023-02-05 RX ADMIN — METHYLPREDNISOLONE SODIUM SUCCINATE 60 MG: 125 INJECTION, POWDER, FOR SOLUTION INTRAMUSCULAR; INTRAVENOUS at 03:29

## 2023-02-05 RX ADMIN — CHLORHEXIDINE GLUCONATE 0.12% ORAL RINSE 15 ML: 1.2 LIQUID ORAL at 19:32

## 2023-02-05 RX ADMIN — PANTOPRAZOLE SODIUM 40 MG: 40 INJECTION, POWDER, FOR SOLUTION INTRAVENOUS at 07:53

## 2023-02-05 RX ADMIN — IPRATROPIUM BROMIDE AND ALBUTEROL SULFATE 1 AMPULE: .5; 2.5 SOLUTION RESPIRATORY (INHALATION) at 07:02

## 2023-02-05 RX ADMIN — IPRATROPIUM BROMIDE AND ALBUTEROL SULFATE 1 AMPULE: .5; 2.5 SOLUTION RESPIRATORY (INHALATION) at 23:05

## 2023-02-05 RX ADMIN — CEFEPIME 2000 MG: 2 INJECTION, POWDER, FOR SOLUTION INTRAVENOUS at 10:32

## 2023-02-05 RX ADMIN — SERTRALINE 100 MG: 100 TABLET, FILM COATED ORAL at 21:30

## 2023-02-05 RX ADMIN — CEFEPIME 2000 MG: 2 INJECTION, POWDER, FOR SOLUTION INTRAVENOUS at 18:02

## 2023-02-05 RX ADMIN — PREGABALIN 200 MG: 100 CAPSULE ORAL at 07:53

## 2023-02-05 RX ADMIN — Medication 10 ML: at 19:30

## 2023-02-05 RX ADMIN — RISPERIDONE 1 MG: 1 TABLET ORAL at 07:53

## 2023-02-05 RX ADMIN — Medication 125 MCG/HR: at 11:53

## 2023-02-05 RX ADMIN — ROPINIROLE HYDROCHLORIDE 1 MG: 1 TABLET, FILM COATED ORAL at 21:30

## 2023-02-05 RX ADMIN — INSULIN GLARGINE 15 UNITS: 100 INJECTION, SOLUTION SUBCUTANEOUS at 08:24

## 2023-02-05 RX ADMIN — INSULIN LISPRO 6 UNITS: 100 INJECTION, SOLUTION INTRAVENOUS; SUBCUTANEOUS at 08:28

## 2023-02-05 RX ADMIN — INSULIN LISPRO 6 UNITS: 100 INJECTION, SOLUTION INTRAVENOUS; SUBCUTANEOUS at 19:32

## 2023-02-05 RX ADMIN — ENOXAPARIN SODIUM 40 MG: 100 INJECTION SUBCUTANEOUS at 07:56

## 2023-02-05 RX ADMIN — INSULIN LISPRO 6 UNITS: 100 INJECTION, SOLUTION INTRAVENOUS; SUBCUTANEOUS at 11:50

## 2023-02-05 RX ADMIN — RISPERIDONE 1 MG: 1 TABLET ORAL at 21:30

## 2023-02-05 RX ADMIN — INSULIN LISPRO 8 UNITS: 100 INJECTION, SOLUTION INTRAVENOUS; SUBCUTANEOUS at 05:10

## 2023-02-05 RX ADMIN — IPRATROPIUM BROMIDE AND ALBUTEROL SULFATE 1 AMPULE: .5; 2.5 SOLUTION RESPIRATORY (INHALATION) at 03:19

## 2023-02-05 RX ADMIN — METHYLPREDNISOLONE SODIUM SUCCINATE 60 MG: 125 INJECTION, POWDER, FOR SOLUTION INTRAMUSCULAR; INTRAVENOUS at 17:59

## 2023-02-05 RX ADMIN — MUPIROCIN: 20 OINTMENT TOPICAL at 19:31

## 2023-02-05 RX ADMIN — MUPIROCIN: 20 OINTMENT TOPICAL at 07:53

## 2023-02-05 RX ADMIN — FUROSEMIDE 40 MG: 10 INJECTION, SOLUTION INTRAMUSCULAR; INTRAVENOUS at 08:25

## 2023-02-05 RX ADMIN — PROPOFOL 30 MCG/KG/MIN: 10 INJECTION, EMULSION INTRAVENOUS at 08:06

## 2023-02-05 RX ADMIN — PREGABALIN 200 MG: 100 CAPSULE ORAL at 21:30

## 2023-02-05 RX ADMIN — CHLORHEXIDINE GLUCONATE 0.12% ORAL RINSE 15 ML: 1.2 LIQUID ORAL at 07:55

## 2023-02-05 RX ADMIN — Medication 100 MCG/HR: at 21:32

## 2023-02-05 RX ADMIN — PROPOFOL 30 MCG/KG/MIN: 10 INJECTION, EMULSION INTRAVENOUS at 18:01

## 2023-02-05 RX ADMIN — SODIUM CHLORIDE, PRESERVATIVE FREE 10 ML: 5 INJECTION INTRAVENOUS at 07:55

## 2023-02-05 RX ADMIN — CEFEPIME 2000 MG: 2 INJECTION, POWDER, FOR SOLUTION INTRAVENOUS at 03:29

## 2023-02-05 RX ADMIN — ATORVASTATIN CALCIUM 60 MG: 40 TABLET, FILM COATED ORAL at 07:52

## 2023-02-05 RX ADMIN — IPRATROPIUM BROMIDE AND ALBUTEROL SULFATE 1 AMPULE: .5; 2.5 SOLUTION RESPIRATORY (INHALATION) at 11:39

## 2023-02-05 RX ADMIN — ROPINIROLE HYDROCHLORIDE 1 MG: 1 TABLET, FILM COATED ORAL at 07:57

## 2023-02-05 RX ADMIN — Medication 125 MCG/HR: at 04:11

## 2023-02-05 RX ADMIN — IPRATROPIUM BROMIDE AND ALBUTEROL SULFATE 1 AMPULE: .5; 2.5 SOLUTION RESPIRATORY (INHALATION) at 19:44

## 2023-02-05 RX ADMIN — SERTRALINE 100 MG: 100 TABLET, FILM COATED ORAL at 07:52

## 2023-02-05 RX ADMIN — IPRATROPIUM BROMIDE AND ALBUTEROL SULFATE 1 AMPULE: .5; 2.5 SOLUTION RESPIRATORY (INHALATION) at 15:11

## 2023-02-05 RX ADMIN — INSULIN LISPRO 4 UNITS: 100 INJECTION, SOLUTION INTRAVENOUS; SUBCUTANEOUS at 18:04

## 2023-02-05 RX ADMIN — PROPOFOL 20 MCG/KG/MIN: 10 INJECTION, EMULSION INTRAVENOUS at 06:20

## 2023-02-05 RX ADMIN — PROPOFOL 30 MCG/KG/MIN: 10 INJECTION, EMULSION INTRAVENOUS at 13:27

## 2023-02-05 ASSESSMENT — PULMONARY FUNCTION TESTS
PIF_VALUE: 24
PIF_VALUE: 29
PIF_VALUE: 26
PIF_VALUE: 25
PIF_VALUE: 25
PIF_VALUE: 24

## 2023-02-05 NOTE — PROGRESS NOTES
02/04/23 2304   Patient Observation   Heart Rate 63   Resp 24   SpO2 95 %   Observations 8ett 21 at lip   Breath Sounds   Right Upper Lobe Diminished   Right Middle Lobe Diminished   Right Lower Lobe Diminished   Left Upper Lobe Diminished   Left Lower Lobe Diminished   Vent Information   Vent Mode AC/VC   Ventilator Settings   FiO2  50 %   Vt (Set, mL) 400 mL   Resp Rate (Set) 24 bmp   PEEP/CPAP (cmH2O) 8   Vent Patient Data (Readings)   Vt (Measured) 408 mL   Peak Inspiratory Pressure (cmH2O) 28 cmH2O   Rate Measured 24 br/min   Minute Volume (L/min) 9.7 Liters   Peak Inspiratory Flow (lpm) 65 L/sec   Mean Airway Pressure (cmH2O) 14 cmH20   I:E Ratio 1:2.7   Flow Sensitivity 3 L/min   Vent Alarm Settings   High Pressure (cmH2O) 40 cmH2O   Low Minute Volume (lpm) 3 L/min   Low Exhaled Vt (ml) 250 mL   RR High (bpm) 40 br/min   Apnea (secs) 20 secs   Additional Respiratoray Assessments   Humidification Source Heated wire   Humidification Temp 36.5   Circuit Condensation Drained   Ambu Bag With Mask At Bedside Yes   Airway Clearance   Suction ET Tube   Suction Device Inline suction catheter   Sputum Method Obtained Endotracheal   Sputum Amount Scant   Non-Surgical Airway 02/02/23 Endotracheal tube   Placement Date/Time: 02/02/23 0636   Placed By: In ED  Placement Verified By: Capnometry  Mask Ventilation: Ventilated by mask (1)  Insertion attempts: 1  Airway Device: Endotracheal tube  Size: 7.5   Secured At 21 cm   Measured From Lips   Secured Location Right   Secured By Commercial tube orellana

## 2023-02-05 NOTE — PROGRESS NOTES
IM Progress Note    Admit Date:  2/2/2023  3    Interval history:  copd exacerbation , failed bipap and intubated in ER   Imaging with left hemithorax opacification,s/p bronch with clearance of mucous plugs     Agitation issues noted on vent   CTA chest with no PE , bilateral pneumonia     Subjective:    Ms. Gavin Fung seen in ICU bed, sedated on vent, no distress  Good diuresis with lasix  Fevers resolved  BP stable   Fio2 of 50 % and PEEP of 8  Ongoing weaning       Objective:   BP (!) 156/75   Pulse 60   Temp 98.1 °F (36.7 °C) (Bladder)   Resp 24   Ht 5' 3\" (1.6 m)   Wt 191 lb 5.8 oz (86.8 kg)   SpO2 95%   BMI 33.90 kg/m²     Intake/Output Summary (Last 24 hours) at 2/5/2023 0749  Last data filed at 2/5/2023 0500  Gross per 24 hour   Intake 2275.25 ml   Output 4925 ml   Net -2649.75 ml         Physical Exam:      General:  elderly female on vent support  Oral ETT and OG noted   Appears to be not in any distress  Mucous Membranes:  Pink , anicteric  Neck: No JVD, no carotid bruit, no thyromegaly  Chest: bilateral air entry with resolving  rhonchi and improving left sided airentry  Cardiovascular:  RRR S1S2 heard, no murmurs or gallops  Abdomen:  Soft, undistended, non tender, no organomegaly, BS present  Extremities: chronic 2 +  brawny edema of both LE with diffuse celluitis of ext Bilaterally  Distal pulses well felt  Neurological : sedated      Medications:   Scheduled Medications:    pregabalin  200 mg Oral BID    rOPINIRole  1 mg Oral BID    lidocaine 1 % injection  5 mL IntraDERmal Once    sodium chloride flush  5-40 mL IntraVENous 2 times per day    cefepime  2,000 mg IntraVENous Q8H    risperiDONE  1 mg Oral BID    methylPREDNISolone  60 mg IntraVENous Q6H    atorvastatin  60 mg Oral Daily    pantoprazole  40 mg IntraVENous Daily    sertraline  100 mg Oral BID    chlorhexidine  15 mL Mouth/Throat BID    sodium chloride flush  5-40 mL IntraVENous 2 times per day    enoxaparin  40 mg SubCUTAneous Daily nicotine  1 patch TransDERmal Daily    insulin lispro  0-8 Units SubCUTAneous Q4H    mupirocin   Nasal BID    ipratropium-albuterol  1 ampule Inhalation Q4H     I   sodium chloride      midazolam 2 mg/hr (02/05/23 0500)    propofol 20 mcg/kg/min (02/05/23 0620)    dextrose      sodium chloride      norepinephrine 5.333 mcg/min (02/02/23 2024)    ketamine (KETALAR) 5 mg/mL infusion for analgosedation 0.2 mg/kg/hr (02/03/23 1545)    fentaNYL 125 mcg/hr (02/05/23 0500)     sodium chloride flush, sodium chloride, glucose, dextrose bolus **OR** dextrose bolus, glucagon (rDNA), dextrose, sodium chloride flush, sodium chloride, ondansetron **OR** ondansetron, polyethylene glycol, acetaminophen **OR** acetaminophen, albuterol sulfate HFA, albuterol, fentanNYL, midazolam    Lab Data:  Recent Labs     02/03/23 0513 02/04/23  0345 02/05/23  0450   WBC 9.5 6.8 10.0   HGB 9.5* 11.5* 10.6*   HCT 29.6* 36.0 32.7*   MCV 95.3 94.0 93.5   * 104* 126*       Recent Labs     02/03/23 0513 02/04/23  0345 02/05/23  0450    138 142   K 4.0 3.4* 4.0    104 106   CO2 27 22 23   BUN 18 22* 38*   CREATININE 0.9 0.8 0.7       No results for input(s): CKTOTAL, CKMB, CKMBINDEX, TROPONINI in the last 72 hours.       Coagulation:   Lab Results   Component Value Date/Time    INR 1.04 02/04/2023 12:15 PM    APTT 27.6 02/04/2023 12:15 PM     Cardiac markers:   Lab Results   Component Value Date/Time    CKMB 10.3 07/14/2010 07:25 AM    CKTOTAL 74 10/17/2014 02:46 AM    TROPONINI <0.01 02/02/2023 05:05 AM         Lab Results   Component Value Date    ALT 12 02/03/2023    AST 13 (L) 02/03/2023    ALKPHOS 99 02/03/2023    BILITOT 0.3 02/03/2023       Lab Results   Component Value Date    INR 1.04 02/04/2023    INR 0.94 09/12/2020    INR 0.99 07/01/2018    PROTIME 13.5 02/04/2023    PROTIME 10.9 09/12/2020    PROTIME 11.3 07/01/2018       Radiology    Cxr    Complete opacification has developed of the left hemithorax, which can reflect combination of atelectasis, airspace disease, and/or pleural   effusion. Right-sided airspace disease is not substantially changed. Chest xray   1. Findings suggestive of partial right upper lobe atelectasis. 2. Small left pleural effusion. 3. Central pulmonary vascular congestion without overt pulmonary edema. 4. Left basilar atelectasis. Cxr    Significantly limited evaluation secondary to patient positioning with no   signs to suggest pneumothorax. CTA chest    1. No CT evidence of a pulmonary embolism. 2. Bilateral lower lobe airspace consolidation, left worse than right,   representing either pneumonia or aspiration. 3. Additional scattered curvilinear, consolidative, nodular opacities   throughout the upper lobes and right middle lobe likely reflect additional   multifocal pneumonia and/or pulmonary edema. 4. Small bilateral pleural effusions. 5. Enlarged nodular appearance of the left thyroid lobe. Consider further   characterization with a routine non urgent follow-up thyroid ultrasound. See   below. 6. Findings suggestive of cirrhosis and portal hypertension    Cultures:   Cultures  Blood- NGTD  Sputum - pending  BAL NRF      Assessment & Plan:    Acute respiratory failure with hypoxia and hypercapnia  Copd exacerbation   Left hemithorax opacification with mucous plugging    . O2 sat 86% on 5L in the ER. Initially placed on NIV. Too obtunded for NIV and PCO2 worsened after ~1 h on NIV. And eventually placed on vent  Admitted to ICU   CTA chest with bilateral pna, no PE  Had emergency bronch with removal of mucous plugs  Started on steroids, abx -cefepime and vanc   Critical care following  BAL with NRF- can downgrade abx    COPD exacerbation   IV solumedrol, IV abx as above , PRN/GIDEON intensive NEB therapy. Can wean steroids     Acute encephalopathy, obtunded, likely metabolic 2/2 hypercarbia. Now on vent support      DM2, hold oral Rx, added Mod SSI q4h. Bilateral LE cellulitis - has chronic lymphedema, resume  diuretics  started on cefepime and added vanc  - await cx       Tobacco Abuse, unable to  cessation 2/2 MS, 21 mg nicotine patch.      Hx of cirrhosis with portal HTN - resume home diuretics    Thyroid enlargement on left side - need f/w US as outpt       DVT Prophylaxis: Lx  Diet TF  Code Status: Full Code     Briseyda Guerrero MD, 2/5/2023 7:49 AM

## 2023-02-05 NOTE — PROGRESS NOTES
02/04/23 1900   RT Protocol   History Pulmonary Disease 2   Respiratory pattern 4   Breath sounds 4   Cough 1   Indications for Bronchodilator Therapy Wheezing associated with pulm disorder   Bronchodilator Assessment Score 11   RT Inhaler-Nebulizer Bronchodilator Protocol Note    There is a bronchodilator order in the chart from a provider indicating to follow the RT Bronchodilator Protocol and there is an Initiate RT Inhaler-Nebulizer Bronchodilator Protocol order as well (see protocol at bottom of note). CXR Findings:  XR CHEST PORTABLE    Result Date: 2/4/2023  Bibasilar airspace disease, left greater than right, similar compared to prior     XR CHEST PORTABLE    Result Date: 2/3/2023  Exam limited by patient positioning. No marked interval change in bilateral airspace disease as compared to prior. The findings from the last RT Protocol Assessment were as follows:   History Pulmonary Disease: Chronic pulmonary disease  Respiratory Pattern: Mild dyspnea at rest, irregular pattern, or RR 21-25 bpm  Breath Sounds: Intermittent or unilateral wheezes  Cough: Strong, productive  Indication for Bronchodilator Therapy: Wheezing associated with pulm disorder  Bronchodilator Assessment Score: 11    Aerosolized bronchodilator medication orders have been revised according to the RT Inhaler-Nebulizer Bronchodilator Protocol below. Respiratory Therapist to perform RT Therapy Protocol Assessment initially then follow the protocol. Repeat RT Therapy Protocol Assessment PRN for score 0-3 or on second treatment, BID, and PRN for scores above 3. No Indications - adjust the frequency to every 6 hours PRN wheezing or bronchospasm, if no treatments needed after 48 hours then discontinue using Per Protocol order mode. If indication present, adjust the RT bronchodilator orders based on the Bronchodilator Assessment Score as indicated below.   Use Inhaler orders unless patient has one or more of the following: on home nebulizer, not able to hold breath for 10 seconds, is not alert and oriented, cannot activate and use MDI correctly, or respiratory rate 25 breaths per minute or more, then use the equivalent nebulizer order(s) with same Frequency and PRN reasons based on the score. If a patient is on this medication at home then do not decrease Frequency below that used at home. 0-3 - enter or revise RT bronchodilator order(s) to equivalent RT Bronchodilator order with Frequency of every 4 hours PRN for wheezing or increased work of breathing using Per Protocol order mode. 4-6 - enter or revise RT Bronchodilator order(s) to two equivalent RT bronchodilator orders with one order with BID Frequency and one order with Frequency of every 4 hours PRN wheezing or increased work of breathing using Per Protocol order mode. 7-10 - enter or revise RT Bronchodilator order(s) to two equivalent RT bronchodilator orders with one order with TID Frequency and one order with Frequency of every 4 hours PRN wheezing or increased work of breathing using Per Protocol order mode. 11-13 - enter or revise RT Bronchodilator order(s) to one equivalent RT bronchodilator order with QID Frequency and an Albuterol order with Frequency of every 4 hours PRN wheezing or increased work of breathing using Per Protocol order mode. Greater than 13 - enter or revise RT Bronchodilator order(s) to one equivalent RT bronchodilator order with every 4 hours Frequency and an Albuterol order with Frequency of every 2 hours PRN wheezing or increased work of breathing using Per Protocol order mode.        Electronically signed by Ankit Rizo RCP on 2/4/2023 at 7:44 PM

## 2023-02-05 NOTE — PROGRESS NOTES
Patient care assumed, assessment completed as charted. Patient continues on ventilator, #8.0 at the 22 lip line. A/C 24, , FiO2 50%, PEEP 8. Right upper arm PICC in place with Propofol infusing at 30mcg/kg/min, Versed at 2mg/hr, and Fentanyl at 150mcg/min. OG in place with tube feed running at 35mL/hr. Foely catheter patent, bilateral soft wrist restraints continue for patient safety. No further needs assessed at this time. Will monitor.

## 2023-02-05 NOTE — PROGRESS NOTES
Peep decraesed to 5cwp  Radiologist recommendations noted regarding ETT  Will await pulmonary recommendations as ETT position has not changed since intubation           02/05/23 0706   Breath Sounds   Right Upper Lobe Rhonchi   Right Middle Lobe Diminished   Right Lower Lobe Diminished   Left Upper Lobe Diminished   Left Lower Lobe Diminished   Ventilator Settings   FiO2  50 %   Vt (Set, mL) 400 mL   Resp Rate (Set) 24 bmp   PEEP/CPAP (cmH2O) 5   Vent Patient Data (Readings)   Vt (Measured) 410 mL   Peak Inspiratory Pressure (cmH2O) 29 cmH2O   Rate Measured 24 br/min   Minute Volume (L/min) 9.8 Liters   Mean Airway Pressure (cmH2O) 12 cmH20   Driving Pressure 15   I:E Ratio 1:2.5   Vent Alarm Settings   High Pressure (cmH2O) 40 cmH2O   Low Minute Volume (lpm) 3 L/min   Low Exhaled Vt (ml) 250 mL   RR High (bpm) 40 br/min   Apnea (secs) 20 secs   Additional Respiratoray Assessments   Humidification Source Heated wire   Humidification Temp 37   Non-Surgical Airway 02/02/23 Endotracheal tube   Placement Date/Time: 02/02/23 0636   Placed By: In ED  Placement Verified By: Capnometry  Mask Ventilation: Ventilated by mask (1)  Insertion attempts: 1  Airway Device: Endotracheal tube  Size: 7.5   Secured At 21 cm   Measured From Lips   Secured Location Right   Secured By Commercial tube orellana   Site Assessment Dry   Treatment   $Treatment Type $Inhaled Therapy/Meds

## 2023-02-05 NOTE — PROGRESS NOTES
Initial exam completed- See doc flowsheet for assessment findings. Pt awake slightly agitated and coughing frequently. He lifts head off of bed and has eyes open but does not follow commands, Pt suctioned for moderate amts of thick creamy sputum. She remains sl agitated after suctioning. Diprivan gtt will be increased to 30 mcg. See MAR. All lines and monitoring devices are in place . Vitals and SpO2 stable. Call light is within easy reach. Plan of care and goals reviewed.  Jose Rafael Quevedo RN

## 2023-02-05 NOTE — PROGRESS NOTES
Patient is not able to demonstrate the ability to move from a reclining position to an upright position within the recliner due to pt on vent and bedrest. Marycruz Avendano RN

## 2023-02-05 NOTE — PROGRESS NOTES
Reassessment completed, no changes noted at this time. Family at bedside, update given. VSSS Pt remains mechanically ventilated and sedated with RASS -2.  Arias patent

## 2023-02-05 NOTE — PROGRESS NOTES
Care rounds completed with Dr Aly Newton and multidisciplinary team.  Reviewed labs, meds, VS (temp/HR/RR), I/O's, assessment, & plan of care for today. See progress note & new orders for details.  Shalini Joe RN

## 2023-02-05 NOTE — PROGRESS NOTES
02/04/23 1938   Patient Observation   Heart Rate 63   Resp 24   SpO2 96 %   Observations 8ett 21 at lip   Breath Sounds   Right Upper Lobe Diminished   Right Middle Lobe Diminished   Right Lower Lobe Diminished   Left Upper Lobe Diminished   Left Lower Lobe Diminished   Vent Information   Vent Mode AC/VC   Ventilator Settings   FiO2  50 %   Vt (Set, mL) 400 mL   Resp Rate (Set) 24 bmp   PEEP/CPAP (cmH2O) 8   Vent Patient Data (Readings)   Vt (Measured) 408 mL   Peak Inspiratory Pressure (cmH2O) 26 cmH2O   Rate Measured 24 br/min   Minute Volume (L/min) 9.8 Liters   Peak Inspiratory Flow (lpm) 65 L/sec   Mean Airway Pressure (cmH2O) 14 cmH20   Plateau Pressure (cm H2O) 21 cm H2O   I:E Ratio 1:2.7   Flow Sensitivity 3 L/min   Static Compliance (L/cm H2O) 31   Dynamic Compliance (L/cm H2O) 25 L/cm H2O   Vent Alarm Settings   High Pressure (cmH2O) 40 cmH2O   Low Minute Volume (lpm) 3 L/min   Low Exhaled Vt (ml) 250 mL   RR High (bpm) 40 br/min   Apnea (secs) 20 secs   Additional Respiratoray Assessments   Humidification Source Heated wire   Humidification Temp 37   Circuit Condensation Drained   Ambu Bag With Mask At Bedside Yes   Airway Clearance   Suction ET Tube   Suction Device Inline suction catheter   Sputum Method Obtained Endotracheal   Sputum Amount Scant   Non-Surgical Airway 02/02/23 Endotracheal tube   Placement Date/Time: 02/02/23 0636   Placed By: In ED  Placement Verified By: Capnometry  Mask Ventilation: Ventilated by mask (1)  Insertion attempts: 1  Airway Device: Endotracheal tube  Size: 7.5   Secured At 21 cm   Measured From [address] By Commercial tube orellana

## 2023-02-05 NOTE — PROGRESS NOTES
02/05/23 0320   Patient Observation   Heart Rate 59   Resp 24   SpO2 97 %   Observations 8ett 21 at lip   Breath Sounds   Right Upper Lobe Diminished   Right Middle Lobe Diminished   Right Lower Lobe Diminished   Left Upper Lobe Diminished   Left Lower Lobe Diminished   Vent Information   Vent Mode AC/VC   Ventilator Settings   FiO2  50 %   Vt (Set, mL) 400 mL   Resp Rate (Set) 24 bmp   PEEP/CPAP (cmH2O) 8   Vent Patient Data (Readings)   Vt (Measured) 402 mL   Peak Inspiratory Pressure (cmH2O) 25 cmH2O   Rate Measured 24 br/min   Minute Volume (L/min) 8.1 Liters   Peak Inspiratory Flow (lpm) 65 L/sec   Mean Airway Pressure (cmH2O) 14 cmH20   I:E Ratio 1:2.7   Flow Sensitivity 3 L/min   Vent Alarm Settings   High Pressure (cmH2O) 40 cmH2O   Low Minute Volume (lpm) 3 L/min   Low Exhaled Vt (ml) 250 mL   RR High (bpm) 40 br/min   Apnea (secs) 20 secs   Additional Respiratoray Assessments   Humidification Source Heated wire   Humidification Temp 37   Circuit Condensation Drained   Ambu Bag With Mask At Bedside Yes   Airway Clearance   Suction ET Tube   Suction Device Inline suction catheter   Sputum Method Obtained Endotracheal   Sputum Amount Scant   Non-Surgical Airway 02/02/23 Endotracheal tube   Placement Date/Time: 02/02/23 0636   Placed By: In ED  Placement Verified By: Capnometry  Mask Ventilation: Ventilated by mask (1)  Insertion attempts: 1  Airway Device: Endotracheal tube  Size: 7.5   Secured At 21 cm   Measured From Lips   Secured Location Left   Secured By Commercial tube orellana

## 2023-02-05 NOTE — PROGRESS NOTES
[facility]                                 Pulmonary Consult /Progress Note :                                                                        CHIEF COMPLAINT: SOB ,leg swelling         HPI:   Seems better  On50 %  CXR better  Vent adjusted  She is requiring high sedation   CXR looks ok  Had hypoxia last night  Propofol 30 ,versed 4 and fenat 175 ,off ketamine   Redness legs and oozing green stuff           Current Medications:    Current Facility-Administered Medications:     methylPREDNISolone sodium (SOLU-MEDROL) injection 60 mg, 60 mg, IntraVENous, Q12H **FOLLOWED BY** [DISCONTINUED] predniSONE (DELTASONE) tablet 40 mg, 40 mg, Oral, Daily, Bailee Olvera MD    pregabalin (LYRICA) capsule 200 mg, 200 mg, Oral, BID, Jv Salinas MD, 200 mg at 02/05/23 0753    rOPINIRole (REQUIP) tablet 1 mg, 1 mg, Oral, BID, Jv Salinas MD, 1 mg at 02/05/23 0757    lidocaine 1 % injection 5 mL, 5 mL, IntraDERmal, Once, Jv Salinas MD    sodium chloride flush 0.9 % injection 5-40 mL, 5-40 mL, IntraVENous, 2 times per day, Jv Salinas MD, 10 mL at 02/04/23 2000    sodium chloride flush 0.9 % injection 5-40 mL, 5-40 mL, IntraVENous, PRN, Jv Salinas MD    0.9 % sodium chloride infusion, 25 mL, IntraVENous, PRN, Jv Salinas MD    midazolam (VERSED) 1 mg/mL in NS infusion, 1-10 mg/hr, IntraVENous, Continuous, Bebeto Miller MD, Last Rate: 2 mL/hr at 02/05/23 0500, 2 mg/hr at 02/05/23 0500    cefepime (MAXIPIME) 2,000 mg in sodium chloride 0.9 % 100 mL IVPB (mini-bag), 2,000 mg, IntraVENous, Q8H, Bebeto Miller MD, Last Rate: 25 mL/hr at 02/05/23 1032, 2,000 mg at 02/05/23 1032    risperiDONE (RISPERDAL) tablet 1 mg, 1 mg, Oral, BID, Bebeto Miller MD, 1 mg at 02/05/23 0753    propofol injection, 5-50 mcg/kg/min, IntraVENous, Continuous, Bailee Olvera MD, Last Rate: 14.7 mL/hr at 02/05/23 0806, 30 mcg/kg/min at 02/05/23 0806    atorvastatin (LIPITOR) tablet 60 mg, 60 mg, Oral, Daily, Seth Tay MD, 60 mg at 02/05/23 0752    pantoprazole (PROTONIX) injection 40 mg, 40 mg, IntraVENous, Daily, Seth Tay MD, 40 mg at 02/05/23 0753    sertraline (ZOLOFT) tablet 100 mg, 100 mg, Oral, BID, Seth Tay MD, 100 mg at 02/05/23 0752    glucose chewable tablet 16 g, 4 tablet, Oral, PRN, Seth Tay MD    dextrose bolus 10% 125 mL, 125 mL, IntraVENous, PRN **OR** dextrose bolus 10% 250 mL, 250 mL, IntraVENous, PRN, Seth Tay MD    glucagon (rDNA) injection 1 mg, 1 mg, SubCUTAneous, PRN, Seth Tay MD    dextrose 10 % infusion, , IntraVENous, Continuous PRN, Seth Tay MD    chlorhexidine (PERIDEX) 0.12 % solution 15 mL, 15 mL, Mouth/Throat, BID, Seth Tay MD, 15 mL at 02/05/23 0755    sodium chloride flush 0.9 % injection 5-40 mL, 5-40 mL, IntraVENous, 2 times per day, Seth Tay MD, 10 mL at 02/05/23 0755    sodium chloride flush 0.9 % injection 5-40 mL, 5-40 mL, IntraVENous, PRN, Seht Tay MD    0.9 % sodium chloride infusion, , IntraVENous, PRN, Seth Tay MD    ondansetron (ZOFRAN-ODT) disintegrating tablet 4 mg, 4 mg, Oral, Q8H PRN **OR** ondansetron (ZOFRAN) injection 4 mg, 4 mg, IntraVENous, Q6H PRN, Seth Tay MD    polyethylene glycol (GLYCOLAX) packet 17 g, 17 g, Oral, Daily PRN, Seth Tay MD    enoxaparin (LOVENOX) injection 40 mg, 40 mg, SubCUTAneous, Daily, Seth Tay MD, 40 mg at 02/05/23 0756    acetaminophen (TYLENOL) tablet 650 mg, 650 mg, Oral, Q6H PRN **OR** acetaminophen (TYLENOL) suppository 650 mg, 650 mg, Rectal, Q6H PRN, Seth Tay MD, 650 mg at 02/02/23 6542    nicotine (NICODERM CQ) 21 MG/24HR 1 patch, 1 patch, TransDERmal, Daily, Seth Tay MD, 1 patch at 02/05/23 4463    insulin lispro (HUMALOG) injection vial 0-8 Units, 0-8 Units, SubCUTAneous, Q4H, Judit Beat Hemanth Mccarthy MD, 6 Units at 02/05/23 0828    albuterol sulfate HFA (PROVENTIL;VENTOLIN;PROAIR) 108 (90 Base) MCG/ACT inhaler 4 puff, 4 puff, Inhalation, Q4H PRN, Chase Strickland MD    norepinephrine (LEVOPHED) 16 mg in sodium chloride 0.9 % 250 mL infusion, 1-100 mcg/min, IntraVENous, Continuous, Bunny Brown MD, Last Rate: 5 mL/hr at 02/02/23 2024, 5.333 mcg/min at 02/02/23 2024    ketamine (KETALAR) 500 mg in sodium chloride 0.9 % 100 mL infusion, 0.05-1 mg/kg/hr (Ideal), IntraVENous, Continuous, Bunny Brown MD, Last Rate: 2.1 mL/hr at 02/03/23 1545, 0.2 mg/kg/hr at 02/03/23 1545    mupirocin (BACTROBAN) 2 % ointment, , Nasal, BID, Selam Gil MD, Given at 02/05/23 0753    fentaNYL (SUBLIMAZE) 1,000 mcg in sodium chloride 0.9% 100 mL infusion,  mcg/hr, IntraVENous, Continuous, Bebeto Miller MD, Last Rate: 12.5 mL/hr at 02/05/23 0500, 125 mcg/hr at 02/05/23 0500    ipratropium-albuterol (DUONEB) nebulizer solution 1 ampule, 1 ampule, Inhalation, Q4H, Bebeto Miller MD, 1 ampule at 02/05/23 0702    albuterol (PROVENTIL) nebulizer solution 2.5 mg, 2.5 mg, Nebulization, Q2H PRN, Bebeto Miller MD    fentaNYL (SUBLIMAZE) injection 25 mcg, 25 mcg, IntraVENous, Q1H PRN, Kelly Muller MD, 25 mcg at 02/03/23 0821    midazolam (VERSED) injection 2 mg, 2 mg, IntraVENous, Q1H PRN, Kelly Muller MD, 2 mg at 02/03/23 0820      REVIEW OF SYSTEMS:  Unable to obtain       Objective:   PHYSICAL EXAM:    Blood pressure (!) 130/59, pulse 72, temperature 98 °F (36.7 °C), temperature source Bladder, resp. rate 24, height 5' 3\" (1.6 m), weight 191 lb 5.8 oz (86.8 kg), SpO2 94 %, not currently breastfeeding.' on RA  Gen: No distress. Sedated   Eyes: PERRL. No sclera icterus. No conjunctival injection. ENT: No discharge. Pharynx clear. Neck: Trachea midline. No obvious mass. Resp: Rhonchi bilaterally more in the right   CV: Regular rate. Regular rhythm. No murmur or rub. No edema. GI: Non-tender. Non-distended. No hernia. Skin: redness and oozing  right> left extremities. Lymph: No cervical LAD. No supraclavicular LAD. M/S: No cyanosis. No joint deformity. No clubbing. Neuro: sedated  Psych: sedated           DATA reviewed by me:   CXR looks better     Assessment:       Acute COPD exacerbation   Bilateral lower extremity cellulitis  Possible CHF/fluid overload  OGHS/SHAZIA  Acute hypoxic  hypercapnic res failure      Plan: On 400/24/5/50   Drop 360 TV   Sputum gram + cocci ? N madhavi   S/p bronch   CXR improving   Had more Lasix ,2 L out   Vent adjusted  be me  '  Off Levophed  versed drip/propofol . fenatnyl. wean propofol and start precedex  *-IV steroids   *-IV abx cefepime Day 3 # 7   and will stop vancomycin Day 2/2    ,culture neg so far for MRSA  *-negative  viral panel   *- Sputum culture follow  *_Wean O2  *-BD  ICS   Lasix as needed  Has PICC   tube feeing   Stop IVF   CT chest NO PE  :stable GGO lungs ,will check COVID again   Adjust sedation   No DVT    Hypertonic saline    Care reviewed with nursing staff, medical and surgical specialty care, primary care and the patient's family as available. Chart review/lab review/X-ray viewing/documentation and had long Conversation with patient/family re: prognosis, care options and any end of life issues:      Critical care time spent reviewing labs/films, examining patient, collaborating with other physicians more than 32  Minutes  excluding procedures . Jennifer Gonsales M.D.   2/5/2023  10:43 AM      Thank you very much for allowing me to participate in the care of this pleasant patient , should you have any questions ,please do not hesitate to contact me      Kacy Womack MD,[facility]  Pulmonary&Critical Care Medicine    Merly Luevano    NOTE: This report was transcribed using voice recognition software. Every effort was made to ensure accuracy; however, inadvertent computerized transcription errors may be present.

## 2023-02-05 NOTE — PROGRESS NOTES
Patient report given to Nelda Wells RN. Patient has rested comfortably overnight on the ventilator without acute changes in assessment noted. Care transferred.

## 2023-02-05 NOTE — PROGRESS NOTES
Bedside report given to [facility] [place] RN and care of pt transferred Garth Workman RN

## 2023-02-06 ENCOUNTER — APPOINTMENT (OUTPATIENT)
Dept: GENERAL RADIOLOGY | Age: 68
DRG: 207 | End: 2023-02-06
Payer: MEDICARE

## 2023-02-06 LAB
ANION GAP SERPL CALCULATED.3IONS-SCNC: 10 MMOL/L (ref 3–16)
ANISOCYTOSIS: ABNORMAL
BANDED NEUTROPHILS RELATIVE PERCENT: 1 % (ref 0–7)
BASE EXCESS ARTERIAL: -0.9 MMOL/L (ref -3–3)
BASOPHILS ABSOLUTE: 0 K/UL (ref 0–0.2)
BASOPHILS RELATIVE PERCENT: 0 %
BLOOD CULTURE, ROUTINE: NORMAL
BUN BLDV-MCNC: 41 MG/DL (ref 7–20)
CALCIUM SERPL-MCNC: 8.1 MG/DL (ref 8.3–10.6)
CARBOXYHEMOGLOBIN ARTERIAL: 0.3 % (ref 0–1.5)
CHLORIDE BLD-SCNC: 105 MMOL/L (ref 99–110)
CO2: 26 MMOL/L (ref 21–32)
CREAT SERPL-MCNC: 0.8 MG/DL (ref 0.6–1.2)
CULTURE, BLOOD 2: NORMAL
EOSINOPHILS ABSOLUTE: 0 K/UL (ref 0–0.6)
EOSINOPHILS RELATIVE PERCENT: 0 %
GFR SERPL CREATININE-BSD FRML MDRD: >60 ML/MIN/{1.73_M2}
GLUCOSE BLD-MCNC: 216 MG/DL (ref 70–99)
GLUCOSE BLD-MCNC: 235 MG/DL (ref 70–99)
GLUCOSE BLD-MCNC: 242 MG/DL (ref 70–99)
GLUCOSE BLD-MCNC: 263 MG/DL (ref 70–99)
GLUCOSE BLD-MCNC: 300 MG/DL (ref 70–99)
HCO3 ARTERIAL: 22.5 MMOL/L (ref 21–29)
HCT VFR BLD CALC: 31.8 % (ref 36–48)
HEMOGLOBIN, ART, EXTENDED: 11.7 G/DL (ref 12–16)
HEMOGLOBIN: 10.3 G/DL (ref 12–16)
LYMPHOCYTES ABSOLUTE: 0.5 K/UL (ref 1–5.1)
LYMPHOCYTES RELATIVE PERCENT: 5 %
MCH RBC QN AUTO: 30.5 PG (ref 26–34)
MCHC RBC AUTO-ENTMCNC: 32.3 G/DL (ref 31–36)
MCV RBC AUTO: 94.4 FL (ref 80–100)
METHEMOGLOBIN ARTERIAL: 0.3 %
MONOCYTES ABSOLUTE: 0.8 K/UL (ref 0–1.3)
MONOCYTES RELATIVE PERCENT: 9 %
NEUTROPHILS ABSOLUTE: 8 K/UL (ref 1.7–7.7)
NEUTROPHILS RELATIVE PERCENT: 85 %
NUCLEATED RED BLOOD CELLS: 0 /100 WBC
O2 SAT, ARTERIAL: 96.1 %
O2 THERAPY: ABNORMAL
PCO2 ARTERIAL: 33.1 MMHG (ref 35–45)
PDW BLD-RTO: 16 % (ref 12.4–15.4)
PERFORMED ON: ABNORMAL
PH ARTERIAL: 7.45 (ref 7.35–7.45)
PLATELET # BLD: 145 K/UL (ref 135–450)
PLATELET SLIDE REVIEW: ADEQUATE
PMV BLD AUTO: 9 FL (ref 5–10.5)
PO2 ARTERIAL: 77.8 MMHG (ref 75–108)
POIKILOCYTES: ABNORMAL
POLYCHROMASIA: ABNORMAL
POTASSIUM REFLEX MAGNESIUM: 4.2 MMOL/L (ref 3.5–5.1)
RBC # BLD: 3.37 M/UL (ref 4–5.2)
SLIDE REVIEW: ABNORMAL
SODIUM BLD-SCNC: 141 MMOL/L (ref 136–145)
TCO2 ARTERIAL: 23.5 MMOL/L
TEAR DROP CELLS: ABNORMAL
WBC # BLD: 9.3 K/UL (ref 4–11)

## 2023-02-06 PROCEDURE — 99291 CRITICAL CARE FIRST HOUR: CPT | Performed by: INTERNAL MEDICINE

## 2023-02-06 PROCEDURE — 6370000000 HC RX 637 (ALT 250 FOR IP): Performed by: INTERNAL MEDICINE

## 2023-02-06 PROCEDURE — 2580000003 HC RX 258: Performed by: INTERNAL MEDICINE

## 2023-02-06 PROCEDURE — 99233 SBSQ HOSP IP/OBS HIGH 50: CPT | Performed by: INTERNAL MEDICINE

## 2023-02-06 PROCEDURE — 6360000002 HC RX W HCPCS: Performed by: INTERNAL MEDICINE

## 2023-02-06 PROCEDURE — 84478 ASSAY OF TRIGLYCERIDES: CPT

## 2023-02-06 PROCEDURE — 94003 VENT MGMT INPAT SUBQ DAY: CPT

## 2023-02-06 PROCEDURE — 80048 BASIC METABOLIC PNL TOTAL CA: CPT

## 2023-02-06 PROCEDURE — 2500000003 HC RX 250 WO HCPCS: Performed by: INTERNAL MEDICINE

## 2023-02-06 PROCEDURE — 71045 X-RAY EXAM CHEST 1 VIEW: CPT

## 2023-02-06 PROCEDURE — C9113 INJ PANTOPRAZOLE SODIUM, VIA: HCPCS | Performed by: INTERNAL MEDICINE

## 2023-02-06 PROCEDURE — 94761 N-INVAS EAR/PLS OXIMETRY MLT: CPT

## 2023-02-06 PROCEDURE — 94640 AIRWAY INHALATION TREATMENT: CPT

## 2023-02-06 PROCEDURE — 2700000000 HC OXYGEN THERAPY PER DAY

## 2023-02-06 PROCEDURE — 36592 COLLECT BLOOD FROM PICC: CPT

## 2023-02-06 PROCEDURE — 82803 BLOOD GASES ANY COMBINATION: CPT

## 2023-02-06 PROCEDURE — 85025 COMPLETE CBC W/AUTO DIFF WBC: CPT

## 2023-02-06 PROCEDURE — 2000000000 HC ICU R&B

## 2023-02-06 RX ORDER — METHYLPREDNISOLONE SODIUM SUCCINATE 40 MG/ML
40 INJECTION, POWDER, LYOPHILIZED, FOR SOLUTION INTRAMUSCULAR; INTRAVENOUS DAILY
Status: DISCONTINUED | OUTPATIENT
Start: 2023-02-07 | End: 2023-02-09

## 2023-02-06 RX ORDER — DEXMEDETOMIDINE HYDROCHLORIDE 4 UG/ML
.1-1.5 INJECTION, SOLUTION INTRAVENOUS CONTINUOUS
Status: DISCONTINUED | OUTPATIENT
Start: 2023-02-06 | End: 2023-02-08

## 2023-02-06 RX ADMIN — Medication 75 MCG/HR: at 08:10

## 2023-02-06 RX ADMIN — INSULIN LISPRO 4 UNITS: 100 INJECTION, SOLUTION INTRAVENOUS; SUBCUTANEOUS at 20:14

## 2023-02-06 RX ADMIN — MIDAZOLAM 2 MG: 1 INJECTION INTRAMUSCULAR; INTRAVENOUS at 05:23

## 2023-02-06 RX ADMIN — INSULIN LISPRO 2 UNITS: 100 INJECTION, SOLUTION INTRAVENOUS; SUBCUTANEOUS at 08:07

## 2023-02-06 RX ADMIN — PANTOPRAZOLE SODIUM 40 MG: 40 INJECTION, POWDER, FOR SOLUTION INTRAVENOUS at 08:05

## 2023-02-06 RX ADMIN — CHLORHEXIDINE GLUCONATE 0.12% ORAL RINSE 15 ML: 1.2 LIQUID ORAL at 21:23

## 2023-02-06 RX ADMIN — PREGABALIN 200 MG: 100 CAPSULE ORAL at 21:23

## 2023-02-06 RX ADMIN — IPRATROPIUM BROMIDE AND ALBUTEROL SULFATE 1 AMPULE: .5; 2.5 SOLUTION RESPIRATORY (INHALATION) at 02:52

## 2023-02-06 RX ADMIN — CEFEPIME 2000 MG: 2 INJECTION, POWDER, FOR SOLUTION INTRAVENOUS at 03:19

## 2023-02-06 RX ADMIN — RISPERIDONE 1 MG: 1 TABLET ORAL at 08:06

## 2023-02-06 RX ADMIN — SODIUM CHLORIDE, PRESERVATIVE FREE 10 ML: 5 INJECTION INTRAVENOUS at 21:24

## 2023-02-06 RX ADMIN — SERTRALINE 100 MG: 100 TABLET, FILM COATED ORAL at 21:23

## 2023-02-06 RX ADMIN — MUPIROCIN: 20 OINTMENT TOPICAL at 21:24

## 2023-02-06 RX ADMIN — Medication 10 ML: at 21:23

## 2023-02-06 RX ADMIN — INSULIN LISPRO 2 UNITS: 100 INJECTION, SOLUTION INTRAVENOUS; SUBCUTANEOUS at 17:20

## 2023-02-06 RX ADMIN — CEFEPIME 2000 MG: 2 INJECTION, POWDER, FOR SOLUTION INTRAVENOUS at 18:00

## 2023-02-06 RX ADMIN — CHLORHEXIDINE GLUCONATE 0.12% ORAL RINSE 15 ML: 1.2 LIQUID ORAL at 08:05

## 2023-02-06 RX ADMIN — IPRATROPIUM BROMIDE AND ALBUTEROL SULFATE 1 AMPULE: .5; 2.5 SOLUTION RESPIRATORY (INHALATION) at 08:30

## 2023-02-06 RX ADMIN — PREGABALIN 200 MG: 100 CAPSULE ORAL at 08:05

## 2023-02-06 RX ADMIN — INSULIN LISPRO 6 UNITS: 100 INJECTION, SOLUTION INTRAVENOUS; SUBCUTANEOUS at 01:10

## 2023-02-06 RX ADMIN — IPRATROPIUM BROMIDE AND ALBUTEROL SULFATE 1 AMPULE: .5; 2.5 SOLUTION RESPIRATORY (INHALATION) at 11:32

## 2023-02-06 RX ADMIN — MUPIROCIN: 20 OINTMENT TOPICAL at 08:05

## 2023-02-06 RX ADMIN — IPRATROPIUM BROMIDE AND ALBUTEROL SULFATE 1 AMPULE: .5; 2.5 SOLUTION RESPIRATORY (INHALATION) at 15:32

## 2023-02-06 RX ADMIN — IPRATROPIUM BROMIDE AND ALBUTEROL SULFATE 1 AMPULE: .5; 2.5 SOLUTION RESPIRATORY (INHALATION) at 22:59

## 2023-02-06 RX ADMIN — DEXMEDETOMIDINE HYDROCHLORIDE 0.4 MCG/KG/HR: 400 INJECTION INTRAVENOUS at 10:58

## 2023-02-06 RX ADMIN — IPRATROPIUM BROMIDE AND ALBUTEROL SULFATE 1 AMPULE: .5; 2.5 SOLUTION RESPIRATORY (INHALATION) at 19:05

## 2023-02-06 RX ADMIN — INSULIN LISPRO 2 UNITS: 100 INJECTION, SOLUTION INTRAVENOUS; SUBCUTANEOUS at 05:53

## 2023-02-06 RX ADMIN — INSULIN LISPRO 2 UNITS: 100 INJECTION, SOLUTION INTRAVENOUS; SUBCUTANEOUS at 12:52

## 2023-02-06 RX ADMIN — INSULIN LISPRO 2 UNITS: 100 INJECTION, SOLUTION INTRAVENOUS; SUBCUTANEOUS at 23:46

## 2023-02-06 RX ADMIN — SERTRALINE 100 MG: 100 TABLET, FILM COATED ORAL at 08:06

## 2023-02-06 RX ADMIN — DEXMEDETOMIDINE HYDROCHLORIDE 0.8 MCG/KG/HR: 400 INJECTION INTRAVENOUS at 16:34

## 2023-02-06 RX ADMIN — ATORVASTATIN CALCIUM 60 MG: 40 TABLET, FILM COATED ORAL at 08:05

## 2023-02-06 RX ADMIN — PROPOFOL 30 MCG/KG/MIN: 10 INJECTION, EMULSION INTRAVENOUS at 08:05

## 2023-02-06 RX ADMIN — DEXMEDETOMIDINE HYDROCHLORIDE 0.8 MCG/KG/HR: 400 INJECTION INTRAVENOUS at 22:33

## 2023-02-06 RX ADMIN — ROPINIROLE HYDROCHLORIDE 1 MG: 1 TABLET, FILM COATED ORAL at 08:06

## 2023-02-06 RX ADMIN — ROPINIROLE HYDROCHLORIDE 1 MG: 1 TABLET, FILM COATED ORAL at 21:23

## 2023-02-06 RX ADMIN — METHYLPREDNISOLONE SODIUM SUCCINATE 60 MG: 125 INJECTION, POWDER, FOR SOLUTION INTRAMUSCULAR; INTRAVENOUS at 03:20

## 2023-02-06 RX ADMIN — ENOXAPARIN SODIUM 40 MG: 100 INJECTION SUBCUTANEOUS at 08:06

## 2023-02-06 RX ADMIN — CEFEPIME 2000 MG: 2 INJECTION, POWDER, FOR SOLUTION INTRAVENOUS at 11:00

## 2023-02-06 RX ADMIN — RISPERIDONE 1 MG: 1 TABLET ORAL at 21:23

## 2023-02-06 RX ADMIN — PROPOFOL 25 MCG/KG/MIN: 10 INJECTION, EMULSION INTRAVENOUS at 02:36

## 2023-02-06 RX ADMIN — Medication 10 ML: at 08:06

## 2023-02-06 ASSESSMENT — PULMONARY FUNCTION TESTS
PIF_VALUE: 28
PIF_VALUE: 23
PIF_VALUE: 27
PIF_VALUE: 25
PIF_VALUE: 24
PIF_VALUE: 30

## 2023-02-06 NOTE — PROGRESS NOTES
Patient care assumed, assessment completed as charted. Patient continues on ventilator, #8.0 at the 21 lip line. A/C 24,  FiO2 50%, PEEP 8. Right upper arm PICC in place with Propofol infusing at 25mcg/kg/min, Versed at 2mg/hr, and Fentanyl at 125mcg/min. OG in place with tube feed running at 35mL/hr. Arias catheter patent, bilateral soft wrist restraints continue for patient safety. No further needs assessed at this time. Will monitor.

## 2023-02-06 NOTE — PROGRESS NOTES
IM Progress Note    Admit Date:  2/2/2023  4    Interval history:  copd exacerbation , failed bipap and intubated in ER   Imaging with left hemithorax opacification,s/p bronch with clearance of mucous plugs     Agitation issues noted on vent   CTA chest with no PE , bilateral pneumonia     Subjective:    Ms. Gavin Fung continues to be on the vent.   On Precedex    Objective:   BP (!) 128/59   Pulse 91   Temp 99.7 °F (37.6 °C) (Bladder)   Resp 20   Ht 5' 3\" (1.6 m)   Wt 191 lb 5.8 oz (86.8 kg)   SpO2 96%   BMI 33.90 kg/m²     Intake/Output Summary (Last 24 hours) at 2/6/2023 1210  Last data filed at 2/6/2023 0600  Gross per 24 hour   Intake 1795.34 ml   Output 2275 ml   Net -479.66 ml         Physical Exam:      General:  elderly female on vent support  Oral ETT and OG noted   Appears to be not in any distress  Mucous Membranes:  Pink , anicteric  Neck: No JVD, no carotid bruit, no thyromegaly  Chest: bilateral air entry with resolving  rhonchi and improving left sided airentry  Cardiovascular:  RRR S1S2 heard, no murmurs or gallops  Abdomen:  Soft, undistended, non tender, no organomegaly, BS present  Extremities: chronic 2 +  brawny edema of both LE with diffuse celluitis of ext Bilaterally  Distal pulses well felt  Neurological : sedated      Medications:   Scheduled Medications:    methylPREDNISolone  60 mg IntraVENous Q12H    pregabalin  200 mg Oral BID    rOPINIRole  1 mg Oral BID    lidocaine 1 % injection  5 mL IntraDERmal Once    sodium chloride flush  5-40 mL IntraVENous 2 times per day    cefepime  2,000 mg IntraVENous Q8H    risperiDONE  1 mg Oral BID    atorvastatin  60 mg Oral Daily    pantoprazole  40 mg IntraVENous Daily    sertraline  100 mg Oral BID    chlorhexidine  15 mL Mouth/Throat BID    sodium chloride flush  5-40 mL IntraVENous 2 times per day    enoxaparin  40 mg SubCUTAneous Daily    nicotine  1 patch TransDERmal Daily    insulin lispro  0-8 Units SubCUTAneous Q4H    mupirocin   Nasal BID ipratropium-albuterol  1 ampule Inhalation Q4H     I   dexmedetomidine HCl in NaCl 0.4 mcg/kg/hr (02/06/23 1058)    sodium chloride      propofol 30 mcg/kg/min (02/06/23 0805)    dextrose      sodium chloride      fentaNYL 75 mcg/hr (02/06/23 0810)     sodium chloride flush, sodium chloride, glucose, dextrose bolus **OR** dextrose bolus, glucagon (rDNA), dextrose, sodium chloride flush, sodium chloride, ondansetron **OR** ondansetron, polyethylene glycol, acetaminophen **OR** acetaminophen, albuterol sulfate HFA, albuterol, fentanNYL, midazolam    Lab Data:  Recent Labs     02/04/23 0345 02/05/23  0450 02/06/23  0600   WBC 6.8 10.0 9.3   HGB 11.5* 10.6* 10.3*   HCT 36.0 32.7* 31.8*   MCV 94.0 93.5 94.4   * 126* 145       Recent Labs     02/04/23 0345 02/05/23  0450 02/06/23  0600    142 141   K 3.4* 4.0 4.2    106 105   CO2 22 23 26   BUN 22* 38* 41*   CREATININE 0.8 0.7 0.8       No results for input(s): CKTOTAL, CKMB, CKMBINDEX, TROPONINI in the last 72 hours. Coagulation:   Lab Results   Component Value Date/Time    INR 1.04 02/04/2023 12:15 PM    APTT 27.6 02/04/2023 12:15 PM     Cardiac markers:   Lab Results   Component Value Date/Time    CKMB 10.3 07/14/2010 07:25 AM    CKTOTAL 74 10/17/2014 02:46 AM    TROPONINI <0.01 02/02/2023 05:05 AM         Lab Results   Component Value Date    ALT 12 02/03/2023    AST 13 (L) 02/03/2023    ALKPHOS 99 02/03/2023    BILITOT 0.3 02/03/2023       Lab Results   Component Value Date    INR 1.04 02/04/2023    INR 0.94 09/12/2020    INR 0.99 07/01/2018    PROTIME 13.5 02/04/2023    PROTIME 10.9 09/12/2020    PROTIME 11.3 07/01/2018       Radiology    Cxr    Complete opacification has developed of the left hemithorax, which can   reflect combination of atelectasis, airspace disease, and/or pleural   effusion. Right-sided airspace disease is not substantially changed. Chest xray   1. Findings suggestive of partial right upper lobe atelectasis.  2. Small left pleural effusion. 3. Central pulmonary vascular congestion without overt pulmonary edema. 4. Left basilar atelectasis. Cxr    Significantly limited evaluation secondary to patient positioning with no   signs to suggest pneumothorax. CTA chest    1. No CT evidence of a pulmonary embolism. 2. Bilateral lower lobe airspace consolidation, left worse than right,   representing either pneumonia or aspiration. 3. Additional scattered curvilinear, consolidative, nodular opacities   throughout the upper lobes and right middle lobe likely reflect additional   multifocal pneumonia and/or pulmonary edema. 4. Small bilateral pleural effusions. 5. Enlarged nodular appearance of the left thyroid lobe. Consider further   characterization with a routine non urgent follow-up thyroid ultrasound. See   below. 6. Findings suggestive of cirrhosis and portal hypertension    Cultures:   Cultures  Blood- NGTD  Sputum - pending  BAL NRF      Assessment & Plan:    Acute respiratory failure with hypoxia and hypercapnia  Copd exacerbation   Left hemithorax opacification with mucous plugging    . O2 sat 86% on 5L in the ER. Initially placed on NIV. Too obtunded for NIV and PCO2 worsened after ~1 h on NIV. And eventually placed on vent  Admitted to ICU   CTA chest with bilateral pna, no PE  Had emergency bronch with removal of mucous plugs  Started on steroids, abx -cefepime and vanc   Critical care following  BAL with NRF- can downgrade abx  Vancomycin discontinued    COPD exacerbation   IV solumedrol, IV abx as above , PRN/GIDEON intensive NEB therapy. Can wean steroids     Acute encephalopathy, obtunded, likely metabolic 2/2 hypercarbia. Now on vent support    DM2, hold oral Rx, added Mod SSI q4h. Bilateral LE cellulitis - has chronic lymphedema, resume  diuretics  started on cefepime and added vanc  - await cx   Vanco DC'd    Tobacco Abuse, unable to  cessation 2/2 MS, 21 mg nicotine patch. Hx of cirrhosis with portal HTN - resume home diuretics    Thyroid enlargement on left side - need f/w US as outpt       DVT Prophylaxis: Lx  Diet TF  Code Status: Full Code     Saul Flores MD, 2/6/2023 12:10 PM

## 2023-02-06 NOTE — PROGRESS NOTES
's, RR >30, BP increased, VT In 200's, breathing trial ended, increased propofol to 30mcg , fentanyl remains at 75, and precedex started.  Goal to decrease propofol and fentanyl and mainly use precedex for SBT in am

## 2023-02-06 NOTE — PROGRESS NOTES
Patient report given to Alyson Noonan. Patient resting comfortably at present. Has beed agitated intermittently overnight requiring PRN Versed and titration of Propofol. Care transferred.

## 2023-02-06 NOTE — PROGRESS NOTES
RT Inhaler-Nebulizer Bronchodilator Protocol Note    There is a bronchodilator order in the chart from a provider indicating to follow the RT Bronchodilator Protocol and there is an Initiate RT Inhaler-Nebulizer Bronchodilator Protocol order as well (see protocol at bottom of note). CXR Findings:  XR CHEST PORTABLE    Result Date: 2/5/2023  1. Endotracheal tube slightly low in position, lying approximately 2.0 cm above the ivis. Suggest retraction of the tube by approximately 2-3 cm to avoid right mainstem intubation. 2. Other support apparatus is in good anatomic position. 3. Slight interval decrease in size of a small left pleural effusion. 4. Interval improvement in appearance of bibasilar airspace disease. The findings were sent to the Radiology Results [address] at 6:04 am on 2/5/2023 to be communicated to a licensed caregiver. XR CHEST PORTABLE    Result Date: 2/4/2023  Bibasilar airspace disease, left greater than right, similar compared to prior       The findings from the last RT Protocol Assessment were as follows:   History Pulmonary Disease: Chronic pulmonary disease  Respiratory Pattern: (P) Mild dyspnea at rest, irregular pattern, or RR 21-25 bpm  Breath Sounds: (P) Intermittent or unilateral wheezes  Cough: (P) Weak, productive  Indication for Bronchodilator Therapy: (P) Wheezing associated with pulm disorder  Bronchodilator Assessment Score: (P) 12    Aerosolized bronchodilator medication orders have been revised according to the RT Inhaler-Nebulizer Bronchodilator Protocol below. Respiratory Therapist to perform RT Therapy Protocol Assessment initially then follow the protocol. Repeat RT Therapy Protocol Assessment PRN for score 0-3 or on second treatment, BID, and PRN for scores above 3. No Indications - adjust the frequency to every 6 hours PRN wheezing or bronchospasm, if no treatments needed after 48 hours then discontinue using Per Protocol order mode.      If indication present, adjust the RT bronchodilator orders based on the Bronchodilator Assessment Score as indicated below. Use Inhaler orders unless patient has one or more of the following: on home nebulizer, not able to hold breath for 10 seconds, is not alert and oriented, cannot activate and use MDI correctly, or respiratory rate 25 breaths per minute or more, then use the equivalent nebulizer order(s) with same Frequency and PRN reasons based on the score. If a patient is on this medication at home then do not decrease Frequency below that used at home. 0-3 - enter or revise RT bronchodilator order(s) to equivalent RT Bronchodilator order with Frequency of every 4 hours PRN for wheezing or increased work of breathing using Per Protocol order mode. 4-6 - enter or revise RT Bronchodilator order(s) to two equivalent RT bronchodilator orders with one order with BID Frequency and one order with Frequency of every 4 hours PRN wheezing or increased work of breathing using Per Protocol order mode. 7-10 - enter or revise RT Bronchodilator order(s) to two equivalent RT bronchodilator orders with one order with TID Frequency and one order with Frequency of every 4 hours PRN wheezing or increased work of breathing using Per Protocol order mode. 11-13 - enter or revise RT Bronchodilator order(s) to one equivalent RT bronchodilator order with QID Frequency and an Albuterol order with Frequency of every 4 hours PRN wheezing or increased work of breathing using Per Protocol order mode. Greater than 13 - enter or revise RT Bronchodilator order(s) to one equivalent RT bronchodilator order with every 4 hours Frequency and an Albuterol order with Frequency of every 2 hours PRN wheezing or increased work of breathing using Per Protocol order mode.        Electronically signed by Enoc Castellanos RCP on 2/5/2023 at 9:37 PM

## 2023-02-06 NOTE — PROGRESS NOTES
4 Eyes Skin Assessment     The patient is being assess for   Shift Handoff    I agree that 2 RN's have performed a thorough Head to Toe Skin Assessment on the patient. ALL assessment sites listed below have been assessed.      Areas assessed for pressure by both nurses:   [x]   Head, Face, and Ears   [x]   Shoulders, Back, and Chest, Abdomen  [x]   Arms, Elbows, and Hands   [x]   Coccyx, Sacrum, and Ischium  [x]   Legs, Feet, and Heels    Cellulitis mary lower ext, purple area to left foot second toe, small wounds to ischium and coccyx, scattered bruising, dusky elbow, dry flaky skin.        Skin Assessed Under all Medical Devices by both nurses:  ETT, O2 device tubing, poole, and OG              All Mepilex Borders were peeled back and area peeked at by both nurses:  Yes  Please list where Mepilex Borders are located:  coccyx             **SHARE this note so that the co-signing nurse is able to place an eSignature**    Co-signer eSignature: Electronically signed by Benedict Queen RN on 2/6/23 at 8:32 PM EST    Does the Patient have Skin Breakdown related to pressure?  Yes LDA WOUND CARE was Initiated documentation include the Sue-wound, Wound Assessment, Measurements, Dressing Treatment, Drainage, and Color\",     (Insert Photo here)         Zack Prevention initiated:  Yes   Wound Care Orders initiated:  Yes      WOC nurse consulted for Pressure Injury (Stage 3,4, Unstageable, DTI, NWPT, Complex wounds)and New or Established Ostomies:  No      Primary Nurse eSignature: Electronically signed by Kristi Serrano RN on 2/6/23 at 6:30 PM EST

## 2023-02-06 NOTE — PROGRESS NOTES
SBT of 5/5 attempted. Pt lasted about 20 minutes. WOB increased, RR and HR increased and VT were in the mid 200s. Pt appeared very agitated. Pt placed back on [facility] support.

## 2023-02-06 NOTE — PROGRESS NOTES
Shift assessment completed as documented on flowsheet. VSS Pt sedated and mechanically ventilated RASS -2. OG in place without concerns, tube feeding remains on hold. Gastric  residual 20. Abd round soft and nontender. Arias patent to bsd. Remy wrist restraints in place. Monitoring closely.

## 2023-02-06 NOTE — CARE COORDINATION
INTERDISCIPLINARY PLAN OF CARE CONFERENCE    Date/Time: 2/6/2023 10:14 AM  Completed by: Kem Barone RN, Case Management      Patient Name:  Izaiah Ramirez  YOB: 1955  Admitting Diagnosis: COPD exacerbation ([facility] [address] [J44.1]  Acute respiratory failure with hypercapnia ([address] [J96.02]  Acute encephalopathy [G93.40]  Acute on chronic respiratory failure with hypoxia and hypercapnia ([facility] [address] [C70.45, J96.22]     Admit Date/Time:  2/2/2023  4:53 AM    Chart reviewed. Interdisciplinary team contacted or reviewed plan related to patient progress and discharge plans. Disciplines included Case Management, Nursing, and Dietitian. Current Status:inpt  PT/OT recommendation for discharge plan of care: tbd    Expected D/C Disposition:  tbd    Discharge Plan Comments: Reviewed chart. Pt in ICU remains on Ventilator. No family at bedside. Rehab list provided to pt's Elis Dean, last week. Becky from [facility] screening the pt. Following. 26 writer received vm from Blake Alcaraz with [facility] and pt meets criteria, will require pre-cert. CM following.     Home O2 in place on admit: Yes

## 2023-02-06 NOTE — PROGRESS NOTES
RT Inhaler-Nebulizer Bronchodilator Protocol Note    There is a bronchodilator order in the chart from a provider indicating to follow the RT Bronchodilator Protocol and there is an Initiate RT Inhaler-Nebulizer Bronchodilator Protocol order as well (see protocol at bottom of note). CXR Findings:  XR CHEST PORTABLE    Result Date: 2/6/2023  Stable chest.  Bibasilar airspace disease appears similar     XR CHEST PORTABLE    Result Date: 2/5/2023  1. Endotracheal tube slightly low in position, lying approximately 2.0 cm above the ivis. Suggest retraction of the tube by approximately 2-3 cm to avoid right mainstem intubation. 2. Other support apparatus is in good anatomic position. 3. Slight interval decrease in size of a small left pleural effusion. 4. Interval improvement in appearance of bibasilar airspace disease. The findings were sent to the Radiology Results [address] at 6:04 am on 2/5/2023 to be communicated to a licensed caregiver. The findings from the last RT Protocol Assessment were as follows:   History Pulmonary Disease: (P) Chronic pulmonary disease  Respiratory Pattern: (P) Dyspnea on exertion or RR 21-25 bpm  Breath Sounds: (P) Slightly diminished and/or crackles  Cough: (P) Weak, productive  Indication for Bronchodilator Therapy: (P) Decreased or absent breath sounds  Bronchodilator Assessment Score: (P) 8    Aerosolized bronchodilator medication orders have been revised according to the RT Inhaler-Nebulizer Bronchodilator Protocol below. Respiratory Therapist to perform RT Therapy Protocol Assessment initially then follow the protocol. Repeat RT Therapy Protocol Assessment PRN for score 0-3 or on second treatment, BID, and PRN for scores above 3. No Indications - adjust the frequency to every 6 hours PRN wheezing or bronchospasm, if no treatments needed after 48 hours then discontinue using Per Protocol order mode.      If indication present, adjust the RT bronchodilator orders based on the Bronchodilator Assessment Score as indicated below. Use Inhaler orders unless patient has one or more of the following: on home nebulizer, not able to hold breath for 10 seconds, is not alert and oriented, cannot activate and use MDI correctly, or respiratory rate 25 breaths per minute or more, then use the equivalent nebulizer order(s) with same Frequency and PRN reasons based on the score. If a patient is on this medication at home then do not decrease Frequency below that used at home. 0-3 - enter or revise RT bronchodilator order(s) to equivalent RT Bronchodilator order with Frequency of every 4 hours PRN for wheezing or increased work of breathing using Per Protocol order mode. 4-6 - enter or revise RT Bronchodilator order(s) to two equivalent RT bronchodilator orders with one order with BID Frequency and one order with Frequency of every 4 hours PRN wheezing or increased work of breathing using Per Protocol order mode. 7-10 - enter or revise RT Bronchodilator order(s) to two equivalent RT bronchodilator orders with one order with TID Frequency and one order with Frequency of every 4 hours PRN wheezing or increased work of breathing using Per Protocol order mode. 11-13 - enter or revise RT Bronchodilator order(s) to one equivalent RT bronchodilator order with QID Frequency and an Albuterol order with Frequency of every 4 hours PRN wheezing or increased work of breathing using Per Protocol order mode. Greater than 13 - enter or revise RT Bronchodilator order(s) to one equivalent RT bronchodilator order with every 4 hours Frequency and an Albuterol order with Frequency of every 2 hours PRN wheezing or increased work of breathing using Per Protocol order mode.          Electronically signed by Herbert Torres RCP on 2/6/2023 at 8:33 AM

## 2023-02-06 NOTE — PLAN OF CARE
Problem: Safety - Medical Restraint  Goal: Remains free of injury from restraints (Restraint for Interference with Medical Device)  Description: INTERVENTIONS:  1. Determine that other, less restrictive measures have been tried or would not be effective before applying the restraint  2. Evaluate the patient's condition at the time of restraint application  3. Inform patient/family regarding the reason for restraint  4.  Q2H: Monitor safety, psychosocial status, comfort, nutrition and hydration  Outcome: Progressing  Flowsheets  Taken 2/5/2023 2000 by Pallavi Sam RN  Remains free of injury from restraints (restraint for interference with medical device):   Determine that other, less restrictive measures have been tried or would not be effective before applying the restraint   Every 2 hours: Monitor safety, psychosocial status, comfort, nutrition and hydration  Taken 2/5/2023 1800 by Laura Kendrick RN  Remains free of injury from restraints (restraint for interference with medical device): Every 2 hours: Monitor safety, psychosocial status, comfort, nutrition and hydration  Taken 2/5/2023 1600 by Laura Kendrick RN  Remains free of injury from restraints (restraint for interference with medical device): Every 2 hours: Monitor safety, psychosocial status, comfort, nutrition and hydration  Taken 2/5/2023 1400 by Ashley Vergara RN  Remains free of injury from restraints (restraint for interference with medical device): Every 2 hours: Monitor safety, psychosocial status, comfort, nutrition and hydration  Taken 2/5/2023 1200 by Ashley Vergara RN  Remains free of injury from restraints (restraint for interference with medical device): Every 2 hours: Monitor safety, psychosocial status, comfort, nutrition and hydration  Taken 2/5/2023 1000 by Ashley Vergara RN  Remains free of injury from restraints (restraint for interference with medical device): Every 2 hours: Monitor safety, psychosocial status, comfort, nutrition and hydration  Taken 2/5/2023 0800 by Ha Umanzor RN  Remains free of injury from restraints (restraint for interference with medical device): Determine that other, less restrictive measures have been tried or would not be effective before applying the restraint  Note: Bilateral soft wrist restraints remain in place for patient safety, and to protect all lines and airways. Problem: Discharge Planning  Goal: Discharge to home or other facility with appropriate resources  Outcome: Progressing     Problem: Pain  Goal: Verbalizes/displays adequate comfort level or baseline comfort level  Outcome: Progressing     Problem: Nutrition Deficit:  Goal: Optimize nutritional status  Outcome: Progressing  Note: Patient receiving nutrition via continuous tube feed. Problem: Skin/Tissue Integrity  Goal: Absence of new skin breakdown  Description: 1. Monitor for areas of redness and/or skin breakdown  2. Assess vascular access sites hourly  3. Every 4-6 hours minimum:  Change oxygen saturation probe site  4. Every 4-6 hours:  If on nasal continuous positive airway pressure, respiratory therapy assess nares and determine need for appliance change or resting period. Outcome: Progressing  Note: Patient turned/repositioned every 2 hours and as needed to maintain and improve skin integrity.

## 2023-02-07 ENCOUNTER — APPOINTMENT (OUTPATIENT)
Dept: GENERAL RADIOLOGY | Age: 68
DRG: 207 | End: 2023-02-07
Payer: MEDICARE

## 2023-02-07 LAB
ALBUMIN SERPL-MCNC: 3.6 G/DL (ref 3.4–5)
ANION GAP SERPL CALCULATED.3IONS-SCNC: 11 MMOL/L (ref 3–16)
BASE EXCESS ARTERIAL: 0.3 MMOL/L (ref -3–3)
BUN BLDV-MCNC: 37 MG/DL (ref 7–20)
CALCIUM SERPL-MCNC: 8.8 MG/DL (ref 8.3–10.6)
CARBOXYHEMOGLOBIN ARTERIAL: 0.6 % (ref 0–1.5)
CHLORIDE BLD-SCNC: 102 MMOL/L (ref 99–110)
CO2: 25 MMOL/L (ref 21–32)
CREAT SERPL-MCNC: 0.8 MG/DL (ref 0.6–1.2)
FUNGUS (MYCOLOGY) CULTURE: ABNORMAL
FUNGUS STAIN: ABNORMAL
GFR SERPL CREATININE-BSD FRML MDRD: >60 ML/MIN/{1.73_M2}
GLUCOSE BLD-MCNC: 256 MG/DL (ref 70–99)
GLUCOSE BLD-MCNC: 271 MG/DL (ref 70–99)
GLUCOSE BLD-MCNC: 278 MG/DL (ref 70–99)
GLUCOSE BLD-MCNC: 305 MG/DL (ref 70–99)
GLUCOSE BLD-MCNC: 307 MG/DL (ref 70–99)
GLUCOSE BLD-MCNC: 325 MG/DL (ref 70–99)
HCO3 ARTERIAL: 24.1 MMOL/L (ref 21–29)
HCT VFR BLD CALC: 34.7 % (ref 36–48)
HEMOGLOBIN, ART, EXTENDED: 12.5 G/DL (ref 12–16)
HEMOGLOBIN: 11.4 G/DL (ref 12–16)
MCH RBC QN AUTO: 30.4 PG (ref 26–34)
MCHC RBC AUTO-ENTMCNC: 32.7 G/DL (ref 31–36)
MCV RBC AUTO: 92.7 FL (ref 80–100)
METHEMOGLOBIN ARTERIAL: 0.3 %
O2 SAT, ARTERIAL: 93.8 %
O2 THERAPY: ABNORMAL
ORGANISM: ABNORMAL
PCO2 ARTERIAL: 36.3 MMHG (ref 35–45)
PDW BLD-RTO: 15.6 % (ref 12.4–15.4)
PERFORMED ON: ABNORMAL
PH ARTERIAL: 7.44 (ref 7.35–7.45)
PHOSPHORUS: 3.3 MG/DL (ref 2.5–4.9)
PLATELET # BLD: 152 K/UL (ref 135–450)
PMV BLD AUTO: 9.1 FL (ref 5–10.5)
PO2 ARTERIAL: 65.8 MMHG (ref 75–108)
POTASSIUM SERPL-SCNC: 4.2 MMOL/L (ref 3.5–5.1)
RBC # BLD: 3.74 M/UL (ref 4–5.2)
SODIUM BLD-SCNC: 138 MMOL/L (ref 136–145)
TCO2 ARTERIAL: 25.2 MMOL/L
TRIGL SERPL-MCNC: 352 MG/DL (ref 0–150)
WBC # BLD: 9.3 K/UL (ref 4–11)

## 2023-02-07 PROCEDURE — 85027 COMPLETE CBC AUTOMATED: CPT

## 2023-02-07 PROCEDURE — 94761 N-INVAS EAR/PLS OXIMETRY MLT: CPT

## 2023-02-07 PROCEDURE — 99233 SBSQ HOSP IP/OBS HIGH 50: CPT | Performed by: INTERNAL MEDICINE

## 2023-02-07 PROCEDURE — 6360000002 HC RX W HCPCS: Performed by: INTERNAL MEDICINE

## 2023-02-07 PROCEDURE — 80069 RENAL FUNCTION PANEL: CPT

## 2023-02-07 PROCEDURE — 82803 BLOOD GASES ANY COMBINATION: CPT

## 2023-02-07 PROCEDURE — 99291 CRITICAL CARE FIRST HOUR: CPT | Performed by: INTERNAL MEDICINE

## 2023-02-07 PROCEDURE — 2500000003 HC RX 250 WO HCPCS: Performed by: INTERNAL MEDICINE

## 2023-02-07 PROCEDURE — C9113 INJ PANTOPRAZOLE SODIUM, VIA: HCPCS | Performed by: INTERNAL MEDICINE

## 2023-02-07 PROCEDURE — 71045 X-RAY EXAM CHEST 1 VIEW: CPT

## 2023-02-07 PROCEDURE — 94640 AIRWAY INHALATION TREATMENT: CPT

## 2023-02-07 PROCEDURE — 94003 VENT MGMT INPAT SUBQ DAY: CPT

## 2023-02-07 PROCEDURE — 6370000000 HC RX 637 (ALT 250 FOR IP): Performed by: INTERNAL MEDICINE

## 2023-02-07 PROCEDURE — 36573 INSJ PICC RS&I 5 YR+: CPT

## 2023-02-07 PROCEDURE — 2580000003 HC RX 258: Performed by: INTERNAL MEDICINE

## 2023-02-07 PROCEDURE — 2000000000 HC ICU R&B

## 2023-02-07 PROCEDURE — 2700000000 HC OXYGEN THERAPY PER DAY

## 2023-02-07 RX ADMIN — ATORVASTATIN CALCIUM 60 MG: 40 TABLET, FILM COATED ORAL at 08:23

## 2023-02-07 RX ADMIN — PREGABALIN 200 MG: 100 CAPSULE ORAL at 20:43

## 2023-02-07 RX ADMIN — CEFEPIME 2000 MG: 2 INJECTION, POWDER, FOR SOLUTION INTRAVENOUS at 18:09

## 2023-02-07 RX ADMIN — SERTRALINE 100 MG: 100 TABLET, FILM COATED ORAL at 08:24

## 2023-02-07 RX ADMIN — PANTOPRAZOLE SODIUM 40 MG: 40 INJECTION, POWDER, FOR SOLUTION INTRAVENOUS at 08:24

## 2023-02-07 RX ADMIN — CHLORHEXIDINE GLUCONATE 0.12% ORAL RINSE 15 ML: 1.2 LIQUID ORAL at 20:44

## 2023-02-07 RX ADMIN — CEFEPIME 2000 MG: 2 INJECTION, POWDER, FOR SOLUTION INTRAVENOUS at 01:53

## 2023-02-07 RX ADMIN — INSULIN LISPRO 4 UNITS: 100 INJECTION, SOLUTION INTRAVENOUS; SUBCUTANEOUS at 17:52

## 2023-02-07 RX ADMIN — INSULIN LISPRO 6 UNITS: 100 INJECTION, SOLUTION INTRAVENOUS; SUBCUTANEOUS at 12:00

## 2023-02-07 RX ADMIN — INSULIN LISPRO 4 UNITS: 100 INJECTION, SOLUTION INTRAVENOUS; SUBCUTANEOUS at 04:36

## 2023-02-07 RX ADMIN — PREGABALIN 200 MG: 100 CAPSULE ORAL at 08:23

## 2023-02-07 RX ADMIN — Medication 50 MCG/HR: at 05:21

## 2023-02-07 RX ADMIN — CHLORHEXIDINE GLUCONATE 0.12% ORAL RINSE 15 ML: 1.2 LIQUID ORAL at 08:23

## 2023-02-07 RX ADMIN — DEXMEDETOMIDINE HYDROCHLORIDE 0.6 MCG/KG/HR: 400 INJECTION INTRAVENOUS at 21:13

## 2023-02-07 RX ADMIN — ROPINIROLE HYDROCHLORIDE 1 MG: 1 TABLET, FILM COATED ORAL at 20:43

## 2023-02-07 RX ADMIN — IPRATROPIUM BROMIDE AND ALBUTEROL SULFATE 1 AMPULE: .5; 2.5 SOLUTION RESPIRATORY (INHALATION) at 19:29

## 2023-02-07 RX ADMIN — Medication 10 ML: at 21:14

## 2023-02-07 RX ADMIN — IPRATROPIUM BROMIDE AND ALBUTEROL SULFATE 1 AMPULE: .5; 2.5 SOLUTION RESPIRATORY (INHALATION) at 11:33

## 2023-02-07 RX ADMIN — IPRATROPIUM BROMIDE AND ALBUTEROL SULFATE 1 AMPULE: .5; 2.5 SOLUTION RESPIRATORY (INHALATION) at 14:57

## 2023-02-07 RX ADMIN — IPRATROPIUM BROMIDE AND ALBUTEROL SULFATE 1 AMPULE: .5; 2.5 SOLUTION RESPIRATORY (INHALATION) at 08:06

## 2023-02-07 RX ADMIN — IPRATROPIUM BROMIDE AND ALBUTEROL SULFATE 1 AMPULE: .5; 2.5 SOLUTION RESPIRATORY (INHALATION) at 22:56

## 2023-02-07 RX ADMIN — METHYLPREDNISOLONE SODIUM SUCCINATE 40 MG: 40 INJECTION, POWDER, FOR SOLUTION INTRAMUSCULAR; INTRAVENOUS at 08:24

## 2023-02-07 RX ADMIN — SODIUM CHLORIDE, PRESERVATIVE FREE 10 ML: 5 INJECTION INTRAVENOUS at 21:14

## 2023-02-07 RX ADMIN — RISPERIDONE 1 MG: 1 TABLET ORAL at 08:24

## 2023-02-07 RX ADMIN — ENOXAPARIN SODIUM 40 MG: 100 INJECTION SUBCUTANEOUS at 08:24

## 2023-02-07 RX ADMIN — ROPINIROLE HYDROCHLORIDE 1 MG: 1 TABLET, FILM COATED ORAL at 08:24

## 2023-02-07 RX ADMIN — IPRATROPIUM BROMIDE AND ALBUTEROL SULFATE 1 AMPULE: .5; 2.5 SOLUTION RESPIRATORY (INHALATION) at 02:37

## 2023-02-07 RX ADMIN — SODIUM CHLORIDE, PRESERVATIVE FREE 10 ML: 5 INJECTION INTRAVENOUS at 08:25

## 2023-02-07 RX ADMIN — MIDAZOLAM 2 MG: 1 INJECTION INTRAMUSCULAR; INTRAVENOUS at 17:29

## 2023-02-07 RX ADMIN — DEXMEDETOMIDINE HYDROCHLORIDE 0.4 MCG/KG/HR: 400 INJECTION INTRAVENOUS at 11:10

## 2023-02-07 RX ADMIN — INSULIN LISPRO 4 UNITS: 100 INJECTION, SOLUTION INTRAVENOUS; SUBCUTANEOUS at 20:43

## 2023-02-07 RX ADMIN — Medication 10 ML: at 08:24

## 2023-02-07 RX ADMIN — SERTRALINE 100 MG: 100 TABLET, FILM COATED ORAL at 20:43

## 2023-02-07 RX ADMIN — CEFEPIME 2000 MG: 2 INJECTION, POWDER, FOR SOLUTION INTRAVENOUS at 10:29

## 2023-02-07 RX ADMIN — INSULIN LISPRO 6 UNITS: 100 INJECTION, SOLUTION INTRAVENOUS; SUBCUTANEOUS at 08:33

## 2023-02-07 RX ADMIN — DEXMEDETOMIDINE HYDROCHLORIDE 0.8 MCG/KG/HR: 400 INJECTION INTRAVENOUS at 04:46

## 2023-02-07 RX ADMIN — SODIUM CHLORIDE 100 ML: 9 INJECTION, SOLUTION INTRAVENOUS at 10:28

## 2023-02-07 RX ADMIN — RISPERIDONE 1 MG: 1 TABLET ORAL at 20:43

## 2023-02-07 ASSESSMENT — PULMONARY FUNCTION TESTS
PIF_VALUE: 24
PIF_VALUE: 24
PIF_VALUE: 35
PIF_VALUE: 25
PIF_VALUE: 25
PIF_VALUE: 28

## 2023-02-07 ASSESSMENT — PAIN SCALES - GENERAL
PAINLEVEL_OUTOF10: 0
PAINLEVEL_OUTOF10: 0

## 2023-02-07 NOTE — PROGRESS NOTES
Care rounds completed with Dr. Tahira James and multidisciplinary team. Reviewed labs, meds, VS, assessment, & plan of care for today.  -informed patient is not taking \"good\" Tidal Volumes- Max 250, Lung sounds wheezing. SpO2 93%.   -Pt opening eyes, not following commands.   -vent adjusted at bedside-  to trial patient on 10/5. RT informed  -Verbal to decreased Precedex by half. Current Rate of Precedex 0.4 mcg/kg/hr (see MAR). -Propofol removed from profile. See dictated note and new orders for details.         CVC Need:  High risk vesicant drug infusing:     Multiple incompatible medications infusing:      CVP Monitoring:      Extremely difficult IV access challenge:      Continued need for central line access:  YES    Addressed with physician:  YES    RIGHT PATIENT, RIGHT TIME, RIGHT LINE

## 2023-02-07 NOTE — PROGRESS NOTES
Reassessment completed (see Flowsheet). All ICU lines remain intact, ICU monitoring continued-   Infusing:  Fentanyl, Precedex (See MAR). Nathan Perches. pt remains on PEEP 5, 40%. SpO2 93%. Lung sounds Diminished with inspiratory and expiratory wheeze. pt's blood pressures WDL. Continuing to monitor.

## 2023-02-07 NOTE — PROGRESS NOTES
SBT of 5/5 attempted. Pt awake and following commands. WOB is calm, but F/V is 112, VTs were 250s, RR 28. PS increased to 8 and VT are now in the 300s, RR is 25 and F/V is 80. Continuing to monitor pt.

## 2023-02-07 NOTE — PROGRESS NOTES
Reassessment completed (see Flowsheet). All ICU lines remain intact, ICU monitoring continued-   Infusing:  Precedex, Fentanyl (see MAR). pt remains on PEEP 5, FiO2 40%. RT called to adjust bipin. Lung sounds diminished with wheeze. Vearl Pippins pt's blood pressures WDL. Continuing to monitor.

## 2023-02-07 NOTE — PROGRESS NOTES
Patient assessment complete, resting, eyes closed, awakens to stimulation or speech, RASS -1, calm, intubated ETT size 8 at 22 lip line, OG tube in place at 67 lip line, tube feeding infusing at goal.  Lung sounds clear and diminished, small to moderate secretions orally and per ETT thick clear. No s/s distress at present, will continue to monitor.  Plan tomorrow for SBT in am.     Arias clear yellow urine    Vent settings:  Rate  20//FiO2 40%/PEEP 5    Sedation:  Precedex 0.8mcg/kg/hr and Fentanyl 50mcg/hr  IVF:  NS Romayne Mom

## 2023-02-07 NOTE — PROGRESS NOTES
Flipped pt back previous setting. Pt was on SBT but was not following commands tidal volumes <300. Decreased rate per verbal order given from Dr. Miguel Palacios to 25.

## 2023-02-07 NOTE — PROGRESS NOTES
Comprehensive Nutrition Assessment    Type and Reason for Visit:  Reassess    Nutrition Recommendations/Plan:   Modified TF order to ADULT TUBE FEEDING; Orogastric; Peptide-Based High-Protein formula - Vital High-Protein with a goal rate of 60 ml/hr x 20 hours. Start with 35 ml/hr and increase by 15 ml every 4 hours, as tolerated by patient, until goal rate can be achieved and maintained. Water flushes, 30 ml every 4 hours for tube patency. Monitor TF rate, intake, and tolerance + water flushes. Monitor respiratory/vent status, sedation type/amount, and plan of care. Monitor nutrition-related labs, bowel function, and weight trends. Malnutrition Assessment:  Malnutrition Status:   At risk for malnutrition (02/07/23 1317)    Context:  Acute Illness     Findings of the 6 clinical characteristics of malnutrition:  Energy Intake:  Mild decrease in energy intake   Weight Loss:  No significant weight loss     Body Fat Loss:  No significant body fat loss     Muscle Mass Loss:  No significant muscle mass loss    Fluid Accumulation:  No significant fluid accumulation  (legs oozing)   Strength:  Not Performed    Nutrition Assessment:    patient remains unchanged from a nutrition standpoint since last RD assessment; she remains at risk for further compromise d/t need for EN as sole source of nutrition, altered nutrition-related labs, and no documented BM for this admission; will modify TF order to Vital High-Protein with a goal rate of 60 ml/hr x 20 hours + 30 ml water flushes every 4 hours    Nutrition Related Findings:    patient remains intubated; propofol is not being used for sedation at this time; abdomen is round, obese, and bowel sounds are active; no documented BM for this admission; BS were elevated in 200's and 300's this am; patient is on med-dose SSI at this time; TF is infusing at goal rate without issue Wound Type: None (legs oozing fluid and blood blister on 2nd digit of L foot)       Current Nutrition Intake & Therapies:    Average Meal Intake: NPO  Average Supplements Intake: NPO  Current Tube Feeding (TF) Orders:  Feeding Route: Orogastric  Formula: Peptide Based High Protein  Schedule: Continuous  Feeding Regimen: Vital High-Protein with a goal rate of 60 ml/hr x 20 hours  Additives/Modulars: None  Water Flushes: 30 ml water flushes every 4 hours for tube patency  Current TF & Flush Orders Provides: TF infusing at current goal rate without issue  Goal TF & Flush Orders Provides: Vital High-Protein with a goal rate of 60 ml/hr x 20 hours = 1200 ml TV, 1200 kcals, 105 g protein, and 1003 ml free water + 30 ml water flushes every 4 hours for tube patency    Anthropometric Measures:  Height: 5' 3\" (160 cm)  Ideal Body Weight (IBW): 115 lbs (52 kg)    Admission Body Weight: 180 lb (81.6 kg) (obtained on 2/2/23; estimated weight)  Current Body Weight: 187 lb 9.8 oz (85.1 kg) (obtained on 2/7/23; actual weight), 163.1 % IBW. Weight Source: Bed Scale  Current BMI (kg/m2): 33.2  Usual Body Weight: 167 lb 10 oz (76 kg) (obtained on 5/18/22; actual weight)  % Weight Change (Calculated): 11.9  Weight Adjustment For: No Adjustment                 BMI Categories: Obese Class 1 (BMI 30.0-34. 9)    Estimated Daily Nutrient Needs:  Energy Requirements Based On: Kcal/kg  Weight Used for Energy Requirements: Current  Energy (kcal/day): 946 - 9155 kcals based on 11-14 kcals/kg/CBW  Weight Used for Protein Requirements: Ideal  Protein (g/day): 104 - 114 g protein based on 2.0-2.2 g/kg/IBW  Method Used for Fluid Requirements: 1 ml/kcal  Fluid (ml/day): 946 - 1204 ml    Nutrition Diagnosis:   Inadequate oral intake related to inadequate protein-energy intake, impaired respiratory function as evidenced by NPO or clear liquid status due to medical condition, intubation, lab values    Nutrition Interventions:   Food and/or Nutrient Delivery: Continue NPO, Modify Tube Feeding  Nutrition Education/Counseling: No recommendation at this time  Coordination of Nutrition Care: Continue to monitor while inpatient, Interdisciplinary Rounds, [address] discussed with: ICU Team    Goals:  Previous Goal Met: Goal(s) Achieved  Goals: Tolerate nutrition support at goal rate       Nutrition Monitoring and Evaluation:   Behavioral-Environmental Outcomes: None Identified  Food/Nutrient Intake Outcomes: Enteral Nutrition Intake/Tolerance  Physical Signs/Symptoms Outcomes: Biochemical Data, Constipation, GI Status, Fluid Status or Edema, Hemodynamic Status, Nutrition Focused Physical Findings, Skin, Weight    Discharge Planning:     Too soon to determine     Yeison Aguirre, [address] [place] [address], LD  Contact: [phone]

## 2023-02-07 NOTE — PROGRESS NOTES
Patient VS stable, lung sounds diminished, ventilator settings no change:  rate 20, , FiO2 40%, PEEP 5. RASS -1, on fentanyl 50mcg/hr, precedex 0.8mcg/kg/hr. No s/s distress at present, awakening easily to voice, not moving hands or feet to stimulation at this time, calm. Repositioned, restraints continued, mouth suctioned.

## 2023-02-07 NOTE — PROGRESS NOTES
Pulmonary & Critical Care Medicine ICU Progress Note    CC: COPD    Events of Last 24 hours: failed SBT yesterday     Invasive Lines: PICC    MV:    Vent Mode: AC/VC Resp Rate (Set): 20 bmp/Vt (Set, mL): 360 mL/ /FiO2 : 40 %  Recent Labs     02/06/23  0600 02/07/23  0455   PHART 7.450 7.440   AWI4OOV 33.1* 36.3   PO2ART 77.8 65.8*       IV:   dexmedetomidine HCl in NaCl 0.8 mcg/kg/hr (02/07/23 0817)    sodium chloride      propofol Stopped (02/06/23 1634)    dextrose      sodium chloride      fentaNYL 50 mcg/hr (02/07/23 0817)       Vitals:  /66   Pulse 59   Temp 100.3 °F (37.9 °C) (Bladder)   Resp 20   Ht 5' 3\" (1.6 m)   Wt 187 lb 9.8 oz (85.1 kg)   SpO2 93%   BMI 33.23 kg/m²   on vent    Intake/Output Summary (Last 24 hours) at 2/7/2023 0835  Last data filed at 2/7/2023 0817  Gross per 24 hour   Intake 1729.02 ml   Output 1400 ml   Net 329.02 ml       EXAM:  Constitutional: ill appearing. Eyes: PERRL. No sclera icterus. ENT: Normal Nose. ETT in place  Neck:  Trachea is midline. No thyroid tenderness. Respiratory: No accessory muscle usage. decreased breath sounds. + wheezes. No rales. No Rhonchi. Cardiovascular: Normal S1S2. No murmur. No digit cyanosis. No digit clubbing. + pruning LE edema. GI: Non-tender. Non-distended. Normal bowel sounds. No masses. Skin: No rash on the exposed extremities. No Nodules on exposed extremities. Neuro: sedated. Moves all extremities. Psych: No Agitation, no anxiety.     Scheduled Meds:   methylPREDNISolone  40 mg IntraVENous Daily    pregabalin  200 mg Oral BID    rOPINIRole  1 mg Oral BID    lidocaine 1 % injection  5 mL IntraDERmal Once    sodium chloride flush  5-40 mL IntraVENous 2 times per day    cefepime  2,000 mg IntraVENous Q8H    risperiDONE  1 mg Oral BID    atorvastatin  60 mg Oral Daily    pantoprazole  40 mg IntraVENous Daily    sertraline  100 mg Oral BID    chlorhexidine  15 mL Mouth/Throat BID    sodium chloride flush  5-40 mL IntraVENous 2 times per day    enoxaparin  40 mg SubCUTAneous Daily    nicotine  1 patch TransDERmal Daily    insulin lispro  0-8 Units SubCUTAneous Q4H    ipratropium-albuterol  1 ampule Inhalation Q4H     PRN Meds:  sodium chloride flush, sodium chloride, glucose, dextrose bolus **OR** dextrose bolus, glucagon (rDNA), dextrose, sodium chloride flush, sodium chloride, ondansetron **OR** ondansetron, polyethylene glycol, acetaminophen **OR** acetaminophen, albuterol sulfate HFA, albuterol, fentanNYL, midazolam    Results:  CBC:   Recent Labs     02/05/23  0450 02/06/23  0600 02/07/23  0445   WBC 10.0 9.3 9.3   HGB 10.6* 10.3* 11.4*   HCT 32.7* 31.8* 34.7*   MCV 93.5 94.4 92.7   * 145 152       BMP:   Recent Labs     02/05/23 0450 02/06/23  0600 02/07/23  0445    141 138   K 4.0 4.2 4.2    105 102   CO2 23 26 25   PHOS  --   --  3.3   BUN 38* 41* 37*   CREATININE 0.7 0.8 0.8       LIVER PROFILE: No results for input(s): AST, ALT, LIPASE, BILIDIR, BILITOT, ALKPHOS in the last 72 hours. Invalid input(s): AMYLASE,  ALB    Cultures:  2/2 resp cx: NGTD    Films:  2/5/23 CTPA:   1. No CT evidence of a pulmonary embolism. 2. Bilateral lower lobe airspace consolidation, left worse than right,   representing either pneumonia or aspiration. 3. Additional scattered curvilinear, consolidative, and nodular opacities   throughout the upper lobes and right middle lobe likely reflect additional   multifocal pneumonia and/or pulmonary edema. 4. Small bilateral pleural effusions. 5. Enlarged nodular appearance of the left thyroid lobe. Consider further   characterization with a routine non urgent follow-up thyroid ultrasound. See   below. 6. Findings suggestive of cirrhosis and portal hypertension.      CXR was reviewed by me and it showed 2/7/23 Stable chest.  Bibasilar airspace disease appears similar    Assessment:       Acute COPD exacerbation   Bilateral lower extremity cellulitis  Possible CHF/fluid overload  OHS/SHAZIA  Acute hypoxic  hypercapnic res failure      Plan:     Mechanical ventilation as per my orders. The ventilator was adjusted by me at the bedside for unstable, life threatening respiratory failure. Head of bed 30 degrees or higher at all times  Daily spontaneous breathing trial once PEEP less than 8, FiO2 less than 55%. Fentanyl and Precedex gtt  *-IV steroids   *-IV abx cefepime Day 5/7    *-BD  Lasix as needed  tube feeds at goal  Consider repeat CT in 6-8 weeks  Outpatient f/u of thyroid nodule  Total critical care time caring for this patient with life threatening, unstable organ failure, including direct patient contact, management of life support systems, review of data including imaging and labs, discussions with other team members and physicians at least 31 minutes so far today, excluding procedures.

## 2023-02-07 NOTE — PROGRESS NOTES
End of SBT. Fentanyl restarted (See MAR)    Pt not following commands. Opening eyes. Tv 250s. Rt at bedside.

## 2023-02-07 NOTE — PROGRESS NOTES
Shift assessment was completed (see flow sheet). Pt is Sedated. Opens eyes to voice. Pt currently on ventilator- 8 ETT, at [address] Spontaneous breathing trial to follow TF stopped, OGT clamped. FiO2 at 40, PEEP 5, SpO2 at 93%, Respirations are Even,  with Diminished/ Expiratory wheeze sounds. Infusing:  Precedex, Fentanyl (See MAR). Oral Gastric tube with tube feed at 35, 20 mL residual observed and returned. Currently Clamped. IV access- PICC/ Peripheral and WDL. Arias in place with STAT lock, Draining Yellow/ Clear urine. Scheduled medications to follow. Call light within reach. Bed in lowest position. Bed alarm on. Bilateral Wrist restraints in place and tied, for safety of lines and tubes. Will continue to monitor    Patient is not able to demonstrated the ability to move from a reclining position to an upright position within the recliner. Patient is confused, demented and /or unable to follow instruction. 8:37 AM    Sedated stopped for SBT. Fentanyl off (see MAR). Precedex infusing at 0.8 mcg/kg/hr. RT called (See NOTE).

## 2023-02-07 NOTE — PROGRESS NOTES
Patient calm, tolerating vent and current sedation, awakens/opens eyes to speech and during turning, tried to mouth few words, not able to squeeze hands or move feet on command at this time. Falls back to sleep after care given. Will continue to monitor.

## 2023-02-07 NOTE — PROGRESS NOTES
RT Inhaler-Nebulizer Bronchodilator Protocol Note    There is a bronchodilator order in the chart from a provider indicating to follow the RT Bronchodilator Protocol and there is an Initiate RT Inhaler-Nebulizer Bronchodilator Protocol order as well (see protocol at bottom of note). CXR Findings:  XR CHEST PORTABLE    Result Date: 2/7/2023  1. Stable appropriate positions of support apparatus. 2. Stable small bilateral pleural effusions. 3. Stable left basilar airspace consolidation. 4. Interval progression of diffuse hazy airspace opacity throughout both lungs, right greater than left, which likely reflects progressive pulmonary edema, though progressive multifocal pneumonia is also a consideration. XR CHEST PORTABLE    Result Date: 2/6/2023  Stable chest.  Bibasilar airspace disease appears similar       The findings from the last RT Protocol Assessment were as follows:   History Pulmonary Disease: (P) Chronic pulmonary disease  Respiratory Pattern: (P) Dyspnea on exertion or RR 21-25 bpm  Breath Sounds: (P) Inspiratory and expiratory or bilateral wheezing and/or rhonchi  Cough: (P) Weak, productive  Indication for Bronchodilator Therapy: (P) Wheezing associated with pulm disorder  Bronchodilator Assessment Score: (P) 12    Aerosolized bronchodilator medication orders have been revised according to the RT Inhaler-Nebulizer Bronchodilator Protocol below. Respiratory Therapist to perform RT Therapy Protocol Assessment initially then follow the protocol. Repeat RT Therapy Protocol Assessment PRN for score 0-3 or on second treatment, BID, and PRN for scores above 3. No Indications - adjust the frequency to every 6 hours PRN wheezing or bronchospasm, if no treatments needed after 48 hours then discontinue using Per Protocol order mode. If indication present, adjust the RT bronchodilator orders based on the Bronchodilator Assessment Score as indicated below.   Use Inhaler orders unless patient has one or more of the following: on home nebulizer, not able to hold breath for 10 seconds, is not alert and oriented, cannot activate and use MDI correctly, or respiratory rate 25 breaths per minute or more, then use the equivalent nebulizer order(s) with same Frequency and PRN reasons based on the score. If a patient is on this medication at home then do not decrease Frequency below that used at home. 0-3 - enter or revise RT bronchodilator order(s) to equivalent RT Bronchodilator order with Frequency of every 4 hours PRN for wheezing or increased work of breathing using Per Protocol order mode. 4-6 - enter or revise RT Bronchodilator order(s) to two equivalent RT bronchodilator orders with one order with BID Frequency and one order with Frequency of every 4 hours PRN wheezing or increased work of breathing using Per Protocol order mode. 7-10 - enter or revise RT Bronchodilator order(s) to two equivalent RT bronchodilator orders with one order with TID Frequency and one order with Frequency of every 4 hours PRN wheezing or increased work of breathing using Per Protocol order mode. 11-13 - enter or revise RT Bronchodilator order(s) to one equivalent RT bronchodilator order with QID Frequency and an Albuterol order with Frequency of every 4 hours PRN wheezing or increased work of breathing using Per Protocol order mode. Greater than 13 - enter or revise RT Bronchodilator order(s) to one equivalent RT bronchodilator order with every 4 hours Frequency and an Albuterol order with Frequency of every 2 hours PRN wheezing or increased work of breathing using Per Protocol order mode.          Electronically signed by Pilar Peñaloza RCP on 2/7/2023 at 8:10 AM

## 2023-02-07 NOTE — PLAN OF CARE
Problem: Safety - Medical Restraint  Goal: Remains free of injury from restraints (Restraint for Interference with Medical Device)  Description: INTERVENTIONS:  1. Determine that other, less restrictive measures have been tried or would not be effective before applying the restraint  2. Evaluate the patient's condition at the time of restraint application  3. Inform patient/family regarding the reason for restraint  4. Q2H: Monitor safety, psychosocial status, comfort, nutrition and hydration      Problem: Discharge Planning  Goal: Discharge to home or other facility with appropriate resources  Outcome: Progressing  Flowsheets (Taken 2/6/2023 2016)  Discharge to home or other facility with appropriate resources: Identify barriers to discharge with patient and caregiver     Problem: Pain  Goal: Verbalizes/displays adequate comfort level or baseline comfort level  Outcome: Progressing     Problem: Nutrition Deficit:  Goal: Optimize nutritional status  Outcome: Progressing     Problem: Skin/Tissue Integrity  Goal: Absence of new skin breakdown  Description: 1. Monitor for areas of redness and/or skin breakdown  2. Assess vascular access sites hourly  3. Every 4-6 hours minimum:  Change oxygen saturation probe site  4. Every 4-6 hours:  If on nasal continuous positive airway pressure, respiratory therapy assess nares and determine need for appliance change or resting period.   Outcome: Progressing

## 2023-02-07 NOTE — PROGRESS NOTES
RT Inhaler-Nebulizer Bronchodilator Protocol Note    There is a bronchodilator order in the chart from a provider indicating to follow the RT Bronchodilator Protocol and there is an Initiate RT Inhaler-Nebulizer Bronchodilator Protocol order as well (see protocol at bottom of note). CXR Findings:  XR CHEST PORTABLE    Result Date: 2/6/2023  Stable chest.  Bibasilar airspace disease appears similar     XR CHEST PORTABLE    Result Date: 2/5/2023  1. Endotracheal tube slightly low in position, lying approximately 2.0 cm above the ivis. Suggest retraction of the tube by approximately 2-3 cm to avoid right mainstem intubation. 2. Other support apparatus is in good anatomic position. 3. Slight interval decrease in size of a small left pleural effusion. 4. Interval improvement in appearance of bibasilar airspace disease. The findings were sent to the Radiology Results [address] at 6:04 am on 2/5/2023 to be communicated to a licensed caregiver. The findings from the last RT Protocol Assessment were as follows:   History Pulmonary Disease: Chronic pulmonary disease  Respiratory Pattern: Dyspnea on exertion or RR 21-25 bpm  Breath Sounds: (P) Slightly diminished and/or crackles  Cough: (P) Weak, productive  Indication for Bronchodilator Therapy: (P) Decreased or absent breath sounds  Bronchodilator Assessment Score: (P) 8    Aerosolized bronchodilator medication orders have been revised according to the RT Inhaler-Nebulizer Bronchodilator Protocol below. Respiratory Therapist to perform RT Therapy Protocol Assessment initially then follow the protocol. Repeat RT Therapy Protocol Assessment PRN for score 0-3 or on second treatment, BID, and PRN for scores above 3. No Indications - adjust the frequency to every 6 hours PRN wheezing or bronchospasm, if no treatments needed after 48 hours then discontinue using Per Protocol order mode.      If indication present, adjust the RT bronchodilator orders based on the Bronchodilator Assessment Score as indicated below. Use Inhaler orders unless patient has one or more of the following: on home nebulizer, not able to hold breath for 10 seconds, is not alert and oriented, cannot activate and use MDI correctly, or respiratory rate 25 breaths per minute or more, then use the equivalent nebulizer order(s) with same Frequency and PRN reasons based on the score. If a patient is on this medication at home then do not decrease Frequency below that used at home. 0-3 - enter or revise RT bronchodilator order(s) to equivalent RT Bronchodilator order with Frequency of every 4 hours PRN for wheezing or increased work of breathing using Per Protocol order mode. 4-6 - enter or revise RT Bronchodilator order(s) to two equivalent RT bronchodilator orders with one order with BID Frequency and one order with Frequency of every 4 hours PRN wheezing or increased work of breathing using Per Protocol order mode. 7-10 - enter or revise RT Bronchodilator order(s) to two equivalent RT bronchodilator orders with one order with TID Frequency and one order with Frequency of every 4 hours PRN wheezing or increased work of breathing using Per Protocol order mode. 11-13 - enter or revise RT Bronchodilator order(s) to one equivalent RT bronchodilator order with QID Frequency and an Albuterol order with Frequency of every 4 hours PRN wheezing or increased work of breathing using Per Protocol order mode. Greater than 13 - enter or revise RT Bronchodilator order(s) to one equivalent RT bronchodilator order with every 4 hours Frequency and an Albuterol order with Frequency of every 2 hours PRN wheezing or increased work of breathing using Per Protocol order mode.        Electronically signed by Liban Mueller RCP on 2/6/2023 at 9:12 PM

## 2023-02-07 NOTE — PROGRESS NOTES
IM Progress Note    Admit Date:  2/2/2023  5    Interval history:  copd exacerbation , failed bipap and intubated in ER   Imaging with left hemithorax opacification,s/p bronch with clearance of mucous plugs     Agitation issues noted on vent   CTA chest with no PE , bilateral pneumonia     Subjective:    Ms. Milan Mason continues to be on the vent.   Low volumes on SBT    Objective:   /61   Pulse 66   Temp 99.9 °F (37.7 °C) (Bladder)   Resp 20   Ht 5' 3\" (1.6 m)   Wt 187 lb 9.8 oz (85.1 kg)   SpO2 93%   BMI 33.23 kg/m²     Intake/Output Summary (Last 24 hours) at 2/7/2023 1227  Last data filed at 2/7/2023 1154  Gross per 24 hour   Intake 1813.04 ml   Output 1825 ml   Net -11.96 ml         Physical Exam:      General:  elderly female on vent support  Oral ETT and OG noted   Appears to be not in any distress  Mucous Membranes:  Pink , anicteric  Neck: No JVD, no carotid bruit, no thyromegaly  Chest: bilateral air entry with resolving  rhonchi and improving left sided airentry  Cardiovascular:  RRR S1S2 heard, no murmurs or gallops  Abdomen:  Soft, undistended, non tender, no organomegaly, BS present  Extremities: chronic 2 +  brawny edema of both LE with diffuse celluitis of ext Bilaterally  Distal pulses well felt  Neurological : sedated      Medications:   Scheduled Medications:    methylPREDNISolone  40 mg IntraVENous Daily    pregabalin  200 mg Oral BID    rOPINIRole  1 mg Oral BID    lidocaine 1 % injection  5 mL IntraDERmal Once    sodium chloride flush  5-40 mL IntraVENous 2 times per day    cefepime  2,000 mg IntraVENous Q8H    risperiDONE  1 mg Oral BID    atorvastatin  60 mg Oral Daily    pantoprazole  40 mg IntraVENous Daily    sertraline  100 mg Oral BID    chlorhexidine  15 mL Mouth/Throat BID    sodium chloride flush  5-40 mL IntraVENous 2 times per day    enoxaparin  40 mg SubCUTAneous Daily    nicotine  1 patch TransDERmal Daily    insulin lispro  0-8 Units SubCUTAneous Q4H ipratropium-albuterol  1 ampule Inhalation Q4H     I   dexmedetomidine HCl in NaCl 0.4 mcg/kg/hr (02/07/23 1150)    sodium chloride      dextrose      sodium chloride Stopped (02/07/23 1029)    fentaNYL 25 mcg/hr (02/07/23 1150)     sodium chloride flush, sodium chloride, glucose, dextrose bolus **OR** dextrose bolus, glucagon (rDNA), dextrose, sodium chloride flush, sodium chloride, ondansetron **OR** ondansetron, polyethylene glycol, acetaminophen **OR** acetaminophen, albuterol sulfate HFA, albuterol, fentanNYL, midazolam    Lab Data:  Recent Labs     02/05/23  0450 02/06/23  0600 02/07/23  0445   WBC 10.0 9.3 9.3   HGB 10.6* 10.3* 11.4*   HCT 32.7* 31.8* 34.7*   MCV 93.5 94.4 92.7   * 145 152       Recent Labs     02/05/23  0450 02/06/23  0600 02/07/23  0445    141 138   K 4.0 4.2 4.2    105 102   CO2 23 26 25   PHOS  --   --  3.3   BUN 38* 41* 37*   CREATININE 0.7 0.8 0.8       No results for input(s): CKTOTAL, CKMB, CKMBINDEX, TROPONINI in the last 72 hours. Coagulation:   Lab Results   Component Value Date/Time    INR 1.04 02/04/2023 12:15 PM    APTT 27.6 02/04/2023 12:15 PM     Cardiac markers:   Lab Results   Component Value Date/Time    CKMB 10.3 07/14/2010 07:25 AM    CKTOTAL 74 10/17/2014 02:46 AM    TROPONINI <0.01 02/02/2023 05:05 AM         Lab Results   Component Value Date    ALT 12 02/03/2023    AST 13 (L) 02/03/2023    ALKPHOS 99 02/03/2023    BILITOT 0.3 02/03/2023       Lab Results   Component Value Date    INR 1.04 02/04/2023    INR 0.94 09/12/2020    INR 0.99 07/01/2018    PROTIME 13.5 02/04/2023    PROTIME 10.9 09/12/2020    PROTIME 11.3 07/01/2018       Radiology    Cxr    Complete opacification has developed of the left hemithorax, which can   reflect combination of atelectasis, airspace disease, and/or pleural   effusion. Right-sided airspace disease is not substantially changed. Chest xray   1. Findings suggestive of partial right upper lobe atelectasis.  2. Small left pleural effusion. 3. Central pulmonary vascular congestion without overt pulmonary edema. 4. Left basilar atelectasis. Cxr    Significantly limited evaluation secondary to patient positioning with no   signs to suggest pneumothorax. CTA chest    1. No CT evidence of a pulmonary embolism. 2. Bilateral lower lobe airspace consolidation, left worse than right,   representing either pneumonia or aspiration. 3. Additional scattered curvilinear, consolidative, nodular opacities   throughout the upper lobes and right middle lobe likely reflect additional   multifocal pneumonia and/or pulmonary edema. 4. Small bilateral pleural effusions. 5. Enlarged nodular appearance of the left thyroid lobe. Consider further   characterization with a routine non urgent follow-up thyroid ultrasound. See   below. 6. Findings suggestive of cirrhosis and portal hypertension    Cultures:   Cultures  Blood- NGTD  Sputum - pending  BAL NRF      Assessment & Plan:    Acute respiratory failure with hypoxia and hypercapnia  Copd exacerbation   Left hemithorax opacification with mucous plugging    . O2 sat 86% on 5L in the ER. Initially placed on NIV. Too obtunded for NIV and PCO2 worsened after ~1 h on NIV. And eventually placed on vent  Admitted to ICU   CTA chest with bilateral pna, no PE  Had emergency bronch with removal of mucous plugs  Started on steroids, abx -cefepime day 5/7 and vanc   Critical care following  BAL with NRF- can downgrade abx  Vancomycin discontinued    COPD exacerbation   IV solumedrol, IV abx as above , PRN/GIDEON intensive NEB therapy. Can wean steroids     Acute encephalopathy, obtunded, likely metabolic 2/2 hypercarbia. Now on vent support    DM2, hold oral Rx, added Mod SSI q4h.       Bilateral LE cellulitis - has chronic lymphedema, resume  diuretics  started on cefepime and added vanc  - await cx   Vanco DC'd    Tobacco Abuse, unable to  cessation 2/2 MS, 21 mg nicotine patch.     Hx of cirrhosis with portal HTN - resume home diuretics    Thyroid enlargement on left side - need f/w US as outpt       DVT Prophylaxis: Lx  Diet TF  Code Status: Full Code     Jeanie Cheatham MD, 2/7/2023 12:27 PM

## 2023-02-08 ENCOUNTER — APPOINTMENT (OUTPATIENT)
Dept: GENERAL RADIOLOGY | Age: 68
DRG: 207 | End: 2023-02-08
Payer: MEDICARE

## 2023-02-08 LAB
ALBUMIN SERPL-MCNC: 3.4 G/DL (ref 3.4–5)
ANION GAP SERPL CALCULATED.3IONS-SCNC: 9 MMOL/L (ref 3–16)
BASE EXCESS ARTERIAL: -2.4 MMOL/L (ref -3–3)
BASE EXCESS ARTERIAL: -2.5 MMOL/L (ref -3–3)
BUN BLDV-MCNC: 39 MG/DL (ref 7–20)
CALCIUM SERPL-MCNC: 8.9 MG/DL (ref 8.3–10.6)
CARBOXYHEMOGLOBIN ARTERIAL: 0.1 % (ref 0–1.5)
CARBOXYHEMOGLOBIN ARTERIAL: 0.2 % (ref 0–1.5)
CHLORIDE BLD-SCNC: 101 MMOL/L (ref 99–110)
CO2: 26 MMOL/L (ref 21–32)
CREAT SERPL-MCNC: 0.7 MG/DL (ref 0.6–1.2)
GFR SERPL CREATININE-BSD FRML MDRD: >60 ML/MIN/{1.73_M2}
GLUCOSE BLD-MCNC: 116 MG/DL (ref 70–99)
GLUCOSE BLD-MCNC: 268 MG/DL (ref 70–99)
GLUCOSE BLD-MCNC: 272 MG/DL (ref 70–99)
GLUCOSE BLD-MCNC: 288 MG/DL (ref 70–99)
GLUCOSE BLD-MCNC: 306 MG/DL (ref 70–99)
GLUCOSE BLD-MCNC: 313 MG/DL (ref 70–99)
GLUCOSE BLD-MCNC: 332 MG/DL (ref 70–99)
HCO3 ARTERIAL: 21.7 MMOL/L (ref 21–29)
HCO3 ARTERIAL: 22.5 MMOL/L (ref 21–29)
HCT VFR BLD CALC: 34.4 % (ref 36–48)
HEMOGLOBIN, ART, EXTENDED: 12 G/DL (ref 12–16)
HEMOGLOBIN, ART, EXTENDED: 12.6 G/DL (ref 12–16)
HEMOGLOBIN: 11.2 G/DL (ref 12–16)
MCH RBC QN AUTO: 30.2 PG (ref 26–34)
MCHC RBC AUTO-ENTMCNC: 32.5 G/DL (ref 31–36)
MCV RBC AUTO: 92.8 FL (ref 80–100)
METHEMOGLOBIN ARTERIAL: 0.3 %
METHEMOGLOBIN ARTERIAL: 0.3 %
O2 SAT, ARTERIAL: 93.9 %
O2 SAT, ARTERIAL: 94.1 %
O2 THERAPY: ABNORMAL
O2 THERAPY: ABNORMAL
PCO2 ARTERIAL: 35.7 MMHG (ref 35–45)
PCO2 ARTERIAL: 39.2 MMHG (ref 35–45)
PDW BLD-RTO: 15.6 % (ref 12.4–15.4)
PERFORMED ON: ABNORMAL
PH ARTERIAL: 7.38 (ref 7.35–7.45)
PH ARTERIAL: 7.4 (ref 7.35–7.45)
PHOSPHORUS: 4.1 MG/DL (ref 2.5–4.9)
PLATELET # BLD: 166 K/UL (ref 135–450)
PMV BLD AUTO: 9.5 FL (ref 5–10.5)
PO2 ARTERIAL: 68.7 MMHG (ref 75–108)
PO2 ARTERIAL: 71.5 MMHG (ref 75–108)
POTASSIUM SERPL-SCNC: 4.5 MMOL/L (ref 3.5–5.1)
RBC # BLD: 3.7 M/UL (ref 4–5.2)
SODIUM BLD-SCNC: 136 MMOL/L (ref 136–145)
TCO2 ARTERIAL: 16.3 MMOL/L
TCO2 ARTERIAL: 22.8 MMOL/L
TRIGL SERPL-MCNC: 350 MG/DL (ref 0–150)
WBC # BLD: 10.7 K/UL (ref 4–11)

## 2023-02-08 PROCEDURE — 6370000000 HC RX 637 (ALT 250 FOR IP): Performed by: INTERNAL MEDICINE

## 2023-02-08 PROCEDURE — 2000000000 HC ICU R&B

## 2023-02-08 PROCEDURE — 6360000002 HC RX W HCPCS: Performed by: INTERNAL MEDICINE

## 2023-02-08 PROCEDURE — 99291 CRITICAL CARE FIRST HOUR: CPT | Performed by: INTERNAL MEDICINE

## 2023-02-08 PROCEDURE — 2580000003 HC RX 258: Performed by: INTERNAL MEDICINE

## 2023-02-08 PROCEDURE — 92526 ORAL FUNCTION THERAPY: CPT

## 2023-02-08 PROCEDURE — 94003 VENT MGMT INPAT SUBQ DAY: CPT

## 2023-02-08 PROCEDURE — 94640 AIRWAY INHALATION TREATMENT: CPT

## 2023-02-08 PROCEDURE — 82803 BLOOD GASES ANY COMBINATION: CPT

## 2023-02-08 PROCEDURE — 2500000003 HC RX 250 WO HCPCS: Performed by: INTERNAL MEDICINE

## 2023-02-08 PROCEDURE — 99233 SBSQ HOSP IP/OBS HIGH 50: CPT | Performed by: INTERNAL MEDICINE

## 2023-02-08 PROCEDURE — 92610 EVALUATE SWALLOWING FUNCTION: CPT

## 2023-02-08 PROCEDURE — 85027 COMPLETE CBC AUTOMATED: CPT

## 2023-02-08 PROCEDURE — C9113 INJ PANTOPRAZOLE SODIUM, VIA: HCPCS | Performed by: INTERNAL MEDICINE

## 2023-02-08 PROCEDURE — 84478 ASSAY OF TRIGLYCERIDES: CPT

## 2023-02-08 PROCEDURE — 71045 X-RAY EXAM CHEST 1 VIEW: CPT

## 2023-02-08 PROCEDURE — 80069 RENAL FUNCTION PANEL: CPT

## 2023-02-08 RX ORDER — INSULIN LISPRO 100 [IU]/ML
0-16 INJECTION, SOLUTION INTRAVENOUS; SUBCUTANEOUS EVERY 4 HOURS
Status: DISCONTINUED | OUTPATIENT
Start: 2023-02-08 | End: 2023-02-10

## 2023-02-08 RX ADMIN — CEFEPIME 2000 MG: 2 INJECTION, POWDER, FOR SOLUTION INTRAVENOUS at 02:41

## 2023-02-08 RX ADMIN — METHYLPREDNISOLONE SODIUM SUCCINATE 40 MG: 40 INJECTION, POWDER, FOR SOLUTION INTRAMUSCULAR; INTRAVENOUS at 07:53

## 2023-02-08 RX ADMIN — ROPINIROLE HYDROCHLORIDE 1 MG: 1 TABLET, FILM COATED ORAL at 07:52

## 2023-02-08 RX ADMIN — CEFEPIME 2000 MG: 2 INJECTION, POWDER, FOR SOLUTION INTRAVENOUS at 11:13

## 2023-02-08 RX ADMIN — ROPINIROLE HYDROCHLORIDE 1 MG: 1 TABLET, FILM COATED ORAL at 20:23

## 2023-02-08 RX ADMIN — INSULIN LISPRO 6 UNITS: 100 INJECTION, SOLUTION INTRAVENOUS; SUBCUTANEOUS at 08:12

## 2023-02-08 RX ADMIN — Medication 10 ML: at 07:53

## 2023-02-08 RX ADMIN — RISPERIDONE 1 MG: 1 TABLET ORAL at 20:23

## 2023-02-08 RX ADMIN — IPRATROPIUM BROMIDE AND ALBUTEROL SULFATE 1 AMPULE: .5; 2.5 SOLUTION RESPIRATORY (INHALATION) at 19:18

## 2023-02-08 RX ADMIN — ENOXAPARIN SODIUM 40 MG: 100 INJECTION SUBCUTANEOUS at 07:53

## 2023-02-08 RX ADMIN — INSULIN LISPRO 8 UNITS: 100 INJECTION, SOLUTION INTRAVENOUS; SUBCUTANEOUS at 15:41

## 2023-02-08 RX ADMIN — IPRATROPIUM BROMIDE AND ALBUTEROL SULFATE 1 AMPULE: .5; 2.5 SOLUTION RESPIRATORY (INHALATION) at 14:41

## 2023-02-08 RX ADMIN — PREGABALIN 200 MG: 100 CAPSULE ORAL at 07:52

## 2023-02-08 RX ADMIN — SERTRALINE 100 MG: 100 TABLET, FILM COATED ORAL at 20:23

## 2023-02-08 RX ADMIN — SERTRALINE 100 MG: 100 TABLET, FILM COATED ORAL at 07:53

## 2023-02-08 RX ADMIN — Medication 10 ML: at 20:25

## 2023-02-08 RX ADMIN — INSULIN LISPRO 4 UNITS: 100 INJECTION, SOLUTION INTRAVENOUS; SUBCUTANEOUS at 04:30

## 2023-02-08 RX ADMIN — ATORVASTATIN CALCIUM 60 MG: 40 TABLET, FILM COATED ORAL at 07:52

## 2023-02-08 RX ADMIN — IPRATROPIUM BROMIDE AND ALBUTEROL SULFATE 1 AMPULE: .5; 2.5 SOLUTION RESPIRATORY (INHALATION) at 11:08

## 2023-02-08 RX ADMIN — RISPERIDONE 1 MG: 1 TABLET ORAL at 07:52

## 2023-02-08 RX ADMIN — CHLORHEXIDINE GLUCONATE 0.12% ORAL RINSE 15 ML: 1.2 LIQUID ORAL at 07:53

## 2023-02-08 RX ADMIN — IPRATROPIUM BROMIDE AND ALBUTEROL SULFATE 1 AMPULE: .5; 2.5 SOLUTION RESPIRATORY (INHALATION) at 07:43

## 2023-02-08 RX ADMIN — PANTOPRAZOLE SODIUM 40 MG: 40 INJECTION, POWDER, FOR SOLUTION INTRAVENOUS at 07:53

## 2023-02-08 RX ADMIN — ACETAMINOPHEN 650 MG: 325 TABLET ORAL at 22:26

## 2023-02-08 RX ADMIN — CEFEPIME 2000 MG: 2 INJECTION, POWDER, FOR SOLUTION INTRAVENOUS at 17:36

## 2023-02-08 RX ADMIN — DEXMEDETOMIDINE HYDROCHLORIDE 0.7 MCG/KG/HR: 400 INJECTION INTRAVENOUS at 05:27

## 2023-02-08 RX ADMIN — PREGABALIN 200 MG: 100 CAPSULE ORAL at 20:23

## 2023-02-08 RX ADMIN — SODIUM CHLORIDE, PRESERVATIVE FREE 10 ML: 5 INJECTION INTRAVENOUS at 20:25

## 2023-02-08 RX ADMIN — INSULIN LISPRO 4 UNITS: 100 INJECTION, SOLUTION INTRAVENOUS; SUBCUTANEOUS at 00:08

## 2023-02-08 RX ADMIN — IPRATROPIUM BROMIDE AND ALBUTEROL SULFATE 1 AMPULE: .5; 2.5 SOLUTION RESPIRATORY (INHALATION) at 23:05

## 2023-02-08 RX ADMIN — IPRATROPIUM BROMIDE AND ALBUTEROL SULFATE 1 AMPULE: .5; 2.5 SOLUTION RESPIRATORY (INHALATION) at 03:06

## 2023-02-08 RX ADMIN — Medication 50 MCG/HR: at 02:38

## 2023-02-08 ASSESSMENT — PAIN DESCRIPTION - ORIENTATION: ORIENTATION: RIGHT;LEFT;MID;LOWER;UPPER

## 2023-02-08 ASSESSMENT — PAIN SCALES - GENERAL
PAINLEVEL_OUTOF10: 3
PAINLEVEL_OUTOF10: 2

## 2023-02-08 ASSESSMENT — PULMONARY FUNCTION TESTS
PIF_VALUE: 22
PIF_VALUE: 26
PIF_VALUE: 24

## 2023-02-08 ASSESSMENT — PAIN - FUNCTIONAL ASSESSMENT: PAIN_FUNCTIONAL_ASSESSMENT: ACTIVITIES ARE NOT PREVENTED

## 2023-02-08 ASSESSMENT — PAIN DESCRIPTION - DESCRIPTORS: DESCRIPTORS: ACHING

## 2023-02-08 ASSESSMENT — PAIN DESCRIPTION - LOCATION: LOCATION: BACK

## 2023-02-08 NOTE — PROGRESS NOTES
02/07/23 2256   Patient Observation   Heart Rate 70   Resp 19   SpO2 96 %   Breath Sounds   Right Upper Lobe Diminished   Right Middle Lobe Diminished   Right Lower Lobe Diminished   Left Upper Lobe Diminished   Left Lower Lobe Diminished   Vent Information   Vent Mode AC/VC   Ventilator Settings   FiO2  40 %   Vt (Set, mL) 360 mL   Resp Rate (Set) 18 bmp   PEEP/CPAP (cmH2O) 5   Vent Patient Data (Readings)   Vt (Measured) 390 mL   Peak Inspiratory Pressure (cmH2O) 24 cmH2O   Rate Measured 20 br/min   Minute Volume (L/min) 7.4 Liters   Peak Inspiratory Flow (lpm) 60 L/sec   Mean Airway Pressure (cmH2O) 9.4 cmH20   I:E Ratio 1:2.5   Flow Sensitivity 3 L/min   Vent Alarm Settings   High Pressure (cmH2O) 40 cmH2O   Low Minute Volume (lpm) 3 L/min   High Minute Volume (lpm) 20 L/min   Low Exhaled Vt (ml) 250 mL   High Exhaled Vt (ml) 100 mL   RR High (bpm) 40 br/min   Apnea (secs) 20 secs   Additional Respiratoray Assessments   Humidification Source Heated wire   Humidification Temp 37   Circuit Condensation Drained   Airway Clearance   Suction Oral;ET Tube   Subglottic Suction Done Yes   Suction Device Inline suction catheter   Sputum Method Obtained Endotracheal   Non-Surgical Airway 02/02/23 Endotracheal tube   Placement Date/Time: 02/02/23 0636   Placed By: In ED  Placement Verified By: Capnometry  Mask Ventilation: Ventilated by mask (1)  Insertion attempts: 1  Airway Device: Endotracheal tube  Size: 7.5   Secured At 21 cm   Measured From Lips   Secured Location Left   Secured By Commercial tube orellana   Site Assessment Dry   Treatment   $Treatment Type $Inhaled Therapy/Meds

## 2023-02-08 NOTE — CARE COORDINATION
INTERDISCIPLINARY PLAN OF CARE CONFERENCE    Date/Time: 2/8/2023 10:59 AM  Completed by: Arthur Maria RN, Case Management      Patient Name:  Alyx Olivera  YOB: 1955  Admitting Diagnosis: COPD exacerbation ([address] [J44.1]  Acute respiratory failure with hypercapnia ([address] [J96.02]  Acute encephalopathy [G93.40]  Acute on chronic respiratory failure with hypoxia and hypercapnia ([address] [U86.17, J96.22]     Admit Date/Time:  2/2/2023  4:53 AM    Chart reviewed. Interdisciplinary team contacted or reviewed plan related to patient progress and discharge plans. Disciplines included Case Management, Nursing, and Dietitian. Current Status:INPT 2/2/23  PT/OT recommendation for discharge plan of care: TBD    Expected D/C Disposition:  Home vs SNF     Discharge Plan Comments: Pt on spontaneous breathing trial this morning. Per Dr. Dilia Brandt, pt will not need LTAC at discharge. Pt to have PT/OT eval when appropriate for poss home vs SNF. CM will cont to follow.      Home O2 in place on admit: Yes

## 2023-02-08 NOTE — PROGRESS NOTES
Page to Dr Mo. To inform of ABG results, and ask for order to extubate. 11:22 AM  Per Dr Mo verbal to extubate. And Verbal to use Vapotherm for moisture/ pressure. RT called.

## 2023-02-08 NOTE — FLOWSHEET NOTE
02/08/23 0000   Vital Signs   Temp 100 °F (37.8 °C)   Temp Source Bladder   Heart Rate 64   Heart Rate Source Monitor   Resp 22   BP (!) 99/53   MAP (Calculated) 68   MAP (mmHg) 67   Level of Consciousness 0   MEWS Score 3   Oxygen Therapy   SpO2 (!) 88 %   Pulse Oximeter Device Mode Continuous   O2 Device Ventilator   FiO2  40 %   PEEP/CPAP (cm H2O) 5 cm H20     SaO2 dropping down to 87-89%, suctioned, secretions improved, patient RASS +1 to 0, increased precedex rate, 100% FiO2 in 2 minute increments done multiple times to bring SaO2 up to 95% then drops back to 80's, will call RT.

## 2023-02-08 NOTE — PROGRESS NOTES
02/08/23 0306   Patient Observation   Heart Rate 58   Resp 19   SpO2 94 %   Breath Sounds   Right Upper Lobe Diminished   Right Middle Lobe Diminished   Right Lower Lobe Diminished   Left Upper Lobe Diminished   Left Lower Lobe Diminished   Vent Information   Vent Mode AC/VC   $Ventilation $Subsequent Day   Ventilator Settings   FiO2  50 %   Vt (Set, mL) 360 mL   Resp Rate (Set) 18 bmp   PEEP/CPAP (cmH2O) 5   Vent Patient Data (Readings)   Vt (Measured) 349 mL   Peak Inspiratory Pressure (cmH2O) 26 cmH2O   Rate Measured 21 br/min   Minute Volume (L/min) 6.96 Liters   Peak Inspiratory Flow (lpm) 60 L/sec   Mean Airway Pressure (cmH2O) 12 cmH20   Plateau Pressure (cm H2O) 19 cm H2O   I:E Ratio 1:4   Flow Sensitivity 3 L/min   Additional Respiratoray Assessments   Humidification Source Heated wire   Humidification Temp 37   Circuit Condensation Drained   Non-Surgical Airway 02/02/23 Endotracheal tube   Placement Date/Time: 02/02/23 0636   Placed By: In ED  Placement Verified By: Capnometry  Mask Ventilation: Ventilated by mask (1)  Insertion attempts: 1  Airway Device: Endotracheal tube  Size: 7.5   Secured At 21 cm   [facility]   Secured By Commercial tube orellana   Site Assessment Dry   Treatment   $Treatment Type $Inhaled Therapy/Meds

## 2023-02-08 NOTE — PROGRESS NOTES
Patient vent alarming, large secretions per ETT suctioning, SaO2 down to 89% prior to suctioning. Large secretions also noted from mouth and nose, suctioned mouth. Lung sounds inspiratory wheezing right posterior, rhonchi bilaterally, patient anxious. Able to squeeze hands and move feet on command, trying to lip words over ETT. Sedation increased. Bowel sounds hypoactive, round abdomen, soft. Tube feeding infusing, 60ml/hr, residual 60ml returned to patient. Arias in place draining well, urine clear yellow. Repositioned, will continue to monitor.

## 2023-02-08 NOTE — PROGRESS NOTES
Physical Therapy / Occupational Therapy     Orders noted. Patient was extubated earlier today to Vapotherm. Will hold for today and plan to see her tomorrow.     Naman Villalobos, PT, DPT  Cherelle Shook, OTR/L

## 2023-02-08 NOTE — PROGRESS NOTES
02/07/23 1929   RT Protocol   History Pulmonary Disease 2   Respiratory pattern 0   Breath sounds 2   Cough 2   Indications for Bronchodilator Therapy Decreased or absent breath sounds   Bronchodilator Assessment Score 6   RT Inhaler-Nebulizer Bronchodilator Protocol Note    There is a bronchodilator order in the chart from a provider indicating to follow the RT Bronchodilator Protocol and there is an Initiate RT Inhaler-Nebulizer Bronchodilator Protocol order as well (see protocol at bottom of note). CXR Findings:  XR CHEST PORTABLE    Result Date: 2/7/2023  1. Stable appropriate positions of support apparatus. 2. Stable small bilateral pleural effusions. 3. Stable left basilar airspace consolidation. 4. Interval progression of diffuse hazy airspace opacity throughout both lungs, right greater than left, which likely reflects progressive pulmonary edema, though progressive multifocal pneumonia is also a consideration. XR CHEST PORTABLE    Result Date: 2/6/2023  Stable chest.  Bibasilar airspace disease appears similar       The findings from the last RT Protocol Assessment were as follows:   History Pulmonary Disease: Chronic pulmonary disease  Respiratory Pattern: Regular pattern and RR 12-20 bpm  Breath Sounds: Slightly diminished and/or crackles  Cough: Weak, productive  Indication for Bronchodilator Therapy: Decreased or absent breath sounds  Bronchodilator Assessment Score: 6    Aerosolized bronchodilator medication orders have been revised according to the RT Inhaler-Nebulizer Bronchodilator Protocol below. Respiratory Therapist to perform RT Therapy Protocol Assessment initially then follow the protocol. Repeat RT Therapy Protocol Assessment PRN for score 0-3 or on second treatment, BID, and PRN for scores above 3. No Indications - adjust the frequency to every 6 hours PRN wheezing or bronchospasm, if no treatments needed after 48 hours then discontinue using Per Protocol order mode. If indication present, adjust the RT bronchodilator orders based on the Bronchodilator Assessment Score as indicated below. Use Inhaler orders unless patient has one or more of the following: on home nebulizer, not able to hold breath for 10 seconds, is not alert and oriented, cannot activate and use MDI correctly, or respiratory rate 25 breaths per minute or more, then use the equivalent nebulizer order(s) with same Frequency and PRN reasons based on the score. If a patient is on this medication at home then do not decrease Frequency below that used at home. 0-3 - enter or revise RT bronchodilator order(s) to equivalent RT Bronchodilator order with Frequency of every 4 hours PRN for wheezing or increased work of breathing using Per Protocol order mode. 4-6 - enter or revise RT Bronchodilator order(s) to two equivalent RT bronchodilator orders with one order with BID Frequency and one order with Frequency of every 4 hours PRN wheezing or increased work of breathing using Per Protocol order mode. 7-10 - enter or revise RT Bronchodilator order(s) to two equivalent RT bronchodilator orders with one order with TID Frequency and one order with Frequency of every 4 hours PRN wheezing or increased work of breathing using Per Protocol order mode. 11-13 - enter or revise RT Bronchodilator order(s) to one equivalent RT bronchodilator order with QID Frequency and an Albuterol order with Frequency of every 4 hours PRN wheezing or increased work of breathing using Per Protocol order mode. Greater than 13 - enter or revise RT Bronchodilator order(s) to one equivalent RT bronchodilator order with every 4 hours Frequency and an Albuterol order with Frequency of every 2 hours PRN wheezing or increased work of breathing using Per Protocol order mode.      Electronically signed by Erika Inman RCP on 2/7/2023 at 7:32 PM

## 2023-02-08 NOTE — PROGRESS NOTES
RT called to start SBT. Fentanyl off, Precedex continues at 0.7mcg/kg/hr (see MAR),    Pt is awake and following commands. See RT note for  further details.

## 2023-02-08 NOTE — PROGRESS NOTES
Pulmonary & Critical Care Medicine ICU Progress Note    CC: COPD    Events of Last 24 hours: low grade fever and increased secretions  Fentanyl gtt  Precedex gtt    Invasive Lines: PICC    MV:    Vent Mode: AC/VC Resp Rate (Set): 18 bmp/Vt (Set, mL): 360 mL/ /FiO2 : 45 %  Recent Labs     02/07/23  0455 02/08/23  0525   PHART 7.440 7.402   [id number] 36.3 35.7   PO2ART 65.8* 68.7*       IV:   dexmedetomidine HCl in NaCl 0.7 mcg/kg/hr (02/08/23 0748)    sodium chloride      dextrose      sodium chloride 5 mL/hr at 02/08/23 0748    fentaNYL Stopped (02/08/23 0749)       Vitals:  BP (!) 104/54   Pulse 53   Temp 99.1 °F (37.3 °C) (Bladder)   Resp 18   Ht 5' 3\" (1.6 m)   Wt 178 lb 9.2 oz (81 kg)   SpO2 95%   BMI 31.63 kg/m²   on vent    Intake/Output Summary (Last 24 hours) at 2/8/2023 0811  Last data filed at 2/8/2023 0748  Gross per 24 hour   Intake 2254.39 ml   Output 3100 ml   Net -845.61 ml       EXAM:  Constitutional: ill appearing. Eyes: PERRL. No sclera icterus. ENT: Normal Nose. ETT in place  Neck:  Trachea is midline. No thyroid tenderness. Respiratory: No accessory muscle usage. decreased breath sounds. + wheezes. No rales. No Rhonchi. Cardiovascular: Normal S1S2. No murmur. No digit cyanosis. No digit clubbing. + pruning LE edema. GI: Non-tender. Non-distended. Normal bowel sounds. No masses. Skin: No rash on the exposed extremities. No Nodules on exposed extremities. Neuro: sedated. Moves all extremities. Psych: No Agitation, no anxiety.     Scheduled Meds:   methylPREDNISolone  40 mg IntraVENous Daily    pregabalin  200 mg Oral BID    rOPINIRole  1 mg Oral BID    lidocaine 1 % injection  5 mL IntraDERmal Once    sodium chloride flush  5-40 mL IntraVENous 2 times per day    cefepime  2,000 mg IntraVENous Q8H    risperiDONE  1 mg Oral BID    atorvastatin  60 mg Oral Daily    pantoprazole  40 mg IntraVENous Daily    sertraline  100 mg Oral BID    chlorhexidine  15 mL Mouth/Throat BID sodium chloride flush  5-40 mL IntraVENous 2 times per day    enoxaparin  40 mg SubCUTAneous Daily    nicotine  1 patch TransDERmal Daily    insulin lispro  0-8 Units SubCUTAneous Q4H    ipratropium-albuterol  1 ampule Inhalation Q4H     PRN Meds:  sodium chloride flush, sodium chloride, glucose, dextrose bolus **OR** dextrose bolus, glucagon (rDNA), dextrose, sodium chloride flush, sodium chloride, ondansetron **OR** ondansetron, polyethylene glycol, acetaminophen **OR** acetaminophen, albuterol sulfate HFA, albuterol, fentanNYL, midazolam    Results:  CBC:   Recent Labs     02/06/23  0600 02/07/23  0445 02/08/23 0455   WBC 9.3 9.3 10.7   HGB 10.3* 11.4* 11.2*   HCT 31.8* 34.7* 34.4*   MCV 94.4 92.7 92.8    152 166       BMP:   Recent Labs     02/06/23  0600 02/07/23 0445 02/08/23 0455    138 136   K 4.2 4.2 4.5    102 101   CO2 26 25 26   PHOS  --  3.3 4.1   BUN 41* 37* 39*   CREATININE 0.8 0.8 0.7       LIVER PROFILE: No results for input(s): AST, ALT, LIPASE, BILIDIR, BILITOT, ALKPHOS in the last 72 hours. Invalid input(s): AMYLASE,  ALB    Cultures:  2/2 resp cx: c. albicans    Films:  2/5/23 CTPA:   1. No CT evidence of a pulmonary embolism. 2. Bilateral lower lobe airspace consolidation, left worse than right,   representing either pneumonia or aspiration. 3. Additional scattered curvilinear, consolidative, and nodular opacities   throughout the upper lobes and right middle lobe likely reflect additional   multifocal pneumonia and/or pulmonary edema. 4. Small bilateral pleural effusions. 5. Enlarged nodular appearance of the left thyroid lobe. Consider further   characterization with a routine non urgent follow-up thyroid ultrasound. See   below. 6. Findings suggestive of cirrhosis and portal hypertension. CXR was reviewed by me and it showed 2/8/23    1. Stable appropriate positions of support apparatus. 2. Slight interval improvement of mild pulmonary edema.    3. No significant change in appearance of a small left pleural effusion and   left basilar airspace consolidation. Assessment:    Acute COPD exacerbation   Bilateral lower extremity cellulitis  Possible CHF/fluid overload  OHS/SHAZIA  Acute hypoxic  hypercapnic res failure      Plan:     Mechanical ventilation as per my orders. The ventilator was adjusted by me at the bedside for unstable, life threatening respiratory failure. Head of bed 30 degrees or higher at all times  Daily spontaneous breathing trial once PEEP less than 8, FiO2 less than 55%. Fentanyl and Precedex gtt  Solumedrol  daily   *-IV abx cefepime Day 6/7    *-BD  Increase SSI  tube feeds at goal  Consider repeat CT in 6-8 weeks  Outpatient f/u of thyroid nodule  Total critical care time caring for this patient with life threatening, unstable organ failure, including direct patient contact, management of life support systems, review of data including imaging and labs, discussions with other team members and physicians at least 31 minutes so far today, excluding procedures.

## 2023-02-08 NOTE — PROGRESS NOTES
Shift assessment was completed (see flow sheet). Pt is Sedated. Opens eyes to voice. Pt currently on ventilator- 8 ETT, at [address] Spontaneous breathing trial to follow     FiO2 at 45, PEEP 5 (decreased from 8 by RT), SpO2 at 94%, Respirations are Glenny,  with Diminished/ Rhonchi sounds. Infusing:  Precedex, Fentanyl (See MAR)  Fentanyl stopped for Prep of SBT. Oral Gastric tube with tube feed at 60 (goal), 70mL residual observed and returned. IV access- PICC/Peripheral and WDL. Arias in place with STAT lock, Draining Yellow/ Clear urine. Scheduled medications to follow. Call light within reach. Bed in lowest position. Bed alarm on. Bilateral Wrist restraints in place and tied, for safety of lines and tubes. Family To be updated on arrival of POC for day. Will continue to monitor    Patient is not able to demonstrated the ability to move from a reclining position to an upright position within the recliner. Patient is confused, demented and /or unable to follow instruction.

## 2023-02-08 NOTE — PROGRESS NOTES
Inpatient Occupational Therapy Evaluation and Treatment    Unit: ICU  Date:  2023  Patient Name:    Hodan Coe  Admitting diagnosis:  COPD exacerbation [facility]) [J44.1]  Acute respiratory failure with hypercapnia ([address] [J96.02]  Acute encephalopathy [G93.40]  Acute on chronic respiratory failure with hypoxia and hypercapnia ([address] [J96.21, J96.22]  Admit Date:  2023  Precautions/Restrictions/WB Status/ Lines/ Wounds/ Oxygen: Fall risk, Bed/chair alarm, Lines (IV, Supplemental O2 (9L), and Arias catheter), Telemetry, and Continuous pulse oximetry   Intubated 23  Extubated 23   Treatment Time:  13:  Treatment Number:  1  Timed Code Treatment Minutes: 40 minutes  Total Treatment Minutes:  50  minutes    Patient Goals for Therapy: \" not stated  \"          Discharge Recommendations: SNF  DME needs for discharge: Defer to facility         Therapy recommendations for staff:   Assist of 2 for bed mobility     History of Present Illness: per H&P   79 y.o. female  has a past medical history of Anxiety, Arthritis, Cancer ([address] Chronic back pain, Chronic pain, COPD (chronic obstructive pulmonary disease) ([address] Depression, DJD (degenerative joint disease), cervical, Drug-seeking behavior, GERD (gastroesophageal reflux disease), Hypercholesteremia, Hypertension, IBS (irritable bowel syndrome), Insomnia, K deficiency, Kidney stone, Narcotic addiction ([address] Narcotic overdose ([address] Osteoporosis, Overdose, PNA (pneumonia), Restless leg syndrome, and Thyroid disease. who presents to the ED for Hartness of breath. Patient is a poor historian as she is able unable to answer any question due to altered mental status  Home Health S4 Level Recommendation:  NA    AM-PAC Score:   9    Subjective  Patient lying supine in bed with family at bedside. Pt agreeable to this OT session.      Cognition:    A&O Person - not fully assessed   Able to follow 1 step commands    Pain:   Yes  Location: feet   Ratin /10  Pain Medicine Status: Received pain med prior to tx    Preadmission Environment:   Pt. Lives     with family (dtr)  Home environment:    mobile home/trailer  Steps to enter first floor:   5 steps to enter with bilateral railings   Steps to second floor/basement: N/A  Laundry:     1st floor  Bathroom:     tub/shower unit, shower chair , and comfort height toilet  Pt sleeps in a:    [address]  Equipment owned:    rollator, shower chair/bench, raised toilet seat, and home O2 (3L) continous    Preadmission Status:  Pt. Able to drive: No  Pt. Fully independent with ADLs: No  Pt. Required assistance from family for: Cleaning, Cooking, and Laundry   Pt. supervision for functional transfers and utilized Rollator for mobility in home and Rollator out in community  History of falls: No  Home Health Services:None      Upper Extremity ROM:    Limited bilaterally however the left appears more limited     Upper Extremity Strength:    2-/5 to 3-/5     Upper Extremity Sensation:    NT    Upper Extremity Proprioception:  NT    Coordination and Tone  Impaired    Balance:  Sitting: Max A   Standing:    Unable to stand       Bed Mobility:   Supine to Sit:    Max A , 2 person  Sit to Supine:   Max A , 2 person  Rolling: Max A   Scooting in sitting: Max A   Scooting in supine: Total A, 2 person    Transfers:    Sit to stand:    Attempted With GARY STEDY and gait belt and pt unable to stand with assist of 3   Stand to sit:    Not Tested   Bed to chair:     Not Tested  Bed/ chair to standard toilet:  Not Tested  Bed/chair to [facility]:   Not Tested    See PT note for gait analysis. ADLs:  Dressing:      UE:   Not Tested  LE:    Max A     Bathing:    UE:  Not Tested  LE:  Not Tested    Eating:   Not Tested    Toileting:  Not Tested    Grooming/hygiene: Not Tested    Activity Tolerance   Pt completed therapy session with poor endurance, decreased balance.  Pain in left thumb      BP (mmHg) HR (bpm) SpO2 (%) on 9L Comments   Supine at rest 154/70 83 bpm 97%    Seated at EOB  bpm %    Standing  bpm %    End of session 121/67  87bpm 97%      Positioning Needs   In bed with needs at reach     Ther Ex / Activities Initiated:   N/A    Patient/Family Education   Pt educated on role of inpatient OT, plan of care, importance of continued activity, calling for assist with mobility. Assessment:    Pt seen for Occupational therapy evaluation in acute care setting. Pt demonstrated decreased Activity tolerance, ADLs, Balance , Bed mobility, Safety Awareness, Transfers, and Cognition. Pt functioning below baseline and will likely benefit from skilled occupational therapy services to maximize safety and independence. Recommending SNF     Goal(s) : To be met in 3 Visits:  1). Bed to toilet/BSC:        Mod A of 2 with appropriate AD   2.) Pt will complete 3/3 CHF goals          To be met in 5 Visits:  1). Supine to/from Sit in preporation for ADL task: Mod A  2.) Toileting         Mod A  3.) Grooming        Mod A  4). Upper Body Dressing: Mod A  5). Lower Body Dressing:       Max A  6). Pt to demonstrate UE therapeutic exs x 15 reps with minimal cues    Rehabilitation Potential: Fair  Strengths for achieving goals include: Pt cooperative   Barriers to achieving goals include:  Complexity of condition    Plan: To be seen 3-5 x/wk while in acute care setting for therapeutic exercises, bed mobility, transfers, family/patient education, ADL/IADL retraining, and energy conservation training.     Signature: Eulalia Petersen OTR/L [id number]       If patient discharges from this facility prior to next visit, this note will serve as the Discharge Summary

## 2023-02-08 NOTE — PROGRESS NOTES
Care rounds completed with Dr. Em Antoine and multidisciplinary team. Reviewed labs, meds, VS, assessment, & plan of care for today. -Doing better during trial, informed of Copious Secretions and mucus plugs with RT. Dr Em Antione feels patient can \"cough those up when extubated\". -Increase in insulin dose. See dictated note and new orders for details.         CVC Need:  High risk vesicant drug infusing:     Multiple incompatible medications infusing:      CVP Monitoring:      Extremely difficult IV access challenge:      Continued need for central line access:  YES    Addressed with physician:  YES    RIGHT PATIENT, RIGHT TIME, RIGHT LINE

## 2023-02-08 NOTE — PROGRESS NOTES
IM Progress Note    Admit Date:  2/2/2023  6    Interval history:  copd exacerbation , failed bipap and intubated in ER   Imaging with left hemithorax opacification,s/p bronch with clearance of mucous plugs     Agitation issues noted on vent   CTA chest with no PE , bilateral pneumonia     Subjective:    Ms. Carli Arshad extubated   Now on vapotherm    Objective:   /60   Pulse 72   Temp 99.6 °F (37.6 °C) (Bladder)   Resp 20   Ht 5' 3\" (1.6 m)   Wt 178 lb 9.2 oz (81 kg)   SpO2 91%   BMI 31.63 kg/m²     Intake/Output Summary (Last 24 hours) at 2/8/2023 1225  Last data filed at 2/8/2023 1140  Gross per 24 hour   Intake 2091.19 ml   Output 2600 ml   Net -508.81 ml         Physical Exam:      General:  elderly female on vapotherm   Appears to be not in any distress  Mucous Membranes:  Pink , anicteric  Neck: No JVD, no carotid bruit, no thyromegaly  Chest: bilateral air entry with resolving  rhonchi and improving left sided airentry  Cardiovascular:  RRR S1S2 heard, no murmurs or gallops  Abdomen:  Soft, undistended, non tender, no organomegaly, BS present  Extremities: chronic 2 +  brawny edema of both LE with diffuse celluitis of ext Bilaterally  Distal pulses well felt  Neurological : sedated      Medications:   Scheduled Medications:    insulin lispro  0-16 Units SubCUTAneous Q4H    methylPREDNISolone  40 mg IntraVENous Daily    pregabalin  200 mg Oral BID    rOPINIRole  1 mg Oral BID    lidocaine 1 % injection  5 mL IntraDERmal Once    sodium chloride flush  5-40 mL IntraVENous 2 times per day    cefepime  2,000 mg IntraVENous Q8H    risperiDONE  1 mg Oral BID    atorvastatin  60 mg Oral Daily    pantoprazole  40 mg IntraVENous Daily    sertraline  100 mg Oral BID    sodium chloride flush  5-40 mL IntraVENous 2 times per day    enoxaparin  40 mg SubCUTAneous Daily    nicotine  1 patch TransDERmal Daily    ipratropium-albuterol  1 ampule Inhalation Q4H     I   sodium chloride      dextrose      sodium chloride Stopped (02/08/23 1113)     sodium chloride flush, sodium chloride, glucose, dextrose bolus **OR** dextrose bolus, glucagon (rDNA), dextrose, sodium chloride flush, sodium chloride, ondansetron **OR** ondansetron, polyethylene glycol, acetaminophen **OR** acetaminophen, albuterol, fentanNYL    Lab Data:  Recent Labs     02/06/23  0600 02/07/23  0445 02/08/23  0455   WBC 9.3 9.3 10.7   HGB 10.3* 11.4* 11.2*   HCT 31.8* 34.7* 34.4*   MCV 94.4 92.7 92.8    152 166       Recent Labs     02/06/23  0600 02/07/23  0445 02/08/23  0455    138 136   K 4.2 4.2 4.5    102 101   CO2 26 25 26   PHOS  --  3.3 4.1   BUN 41* 37* 39*   CREATININE 0.8 0.8 0.7       No results for input(s): CKTOTAL, CKMB, CKMBINDEX, TROPONINI in the last 72 hours. Coagulation:   Lab Results   Component Value Date/Time    INR 1.04 02/04/2023 12:15 PM    APTT 27.6 02/04/2023 12:15 PM     Cardiac markers:   Lab Results   Component Value Date/Time    CKMB 10.3 07/14/2010 07:25 AM    CKTOTAL 74 10/17/2014 02:46 AM    TROPONINI <0.01 02/02/2023 05:05 AM         Lab Results   Component Value Date    ALT 12 02/03/2023    AST 13 (L) 02/03/2023    ALKPHOS 99 02/03/2023    BILITOT 0.3 02/03/2023       Lab Results   Component Value Date    INR 1.04 02/04/2023    INR 0.94 09/12/2020    INR 0.99 07/01/2018    PROTIME 13.5 02/04/2023    PROTIME 10.9 09/12/2020    PROTIME 11.3 07/01/2018       Radiology    Cxr    Complete opacification has developed of the left hemithorax, which can   reflect combination of atelectasis, airspace disease, and/or pleural   effusion. Right-sided airspace disease is not substantially changed. Chest xray   1. Findings suggestive of partial right upper lobe atelectasis. 2. Small left pleural effusion. 3. Central pulmonary vascular congestion without overt pulmonary edema. 4. Left basilar atelectasis.      Cxr    Significantly limited evaluation secondary to patient positioning with no   signs to suggest pneumothorax. CTA chest    1. No CT evidence of a pulmonary embolism. 2. Bilateral lower lobe airspace consolidation, left worse than right,   representing either pneumonia or aspiration. 3. Additional scattered curvilinear, consolidative, nodular opacities   throughout the upper lobes and right middle lobe likely reflect additional   multifocal pneumonia and/or pulmonary edema. 4. Small bilateral pleural effusions. 5. Enlarged nodular appearance of the left thyroid lobe. Consider further   characterization with a routine non urgent follow-up thyroid ultrasound. See   below. 6. Findings suggestive of cirrhosis and portal hypertension    Cultures:   Cultures  Blood- NGTD  Sputum - pending  BAL NRF      Assessment & Plan:    Acute respiratory failure with hypoxia and hypercapnia  Copd exacerbation   Left hemithorax opacification with mucous plugging    . O2 sat 86% on 5L in the ER. Initially placed on NIV. Too obtunded for NIV and PCO2 worsened after ~1 h on NIV. And eventually placed on vent  Admitted to ICU   CTA chest with bilateral pna, no PE  Had emergency bronch with removal of mucous plugs  Started on steroids, abx -cefepime day 6/7 and vanc   Critical care following  BAL with NRF- can downgrade abx  Vancomycin discontinued  Extubated   Now on vapotherm    COPD exacerbation   IV solumedrol, IV abx as above , PRN/GIDEON intensive NEB therapy. Can wean steroids     Acute encephalopathy, obtunded, likely metabolic 2/2 hypercarbia. Now on vent support    DM2, hold oral Rx, added Mod SSI q4h. Bilateral LE cellulitis - has chronic lymphedema, resume  diuretics  started on cefepime and added vanc  - await cx   Vanco DC'd    Tobacco Abuse, unable to  cessation 2/2 MS, 21 mg nicotine patch.      Hx of cirrhosis with portal HTN - resume home diuretics    Thyroid enlargement on left side - need f/w US as outpt       DVT Prophylaxis: Lx  Diet TF  Code Status: Full Code     QUE Marcelle Osborn MD, 2/8/2023 12:25 PM

## 2023-02-08 NOTE — PROGRESS NOTES
02/07/23 1929   Patient Observation   Heart Rate 64   Resp 19   SpO2 99 %   Breath Sounds   Right Upper Lobe Clear   Right Middle Lobe Clear   Right Lower Lobe Diminished   Left Upper Lobe Clear   Left Lower Lobe Diminished   Vent Information   Vent Mode AC/VC   Ventilator Settings   FiO2  40 %   Vt (Set, mL) 360 mL   Resp Rate (Set) 18 bmp   PEEP/CPAP (cmH2O) 5   Vent Patient Data (Readings)   Vt (Measured) 373 mL   Peak Inspiratory Pressure (cmH2O) 28 cmH2O   Rate Measured 23 br/min   Minute Volume (L/min) 7.73 Liters   Peak Inspiratory Flow (lpm) 60 L/sec   Mean Airway Pressure (cmH2O) 10 cmH20   Plateau Pressure (cm H2O) 19 cm H2O   I:E Ratio 1:3.6   Flow Sensitivity 3 L/min   Vent Alarm Settings   High Pressure (cmH2O) 40 cmH2O   Low Minute Volume (lpm) 3 L/min   High Minute Volume (lpm) 20 L/min   Low Exhaled Vt (ml) 250 mL   High Exhaled Vt (ml) 1000 mL   RR High (bpm) 40 br/min   Apnea (secs) 20 secs   Additional Respiratoray Assessments   Humidification Source Heated wire   Humidification Temp 37   Circuit Condensation Drained   Airway Clearance   Suction Oral;ET Tube   Subglottic Suction Done Yes   Suction Device Inline suction catheter   Non-Surgical Airway 02/02/23 Endotracheal tube   Placement Date/Time: 02/02/23 0636   Placed By: In ED  Placement Verified By: Capnometry  Mask Ventilation: Ventilated by mask (1)  Insertion attempts: 1  Airway Device: Endotracheal tube  Size: 7.5   Secured At 21 cm   Measured From Lips   Secured Location Right   Secured By Commercial tube orellana   Site Assessment Dry   Treatment   $Treatment Type $Inhaled Therapy/Meds

## 2023-02-08 NOTE — PROGRESS NOTES
Shift handoff report given to Amado Ingram RN at bedside. Pt is sedated on vent  IV handoff completed. Call light within reach, bed in lowest position, bed alarm on. End of shift checks completed. Pt has been free of falls for duration of shift. Felicia Barrientos

## 2023-02-08 NOTE — PROGRESS NOTES
Witnessed waste of 80mL of IV Fentanyl.   Electronically signed by Neil Vickers RN on 2/8/2023 at 3:07 PM

## 2023-02-08 NOTE — PLAN OF CARE
SLP completed evaluation. Please refer to notes in EMR. Lauryn Gonzales Males, [id number] [facility] PS[address][id number]  Speech-Language Pathologist

## 2023-02-08 NOTE — PROGRESS NOTES
Reassessment completed (see Flowsheet). All ICU lines remain intact, ICU monitoring continued-   Infusing:  Alicia Moreno  pt remains on Vapotherm. Patient is able to cough up secretions at this time- large amounts. Lung sounds Diminished with Rhonchi. pt's blood pressures WDL. Continuing to monitor.

## 2023-02-08 NOTE — PROGRESS NOTES
4 Eyes Skin Assessment     The patient is being assess for   Shift    I agree that 2 RN's have performed a thorough Head to Toe Skin Assessment on the patient. ALL assessment sites listed below have been assessed. Areas assessed for pressure by both nurses:   [x]   Head, Face, and Ears   [x]   Shoulders, Back, and Chest, Abdomen  [x]   Arms, Elbows, and Hands   [x]   Coccyx, Sacrum, and Ischium  [x]   Legs, Feet, and Heels   BLE red/ scabbing/ cracking/ fissures. Black/Purple spot on L foot 2nd toe tip. Redness/ dusky crack to gluteal cleft  Redness/ dusky color to L posterior thigh at buttocks. Skin Assessed Under all Medical Devices by both nurses:  O2 device tubing and securement devices              All Mepilex Borders were peeled back and area peeked at by both nurses:  Yes  Please list where Mepilex Borders are located:  Coccyx               **SHARE this note so that the co-signing nurse is able to place an eSignature**    Co-signer eSignature: Electronically signed by Levy Mckeon RN on 2/8/23 at 4:31 PM EST    Does the Patient have Skin Breakdown related to pressure?   Yes LDA WOUND CARE was Initiated documentation include the Sue-wound, Wound Assessment, Measurements, Dressing Treatment, Drainage, and Color\",     (Insert Photo here              )         Zack Prevention initiated:  Yes   Wound Care Orders initiated:  Yes      [id number]  nurse consulted for Pressure Injury (Stage 3,4, Unstageable, DTI, NWPT, Complex wounds)and New or Established Ostomies:  Yes      Primary Nurse eSignature: Electronically signed by Penelope Guadarrama RN on 2/8/23 at 4:30 PM EST

## 2023-02-08 NOTE — PROGRESS NOTES
Patient had large secretions last night at the beginning of the shift, secretions improved per ETT and orally, vent settings increased through the night from 40% FiO2 with PEEP of 5 to 50% FiO2 with PEEP of 8 due to SaO2 readings mid to upper 80's while turned to left side. Settings continued FiO2 50%, , rate 18, PEEP 8, Current SaO2 95%. RASS 0 to -1, patient awake through bathing this am, tolerated activity, nods appropriate, tries to mouth words, calm, able to move arms and legs, lung sounds diminished. Bowel sounds active, abdomen distended/soft. Tube feeding infusing. Arias patent. Precedex 0.7mcg/kg/hr, fentanyl 50mcg/hr. Will continue to monitor.

## 2023-02-08 NOTE — PROGRESS NOTES
Pt given Ice chips, and sips of water. Pt does not appear to be in distress, however writer is unable to determine based on body habitus, that patient is not having any difficulty. SLP ordered. And will give patient Ice chips at this time.

## 2023-02-08 NOTE — PROGRESS NOTES
Reassessment completed (see Flowsheet). All ICU lines remain intact, ICU monitoring continued-   Infusing:  Kelby Felix,    pt Extubated to vapotherm, FiO2 increased to 60%, 30L. SpO2 88%. Pt educated on breathing through nose- spO2 improvement to 96%. Lung sounds Diminished/ Rhonchi. pt's blood pressures WDL. Daughter updated. Continuing to monitor.

## 2023-02-08 NOTE — PROGRESS NOTES
Speech Language Pathology    Speech Language Pathology  Clinical Bedside Swallow Assessment  Facility/Department: [facility] ICU        Recommendations:  Diet recommendation: NPO; with exception of low volume pureed food pleasure feeds; Meds crushed in puree as able  Instrumentation: not indicated at this time due to pt's O2 requirements  Risk management: small bites/sips, total feed, oral care q4 hrs to reduce adverse affects in the event of aspiration, general aspiration precautions, and hold PO and contact SLP if s/s of aspiration or worsening respiratory status develop. Brent Nielsen  : 1955 (79 y.o.)   MRN: [id number]  ROOM: [address] [place] [address]  ADMISSION DATE: 2023  PATIENT DIAGNOSIS(ES): COPD exacerbation ([address] [J44.1]  Acute respiratory failure with hypercapnia (HCC) [J96.02]  Acute encephalopathy [G93.40]  Acute on chronic respiratory failure with hypoxia and hypercapnia ([address] [L07.77, J96.22]  Chief Complaint   Patient presents with    Shortness of Breath     Pt arrives with complaint of SOB x 1 hour. O2 was 70% on RA. Got duoneb en route.       Patient Active Problem List    Diagnosis Date Noted    Spinal stenosis 10/29/2013    Hyperlipidemia 2012    DJD (degenerative joint disease), cervical 2012    Chronic respiratory failure with hypoxia ([address] 2022    Cellulitis and abscess of leg     Cellulitis of both lower extremities 2022    Hyperglycemia     Cellulitis of left lower extremity     Tobacco abuse     Cirrhosis of liver without ascites ([address]     Community acquired pneumonia 2022    COPD (chronic obstructive pulmonary disease) ([address] 2013    Vitamin D deficiency 07/15/2013    Lumbosacral radiculopathy due to degenerative joint disease of spine 2013    B12 deficiency 2012    Generalized anxiety disorder 2012    Depression with anxiety 2012    GERD (gastroesophageal reflux disease) 2012    Osteoporosis 2012    Insomnia 04/19/2012    Restless leg syndrome 04/19/2012    Chronic pain 04/19/2012    Fatigue 06/18/2012    Bipolar disorder ([address] 04/20/2012    Anorexia nervosa 04/20/2012    IBS (irritable bowel syndrome) 04/19/2012    Essential hypertension     Acute on chronic respiratory failure with hypoxia and hypercapnia (HCC)     COPD exacerbation ([address] 09/12/2020    Acute respiratory failure with hypercapnia ([address] 09/12/2020    Thoracic compression fracture, closed, initial encounter ([address] 02/05/2018    Post traumatic stress disorder (PTSD)     PNA (pneumonia) 10/17/2016    Acute encephalopathy 10/17/2016    Thyroid nodule 07/21/2015    Paresthesia 07/20/2015    TIA (transient ischemic attack)     Gait disturbance 11/14/2013    Weakness 11/14/2013    Constipation due to pain medication 04/17/2013    Low back pain 04/09/2013    Low back pain radiating to both legs 04/09/2013     Past Medical History:   Diagnosis Date    Anxiety     Arthritis     Cancer ([address] 1980    Cervical    Chronic back pain     Chronic pain     COPD (chronic obstructive pulmonary disease) (HCC)     Depression     DJD (degenerative joint disease), cervical     Drug-seeking behavior     GERD (gastroesophageal reflux disease)     Hypercholesteremia     Hypertension     IBS (irritable bowel syndrome)     Insomnia     K deficiency     Kidney stone     Narcotic addiction ([address]     Patient Denies    Narcotic overdose ([address]     Patient Denies    Osteoporosis     Overdose     2010:2013 Patient Denies on 4/3/14    PNA (pneumonia) 10/17/2016    Restless leg syndrome     Thyroid disease      Past Surgical History:   Procedure Laterality Date    APPENDECTOMY      BACK SURGERY      CERVICAL DISCECTOMY  12-14-13    anterior discetomy fusion c 3/4 c 4/5 posterior fusion C3-C5 screws and rods C3-C5    CHOLECYSTECTOMY  2012    Laparoscopic    COLONOSCOPY  2008    COLONOSCOPY  08/10/2016    FOOT SURGERY Right     FRACTURE SURGERY Right 9/14    distal radius    HAND SURGERY 08/2012    crushed right hand and has two steel plates in hand    HYSTERECTOMY (CERVIX STATUS UNKNOWN)      NECK SURGERY  2002    disc    OTHER SURGICAL HISTORY  4/9/14    TRANSFORAMINAL LUMBAR INTERBODY FUSION L4 L5     Allergies   Allergen Reactions    Aspirin Hives     Other reaction(s): GI Upset, Unknown    Duloxetine Hcl Nausea And Vomiting     Other reaction(s): Unknown, Vomiting    Duloxetine Nausea And Vomiting    Asa Buff (Mag [Buffered Aspirin] Hives       DATE ONSET: admit date 02/02/23    Date of Evaluation: 2/8/2023   Evaluating Therapist: MEGAN Wooten    Chart Reviewed: : [x] Yes [] No    Current Diet: Diet NPO    Recent Chest Radiography: [x] Chest XR   [] CT of Chest  Date: 02/02/23  Impressions:   1. Stable appropriate positions of support apparatus. 2. Slight interval improvement of mild pulmonary edema. 3. No significant change in appearance of a small left pleural effusion and   left basilar airspace consolidation. Pain: denies    Reason for Referral  Tracy Zhong was referred for a bedside swallow evaluation to assess the efficiency of their swallow function, identify signs and symptoms of aspiration and make recommendations regarding safe dietary consistencies, effective compensatory strategies, and safe eating environment. Assessment    Medical record review/interview: Per MD H&P: \"Romina Ordoñez was checked out to me by Dr. Veronika Chaidez. Please see his/her initial documentation for details of the patient's initial ED presentation, physical exam and completed studies. In brief, Tracy Zhong is a 79 y.o. female that presented to the emergency department shortness of breath. Patient has been evaluated and had to be intubated. She was in the emergency room waiting to be transferred to the ICU. Her oxygen sats dropped and her blood pressures also dropped. Rescue therapy was then consulted and came down to the emergency room patient was suction.   Vent settings were adjusted patient his PEEP was up to 7.5. Because of low blood pressures which I believe is secondary to propofol Levophed was started. Central line was placed. I also believe that the difficulties with the vent setting was because patient was not adequately sedated. I added a ketamine drip. After doing on this patient was much stable and shortly after was taken to the ICU for further evaluation and treatment. \"      Predisposing dysphagia risk factors: COPD, Other chronic respiratory illness, and GERD  Clinical signs of possible chronic dysphagia: N/A  Precipitating dysphagia risk factors: reduced physical mobility, increased O2 demands, AMS, recent intubation, and emergent/traumatic intubation    Patient Complaints:  Odynophagia: [] Yes [x] No  Globus Sensation: [] Yes [x] No  SOB with PO intake: [] Yes [x] No  Increased WOB with PO intake: [] Yes [x] No  Reflux Sx's: [] Yes [x] No  Weight loss: [] Yes [x] No  Coughing/Choking with PO intake: [x] Yes [] No  Reduced Appetite: [] Yes [x] No    Additional Reported Symptoms/Complaints/Hospital Course: Pt intubated 02/02/23-02/08/23. Pt on 35lpm Vapotherm, 60% O2. Pt is quite kyphotic. Digital palpation of anterior neck is limited.     Pt's goals: \"to get better\"    Vitals/labs:   Temp: 98.9  SpO2:  97%  RR:  21/min  BP: 129/63  HR: 79  O2 device:  35lpm O2 via Vapotherm, 60% O2    CBC:   Recent Labs     02/08/23  0455   WBC 10.7   HGB 11.2*         BMP:  Recent Labs     02/08/23  0455      K 4.5      CO2 26   BUN 39*   CREATININE 0.7   GLUCOSE 306*          Cranial nerve exam:   CN V (trigeminal): ophthalmic, maxillary, and mandibular facial sensation- WFL  CN VII (facial): WFL  CN IX/X (glossopharyngeal/vagus): MPT: Reduced; pitch range: Reduced; vocal quality: weak; cough: Strong-perceptually, Wet, and Productive  CN XII (hypoglossal): WFL    Laryngeal function exam:   Secretions:   Vocal quality: See CN exam above  MPT: See CN exam above  S/Z ratio: Pitch range: See CN exam above  Cough: See CN exam above    Oral Care Status:    [] Oral Care [facility]  [] Poor oral care status  [x] Edentulous  [] Upper Dentures  [] Lower Dentures  [] Missing/Broken Teeth  [] Evidence of dental cavities/carries    PO trials:   IDDSI 0 (thin): RR 23/min with O2 sat of 97% on 35lpm O2 via Vapotherm, 60% O2. mild anterior bolus loss , suspect functional A-P bolus transit, swallow timing subjectively appears delayed, oral clearance grossly WFL, suspect s/s of aspiration with delayed deep, wet coughing post swallow. RR increased to 28/min momentarily with stable O2 sat. Pt coughing up copious thick, clear bubbly secretions after extended coughing fit. IDDSI 4 (puree): no anterior bolus loss , suspect functional A-P bolus transit, swallow timing subjectively appears timely, oral clearance grossly WFL, no clinical s/s of aspiration and vitals stable. Pt consumed entire applesauce cup without overt s/s of aspiration. 3 oz water: DNT    Impressions:  Oral phase characterized by mild anterior loss of thin liquids during spoon sip trials. Oral phase is otherwise [facility] for textures trialed this date. Suspected aspiration of thin liquid trials (spoon sip) with delayed deep, wet coughing post swallow. RR increased to 28/min momentarily with stable O2 sat. Pt coughing up copious thick, clear bubbly secretions after extended coughing fit. Peak airway pressure in patient's receiving 35 L/min or greater of O2 on HFNC can place patient at increased risk for food/liquid being blown into the airway and increase the risk of aspiration, especially in patient's with past hx of swallow dysfunction. Recommend NPO at this time, with the exception of pureed food pleasure feeds, low volume. SLP to continue to reassess appropriateness for PO intake as O2 demands decrease.  Consider trialing standard nasal cannula at lower flow rate for the duration of a meal (20 mins) to decrease pharyngeal/airway pressure and increase swallow safety during PO intake. Recommendations:  Diet recommendation: NPO; with exception of low volume pureed food pleasure feeds; Meds crushed in puree as able  Instrumentation: not indicated at this time due to pt's O2 requirements  Risk management: small bites/sips, total feed, oral care q4 hrs to reduce adverse affects in the event of aspiration, general aspiration precautions, and hold PO and contact SLP if s/s of aspiration or worsening respiratory status develop.     Prognosis: Fair    Recommended Intervention:   [] Dysphagia tx  [] Videostroboscopy                      [] NPO   [] MBS       [] Speech/Cog Eval    [] Therapeutic PO Trials     [] Ice Chips   [] Other:  [] FEES                                                 Dysphagia Therapeutic Intervention:   []  Bolus control Exercises  []  Oral Motor Exercises  []  [employer] Protocol  []  Thermal Stimulation  []  Oral Care    []  Vital Stim/NMES  []  Laryngeal Exercises  [x]  Patient/Family Education  []  Pharyngeal Exercises  [x]  Therapeutic PO trials with SLP  [x]  Diet tolerance monitoring  []  Other:     Referrals:  [] ENT    [] PT  [] Pulmonology [] GI  [] Neurology  [] RD  [] OT   []     Goals:  Short Term Goals:  Timeframe for Short Term Goals: (1 week, 3-5x/wk until 02/15/23)  Goal 1: The patient will tolerate repeat BSE when able for ongoing assessment      Long Term Goals:   Timeframe for Long Term Goals: (1-2 weeks)  Goal 1: The patient will tolerate least restrictive diet with no clinical s/s of aspiration or worsening respiratory/pulmonary status    Treatment:  Skilled instruction completed with patient re: evidenced based practice regarding recommendations and POC, importance of oral care to reduce adverse affects in the event of aspiration, and instruction of recommended compensatory strategies developed based upon clinical exam. Pt able to recall/demonstrate compensatory strategies with mod cues.    Pt Education: SLP educated the patient re: Role of SLP, rationale for completion of assessment, anatomical components of swallow structures as they pertain to airway protection, results of assessment, and recommendations  Pt Education Response: verbalized understanding and would benefit from ongoing education    Duration/Frequency of Tx: 1 week, 3-5x/wk until 02/15/23    Individuals Consulted:   [x]  Patient     []  NP         [x]  RN   []  RD                   []  MD      []  Family Member                        []  PA    []  Other:      Safety Devices / Report:  [x]  All fall risk precautions in place []  Safety handoff completed with RN  [x]  Bed alarm in place  []  Left in bed     []  Chair alarm in place  []  Left in chair   [x]  Call light in reach   []  Other: Total Treatment Time / Charges       Time in Time out Total Time / units   Swallow Eval/Tx Time  7450  9957   27 min / 2 units     Signature:    Lauryn Motta, [id number]  Speech-Language Pathologist

## 2023-02-09 ENCOUNTER — APPOINTMENT (OUTPATIENT)
Dept: GENERAL RADIOLOGY | Age: 68
DRG: 207 | End: 2023-02-09
Payer: MEDICARE

## 2023-02-09 LAB
ALBUMIN SERPL-MCNC: 3.8 G/DL (ref 3.4–5)
ANION GAP SERPL CALCULATED.3IONS-SCNC: 12 MMOL/L (ref 3–16)
BUN BLDV-MCNC: 31 MG/DL (ref 7–20)
CALCIUM SERPL-MCNC: 9.5 MG/DL (ref 8.3–10.6)
CHLORIDE BLD-SCNC: 102 MMOL/L (ref 99–110)
CO2: 25 MMOL/L (ref 21–32)
CREAT SERPL-MCNC: 0.6 MG/DL (ref 0.6–1.2)
GFR SERPL CREATININE-BSD FRML MDRD: >60 ML/MIN/{1.73_M2}
GLUCOSE BLD-MCNC: 156 MG/DL (ref 70–99)
GLUCOSE BLD-MCNC: 162 MG/DL (ref 70–99)
GLUCOSE BLD-MCNC: 181 MG/DL (ref 70–99)
GLUCOSE BLD-MCNC: 185 MG/DL (ref 70–99)
GLUCOSE BLD-MCNC: 221 MG/DL (ref 70–99)
GLUCOSE BLD-MCNC: 244 MG/DL (ref 70–99)
GLUCOSE BLD-MCNC: 259 MG/DL (ref 70–99)
HCT VFR BLD CALC: 37.4 % (ref 36–48)
HEMOGLOBIN: 11.8 G/DL (ref 12–16)
MCH RBC QN AUTO: 29.8 PG (ref 26–34)
MCHC RBC AUTO-ENTMCNC: 31.6 G/DL (ref 31–36)
MCV RBC AUTO: 94.4 FL (ref 80–100)
PDW BLD-RTO: 15.7 % (ref 12.4–15.4)
PERFORMED ON: ABNORMAL
PHOSPHORUS: 3 MG/DL (ref 2.5–4.9)
PLATELET # BLD: 176 K/UL (ref 135–450)
PMV BLD AUTO: 8.8 FL (ref 5–10.5)
POTASSIUM SERPL-SCNC: 3.6 MMOL/L (ref 3.5–5.1)
RBC # BLD: 3.96 M/UL (ref 4–5.2)
SODIUM BLD-SCNC: 139 MMOL/L (ref 136–145)
WBC # BLD: 15.3 K/UL (ref 4–11)

## 2023-02-09 PROCEDURE — 94669 MECHANICAL CHEST WALL OSCILL: CPT

## 2023-02-09 PROCEDURE — 2580000003 HC RX 258: Performed by: INTERNAL MEDICINE

## 2023-02-09 PROCEDURE — 94640 AIRWAY INHALATION TREATMENT: CPT

## 2023-02-09 PROCEDURE — 2700000000 HC OXYGEN THERAPY PER DAY

## 2023-02-09 PROCEDURE — 6370000000 HC RX 637 (ALT 250 FOR IP): Performed by: INTERNAL MEDICINE

## 2023-02-09 PROCEDURE — 85027 COMPLETE CBC AUTOMATED: CPT

## 2023-02-09 PROCEDURE — 97530 THERAPEUTIC ACTIVITIES: CPT

## 2023-02-09 PROCEDURE — 6360000002 HC RX W HCPCS: Performed by: INTERNAL MEDICINE

## 2023-02-09 PROCEDURE — 94761 N-INVAS EAR/PLS OXIMETRY MLT: CPT

## 2023-02-09 PROCEDURE — C9113 INJ PANTOPRAZOLE SODIUM, VIA: HCPCS | Performed by: INTERNAL MEDICINE

## 2023-02-09 PROCEDURE — 97110 THERAPEUTIC EXERCISES: CPT

## 2023-02-09 PROCEDURE — 97166 OT EVAL MOD COMPLEX 45 MIN: CPT

## 2023-02-09 PROCEDURE — 92526 ORAL FUNCTION THERAPY: CPT

## 2023-02-09 PROCEDURE — 97162 PT EVAL MOD COMPLEX 30 MIN: CPT

## 2023-02-09 PROCEDURE — 2000000000 HC ICU R&B

## 2023-02-09 PROCEDURE — 71045 X-RAY EXAM CHEST 1 VIEW: CPT

## 2023-02-09 PROCEDURE — 80069 RENAL FUNCTION PANEL: CPT

## 2023-02-09 PROCEDURE — 99233 SBSQ HOSP IP/OBS HIGH 50: CPT | Performed by: INTERNAL MEDICINE

## 2023-02-09 PROCEDURE — 99291 CRITICAL CARE FIRST HOUR: CPT | Performed by: INTERNAL MEDICINE

## 2023-02-09 RX ORDER — PREDNISONE 20 MG/1
40 TABLET ORAL DAILY
Status: DISCONTINUED | OUTPATIENT
Start: 2023-02-10 | End: 2023-02-14

## 2023-02-09 RX ORDER — TRAMADOL HYDROCHLORIDE 50 MG/1
50 TABLET ORAL EVERY 12 HOURS PRN
Status: DISCONTINUED | OUTPATIENT
Start: 2023-02-09 | End: 2023-02-16 | Stop reason: HOSPADM

## 2023-02-09 RX ADMIN — SODIUM CHLORIDE, PRESERVATIVE FREE 10 ML: 5 INJECTION INTRAVENOUS at 20:26

## 2023-02-09 RX ADMIN — METHYLPREDNISOLONE SODIUM SUCCINATE 40 MG: 40 INJECTION, POWDER, FOR SOLUTION INTRAMUSCULAR; INTRAVENOUS at 08:15

## 2023-02-09 RX ADMIN — ENOXAPARIN SODIUM 40 MG: 100 INJECTION SUBCUTANEOUS at 08:15

## 2023-02-09 RX ADMIN — CEFEPIME 2000 MG: 2 INJECTION, POWDER, FOR SOLUTION INTRAVENOUS at 10:14

## 2023-02-09 RX ADMIN — IPRATROPIUM BROMIDE AND ALBUTEROL SULFATE 1 AMPULE: .5; 2.5 SOLUTION RESPIRATORY (INHALATION) at 11:17

## 2023-02-09 RX ADMIN — PANTOPRAZOLE SODIUM 40 MG: 40 INJECTION, POWDER, FOR SOLUTION INTRAVENOUS at 08:15

## 2023-02-09 RX ADMIN — CEFEPIME 2000 MG: 2 INJECTION, POWDER, FOR SOLUTION INTRAVENOUS at 03:07

## 2023-02-09 RX ADMIN — Medication 10 ML: at 20:25

## 2023-02-09 RX ADMIN — CEFEPIME 2000 MG: 2 INJECTION, POWDER, FOR SOLUTION INTRAVENOUS at 18:04

## 2023-02-09 RX ADMIN — SERTRALINE 100 MG: 100 TABLET, FILM COATED ORAL at 20:24

## 2023-02-09 RX ADMIN — ROPINIROLE HYDROCHLORIDE 1 MG: 1 TABLET, FILM COATED ORAL at 20:25

## 2023-02-09 RX ADMIN — IPRATROPIUM BROMIDE AND ALBUTEROL SULFATE 1 AMPULE: .5; 2.5 SOLUTION RESPIRATORY (INHALATION) at 23:19

## 2023-02-09 RX ADMIN — IPRATROPIUM BROMIDE AND ALBUTEROL SULFATE 1 AMPULE: .5; 2.5 SOLUTION RESPIRATORY (INHALATION) at 19:30

## 2023-02-09 RX ADMIN — RISPERIDONE 1 MG: 1 TABLET ORAL at 20:24

## 2023-02-09 RX ADMIN — ROPINIROLE HYDROCHLORIDE 1 MG: 1 TABLET, FILM COATED ORAL at 08:15

## 2023-02-09 RX ADMIN — PREGABALIN 200 MG: 100 CAPSULE ORAL at 20:24

## 2023-02-09 RX ADMIN — PREGABALIN 200 MG: 100 CAPSULE ORAL at 08:14

## 2023-02-09 RX ADMIN — SERTRALINE 100 MG: 100 TABLET, FILM COATED ORAL at 08:15

## 2023-02-09 RX ADMIN — ACETAMINOPHEN 650 MG: 325 TABLET ORAL at 20:24

## 2023-02-09 RX ADMIN — ACETAMINOPHEN 650 MG: 325 TABLET ORAL at 04:53

## 2023-02-09 RX ADMIN — TRAMADOL HYDROCHLORIDE 50 MG: 50 TABLET, COATED ORAL at 14:34

## 2023-02-09 RX ADMIN — IPRATROPIUM BROMIDE AND ALBUTEROL SULFATE 1 AMPULE: .5; 2.5 SOLUTION RESPIRATORY (INHALATION) at 07:37

## 2023-02-09 RX ADMIN — IPRATROPIUM BROMIDE AND ALBUTEROL SULFATE 1 AMPULE: .5; 2.5 SOLUTION RESPIRATORY (INHALATION) at 02:56

## 2023-02-09 RX ADMIN — ATORVASTATIN CALCIUM 60 MG: 40 TABLET, FILM COATED ORAL at 08:15

## 2023-02-09 RX ADMIN — RISPERIDONE 1 MG: 1 TABLET ORAL at 08:15

## 2023-02-09 RX ADMIN — IPRATROPIUM BROMIDE AND ALBUTEROL SULFATE 1 AMPULE: .5; 2.5 SOLUTION RESPIRATORY (INHALATION) at 15:03

## 2023-02-09 ASSESSMENT — PAIN DESCRIPTION - LOCATION
LOCATION: HAND
LOCATION: LEG
LOCATION: LEG

## 2023-02-09 ASSESSMENT — PAIN DESCRIPTION - ORIENTATION
ORIENTATION: LEFT
ORIENTATION: RIGHT;LEFT
ORIENTATION: RIGHT;LEFT;LOWER

## 2023-02-09 ASSESSMENT — PAIN SCALES - GENERAL
PAINLEVEL_OUTOF10: 3
PAINLEVEL_OUTOF10: 8
PAINLEVEL_OUTOF10: 0
PAINLEVEL_OUTOF10: 9
PAINLEVEL_OUTOF10: 3

## 2023-02-09 ASSESSMENT — PAIN - FUNCTIONAL ASSESSMENT: PAIN_FUNCTIONAL_ASSESSMENT: PREVENTS OR INTERFERES SOME ACTIVE ACTIVITIES AND ADLS

## 2023-02-09 ASSESSMENT — PAIN DESCRIPTION - DESCRIPTORS
DESCRIPTORS: ACHING
DESCRIPTORS: SHARP

## 2023-02-09 NOTE — PROGRESS NOTES
Inpatient Physical Therapy Evaluation    Unit: ICU  Date:  2/9/2023  Patient Name:    Tien Blanco  Admitting diagnosis:  COPD exacerbation [facility]) [J44.1]  Acute respiratory failure with hypercapnia ([facility] [J96.02]  Acute encephalopathy [G93.40]  Acute on chronic respiratory failure with hypoxia and hypercapnia ([address] [J96.21, J96.22]  Admit Date:  2/2/2023  Precautions/Restrictions/WB Status/ Lines/ Wounds/ Oxygen: Fall risk, Bed/chair alarm, Lines (IV, Supplemental O2 (9L), and Arias catheter), Telemetry, and Continuous pulse oximetry    Treatment Time:  13:40-14:30  Treatment Number:  1  Timed Code Treatment Minutes: 40 minutes  Total Treatment Minutes:  50  minutes    Patient Stated Goals for Therapy: \" not stated \"          Discharge Recommendations: SNF  DME needs for discharge: Defer to facility       Therapy recommendation for EMS Transport: requires transport by cot due to pt needs lift equipment for safe transfers    Therapy recommendations for staff:   Assist of 2 for bed mobility. Unable to sit at EOB unsupported. Maxi Move to recliner. History of Present Illness:   COPD exacerbation , failed bipap and intubated in ER   Imaging with left hemithorax opacification,s/p bronch with clearance of mucous plugs      Agitation issues noted on vent   CTA chest with no PE , bilateral pneumonia   Intubated 2/2-2/6. Extubated to Vapotherm. Home Health S4 Level Recommendation:  NA    AM-PAC Mobility Score    AM-PAC Inpatient Mobility Raw Score : 8       Subjective  Patient lying reclined bed with family at bedside. Pt agreeable to this PT session. Cognition    A&O orientation not directly assessed.     Able to follow 1 step commands    Pain   Yes  Location: bilat feet  Rating: moderate /10  Pain Medicine Status: Received pain med prior to tx    Preadmission Environment:   Pt. Lives                                              with family (dtr)  Home environment:                            mobile home/trailer  Steps to enter first floor:                     5 steps to enter with bilateral railings   Steps to second floor/basement:        N/A  Laundry:                                              1st floor  Bathroom:                                           tub/shower unit, shower chair , and comfort height toilet  Pt sleeps in a:                                     [address]  Equipment owned:                              rollator, shower chair/bench, raised toilet seat, and home O2 (3L) continous     Preadmission Status:  Pt. Able to drive: No  Pt. Fully independent with ADLs: No  Pt. Required assistance from family for: Cleaning, Cooking, and Laundry   Pt. supervision for functional transfers and utilized Rollator for mobility in home and Rollator out in community  History of falls:            No  Home Health Services:None    Objective  Does this pt have an acute or acute on chronic diagnosis of CHF? No    Upper Extremity ROM/Strength  Please see OT evaluation. Lower Extremity ROM / Strength   AROM WFL: No; <50% at all LE joints bilat 2/2 weakness. BLE strength impaired, but not formally assessed with MMT. Grossly 2+/5 to 3-/5 throughout. Lower Extremity Sensation    NT    Coordination and Tone  NT    Balance  Static Sitting:  Poor; Max A   Dynamic Sitting:  Poor; Max A   Comments: At EOB x 25 minutes; needed Max A at all times due to posterior and left lateral lean. Static Standing: Not tested; N/A  Dynamic Standing: Not tested; N/A  Comments: N/A    Posture  Seated: Forward head and neck, Thoracic kyphosis, and posterior pelvic tilt  Standing: N/A    Bed Mobility   Supine to Sit:    Max A  and 2 persons  Sit to Supine: Total A and 2 persons  Rolling: Max A  and 2 persons  Scooting in sitting: Total A and 2 persons  Scooting in supine: Total A and 2 persons  Bridging:  No    Transfer Training     Sit to stand:   Attempted 2 trials from EOB with ROHITH.  Pt unable to appreciably contribute with UEs or LEs. Stand to sit:   Not Tested  Bed to Chair:   Not Tested with use of N/A    Gait gait deferred due to weakness; pt ambulated 0 ft. Stair Training deferred, pt unsafe/ not appropriate to complete stairs at this time    Therapeutic Exercises Initiated    Exercises in BOLD performed in unit today. Items not bolded are carried forward from prior visits for continuity of the record. *For CHF pt, see ADDENDUM below for therex details. Exercise/Equipment Resistance/Repetitions Other comments     Ankle pumps  X 10 reps AAROM      Hip IR/ER log roll X 10 reps AAROM      SAQ  X 10 reps AROM                    Activity Tolerance   During therapy session noted pt with no adverse symptoms to activity   BP (mmHg) HR (bpm) SpO2 (%) on 9L Comments   Supine at rest 154/70 83 bpm 97%     Seated at EOB   bpm %     Standing   bpm %     End of session 121/67  87bpm 97%      Positioning Needs   Pt in bed, ICU monitoring, positioned in proper neutral alignment and pressure relief provided. Call light provided and all needs within reach    Other Activities  None. Patient/Family Education   Pt educated on role of inpatient PT, POC, importance of continued activity, HEP and calling for assist with mobility. Assessment  Pt seen today for physical therapy Evaluation. Pt demonstrated decreased Activity tolerance, Balance, ROM, Safety, and Strength as well as decreased independence with Ambulation, Bed Mobility , and Transfers. Pt lives with family and is normally indep with mobility using a walker. Now functioning well below her baseline. Needs Max A to sit at EOB due to poor trunk stabilizers. Needed Max A x 2 for all bed mobility tasks. Vitals were stable on 9L O2.      Recommending SNF upon discharge as patient functioning well below baseline, demonstrates good rehab potential and unable to return home due to burden of care beyond caregiver ability and home environment not conducive to patient recovery. Goals : To be met in 3 visits:  1). Independent with LE Ex x 10 reps  2). CHF goal: N/A  3). Sit EOB x 10 minutes with Min A    To be met in 6 visits:  1). Supine to/from sit: Min A   2). Sit to/from stand: Min A   3). Bed to chair: Total A with STEDY  4). Tolerate B LE exercises 3 sets of 10-15 reps    Rehabilitation Potential: Good  Strengths for achieving goals include:   Pt motivated, PLOF, Family Support, and Pt cooperative   Barriers to achieving goals include:    No Barriers    Plan    To be seen 3-5 x / week  while in acute care setting for therapeutic exercises, bed mobility, transfers, progressive gait training, balance training, and family/patient education. Signature: Jennyfer Terry, PT, DPT    If patient discharges from this facility prior to next visit, this note will serve as the Discharge Summary.

## 2023-02-09 NOTE — PROGRESS NOTES
RT Inhaler-Nebulizer Bronchodilator Protocol Note    There is a bronchodilator order in the chart from a provider indicating to follow the RT Bronchodilator Protocol and there is an Initiate RT Inhaler-Nebulizer Bronchodilator Protocol order as well (see protocol at bottom of note). CXR Findings:  XR CHEST PORTABLE    Result Date: 2/8/2023  1. Stable appropriate positions of support apparatus. 2. Slight interval improvement of mild pulmonary edema. 3. No significant change in appearance of a small left pleural effusion and left basilar airspace consolidation. XR CHEST PORTABLE    Result Date: 2/7/2023  1. Stable appropriate positions of support apparatus. 2. Stable small bilateral pleural effusions. 3. Stable left basilar airspace consolidation. 4. Interval progression of diffuse hazy airspace opacity throughout both lungs, right greater than left, which likely reflects progressive pulmonary edema, though progressive multifocal pneumonia is also a consideration. The findings from the last RT Protocol Assessment were as follows:   History Pulmonary Disease: Chronic pulmonary disease  Respiratory Pattern: Dyspnea on exertion or RR 21-25 bpm  Breath Sounds: Inspiratory and expiratory or bilateral wheezing and/or rhonchi  Cough: Strong, spontaneous, non-productive  Indication for Bronchodilator Therapy: Decreased or absent breath sounds  Bronchodilator Assessment Score: 10    Aerosolized bronchodilator medication orders have been revised according to the RT Inhaler-Nebulizer Bronchodilator Protocol below. Respiratory Therapist to perform RT Therapy Protocol Assessment initially then follow the protocol. Repeat RT Therapy Protocol Assessment PRN for score 0-3 or on second treatment, BID, and PRN for scores above 3. No Indications - adjust the frequency to every 6 hours PRN wheezing or bronchospasm, if no treatments needed after 48 hours then discontinue using Per Protocol order mode.      If indication present, adjust the RT bronchodilator orders based on the Bronchodilator Assessment Score as indicated below. Use Inhaler orders unless patient has one or more of the following: on home nebulizer, not able to hold breath for 10 seconds, is not alert and oriented, cannot activate and use MDI correctly, or respiratory rate 25 breaths per minute or more, then use the equivalent nebulizer order(s) with same Frequency and PRN reasons based on the score. If a patient is on this medication at home then do not decrease Frequency below that used at home. 0-3 - enter or revise RT bronchodilator order(s) to equivalent RT Bronchodilator order with Frequency of every 4 hours PRN for wheezing or increased work of breathing using Per Protocol order mode. 4-6 - enter or revise RT Bronchodilator order(s) to two equivalent RT bronchodilator orders with one order with BID Frequency and one order with Frequency of every 4 hours PRN wheezing or increased work of breathing using Per Protocol order mode. 7-10 - enter or revise RT Bronchodilator order(s) to two equivalent RT bronchodilator orders with one order with TID Frequency and one order with Frequency of every 4 hours PRN wheezing or increased work of breathing using Per Protocol order mode. 11-13 - enter or revise RT Bronchodilator order(s) to one equivalent RT bronchodilator order with QID Frequency and an Albuterol order with Frequency of every 4 hours PRN wheezing or increased work of breathing using Per Protocol order mode. Greater than 13 - enter or revise RT Bronchodilator order(s) to one equivalent RT bronchodilator order with every 4 hours Frequency and an Albuterol order with Frequency of every 2 hours PRN wheezing or increased work of breathing using Per Protocol order mode. PATIENT WILL BENEFIT FROM TREATMENTS.      Electronically signed by Kiera Mtz RCP on 2/8/2023 at 7:23 PM

## 2023-02-09 NOTE — PROGRESS NOTES
FiO2 decraesed to55% via vapotherm at 35lpm         02/09/23 0740   Oxygen Therapy/Pulse Ox   O2 Therapy Oxygen humidified   O2 Device Heated high flow cannula   O2 Flow Rate (L/min) 35 L/min   FiO2  55 %

## 2023-02-09 NOTE — PROGRESS NOTES
Patient resting in bed, awake, family leaving for the night, a/o, on Vapotherm, tolerating well, lung sounds rhonchi right middle lobe and left upper lobe otherwise diminished, cough moderate secretions, Pardeep at bedside for patient use. Abdomen round, bowel sounds active, positive flatus, Speech to see in am, tolerating ice chips. Extubated today, no longer on tube feeding. Edema improving, BLE slight redness, skin shins grooved, good pedal pulses. Repositioned, c/o back discomfort. Call light in reach, will continue to monitor.

## 2023-02-09 NOTE — PROGRESS NOTES
IM Progress Note    Admit Date:  2/2/2023  7    Interval history:  copd exacerbation , failed bipap and intubated in ER   Imaging with left hemithorax opacification,s/p bronch with clearance of mucous plugs     Agitation issues noted on vent   CTA chest with no PE , bilateral pneumonia     Subjective:    Ms. Woods Book extubated   Now on vapotherm--> 9 L high flow     Objective:   BP (!) 140/59   Pulse (!) 103   Temp 99.4 °F (37.4 °C) (Core)   Resp 19   Ht 5' 3\" (1.6 m)   Wt 178 lb 3.2 oz (80.8 kg)   SpO2 94%   BMI 31.57 kg/m²     Intake/Output Summary (Last 24 hours) at 2/9/2023 1213  Last data filed at 2/9/2023 0554  Gross per 24 hour   Intake 333.11 ml   Output 3350 ml   Net -3016.89 ml         Physical Exam:      General:  elderly female    Appears to be not in any distress  Mucous Membranes:  Pink , anicteric  Neck: No JVD, no carotid bruit, no thyromegaly  Chest: bilateral air entry with resolving  rhonchi and improving left sided airentry  Cardiovascular:  RRR S1S2 heard, no murmurs or gallops  Abdomen:  Soft, undistended, non tender, no organomegaly, BS present  Extremities: chronic 2 +  brawny edema of both LE with diffuse celluitis of ext Bilaterally  Distal pulses well felt  Neurological : sedated      Medications:   Scheduled Medications:    [START ON 2/10/2023] predniSONE  40 mg Oral Daily    insulin lispro  0-16 Units SubCUTAneous Q4H    pregabalin  200 mg Oral BID    rOPINIRole  1 mg Oral BID    lidocaine 1 % injection  5 mL IntraDERmal Once    sodium chloride flush  5-40 mL IntraVENous 2 times per day    cefepime  2,000 mg IntraVENous Q8H    risperiDONE  1 mg Oral BID    atorvastatin  60 mg Oral Daily    pantoprazole  40 mg IntraVENous Daily    sertraline  100 mg Oral BID    sodium chloride flush  5-40 mL IntraVENous 2 times per day    enoxaparin  40 mg SubCUTAneous Daily    nicotine  1 patch TransDERmal Daily    ipratropium-albuterol  1 ampule Inhalation Q4H     I   sodium chloride      dextrose sodium chloride 5 mL/hr at 02/09/23 0014     sodium chloride flush, sodium chloride, glucose, dextrose bolus **OR** dextrose bolus, glucagon (rDNA), dextrose, sodium chloride flush, sodium chloride, ondansetron **OR** ondansetron, polyethylene glycol, acetaminophen **OR** acetaminophen, albuterol    Lab Data:  Recent Labs     02/07/23 0445 02/08/23 0455 02/09/23  0435   WBC 9.3 10.7 15.3*   HGB 11.4* 11.2* 11.8*   HCT 34.7* 34.4* 37.4   MCV 92.7 92.8 94.4    166 176       Recent Labs     02/07/23 0445 02/08/23 0455 02/09/23  0435    136 139   K 4.2 4.5 3.6    101 102   CO2 25 26 25   PHOS 3.3 4.1 3.0   BUN 37* 39* 31*   CREATININE 0.8 0.7 0.6       No results for input(s): CKTOTAL, CKMB, CKMBINDEX, TROPONINI in the last 72 hours. Coagulation:   Lab Results   Component Value Date/Time    INR 1.04 02/04/2023 12:15 PM    APTT 27.6 02/04/2023 12:15 PM     Cardiac markers:   Lab Results   Component Value Date/Time    CKMB 10.3 07/14/2010 07:25 AM    CKTOTAL 74 10/17/2014 02:46 AM    TROPONINI <0.01 02/02/2023 05:05 AM         Lab Results   Component Value Date    ALT 12 02/03/2023    AST 13 (L) 02/03/2023    ALKPHOS 99 02/03/2023    BILITOT 0.3 02/03/2023       Lab Results   Component Value Date    INR 1.04 02/04/2023    INR 0.94 09/12/2020    INR 0.99 07/01/2018    PROTIME 13.5 02/04/2023    PROTIME 10.9 09/12/2020    PROTIME 11.3 07/01/2018       Radiology    Cxr    Complete opacification has developed of the left hemithorax, which can   reflect combination of atelectasis, airspace disease, and/or pleural   effusion. Right-sided airspace disease is not substantially changed. Chest xray   1. Findings suggestive of partial right upper lobe atelectasis. 2. Small left pleural effusion. 3. Central pulmonary vascular congestion without overt pulmonary edema. 4. Left basilar atelectasis.      Cxr    Significantly limited evaluation secondary to patient positioning with no   signs to suggest pneumothorax. CTA chest    1. No CT evidence of a pulmonary embolism. 2. Bilateral lower lobe airspace consolidation, left worse than right,   representing either pneumonia or aspiration. 3. Additional scattered curvilinear, consolidative, nodular opacities   throughout the upper lobes and right middle lobe likely reflect additional   multifocal pneumonia and/or pulmonary edema. 4. Small bilateral pleural effusions. 5. Enlarged nodular appearance of the left thyroid lobe. Consider further   characterization with a routine non urgent follow-up thyroid ultrasound. See   below. 6. Findings suggestive of cirrhosis and portal hypertension    Cultures:   Cultures  Blood- NGTD  Sputum - pending  BAL NRF      Assessment & Plan:    Acute respiratory failure with hypoxia and hypercapnia  Copd exacerbation   Left hemithorax opacification with mucous plugging    . O2 sat 86% on 5L in the ER. Initially placed on NIV. Too obtunded for NIV and PCO2 worsened after ~1 h on NIV. And eventually placed on vent  Admitted to ICU   CTA chest with bilateral pna, no PE  Had emergency bronch with removal of mucous plugs  Started on steroids, abx -cefepime day 7/7 and vanc   Critical care following  BAL with NRF- can downgrade abx  Vancomycin discontinued  Extubated   Now on vapotherm--> 9 L high flow nasal canula    COPD exacerbation   IV solumedrol, IV abx as above , PRN/GIDEON intensive NEB therapy. Can wean steroids     Acute encephalopathy, obtunded, likely metabolic 2/2 hypercarbia. Now on vent support    DM2, hold oral Rx, added Mod SSI q4h. Bilateral LE cellulitis - has chronic lymphedema, resume  diuretics  started on cefepime and added vanc  - await cx   Vanco DC'd  Start low-dose gabapentin for leg pain    Tobacco Abuse, unable to  cessation 2/2 MS, 21 mg nicotine patch.      Hx of cirrhosis with portal HTN - resume home diuretics    Thyroid enlargement on left side - need f/w US as outpt DVT Prophylaxis: Lx  Diet TF  Code Status: Full Code     Sky Murillo MD, 2/9/2023 12:13 PM

## 2023-02-09 NOTE — CARE COORDINATION
67 Met with pt at bedside she is agreeable to STR and states she would like writer to call her dtr Blank Garcia for SNF choices. LVM for dtr, awaiting call back.

## 2023-02-09 NOTE — PROGRESS NOTES
Speech Language Pathology    Speech Language Pathology  Dysphagia Treatment/Follow-Up Note  Facility/Department: [facility] ICU    Recommendations:  NPO exception of low volume ice chips for comfort and to combat disuse atrophy, meds whole or crushed in puree, oral care q4 hours  Pt may benefit from completion of instrumental swallow study in the future -- will continue to monitor. Charlotte Vann  : 1955 (79 y.o.)   MRN: [id number]  ROOM: [address] [place] [address]  ADMISSION DATE: 2023  PATIENT DIAGNOSIS(ES): COPD exacerbation (HCC) [J44.1]  Acute respiratory failure with hypercapnia (HCC) [J96.02]  Acute encephalopathy [G93.40]  Acute on chronic respiratory failure with hypoxia and hypercapnia (HCC) [J96.21, J96.22]  Allergies   Allergen Reactions    Aspirin Hives     Other reaction(s): GI Upset, Unknown    Duloxetine Hcl Nausea And Vomiting     Other reaction(s): Unknown, Vomiting    Duloxetine Nausea And Vomiting    Asa Buff (Mag [Buffered Aspirin] Hives       DATE ONSET: 2023    Pain: The patient does not complain of pain     Current Diet: Diet NPO    Diet Tolerance:  Pt currently NPO. Most Recent CXR Findings (23): Impression   Increasing bibasilar ground-glass opacities with a developing right pleural   effusion. Pulmonary edema or ARDS suspected. Dysphagia Treatment and Impressions:  Subjective: Pt seen in room at bedside with RN permission Raquel Wilkerson). Pt seen upright in bed, quite kyphotic. Pt alert and cooperative, agreeable to PO trials (requesting \"coffee\"). Noted audible congestion at baseline, although pt's vocal quality is clear. RN Report/Chart Review: RN reported pt now on 12 L O2 HFNC 100% FiO2 (pt was requiring Vapotherm 35L/min earlier this AM). Noted worsening CXR findings from this AM, . Patient tolerance: pt currently NPO    Respiratory Status: Pt with SPO2% of 96-98 on 12 LPM HFNC 100% FiO2 with RR of 21-24/min.   Pt reported that she wears 3 lpm O2 NC at baseline. Liquid PO Trials:   IDDSI 0 Thin:  Assessed via ice chips (x3) and tsp: anterior bolus loss with tsp sips, suspect premature bolus loss into pharynx, suspect delayed swallow onset, vitals stable, and wet breath sounds upon exhalation  IDDSI 2 Mildly Thick (nectar):  Assessed via tsp (5cc): anterior bolus loss, suspect premature bolus loss into pharynx, suspect delayed swallow onset, vitals stable, and wet breath sounds upon exhalation. Hyolaryngeal excursion appears reduced upon palpation of the anterior neck, however difficult to fully assess d/t pt's body habitus/positioning (pt is quite kyphotic). Solid PO Trials  IDDSI 4 Puree:   no anterior bolus loss , suspect functional A-P bolus transit, swallow timing subjectively appears timely, vitals stable. X1 delayed dry throat clear post-swallow across 8 trials. Education: SLP edu pt re: Role of SLP, Rationale for dysphagia tx, Recommended compensatory strategies, Aspiration precautions, POC, Anatomical components of swallow structures as they pertain to airway protection, and Rationale for NPO status. Pt verbalized understanding, would benefit from ongoing education, and RN aware of recommendations  Assessment: Pt not tolerating PO trials without clinical s/s of aspiration. Some carryover of recommended compensatory strategies    Recommendations: SLP recommends to continue with NPO exception of low volume ice chips for comfort and to combat disuse atrophy; Meds whole or crushed in puree. Pt may benefit from completion of instrumental swallow study in the future -- will continue to monitor. Risk Management: oral care q4 hrs to reduce adverse affects in the event of aspiration, general aspiration precautions, and hold PO and contact SLP if s/s of aspiration or worsening respiratory status develop. Sherryle Moder      Dysphagia Goals:  Timeframe for Long-term Goals: 1-2 weeks (2/15-3/1/23)  Goal 1: Pt will tolerate ongoing assessment of swallowing during this admission. 02/09: ongoing, continue NPO exception of low volume ice chips and meds in puree at this time     Short-term Goals  Timeframe for Short-term Goals: 1 week, 3-5x/wk until 02/15/23  1) The patient will tolerate repeat bedside swallowing evaluation when able. 02/09: ongoing, progressing. Continue NPO rec. 2) The patient will recall and perform compensatory strategies, with no cues. 02/09: ongoing, pt would benefit from continued reinforcement. 3) New goal: The pt will participate in completion of instrumental swallow study. 02/09: ongoing; will continue to monitor for need. Speech/Language/Cog Goals: N/A    Recommendations:  NPO exception of low volume ice chips for comfort and to combat disuse atrophy, meds whole or crushed in puree, oral care q4 hours    Plan:    Continued Dysphagia treatment with goals per plan of care. Discharge Recommendations: TBD    If pt discharges from hospital prior to Speech/Swallowing discharge, this note serves as tx and discharge summary.      Total Treatment Time / Charges     Time in Time out Total Time / units   Cognitive Tx         Speech Tx      Dysphagia Tx 0944 1000 16 min / 1 unit     Signature:  Brent Phalen, M.S. [id number] [facility]  Speech-language pathologist  [id number]

## 2023-02-09 NOTE — PLAN OF CARE
Problem: Safety - Medical Restraint  Goal: Remains free of injury from restraints (Restraint for Interference with Medical Device)  Description: INTERVENTIONS:  1. Determine that other, less restrictive measures have been tried or would not be effective before applying the restraint  2. Evaluate the patient's condition at the time of restraint application  3. Inform patient/family regarding the reason for restraint  4. Q2H: Monitor safety, psychosocial status, comfort, nutrition and hydration  Outcome: Completed       Problem: Skin/Tissue Integrity  Goal: Absence of new skin breakdown  Description: 1. Monitor for areas of redness and/or skin breakdown  2. Assess vascular access sites hourly  3. Every 4-6 hours minimum:  Change oxygen saturation probe site  4. Every 4-6 hours:  If on nasal continuous positive airway pressure, respiratory therapy assess nares and determine need for appliance change or resting period.   Outcome: Progressing   Problem: Pain  Goal: Verbalizes/displays adequate comfort level or baseline comfort level  Outcome: Progressing     Problem: Nutrition Deficit:  Goal: Optimize nutritional status  Outcome: Progressing   Problem: Discharge Planning  Goal: Discharge to home or other facility with appropriate resources  Recent Flowsheet Documentation  Taken 2/8/2023 2000 by Rachelle Palacios RN  Discharge to home or other facility with appropriate resources: Identify barriers to discharge with patient and caregiver

## 2023-02-09 NOTE — PROGRESS NOTES
Shift handoff report given to Kadeem Escalona RN at bedside. Pt is Awake and alert  IV handoff completed. Daughter at bedside. Call light within reach, bed in lowest position, bed alarm on. End of shift checks completed. Pt has been free of falls for duration of shift. Loretta Braxton

## 2023-02-09 NOTE — PROGRESS NOTES
Tylenol requested for discomfort. Patient lung sounds rhonchi, SaO2 87%, high flow NC in place, productive cough, RT called for hypoxia, will continue to monitor.

## 2023-02-09 NOTE — FLOWSHEET NOTE
02/09/23 0600   Vital Signs   Heart Rate 100   Resp 20   /64   MAP (Calculated) 89   MAP (mmHg) 82   Oxygen Therapy   SpO2 98 %   Pulse Oximeter Device Mode Continuous   FiO2  70 %   O2 Flow Rate (L/min) 35 L/min     Patient denies needs, does not want to reposition at this time, lung sounds diminished, rhonchi upper lobes, productive cough, a/o, did not rest much. IV intact, call light in reach, will continue to monitor.

## 2023-02-09 NOTE — PROGRESS NOTES
Patient requesting pain medication, educated patient that only Tylenol is ordered at this time. Patient states that Tylenol won't work, refusing to take it at this time. Patient states she does not have any pain medications that she takes at home. Patient agreeable to waiting for Dr. Jefferson Ceja to round and requesting pain medication.

## 2023-02-09 NOTE — PROGRESS NOTES
Reassessment complete.    Patient continues awake in bed. Physical assessment unchanged. Patient repositioned for comfort. Patient's family at bedside. No current needs voiced. Call light and personal belongings within reach.

## 2023-02-09 NOTE — PROGRESS NOTES
02/09/23 0102   Oxygen Therapy/Pulse Ox   O2 Therapy Oxygen humidified   O2 Device Heated high flow cannula   O2 Flow Rate (L/min) 35 L/min   FiO2  (S)  70 %   Heart Rate 85   Resp 22   SpO2 92 %

## 2023-02-09 NOTE — PROGRESS NOTES
Pulmonary & Critical Care Medicine ICU Progress Note    CC: COPD    Events of Last 24 hours: extubated to vapotherm  A lot of secretions but clearing them well    Invasive Lines: PICC    MV:    Vent Mode: AC/VC Resp Rate (Set): 18 bmp/Vt (Set, mL): 360 mL/ /FiO2 : 55 %  Recent Labs     02/08/23  0525 02/08/23  1050   PHART 7.402 7.376   [id number] 35.7 39.2   PO2ART 68.7* 71.5*       IV:   sodium chloride      dextrose      sodium chloride 5 mL/hr at 02/09/23 0014       Vitals:  /64   Pulse 96   Temp 99.8 °F (37.7 °C) (Core)   Resp 24   Ht 5' 3\" (1.6 m)   Wt 178 lb 3.2 oz (80.8 kg)   SpO2 97%   BMI 31.57 kg/m²   on 55%   Constitutional:  No acute distress. Eyes: PERRL. Conjunctivae anicteric. ENT: Normal nose. Normal tongue. Neck:  Trachea is midline. No thyroid tenderness. Respiratory: No accessory muscle usage. decreased breath sounds. No wheezes. No rales. +Rhonchi. Cardiovascular: Normal S1S2. No digit clubbing. No digit cyanosis. No LE edema. Psychiatric: No anxiety or Agitation. Alert and Oriented to person, place and time.      Scheduled Meds:   insulin lispro  0-16 Units SubCUTAneous Q4H    methylPREDNISolone  40 mg IntraVENous Daily    pregabalin  200 mg Oral BID    rOPINIRole  1 mg Oral BID    lidocaine 1 % injection  5 mL IntraDERmal Once    sodium chloride flush  5-40 mL IntraVENous 2 times per day    cefepime  2,000 mg IntraVENous Q8H    risperiDONE  1 mg Oral BID    atorvastatin  60 mg Oral Daily    pantoprazole  40 mg IntraVENous Daily    sertraline  100 mg Oral BID    sodium chloride flush  5-40 mL IntraVENous 2 times per day    enoxaparin  40 mg SubCUTAneous Daily    nicotine  1 patch TransDERmal Daily    ipratropium-albuterol  1 ampule Inhalation Q4H     PRN Meds:  sodium chloride flush, sodium chloride, glucose, dextrose bolus **OR** dextrose bolus, glucagon (rDNA), dextrose, sodium chloride flush, sodium chloride, ondansetron **OR** ondansetron, polyethylene glycol, acetaminophen **OR** acetaminophen, albuterol    Results:  CBC:   Recent Labs     02/07/23  0445 02/08/23 0455 02/09/23  0435   WBC 9.3 10.7 15.3*   HGB 11.4* 11.2* 11.8*   HCT 34.7* 34.4* 37.4   MCV 92.7 92.8 94.4    166 176       BMP:   Recent Labs     02/07/23 0445 02/08/23 0455 02/09/23 0435    136 139   K 4.2 4.5 3.6    101 102   CO2 25 26 25   PHOS 3.3 4.1 3.0   BUN 37* 39* 31*   CREATININE 0.8 0.7 0.6       LIVER PROFILE: No results for input(s): AST, ALT, LIPASE, BILIDIR, BILITOT, ALKPHOS in the last 72 hours. Invalid input(s): AMYLASE,  ALB    Cultures:  2/2 resp cx: c. albicans    Films:  2/5/23 CTPA:   1. No CT evidence of a pulmonary embolism. 2. Bilateral lower lobe airspace consolidation, left worse than right,   representing either pneumonia or aspiration. 3. Additional scattered curvilinear, consolidative, and nodular opacities   throughout the upper lobes and right middle lobe likely reflect additional   multifocal pneumonia and/or pulmonary edema. 4. Small bilateral pleural effusions. 5. Enlarged nodular appearance of the left thyroid lobe. Consider further   characterization with a routine non urgent follow-up thyroid ultrasound. See   below. 6. Findings suggestive of cirrhosis and portal hypertension. CXR was reviewed by me and it showed 2/8/23    1. Stable appropriate positions of support apparatus. 2. Slight interval improvement of mild pulmonary edema. 3. No significant change in appearance of a small left pleural effusion and   left basilar airspace consolidation.        Assessment:    Acute COPD exacerbation   Bilateral lower extremity cellulitis  Possible CHF/fluid overload  OHS/SHAZIA  Acute hypoxic  hypercapnic res failure      Plan:    Vapotherm for respiratory failure - try to wean today  Change Solumedrol to prednisone taper   Cefepime Day 7/7     BD  ISSI  SLP today and advanve diet  Consider repeat CT in 6-8 weeks  Outpatient f/u of thyroid nodule  Total critical care time caring for this patient with life threatening, unstable organ failure, including direct patient contact, management of life support systems, review of data including imaging and labs, discussions with other team members and physicians at least 31 minutes so far today, excluding procedures.

## 2023-02-09 NOTE — PROGRESS NOTES
Shift assessment complete. Patient seen awake in bed. Patient is alert and oriented x 4. Patient seen on vapotherm 35 L 55%. Patient with no c/o pain. Patient with no s/s of distress noted at this time. Physical assessment as charted in flow sheets. Scheduled medications given per orders. Peripheral IV C/D/I and functioning properly. Central line dressing is clean, dry and intact with no signs of drainage. All tubing is current and dated. IPA caps applied to all access hubs. Arias intact and secure, clear yellow urine draining. Patient assisted with repositioning. Patient educated on use of call light and to call out with needs, verbalized understanding. Call light within reach.

## 2023-02-10 LAB
ALBUMIN SERPL-MCNC: 3.7 G/DL (ref 3.4–5)
ANION GAP SERPL CALCULATED.3IONS-SCNC: 11 MMOL/L (ref 3–16)
BUN BLDV-MCNC: 25 MG/DL (ref 7–20)
CALCIUM SERPL-MCNC: 10 MG/DL (ref 8.3–10.6)
CHLORIDE BLD-SCNC: 103 MMOL/L (ref 99–110)
CO2: 27 MMOL/L (ref 21–32)
CREAT SERPL-MCNC: 0.6 MG/DL (ref 0.6–1.2)
GFR SERPL CREATININE-BSD FRML MDRD: >60 ML/MIN/{1.73_M2}
GLUCOSE BLD-MCNC: 140 MG/DL (ref 70–99)
GLUCOSE BLD-MCNC: 154 MG/DL (ref 70–99)
GLUCOSE BLD-MCNC: 176 MG/DL (ref 70–99)
GLUCOSE BLD-MCNC: 205 MG/DL (ref 70–99)
GLUCOSE BLD-MCNC: 229 MG/DL (ref 70–99)
GLUCOSE BLD-MCNC: 259 MG/DL (ref 70–99)
HCT VFR BLD CALC: 35.9 % (ref 36–48)
HEMOGLOBIN: 11.7 G/DL (ref 12–16)
MCH RBC QN AUTO: 30.2 PG (ref 26–34)
MCHC RBC AUTO-ENTMCNC: 32.6 G/DL (ref 31–36)
MCV RBC AUTO: 92.7 FL (ref 80–100)
PDW BLD-RTO: 15.7 % (ref 12.4–15.4)
PERFORMED ON: ABNORMAL
PHOSPHORUS: 3.2 MG/DL (ref 2.5–4.9)
PLATELET # BLD: 179 K/UL (ref 135–450)
PMV BLD AUTO: 8.7 FL (ref 5–10.5)
POTASSIUM SERPL-SCNC: 3.6 MMOL/L (ref 3.5–5.1)
RBC # BLD: 3.87 M/UL (ref 4–5.2)
SODIUM BLD-SCNC: 141 MMOL/L (ref 136–145)
WBC # BLD: 11.5 K/UL (ref 4–11)

## 2023-02-10 PROCEDURE — 6370000000 HC RX 637 (ALT 250 FOR IP): Performed by: INTERNAL MEDICINE

## 2023-02-10 PROCEDURE — 1200000000 HC SEMI PRIVATE

## 2023-02-10 PROCEDURE — 99233 SBSQ HOSP IP/OBS HIGH 50: CPT | Performed by: INTERNAL MEDICINE

## 2023-02-10 PROCEDURE — 6360000002 HC RX W HCPCS: Performed by: INTERNAL MEDICINE

## 2023-02-10 PROCEDURE — 92526 ORAL FUNCTION THERAPY: CPT

## 2023-02-10 PROCEDURE — 94761 N-INVAS EAR/PLS OXIMETRY MLT: CPT

## 2023-02-10 PROCEDURE — 94640 AIRWAY INHALATION TREATMENT: CPT

## 2023-02-10 PROCEDURE — 2580000003 HC RX 258: Performed by: INTERNAL MEDICINE

## 2023-02-10 PROCEDURE — 94660 CPAP INITIATION&MGMT: CPT

## 2023-02-10 PROCEDURE — 80069 RENAL FUNCTION PANEL: CPT

## 2023-02-10 PROCEDURE — 99232 SBSQ HOSP IP/OBS MODERATE 35: CPT | Performed by: INTERNAL MEDICINE

## 2023-02-10 PROCEDURE — 85027 COMPLETE CBC AUTOMATED: CPT

## 2023-02-10 PROCEDURE — C9113 INJ PANTOPRAZOLE SODIUM, VIA: HCPCS | Performed by: INTERNAL MEDICINE

## 2023-02-10 PROCEDURE — 2700000000 HC OXYGEN THERAPY PER DAY

## 2023-02-10 PROCEDURE — 94669 MECHANICAL CHEST WALL OSCILL: CPT

## 2023-02-10 RX ORDER — INSULIN LISPRO 100 [IU]/ML
0-4 INJECTION, SOLUTION INTRAVENOUS; SUBCUTANEOUS NIGHTLY
Status: DISCONTINUED | OUTPATIENT
Start: 2023-02-10 | End: 2023-02-16 | Stop reason: HOSPADM

## 2023-02-10 RX ORDER — PANTOPRAZOLE SODIUM 40 MG/1
40 TABLET, DELAYED RELEASE ORAL
Status: DISCONTINUED | OUTPATIENT
Start: 2023-02-11 | End: 2023-02-16 | Stop reason: HOSPADM

## 2023-02-10 RX ORDER — IPRATROPIUM BROMIDE AND ALBUTEROL SULFATE 2.5; .5 MG/3ML; MG/3ML
1 SOLUTION RESPIRATORY (INHALATION)
Status: DISCONTINUED | OUTPATIENT
Start: 2023-02-10 | End: 2023-02-11

## 2023-02-10 RX ORDER — INSULIN LISPRO 100 [IU]/ML
0-16 INJECTION, SOLUTION INTRAVENOUS; SUBCUTANEOUS
Status: DISCONTINUED | OUTPATIENT
Start: 2023-02-10 | End: 2023-02-16 | Stop reason: HOSPADM

## 2023-02-10 RX ADMIN — IPRATROPIUM BROMIDE AND ALBUTEROL SULFATE 1 AMPULE: .5; 2.5 SOLUTION RESPIRATORY (INHALATION) at 08:20

## 2023-02-10 RX ADMIN — IPRATROPIUM BROMIDE AND ALBUTEROL SULFATE 1 AMPULE: .5; 2.5 SOLUTION RESPIRATORY (INHALATION) at 19:42

## 2023-02-10 RX ADMIN — RISPERIDONE 1 MG: 1 TABLET ORAL at 21:22

## 2023-02-10 RX ADMIN — TRAMADOL HYDROCHLORIDE 50 MG: 50 TABLET, COATED ORAL at 05:53

## 2023-02-10 RX ADMIN — SERTRALINE 100 MG: 100 TABLET, FILM COATED ORAL at 21:22

## 2023-02-10 RX ADMIN — PREGABALIN 200 MG: 100 CAPSULE ORAL at 08:04

## 2023-02-10 RX ADMIN — PREGABALIN 200 MG: 100 CAPSULE ORAL at 21:21

## 2023-02-10 RX ADMIN — ROPINIROLE HYDROCHLORIDE 1 MG: 1 TABLET, FILM COATED ORAL at 21:21

## 2023-02-10 RX ADMIN — PANTOPRAZOLE SODIUM 40 MG: 40 INJECTION, POWDER, FOR SOLUTION INTRAVENOUS at 08:04

## 2023-02-10 RX ADMIN — INSULIN LISPRO 8 UNITS: 100 INJECTION, SOLUTION INTRAVENOUS; SUBCUTANEOUS at 11:27

## 2023-02-10 RX ADMIN — INSULIN LISPRO 4 UNITS: 100 INJECTION, SOLUTION INTRAVENOUS; SUBCUTANEOUS at 16:39

## 2023-02-10 RX ADMIN — IPRATROPIUM BROMIDE AND ALBUTEROL SULFATE 1 AMPULE: .5; 2.5 SOLUTION RESPIRATORY (INHALATION) at 23:35

## 2023-02-10 RX ADMIN — ROPINIROLE HYDROCHLORIDE 1 MG: 1 TABLET, FILM COATED ORAL at 08:04

## 2023-02-10 RX ADMIN — ATORVASTATIN CALCIUM 60 MG: 40 TABLET, FILM COATED ORAL at 08:04

## 2023-02-10 RX ADMIN — ACETAMINOPHEN 650 MG: 325 TABLET ORAL at 21:21

## 2023-02-10 RX ADMIN — RISPERIDONE 1 MG: 1 TABLET ORAL at 08:04

## 2023-02-10 RX ADMIN — IPRATROPIUM BROMIDE AND ALBUTEROL SULFATE 1 AMPULE: .5; 2.5 SOLUTION RESPIRATORY (INHALATION) at 03:11

## 2023-02-10 RX ADMIN — Medication 10 ML: at 08:04

## 2023-02-10 RX ADMIN — SODIUM CHLORIDE, PRESERVATIVE FREE 10 ML: 5 INJECTION INTRAVENOUS at 20:47

## 2023-02-10 RX ADMIN — IPRATROPIUM BROMIDE AND ALBUTEROL SULFATE 1 AMPULE: .5; 2.5 SOLUTION RESPIRATORY (INHALATION) at 15:10

## 2023-02-10 RX ADMIN — IPRATROPIUM BROMIDE AND ALBUTEROL SULFATE 1 AMPULE: .5; 2.5 SOLUTION RESPIRATORY (INHALATION) at 12:20

## 2023-02-10 RX ADMIN — PREDNISONE 40 MG: 20 TABLET ORAL at 08:04

## 2023-02-10 RX ADMIN — ENOXAPARIN SODIUM 40 MG: 100 INJECTION SUBCUTANEOUS at 08:04

## 2023-02-10 RX ADMIN — SERTRALINE 100 MG: 100 TABLET, FILM COATED ORAL at 08:04

## 2023-02-10 ASSESSMENT — PAIN DESCRIPTION - ONSET: ONSET: SUDDEN

## 2023-02-10 ASSESSMENT — PAIN - FUNCTIONAL ASSESSMENT: PAIN_FUNCTIONAL_ASSESSMENT: PREVENTS OR INTERFERES SOME ACTIVE ACTIVITIES AND ADLS

## 2023-02-10 ASSESSMENT — PAIN SCALES - GENERAL
PAINLEVEL_OUTOF10: 7
PAINLEVEL_OUTOF10: 8
PAINLEVEL_OUTOF10: 0

## 2023-02-10 ASSESSMENT — PAIN DESCRIPTION - DESCRIPTORS
DESCRIPTORS: SHARP
DESCRIPTORS: ACHING

## 2023-02-10 ASSESSMENT — PAIN DESCRIPTION - ORIENTATION
ORIENTATION: RIGHT;LEFT
ORIENTATION: LEFT;RIGHT

## 2023-02-10 ASSESSMENT — PAIN DESCRIPTION - PAIN TYPE: TYPE: CHRONIC PAIN

## 2023-02-10 ASSESSMENT — PAIN DESCRIPTION - LOCATION
LOCATION: LEG
LOCATION: LEG

## 2023-02-10 ASSESSMENT — PAIN DESCRIPTION - FREQUENCY: FREQUENCY: INTERMITTENT

## 2023-02-10 NOTE — PLAN OF CARE
Problem: Discharge Planning  Goal: Discharge to home or other facility with appropriate resources  Outcome: Progressing     Problem: Pain  Goal: Verbalizes/displays adequate comfort level or baseline comfort level  Outcome: Progressing     Problem: Nutrition Deficit:  Goal: Optimize nutritional status  Outcome: Progressing     Problem: Skin/Tissue Integrity  Goal: Absence of new skin breakdown  Description: 1. Monitor for areas of redness and/or skin breakdown  2. Assess vascular access sites hourly  3. Every 4-6 hours minimum:  Change oxygen saturation probe site  4. Every 4-6 hours:  If on nasal continuous positive airway pressure, respiratory therapy assess nares and determine need for appliance change or resting period.   Outcome: Progressing

## 2023-02-10 NOTE — PROGRESS NOTES
Speech Language Pathology    Speech Language Pathology  Dysphagia Treatment/Follow-Up Note  Facility/Department: [facility] ICU    Recommendations:  Upgrade to Minced and moist diet (IDDSI 5) with thin liquids (IDDSI 0), meds whole with puree or TL, strict aspiration precautions. Consider completion of instrumental swallow study to correlate clinical findings -- will continue to monitor for need      Radha Soares  : 1955 (79 y.o.)   MRN: [id number]  ROOM: [address] [place] [address]  ADMISSION DATE: 2023  PATIENT DIAGNOSIS(ES): COPD exacerbation (HCC) [J44.1]  Acute respiratory failure with hypercapnia (HCC) [J96.02]  Acute encephalopathy [G93.40]  Acute on chronic respiratory failure with hypoxia and hypercapnia (HCC) [J96.21, J96.22]  Allergies   Allergen Reactions    Aspirin Hives     Other reaction(s): GI Upset, Unknown    Duloxetine Hcl Nausea And Vomiting     Other reaction(s): Unknown, Vomiting    Duloxetine Nausea And Vomiting    Asa Buff (Mag [Buffered Aspirin] Hives       DATE ONSET: 2023    Pain: The patient does not complain of pain     Current Diet: Diet NPO    Diet Tolerance:  Pt currently NPO. Most Recent CXR Findings (23): Impression   Increasing bibasilar ground-glass opacities with a developing right pleural   effusion. Pulmonary edema or ARDS suspected. Dysphagia Treatment and Impressions:  Subjective: Pt seen in room at bedside with RN permission Drakecarlos a Zaldivar). Pt seen upright in bed as able, quite kyphotic. Pt alert and cooperative. RN Report/Chart Review: Pt now on 3 lpm O2 NC (baseline per pt). No audible congestion noted this date. RN reported pt with frequent productive cough yesterday. Patient tolerance: pt currently NPO exception of meds in puree. RN reported pt tolerated meds whole in puree earlier this AM without difficulty. Respiratory Status: Pt with SPO2% of 93-94 on 3 lpm NC with RR of 21-24/min.       Liquid PO Trials:   IDDSI 0 Thin:  Assessed via ice chips, cup, and straw: no anterior bolus loss, grossly timely swallow initiation, no clinical s/s of aspiration, vitals stable. Solid PO Trials  IDDSI 4 Puree:  no anterior bolus loss, suspect functional A-P bolus transit, swallow timing subjectively appears timely, complete oral clearance, no clinical s/s of aspiration, vitals stable. Pt denied globus sensation post-swallow with all PO.  IDDSI 5 Minced and Moist: no anterior bolus loss, suspect functional A-P bolus transit, swallow timing subjectively appears timely, complete oral clearance, no clinical s/s of aspiration, vitals stable. Pt denied globus sensation post-swallow with all PO. Pt declined regular solid trials at this time, she reported choosing softer foods at baseline (pt is edentulous). Education: SLP edu pt re: Role of SLP, Rationale for dysphagia tx, Recommended compensatory strategies, Aspiration precautions, POC, Anatomical components of swallow structures as they pertain to airway protection. Pt verbalized understanding, would benefit from ongoing education, and RN aware of recommendations  Assessment: Pt tolerating PO trials without clinical s/s of aspiration. Some carryover of recommended compensatory strategies. Recommendations: SLP recommends to upgrade to Minced and moist diet (IDDSI 5) with thin liquids (IDDSI 0), meds whole with puree or TL, strict aspiration precautions, assist feed. Pt may benefit from completion of instrumental swallow study in the future to correlate clinical findings-- will continue to monitor. Risk Management: oral care q4 hrs to reduce adverse affects in the event of aspiration, upright positioning with PO, alternate bites/sips, assist feed, small bites/sips, general aspiration precautions, and hold PO and contact SLP if s/s of aspiration or worsening respiratory status develop.     Dysphagia Goals:  Timeframe for Long-term Goals: 1-2 weeks (2/15-3/1/23)  Goal 1: Pt will tolerate ongoing assessment of swallowing during this admission. 02/10: ongoing, progressing. PO diet recommended this date. Short-term Goals  Timeframe for Short-term Goals: 1 week, 3-5x/wk until 02/15/23  . 02/10: goal met  2) The patient will recall and perform compensatory strategies, with no cues. 02/10: ongoing, pt would benefit from continued reinforcement. 3) New goal: The pt will participate in completion of instrumental swallow study. 02/10: ongoing; will continue to monitor for need. Speech/Language/Cog Goals: N/A    Recommendations:  Upgrade to Minced and moist diet (IDDSI 5) with thin liquids (IDDSI 0), meds whole with puree or TL, strict aspiration precautions. Consider completion of instrumental swallow study to correlate clinical findings -- will continue to monitor for need    Plan:    Continued Dysphagia treatment with goals per plan of care. Discharge Recommendations: per PT/OT recs    If pt discharges from hospital prior to Speech/Swallowing discharge, this note serves as tx and discharge summary.      Total Treatment Time / Charges     Time in Time out Total Time / units   Cognitive Tx         Speech Tx      Dysphagia Tx 0914 0983 18 min / 1 unit (DT)     Signature:  Florinda Cushing, M.S. Basia Nieto  Speech-language pathologist  [id number]

## 2023-02-10 NOTE — CARE COORDINATION
INTERDISCIPLINARY PLAN OF CARE CONFERENCE    Date/Time: 2/10/2023 12:25 PM  Completed by: Darron Lesch, RN, Case Management      Patient Name:  Jayy Wang  YOB: 1955  Admitting Diagnosis: COPD exacerbation ([facility] [J44.1]  Acute respiratory failure with hypercapnia ([facility] [J96.02]  Acute encephalopathy [G93.40]  Acute on chronic respiratory failure with hypoxia and hypercapnia ([facility] [L58.25, J96.22]     Admit Date/Time:  2/2/2023  4:53 AM    Chart reviewed. Interdisciplinary team contacted or reviewed plan related to patient progress and discharge plans. Disciplines included Case Management, Nursing, and Dietitian. Current Status:inpt  PT/OT recommendation for discharge plan of care: SNF    Expected D/C Disposition:  Skilled nursing facility  Confirmed plan with patient and/or family Yes confirmed with: (name) pt and daughter  Met with:pt at bedside and writer spoke with daughter via TC with pt's permission  Discharge Plan Comments: pt and pt's Reji Hester, requesting referral to [facility]. Pt's daughter is looking at rehab list for two more facilities if needed and will let CM know second and third choice. Referral called to admissions vm with [facility]. Will require pre-cert. Following. 1427 Writer received call back from [facility] and no beds available at this time. Writer left vm for pt's daughter to receive second and third choice. Awaiting call back. Following.     Home O2 in place on admit: Yes

## 2023-02-10 NOTE — PROGRESS NOTES
RT Inhaler-Nebulizer Bronchodilator Protocol Note    There is a bronchodilator order in the chart from a provider indicating to follow the RT Bronchodilator Protocol and there is an Initiate RT Inhaler-Nebulizer Bronchodilator Protocol order as well (see protocol at bottom of note). CXR Findings:  XR CHEST PORTABLE    Result Date: 2/9/2023  Increasing bibasilar ground-glass opacities with a developing right pleural effusion. Pulmonary edema or ARDS suspected. XR CHEST PORTABLE    Result Date: 2/8/2023  1. Stable appropriate positions of support apparatus. 2. Slight interval improvement of mild pulmonary edema. 3. No significant change in appearance of a small left pleural effusion and left basilar airspace consolidation. The findings from the last RT Protocol Assessment were as follows:   History Pulmonary Disease: Chronic pulmonary disease  Respiratory Pattern: Dyspnea on exertion or RR 21-25 bpm  Breath Sounds: Inspiratory and expiratory or bilateral wheezing and/or rhonchi  Cough: Strong, spontaneous, non-productive  Indication for Bronchodilator Therapy: Decreased or absent breath sounds  Bronchodilator Assessment Score: 10    Aerosolized bronchodilator medication orders have been revised according to the RT Inhaler-Nebulizer Bronchodilator Protocol below. Respiratory Therapist to perform RT Therapy Protocol Assessment initially then follow the protocol. Repeat RT Therapy Protocol Assessment PRN for score 0-3 or on second treatment, BID, and PRN for scores above 3. No Indications - adjust the frequency to every 6 hours PRN wheezing or bronchospasm, if no treatments needed after 48 hours then discontinue using Per Protocol order mode. If indication present, adjust the RT bronchodilator orders based on the Bronchodilator Assessment Score as indicated below.   Use Inhaler orders unless patient has one or more of the following: on home nebulizer, not able to hold breath for 10 seconds, is not alert and oriented, cannot activate and use MDI correctly, or respiratory rate 25 breaths per minute or more, then use the equivalent nebulizer order(s) with same Frequency and PRN reasons based on the score. If a patient is on this medication at home then do not decrease Frequency below that used at home. 0-3 - enter or revise RT bronchodilator order(s) to equivalent RT Bronchodilator order with Frequency of every 4 hours PRN for wheezing or increased work of breathing using Per Protocol order mode. 4-6 - enter or revise RT Bronchodilator order(s) to two equivalent RT bronchodilator orders with one order with BID Frequency and one order with Frequency of every 4 hours PRN wheezing or increased work of breathing using Per Protocol order mode. 7-10 - enter or revise RT Bronchodilator order(s) to two equivalent RT bronchodilator orders with one order with TID Frequency and one order with Frequency of every 4 hours PRN wheezing or increased work of breathing using Per Protocol order mode. 11-13 - enter or revise RT Bronchodilator order(s) to one equivalent RT bronchodilator order with QID Frequency and an Albuterol order with Frequency of every 4 hours PRN wheezing or increased work of breathing using Per Protocol order mode. Greater than 13 - enter or revise RT Bronchodilator order(s) to one equivalent RT bronchodilator order with every 4 hours Frequency and an Albuterol order with Frequency of every 2 hours PRN wheezing or increased work of breathing using Per Protocol order mode. PATIENT WILL BENEFIT FROM TREATMENTS.      Electronically signed by Sky Burnette RCP on 2/9/2023 at 7:35 PM

## 2023-02-10 NOTE — PROGRESS NOTES
Shift assessment complete. Patient seen awake in bed. Patient is alert and oriented x 4. Patient seen on 4 lpm per NC. Patient denies pain at this time. Patient with no s/s of distress noted. Physical assessment as charted in flow sheets. Scheduled medications given per orders, whole in pudding. Peripheral IV C/D/I and functioning properly. Central line dressing is clean, dry and intact with no signs of drainage. All tubing is current and dated. IPA caps applied to all access hubs. Arias intact and secure, clear yellow urine draining. Patient with no needs voiced at this time. Patient educated on use of call light and to call out with needs, verbalized understanding. Call light and personal belongings within reach.

## 2023-02-10 NOTE — PROGRESS NOTES
IM Progress Note    Admit Date:  2/2/2023  8    Interval history:  copd exacerbation , failed bipap and intubated in ER   Imaging with left hemithorax opacification,s/p bronch with clearance of mucous plugs     Agitation issues noted on vent   CTA chest with no PE , bilateral pneumonia     Subjective:    Ms. Yonatan Feldman extubated   Now on vapotherm--> 9 L high flow -- 3 L     Objective:   /64   Pulse 98   Temp 99.1 °F (37.3 °C) (Bladder)   Resp 14   Ht 5' 3\" (1.6 m)   Wt 173 lb 4.5 oz (78.6 kg)   SpO2 90%   BMI 30.70 kg/m²     Intake/Output Summary (Last 24 hours) at 2/10/2023 1201  Last data filed at 2/10/2023 1052  Gross per 24 hour   Intake 346.05 ml   Output 1830 ml   Net -1483.95 ml         Physical Exam:      General:  elderly female    Appears to be not in any distress  Mucous Membranes:  Pink , anicteric  Neck: No JVD, no carotid bruit, no thyromegaly  Chest: bilateral air entry with resolving  rhonchi and improving left sided airentry  Cardiovascular:  RRR S1S2 heard, no murmurs or gallops  Abdomen:  Soft, undistended, non tender, no organomegaly, BS present  Extremities: chronic 2 +  brawny edema of both LE with diffuse celluitis of ext Bilaterally  Distal pulses well felt  Neurological : sedated      Medications:   Scheduled Medications:    [START ON 2/11/2023] pantoprazole  40 mg Oral QAM AC    predniSONE  40 mg Oral Daily    insulin lispro  0-16 Units SubCUTAneous Q4H    pregabalin  200 mg Oral BID    rOPINIRole  1 mg Oral BID    lidocaine 1 % injection  5 mL IntraDERmal Once    sodium chloride flush  5-40 mL IntraVENous 2 times per day    risperiDONE  1 mg Oral BID    atorvastatin  60 mg Oral Daily    sertraline  100 mg Oral BID    sodium chloride flush  5-40 mL IntraVENous 2 times per day    enoxaparin  40 mg SubCUTAneous Daily    nicotine  1 patch TransDERmal Daily    ipratropium-albuterol  1 ampule Inhalation Q4H     I   sodium chloride      dextrose      sodium chloride Stopped (02/09/23 1804)     traMADol, sodium chloride flush, sodium chloride, glucose, dextrose bolus **OR** dextrose bolus, glucagon (rDNA), dextrose, sodium chloride flush, sodium chloride, ondansetron **OR** ondansetron, polyethylene glycol, acetaminophen **OR** acetaminophen, albuterol    Lab Data:  Recent Labs     02/08/23 0455 02/09/23 0435 02/10/23  0336   WBC 10.7 15.3* 11.5*   HGB 11.2* 11.8* 11.7*   HCT 34.4* 37.4 35.9*   MCV 92.8 94.4 92.7    176 179       Recent Labs     02/08/23  0455 02/09/23  0435 02/10/23  0336    139 141   K 4.5 3.6 3.6    102 103   CO2 26 25 27   PHOS 4.1 3.0 3.2   BUN 39* 31* 25*   CREATININE 0.7 0.6 0.6       No results for input(s): CKTOTAL, CKMB, CKMBINDEX, TROPONINI in the last 72 hours. Coagulation:   Lab Results   Component Value Date/Time    INR 1.04 02/04/2023 12:15 PM    APTT 27.6 02/04/2023 12:15 PM     Cardiac markers:   Lab Results   Component Value Date/Time    CKMB 10.3 07/14/2010 07:25 AM    CKTOTAL 74 10/17/2014 02:46 AM    TROPONINI <0.01 02/02/2023 05:05 AM         Lab Results   Component Value Date    ALT 12 02/03/2023    AST 13 (L) 02/03/2023    ALKPHOS 99 02/03/2023    BILITOT 0.3 02/03/2023       Lab Results   Component Value Date    INR 1.04 02/04/2023    INR 0.94 09/12/2020    INR 0.99 07/01/2018    PROTIME 13.5 02/04/2023    PROTIME 10.9 09/12/2020    PROTIME 11.3 07/01/2018       Radiology    Cxr    Complete opacification has developed of the left hemithorax, which can   reflect combination of atelectasis, airspace disease, and/or pleural   effusion. Right-sided airspace disease is not substantially changed. Chest xray   1. Findings suggestive of partial right upper lobe atelectasis. 2. Small left pleural effusion. 3. Central pulmonary vascular congestion without overt pulmonary edema. 4. Left basilar atelectasis. Cxr    Significantly limited evaluation secondary to patient positioning with no   signs to suggest pneumothorax.        CTA chest    1. No CT evidence of a pulmonary embolism. 2. Bilateral lower lobe airspace consolidation, left worse than right,   representing either pneumonia or aspiration. 3. Additional scattered curvilinear, consolidative, nodular opacities   throughout the upper lobes and right middle lobe likely reflect additional   multifocal pneumonia and/or pulmonary edema. 4. Small bilateral pleural effusions. 5. Enlarged nodular appearance of the left thyroid lobe. Consider further   characterization with a routine non urgent follow-up thyroid ultrasound. See   below. 6. Findings suggestive of cirrhosis and portal hypertension    Cultures:   Cultures  Blood- NGTD  Sputum - pending  BAL NRF      Assessment & Plan:    Acute respiratory failure with hypoxia and hypercapnia  Copd exacerbation   Left hemithorax opacification with mucous plugging    . O2 sat 86% on 5L in the ER. Initially placed on NIV. Too obtunded for NIV and PCO2 worsened after ~1 h on NIV. And eventually placed on vent  Admitted to ICU   CTA chest with bilateral pna, no PE  Had emergency bronch with removal of mucous plugs  Started on steroids, abx -cefepime day 7/7 and vanc   Critical care following  BAL with NRF- can downgrade abx  Vancomycin discontinued  Extubated   Now on vapotherm--> 9 L high flow nasal canula--> 3 L     COPD exacerbation   IV solumedrol, IV abx as above , PRN/GIDEON intensive NEB therapy. Can wean steroids     Acute encephalopathy, obtunded, likely metabolic 2/2 hypercarbia. Resolved     DM2, hold oral Rx, added Mod SSI q4h. Bilateral LE cellulitis - has chronic lymphedema, resume  diuretics  started on cefepime and added vanc  - await cx   Vanco DC'd  Start low-dose gabapentin for leg pain    Tobacco Abuse, unable to  cessation 2/2 MS, 21 mg nicotine patch.      Hx of cirrhosis with portal HTN - resume home diuretics    Thyroid enlargement on left side - need f/w US as outpt       DVT Prophylaxis: Lx  Diet carb control  Code Status: Full Code     Pt/ot   Needs precert for SNF     Jazmin Sands MD, 2/10/2023 12:01 PM

## 2023-02-10 NOTE — PROGRESS NOTES
Pulmonary & Critical Care Medicine ICU Progress Note    CC: COPD    Events of Last 24 hours: weaned to 3lpm  Less secretions    Invasive Lines: PICC    MV:    Vent Mode: AC/VC Resp Rate (Set): 18 bmp/Vt (Set, mL): 360 mL/ /FiO2 : 55 %  Recent Labs     02/08/23  0525 02/08/23  1050   PHART 7.402 7.376   EBB9CWR 35.7 39.2   PO2ART 68.7* 71.5*       IV:   sodium chloride      dextrose      sodium chloride Stopped (02/09/23 1804)       Vitals:  BP (!) 141/59   Pulse 82   Temp 98.8 °F (37.1 °C)   Resp 20   Ht 5' 3\" (1.6 m)   Wt 173 lb 4.5 oz (78.6 kg)   SpO2 98%   BMI 30.70 kg/m²   on 3L  Constitutional:  No acute distress. Eyes: PERRL. Conjunctivae anicteric. ENT: Normal nose. Normal tongue. Neck:  Trachea is midline. No thyroid tenderness. Respiratory: No accessory muscle usage. decreased breath sounds. No wheezes. No rales. +Rhonchi. Cardiovascular: Normal S1S2. No digit clubbing. No digit cyanosis. No LE edema. Psychiatric: No anxiety or Agitation. Alert and Oriented to person, place and time.      Scheduled Meds:   predniSONE  40 mg Oral Daily    insulin lispro  0-16 Units SubCUTAneous Q4H    pregabalin  200 mg Oral BID    rOPINIRole  1 mg Oral BID    lidocaine 1 % injection  5 mL IntraDERmal Once    sodium chloride flush  5-40 mL IntraVENous 2 times per day    risperiDONE  1 mg Oral BID    atorvastatin  60 mg Oral Daily    pantoprazole  40 mg IntraVENous Daily    sertraline  100 mg Oral BID    sodium chloride flush  5-40 mL IntraVENous 2 times per day    enoxaparin  40 mg SubCUTAneous Daily    nicotine  1 patch TransDERmal Daily    ipratropium-albuterol  1 ampule Inhalation Q4H     PRN Meds:  traMADol, sodium chloride flush, sodium chloride, glucose, dextrose bolus **OR** dextrose bolus, glucagon (rDNA), dextrose, sodium chloride flush, sodium chloride, ondansetron **OR** ondansetron, polyethylene glycol, acetaminophen **OR** acetaminophen, albuterol    Results:  CBC:   Recent Labs 02/08/23 0455 02/09/23  0435 02/10/23  0336   WBC 10.7 15.3* 11.5*   HGB 11.2* 11.8* 11.7*   HCT 34.4* 37.4 35.9*   MCV 92.8 94.4 92.7    176 179       BMP:   Recent Labs     02/08/23 0455 02/09/23  0435 02/10/23  0336    139 141   K 4.5 3.6 3.6    102 103   CO2 26 25 27   PHOS 4.1 3.0 3.2   BUN 39* 31* 25*   CREATININE 0.7 0.6 0.6       LIVER PROFILE: No results for input(s): AST, ALT, LIPASE, BILIDIR, BILITOT, ALKPHOS in the last 72 hours. Invalid input(s): AMYLASE,  ALB    Cultures:  2/2 resp cx: c. albicans    Films:  2/5/23 CTPA:   1. No CT evidence of a pulmonary embolism. 2. Bilateral lower lobe airspace consolidation, left worse than right,   representing either pneumonia or aspiration. 3. Additional scattered curvilinear, consolidative, and nodular opacities   throughout the upper lobes and right middle lobe likely reflect additional   multifocal pneumonia and/or pulmonary edema. 4. Small bilateral pleural effusions. 5. Enlarged nodular appearance of the left thyroid lobe. Consider further   characterization with a routine non urgent follow-up thyroid ultrasound. See   below. 6. Findings suggestive of cirrhosis and portal hypertension. CXR was reviewed by me and it showed 2/8/23    1. Stable appropriate positions of support apparatus. 2. Slight interval improvement of mild pulmonary edema. 3. No significant change in appearance of a small left pleural effusion and   left basilar airspace consolidation.        Assessment:    Acute COPD exacerbation   Bilateral lower extremity cellulitis  CHF/fluid overload  OHS/SHAZIA  Acute hypoxic  hypercapnic res failure      Plan:    Supplemental oxygen to maintain SaO2 >92%; wean as tolerated    prednisone taper   Completed Cefepime Day 7/7     BD  ISSI  Diuresed 8L total  SLP today and advanve diet  Consider repeat CT in 6-8 weeks  Outpatient f/u of thyroid nodule  Ok for floor

## 2023-02-10 NOTE — PROGRESS NOTES
4 Eyes Skin Assessment     The patient is being assess for   Shift    I agree that 2 RN's have performed a thorough Head to Toe Skin Assessment on the patient. ALL assessment sites listed below have been assessed. Areas assessed for pressure by both nurses:   [x]   Head, Face, and Ears   [x]   Shoulders, Back, and Chest, Abdomen  [x]   Arms, Elbows, and Hands   [x]   Coccyx, Sacrum, and Ischium  [x]   Legs, Feet, and Heels        Skin Assessed Under all Medical Devices by both nurses:  O2 device tubing              All Mepilex Borders were peeled back and area peeked at by both nurses:  Yes  Please list where Mepilex Borders are located:  coccyx             **SHARE this note so that the co-signing nurse is able to place an eSignature**    Co-signer eSignature: Electronically signed by Luis Chamberlain RN on 2/10/23 at 10:58 AM EST    Does the Patient have Skin Breakdown related to pressure?   No         Zack Prevention initiated:  Yes   Wound Care Orders initiated:  No      [facility] nurse consulted for Pressure Injury (Stage 3,4, Unstageable, DTI, NWPT, Complex wounds)and New or Established Ostomies:  No      Primary Nurse eSignature: Electronically signed by Jacinda Barrientos RN on 2/10/23 at 10:57 AM EST

## 2023-02-10 NOTE — PROGRESS NOTES
Reassessment complete. Patient seen awake in bed. Physical assessment unchanged. Patient continues on 3 lpm o2 per NC. Bath and linen change provided. Patient repositioned for comfort. Call light and personal belongings within reach.

## 2023-02-11 LAB
ALBUMIN SERPL-MCNC: 3.9 G/DL (ref 3.4–5)
ANION GAP SERPL CALCULATED.3IONS-SCNC: 10 MMOL/L (ref 3–16)
BUN BLDV-MCNC: 24 MG/DL (ref 7–20)
CALCIUM SERPL-MCNC: 10 MG/DL (ref 8.3–10.6)
CHLORIDE BLD-SCNC: 103 MMOL/L (ref 99–110)
CO2: 27 MMOL/L (ref 21–32)
CREAT SERPL-MCNC: 0.7 MG/DL (ref 0.6–1.2)
GFR SERPL CREATININE-BSD FRML MDRD: >60 ML/MIN/{1.73_M2}
GLUCOSE BLD-MCNC: 195 MG/DL (ref 70–99)
GLUCOSE BLD-MCNC: 201 MG/DL (ref 70–99)
GLUCOSE BLD-MCNC: 212 MG/DL (ref 70–99)
GLUCOSE BLD-MCNC: 263 MG/DL (ref 70–99)
GLUCOSE BLD-MCNC: 283 MG/DL (ref 70–99)
PERFORMED ON: ABNORMAL
PHOSPHORUS: 2.9 MG/DL (ref 2.5–4.9)
POTASSIUM SERPL-SCNC: 3.6 MMOL/L (ref 3.5–5.1)
SODIUM BLD-SCNC: 140 MMOL/L (ref 136–145)

## 2023-02-11 PROCEDURE — 94640 AIRWAY INHALATION TREATMENT: CPT

## 2023-02-11 PROCEDURE — 94761 N-INVAS EAR/PLS OXIMETRY MLT: CPT

## 2023-02-11 PROCEDURE — 6370000000 HC RX 637 (ALT 250 FOR IP): Performed by: INTERNAL MEDICINE

## 2023-02-11 PROCEDURE — 92526 ORAL FUNCTION THERAPY: CPT

## 2023-02-11 PROCEDURE — 6360000002 HC RX W HCPCS: Performed by: INTERNAL MEDICINE

## 2023-02-11 PROCEDURE — 94669 MECHANICAL CHEST WALL OSCILL: CPT

## 2023-02-11 PROCEDURE — 99233 SBSQ HOSP IP/OBS HIGH 50: CPT | Performed by: INTERNAL MEDICINE

## 2023-02-11 PROCEDURE — 80069 RENAL FUNCTION PANEL: CPT

## 2023-02-11 PROCEDURE — 1200000000 HC SEMI PRIVATE

## 2023-02-11 PROCEDURE — 2700000000 HC OXYGEN THERAPY PER DAY

## 2023-02-11 PROCEDURE — 2580000003 HC RX 258: Performed by: INTERNAL MEDICINE

## 2023-02-11 RX ORDER — IPRATROPIUM BROMIDE AND ALBUTEROL SULFATE 2.5; .5 MG/3ML; MG/3ML
1 SOLUTION RESPIRATORY (INHALATION) 4 TIMES DAILY
Status: DISCONTINUED | OUTPATIENT
Start: 2023-02-11 | End: 2023-02-14

## 2023-02-11 RX ADMIN — PANTOPRAZOLE SODIUM 40 MG: 40 TABLET, DELAYED RELEASE ORAL at 05:35

## 2023-02-11 RX ADMIN — IPRATROPIUM BROMIDE AND ALBUTEROL SULFATE 1 AMPULE: .5; 2.5 SOLUTION RESPIRATORY (INHALATION) at 20:45

## 2023-02-11 RX ADMIN — ROPINIROLE HYDROCHLORIDE 1 MG: 1 TABLET, FILM COATED ORAL at 20:50

## 2023-02-11 RX ADMIN — INSULIN LISPRO 8 UNITS: 100 INJECTION, SOLUTION INTRAVENOUS; SUBCUTANEOUS at 11:26

## 2023-02-11 RX ADMIN — IPRATROPIUM BROMIDE AND ALBUTEROL SULFATE 1 AMPULE: .5; 2.5 SOLUTION RESPIRATORY (INHALATION) at 15:43

## 2023-02-11 RX ADMIN — PREGABALIN 200 MG: 100 CAPSULE ORAL at 08:12

## 2023-02-11 RX ADMIN — SODIUM CHLORIDE, PRESERVATIVE FREE 10 ML: 5 INJECTION INTRAVENOUS at 08:23

## 2023-02-11 RX ADMIN — SERTRALINE 100 MG: 100 TABLET, FILM COATED ORAL at 08:12

## 2023-02-11 RX ADMIN — IPRATROPIUM BROMIDE AND ALBUTEROL SULFATE 1 AMPULE: .5; 2.5 SOLUTION RESPIRATORY (INHALATION) at 07:24

## 2023-02-11 RX ADMIN — ENOXAPARIN SODIUM 40 MG: 100 INJECTION SUBCUTANEOUS at 08:13

## 2023-02-11 RX ADMIN — ROPINIROLE HYDROCHLORIDE 1 MG: 1 TABLET, FILM COATED ORAL at 08:12

## 2023-02-11 RX ADMIN — SERTRALINE 100 MG: 100 TABLET, FILM COATED ORAL at 20:50

## 2023-02-11 RX ADMIN — SODIUM CHLORIDE, PRESERVATIVE FREE 10 ML: 5 INJECTION INTRAVENOUS at 20:35

## 2023-02-11 RX ADMIN — INSULIN LISPRO 8 UNITS: 100 INJECTION, SOLUTION INTRAVENOUS; SUBCUTANEOUS at 16:54

## 2023-02-11 RX ADMIN — PREDNISONE 40 MG: 20 TABLET ORAL at 08:12

## 2023-02-11 RX ADMIN — RISPERIDONE 1 MG: 1 TABLET ORAL at 08:12

## 2023-02-11 RX ADMIN — IPRATROPIUM BROMIDE AND ALBUTEROL SULFATE 1 AMPULE: .5; 2.5 SOLUTION RESPIRATORY (INHALATION) at 11:30

## 2023-02-11 RX ADMIN — ATORVASTATIN CALCIUM 60 MG: 40 TABLET, FILM COATED ORAL at 08:12

## 2023-02-11 RX ADMIN — PREGABALIN 200 MG: 100 CAPSULE ORAL at 20:50

## 2023-02-11 RX ADMIN — RISPERIDONE 1 MG: 1 TABLET ORAL at 20:50

## 2023-02-11 ASSESSMENT — PAIN SCALES - WONG BAKER: WONGBAKER_NUMERICALRESPONSE: 0

## 2023-02-11 NOTE — PROGRESS NOTES
Family and patient agreed that they would like to go to [facility]  or Residence at [facility]. Electronically signed by Dez Sorto RN on 2/11/2023 at 6:58 PM

## 2023-02-11 NOTE — PROGRESS NOTES
Pulmonary & Critical Care Medicine ICU Progress Note    CC: COPD    Events of Last 24 hours: less SOB    Invasive Lines: PICC    MV:    Vent Mode: AC/VC Resp Rate (Set): 18 bmp/Vt (Set, mL): 360 mL/ /FiO2 : 55 %  No results for input(s): PHART, GAN4MTP, PO2ART in the last 72 hours. IV:   sodium chloride      dextrose      sodium chloride Stopped (02/09/23 1804)       Vitals:  BP (!) 147/76   Pulse 95   Temp 98.2 °F (36.8 °C) (Oral)   Resp 18   Ht 5' 3\" (1.6 m)   Wt 168 lb 3.2 oz (76.3 kg)   SpO2 94%   BMI 29.80 kg/m²   on 3L  Constitutional:  No acute distress. Eyes: PERRL. Conjunctivae anicteric. ENT: Normal nose. Normal tongue. Neck:  Trachea is midline. No thyroid tenderness. Respiratory: No accessory muscle usage. decreased breath sounds. No wheezes. No rales. +Rhonchi. Cardiovascular: Normal S1S2. No digit clubbing. No digit cyanosis. No LE edema. Psychiatric: No anxiety or Agitation. Alert and Oriented to person, place and time.      Scheduled Meds:   ipratropium-albuterol  1 ampule Inhalation 4x daily    pantoprazole  40 mg Oral QAM AC    insulin lispro  0-16 Units SubCUTAneous TID WC    insulin lispro  0-4 Units SubCUTAneous Nightly    predniSONE  40 mg Oral Daily    pregabalin  200 mg Oral BID    rOPINIRole  1 mg Oral BID    lidocaine 1 % injection  5 mL IntraDERmal Once    risperiDONE  1 mg Oral BID    atorvastatin  60 mg Oral Daily    sertraline  100 mg Oral BID    sodium chloride flush  5-40 mL IntraVENous 2 times per day    enoxaparin  40 mg SubCUTAneous Daily    nicotine  1 patch TransDERmal Daily     PRN Meds:  traMADol, sodium chloride, glucose, dextrose bolus **OR** dextrose bolus, glucagon (rDNA), dextrose, sodium chloride flush, sodium chloride, ondansetron **OR** ondansetron, polyethylene glycol, acetaminophen **OR** acetaminophen, albuterol    Results:  CBC:   Recent Labs     02/09/23  0435 02/10/23  0336   WBC 15.3* 11.5*   HGB 11.8* 11.7*   HCT 37.4 35.9*   MCV 94.4 92.7    179       BMP:   Recent Labs     02/09/23  0435 02/10/23  0336 02/11/23  0532    141 140   K 3.6 3.6 3.6    103 103   CO2 25 27 27   PHOS 3.0 3.2 2.9   BUN 31* 25* 24*   CREATININE 0.6 0.6 0.7       LIVER PROFILE: No results for input(s): AST, ALT, LIPASE, BILIDIR, BILITOT, ALKPHOS in the last 72 hours. Invalid input(s): AMYLASE,  ALB    Cultures:  2/2 resp cx: c. albicans    Films:  2/5/23 CTPA:   1. No CT evidence of a pulmonary embolism. 2. Bilateral lower lobe airspace consolidation, left worse than right,   representing either pneumonia or aspiration. 3. Additional scattered curvilinear, consolidative, and nodular opacities   throughout the upper lobes and right middle lobe likely reflect additional   multifocal pneumonia and/or pulmonary edema. 4. Small bilateral pleural effusions. 5. Enlarged nodular appearance of the left thyroid lobe. Consider further   characterization with a routine non urgent follow-up thyroid ultrasound. See   below. 6. Findings suggestive of cirrhosis and portal hypertension. CXR was reviewed by me and it showed 2/8/23    1. Stable appropriate positions of support apparatus. 2. Slight interval improvement of mild pulmonary edema. 3. No significant change in appearance of a small left pleural effusion and   left basilar airspace consolidation.        Assessment:    Acute COPD exacerbation   Bilateral lower extremity cellulitis  CHF/fluid overload  OHS/SHAZIA  Acute hypoxic  hypercapnic res failure      Plan:    Supplemental oxygen to maintain SaO2 >92%; wean as tolerated    prednisone taper   Completed Cefepime Day 7/7     BD  Consider repeat CT in 6-8 weeks

## 2023-02-11 NOTE — PROGRESS NOTES
Speech Language Pathology  Dysphagia Treatment/Follow-Up Note  Facility/Department: [address] OVERFLOW    Recommendations:  Solid Consistency: IDDSI 5 Minced and moist Solids  Liquid Consistency: IDDSI 0 Thin Liquids  Medication: Meds PO as tolerated    Xochitl Saliva  : 1955 (79 y.o.)   MRN: [id number]  ROOM: [address] [place] [address]  ADMISSION DATE: 2023  PATIENT DIAGNOSIS(ES): COPD exacerbation (HCC) [J44.1]  Acute respiratory failure with hypercapnia (HCC) [J96.02]  Acute encephalopathy [G93.40]  Acute on chronic respiratory failure with hypoxia and hypercapnia (HCC) [J96.21, J96.22]  Allergies   Allergen Reactions    Aspirin Hives     Other reaction(s): GI Upset, Unknown    Duloxetine Hcl Nausea And Vomiting     Other reaction(s): Unknown, Vomiting    Duloxetine Nausea And Vomiting    Asa Buff (Mag [Buffered Aspirin] Hives     DATE ONSET: 2023    Pain: The patient does not complain of pain       Current Diet: ADULT DIET; Dysphagia - Minced and Moist    Diet Tolerance:  Patient tolerating current diet level without signs/symptoms of aspiration. Dysphagia Treatment and Impressions:  Subjective: Pt seen in room at bedside with RN permission. Pt upright in bed as able, quiet kyphotic. Pt alert, cooperative, and agreeable to tx session.   RN Report/Chart Review: RN reports good tolerance of current diet; no s/s with PO meds  Patient tolerance: Pt reports no coughing with current diet    Respiratory Status: Pt with SPO2% of 92 on 3 LPM NC with RR of 16    Liquid PO Trials:   IDDSI 0 Thin:  Assessed via cup: no anterior bolus loss , suspect functional A-P bolus transit, swallow timing subjectively appears timely, no clinical s/s of aspiration, no coughing, dry vocal quality, no throat clearing, and vitals stable    Solid PO Trials  IDDSI 5 Minced and Moist:   no anterior bolus loss , suspect functional A-P bolus transit, decreased mastication swallow timing subjectively appears timely, good oral clearance, no clinical s/s of aspiration, no coughing, dry vocal quality, no throat clearing, and vitals stable  IDDSI 6 Soft and Bite Sized:   no anterior bolus loss , suspect functional A-P bolus transit, swallow timing subjectively appears timely, prolonged mastication, oral stasis noted post swallow, and x2 coughing after the swallow out of 3 trials    Education: SLP edu pt re: Role of SLP, Rationale for dysphagia tx, Recommended compensatory strategies, Aspiration precautions, and Importance of oral care to reduce adverse affects in the event of aspiration. Pt verbalized understanding, would benefit from ongoing education, and RN aware of recommendations  Assessment: Pt tolerating current diet with no clinical s/s of aspiration. Some carryover of recommended compensatory strategies    Recommendations: SLP recommends to continue with IDDSI 5 Minced and moist Solids; IDDSI 0 Thin Liquids; Meds PO as tolerated; Pt may benefit from completion of instrumental swallow study in the future to correlate clinical findings-- will continue to monitor. Risk Management: upright for all intake, stay upright for at least 30 mins after intake, small bites/sips, assist feed, downgrade to mildly thick if s/s of aspiration develop, oral care 2-3x/day to reduce adverse affects in the event of aspiration, increase physical mobility as able, alternate bites/sips, slow rate of intake, general GERD precautions, general aspiration precautions, and hold PO and contact SLP if s/s of aspiration or worsening respiratory status develop. Maria M Perry Dysphagia Goals:  Timeframe for Long-term Goals: 1-2 weeks (2/15-3/1/23)  Goal 1: Pt will tolerate ongoing assessment of swallowing during this admission. 02/11: ongoing, progressing. Short-term Goals  Timeframe for Short-term Goals: 1 week, 3-5x/wk until 02/15/23  . 02/10: goal met  2) The patient will recall and perform compensatory strategies, with no cues.  02/101: ongoing, pt would benefit from continued reinforcement. 3) The pt will participate in completion of instrumental swallow study. 02/11: ongoing; will continue to monitor for need. Speech/Language/Cog Goals: N/A       Recommendations:  Solid Consistency: IDDSI 5 Minced and moist Solids  Liquid Consistency: IDDSI 0 Thin Liquids  Medication: Meds PO as tolerated    Plan:    Continued Dysphagia treatment with goals per plan of care. Discharge Recommendations: Recommend SLP tx at discharge    If pt discharges from hospital prior to Speech/Swallowing discharge, this note serves as tx and discharge summary.      Total Treatment Time / Charges     Time in Time out Total Time / units   Cognitive Tx         Speech Tx      Dysphagia Tx 1259 1313 14 minutes/1 unit     Signature:  Dimple Santos MA. CF- SLP  Speech Language Pathologist  [id number]

## 2023-02-11 NOTE — PROGRESS NOTES
IM Progress Note    Admit Date:  2/2/2023  9    Interval history:    copd exacerbation , failed bipap and intubated in ER   Imaging with left hemithorax opacification,s/p bronch with clearance of mucous plugs   Agitation issues noted on vent   CTA chest with no PE , bilateral pneumonia     -> Extubated-> Vapotherm  -> O2 weaned down to 9 L high flow-> 3 L. Transferred out of ICU to PCU on 2/10    Subjective:    Ms. Peter Garduno is on oxygen 3 L . She is awake and oriented , very weak . Awaiting SNF placement . Objective:   /79   Pulse 98   Temp 98.6 °F (37 °C) (Oral)   Resp 16   Ht 5' 3\" (1.6 m)   Wt 168 lb 3.2 oz (76.3 kg)   SpO2 92%   BMI 29.80 kg/m²     Intake/Output Summary (Last 24 hours) at 2/11/2023 1156  Last data filed at 2/11/2023 0435  Gross per 24 hour   Intake 430 ml   Output 500 ml   Net -70 ml         Physical Exam:    General:  elderly female , looks fatigued, no distress   Appears to be not in any distress  Mucous Membranes:  Pink , anicteric  Neck: No JVD, no carotid bruit, no thyromegaly  Chest: Diminished breath sounds , mild rhonchi .   Cardiovascular:  RRR S1S2 heard, no murmurs or gallops  Abdomen:  Soft, undistended, non tender, no organomegaly, BS present  Extremities: chronic 2 +  brawny edema of both LE with diffuse celluitis of ext Bilaterally  Distal pulses well felt  Neurological : sedated      Medications:   Scheduled Medications:    ipratropium-albuterol  1 ampule Inhalation 4x daily    pantoprazole  40 mg Oral QAM AC    insulin lispro  0-16 Units SubCUTAneous TID WC    insulin lispro  0-4 Units SubCUTAneous Nightly    predniSONE  40 mg Oral Daily    pregabalin  200 mg Oral BID    rOPINIRole  1 mg Oral BID    lidocaine 1 % injection  5 mL IntraDERmal Once    risperiDONE  1 mg Oral BID    atorvastatin  60 mg Oral Daily    sertraline  100 mg Oral BID    sodium chloride flush  5-40 mL IntraVENous 2 times per day    enoxaparin  40 mg SubCUTAneous Daily    nicotine  1 patch TransDERmal Daily     I   sodium chloride      dextrose      sodium chloride Stopped (02/09/23 1804)     traMADol, sodium chloride, glucose, dextrose bolus **OR** dextrose bolus, glucagon (rDNA), dextrose, sodium chloride flush, sodium chloride, ondansetron **OR** ondansetron, polyethylene glycol, acetaminophen **OR** acetaminophen, albuterol    Lab Data:  Recent Labs     02/09/23  0435 02/10/23  0336   WBC 15.3* 11.5*   HGB 11.8* 11.7*   HCT 37.4 35.9*   MCV 94.4 92.7    179       Recent Labs     02/09/23  0435 02/10/23  0336 02/11/23  0532    141 140   K 3.6 3.6 3.6    103 103   CO2 25 27 27   PHOS 3.0 3.2 2.9   BUN 31* 25* 24*   CREATININE 0.6 0.6 0.7       No results for input(s): CKTOTAL, CKMB, CKMBINDEX, TROPONINI in the last 72 hours.      Coagulation:   Lab Results   Component Value Date/Time    INR 1.04 02/04/2023 12:15 PM    APTT 27.6 02/04/2023 12:15 PM     Cardiac markers:   Lab Results   Component Value Date/Time    CKMB 10.3 07/14/2010 07:25 AM    CKTOTAL 74 10/17/2014 02:46 AM    TROPONINI <0.01 02/02/2023 05:05 AM         Lab Results   Component Value Date    ALT 12 02/03/2023    AST 13 (L) 02/03/2023    ALKPHOS 99 02/03/2023    BILITOT 0.3 02/03/2023       Lab Results   Component Value Date    INR 1.04 02/04/2023    INR 0.94 09/12/2020    INR 0.99 07/01/2018    PROTIME 13.5 02/04/2023    PROTIME 10.9 09/12/2020    PROTIME 11.3 07/01/2018     Cultures:   Cultures  Blood- NGTD  Sputum - pending  BAL NRF      Radiology    Cxr    Complete opacification has developed of the left hemithorax, which can   reflect combination of atelectasis, airspace disease, and/or pleural   effusion.  Right-sided airspace disease is not substantially changed.     Chest xray   1. Findings suggestive of partial right upper lobe atelectasis. 2. Small left pleural effusion. 3. Central pulmonary vascular congestion without overt pulmonary edema. 4. Left basilar atelectasis.     Cxr    Significantly  limited evaluation secondary to patient positioning with no   signs to suggest pneumothorax. CTA chest    1. No CT evidence of a pulmonary embolism. 2. Bilateral lower lobe airspace consolidation, left worse than right,   representing either pneumonia or aspiration. 3. Additional scattered curvilinear, consolidative, nodular opacities   throughout the upper lobes and right middle lobe likely reflect additional   multifocal pneumonia and/or pulmonary edema. 4. Small bilateral pleural effusions. 5. Enlarged nodular appearance of the left thyroid lobe. Consider further   characterization with a routine non urgent follow-up thyroid ultrasound. See   below. 6. Findings suggestive of cirrhosis and portal hypertension        Assessment & Plan:    Acute respiratory failure with hypoxia and hypercapnia  Copd exacerbation   Left hemithorax opacification with mucous plugging    - O2 sat 86% on 5L in the ER. Initially placed on NIV. Too obtunded for NIV and PCO2 worsened after ~1 h on NIV. And eventually placed on vent  Admitted to ICU   CTA chest with bilateral pna, no PE  Had emergency bronch with removal of mucous plugs  Started on steroids, abx -cefepime day 7/7 and vanc   Critical care following  BAL with NRF- can downgrade abx  Vancomycin discontinued  Extubated   Now on vapotherm--> 9 L high flow nasal canula--> 3 L .  -> Oxygen saturation stable, completed course of antibiotics    COPD exacerbation   IV solumedrol, IV abx as above , PRN/GIDEON intensive NEB therapy. Can wean steroids     Acute encephalopathy, obtunded, likely metabolic 2/2 hypercarbia. Resolved     DM2, hold oral Rx, added Mod SSI q4h. Bilateral LE cellulitis - has chronic lymphedema, resume  diuretics  started on cefepime and added vanc  - await cx   Vanco DC'd  Start low-dose gabapentin for leg pain    Tobacco Abuse, unable to  cessation 2/2 MS, 21 mg nicotine patch.      Hx of cirrhosis with portal HTN - resume home diuretics    Thyroid enlargement on left side - need f/w US as outpt       DVT Prophylaxis: Lx  Diet carb control  Code Status: Full Code     Pt/ot   Needs precert for SNF .       Edna Cardona MD, 2/11/2023 11:56 AM

## 2023-02-11 NOTE — PROGRESS NOTES
02/10/23 1900   RT Protocol   History Pulmonary Disease 2   Respiratory pattern 2   Breath sounds 2   Cough 0   Indications for Bronchodilator Therapy Decreased or absent breath sounds   Bronchodilator Assessment Score 6   RT Inhaler-Nebulizer Bronchodilator Protocol Note    There is a bronchodilator order in the chart from a provider indicating to follow the RT Bronchodilator Protocol and there is an Initiate RT Inhaler-Nebulizer Bronchodilator Protocol order as well (see protocol at bottom of note). CXR Findings:  XR CHEST PORTABLE    Result Date: 2/9/2023  Increasing bibasilar ground-glass opacities with a developing right pleural effusion. Pulmonary edema or ARDS suspected. The findings from the last RT Protocol Assessment were as follows:   History Pulmonary Disease: Chronic pulmonary disease  Respiratory Pattern: Dyspnea on exertion or RR 21-25 bpm  Breath Sounds: Slightly diminished and/or crackles  Cough: Strong, spontaneous, non-productive  Indication for Bronchodilator Therapy: Decreased or absent breath sounds  Bronchodilator Assessment Score: 6    Aerosolized bronchodilator medication orders have been revised according to the RT Inhaler-Nebulizer Bronchodilator Protocol below. Respiratory Therapist to perform RT Therapy Protocol Assessment initially then follow the protocol. Repeat RT Therapy Protocol Assessment PRN for score 0-3 or on second treatment, BID, and PRN for scores above 3. No Indications - adjust the frequency to every 6 hours PRN wheezing or bronchospasm, if no treatments needed after 48 hours then discontinue using Per Protocol order mode. If indication present, adjust the RT bronchodilator orders based on the Bronchodilator Assessment Score as indicated below.   Use Inhaler orders unless patient has one or more of the following: on home nebulizer, not able to hold breath for 10 seconds, is not alert and oriented, cannot activate and use MDI correctly, or respiratory rate 25 breaths per minute or more, then use the equivalent nebulizer order(s) with same Frequency and PRN reasons based on the score. If a patient is on this medication at home then do not decrease Frequency below that used at home. 0-3 - enter or revise RT bronchodilator order(s) to equivalent RT Bronchodilator order with Frequency of every 4 hours PRN for wheezing or increased work of breathing using Per Protocol order mode. 4-6 - enter or revise RT Bronchodilator order(s) to two equivalent RT bronchodilator orders with one order with BID Frequency and one order with Frequency of every 4 hours PRN wheezing or increased work of breathing using Per Protocol order mode. 7-10 - enter or revise RT Bronchodilator order(s) to two equivalent RT bronchodilator orders with one order with TID Frequency and one order with Frequency of every 4 hours PRN wheezing or increased work of breathing using Per Protocol order mode. 11-13 - enter or revise RT Bronchodilator order(s) to one equivalent RT bronchodilator order with QID Frequency and an Albuterol order with Frequency of every 4 hours PRN wheezing or increased work of breathing using Per Protocol order mode. Greater than 13 - enter or revise RT Bronchodilator order(s) to one equivalent RT bronchodilator order with every 4 hours Frequency and an Albuterol order with Frequency of every 2 hours PRN wheezing or increased work of breathing using Per Protocol order mode.        Electronically signed by Mimi Wolff RCP on 2/10/2023 at 7:45 PM

## 2023-02-11 NOTE — CARE COORDINATION
Spoke with pt. She stated her granddaughter is coming shortly to discuss SNF. She stated there is no family for CM to call.  RN updated    MARQUITA Bryan  Case Management Department  Phone: [phone] Fax: [phone]    Addendum at 4:56pm: RN was given SNF list with CM contact number to provide to family for review

## 2023-02-11 NOTE — PROGRESS NOTES
D: Pt is lying in bed with their eyes open. Alert and oriented times 4. Speech is clear. Denies pain and any needs at this time. Call light within reach. Bed in lowest position with the wheels locked. Family at bedside. Will continue to monitor.  Baljeet Washburn RN

## 2023-02-11 NOTE — PROGRESS NOTES
Patients Granddaughter Julia Elam is at bedside. List of skilled nursing facilities provided. They will find one and call back case management.  Electronically signed by Peggy Landau, RN on 2/11/2023 at 4:54 PM

## 2023-02-11 NOTE — PROGRESS NOTES
MD PAGE via [email or web address]:     Unable to draw blood via picc line this morning. May we order cathflo? Please advise. Thank you!

## 2023-02-11 NOTE — PROGRESS NOTES
RT Inhaler-Nebulizer Bronchodilator Protocol Note    There is a bronchodilator order in the chart from a provider indicating to follow the RT Bronchodilator Protocol and there is an Initiate RT Inhaler-Nebulizer Bronchodilator Protocol order as well (see protocol at bottom of note). CXR Findings:  No results found. The findings from the last RT Protocol Assessment were as follows:   History Pulmonary Disease: (P) Chronic pulmonary disease  Respiratory Pattern: (P) Dyspnea on exertion or RR 21-25 bpm  Breath Sounds: (P) Slightly diminished and/or crackles  Cough: (P) Strong, spontaneous, non-productive  Indication for Bronchodilator Therapy: (P) Decreased or absent breath sounds  Bronchodilator Assessment Score: (P) 6    Aerosolized bronchodilator medication orders have been revised according to the RT Inhaler-Nebulizer Bronchodilator Protocol below. Respiratory Therapist to perform RT Therapy Protocol Assessment initially then follow the protocol. Repeat RT Therapy Protocol Assessment PRN for score 0-3 or on second treatment, BID, and PRN for scores above 3. No Indications - adjust the frequency to every 6 hours PRN wheezing or bronchospasm, if no treatments needed after 48 hours then discontinue using Per Protocol order mode. If indication present, adjust the RT bronchodilator orders based on the Bronchodilator Assessment Score as indicated below. Use Inhaler orders unless patient has one or more of the following: on home nebulizer, not able to hold breath for 10 seconds, is not alert and oriented, cannot activate and use MDI correctly, or respiratory rate 25 breaths per minute or more, then use the equivalent nebulizer order(s) with same Frequency and PRN reasons based on the score. If a patient is on this medication at home then do not decrease Frequency below that used at home.     0-3 - enter or revise RT bronchodilator order(s) to equivalent RT Bronchodilator order with Frequency of every 4 hours PRN for wheezing or increased work of breathing using Per Protocol order mode. 4-6 - enter or revise RT Bronchodilator order(s) to two equivalent RT bronchodilator orders with one order with BID Frequency and one order with Frequency of every 4 hours PRN wheezing or increased work of breathing using Per Protocol order mode. 7-10 - enter or revise RT Bronchodilator order(s) to two equivalent RT bronchodilator orders with one order with TID Frequency and one order with Frequency of every 4 hours PRN wheezing or increased work of breathing using Per Protocol order mode. 11-13 - enter or revise RT Bronchodilator order(s) to one equivalent RT bronchodilator order with QID Frequency and an Albuterol order with Frequency of every 4 hours PRN wheezing or increased work of breathing using Per Protocol order mode. Greater than 13 - enter or revise RT Bronchodilator order(s) to one equivalent RT bronchodilator order with every 4 hours Frequency and an Albuterol order with Frequency of every 2 hours PRN wheezing or increased work of breathing using Per Protocol order mode.          Electronically signed by Eloy Stein RCP on 2/11/2023 at 7:28 AM

## 2023-02-12 LAB
GLUCOSE BLD-MCNC: 146 MG/DL (ref 70–99)
GLUCOSE BLD-MCNC: 221 MG/DL (ref 70–99)
GLUCOSE BLD-MCNC: 280 MG/DL (ref 70–99)
GLUCOSE BLD-MCNC: 320 MG/DL (ref 70–99)
PERFORMED ON: ABNORMAL

## 2023-02-12 PROCEDURE — 2700000000 HC OXYGEN THERAPY PER DAY

## 2023-02-12 PROCEDURE — 6370000000 HC RX 637 (ALT 250 FOR IP): Performed by: INTERNAL MEDICINE

## 2023-02-12 PROCEDURE — 94640 AIRWAY INHALATION TREATMENT: CPT

## 2023-02-12 PROCEDURE — 99232 SBSQ HOSP IP/OBS MODERATE 35: CPT | Performed by: INTERNAL MEDICINE

## 2023-02-12 PROCEDURE — 6360000002 HC RX W HCPCS: Performed by: INTERNAL MEDICINE

## 2023-02-12 PROCEDURE — 2580000003 HC RX 258: Performed by: INTERNAL MEDICINE

## 2023-02-12 PROCEDURE — 94761 N-INVAS EAR/PLS OXIMETRY MLT: CPT

## 2023-02-12 PROCEDURE — 1200000000 HC SEMI PRIVATE

## 2023-02-12 PROCEDURE — 94669 MECHANICAL CHEST WALL OSCILL: CPT

## 2023-02-12 RX ADMIN — SODIUM CHLORIDE, PRESERVATIVE FREE 10 ML: 5 INJECTION INTRAVENOUS at 21:51

## 2023-02-12 RX ADMIN — METFORMIN HYDROCHLORIDE 1000 MG: 500 TABLET ORAL at 16:03

## 2023-02-12 RX ADMIN — ENOXAPARIN SODIUM 40 MG: 100 INJECTION SUBCUTANEOUS at 07:45

## 2023-02-12 RX ADMIN — ATORVASTATIN CALCIUM 60 MG: 40 TABLET, FILM COATED ORAL at 07:46

## 2023-02-12 RX ADMIN — INSULIN LISPRO 8 UNITS: 100 INJECTION, SOLUTION INTRAVENOUS; SUBCUTANEOUS at 07:47

## 2023-02-12 RX ADMIN — IPRATROPIUM BROMIDE AND ALBUTEROL SULFATE 1 AMPULE: .5; 2.5 SOLUTION RESPIRATORY (INHALATION) at 11:01

## 2023-02-12 RX ADMIN — RISPERIDONE 1 MG: 1 TABLET ORAL at 21:56

## 2023-02-12 RX ADMIN — SERTRALINE 100 MG: 100 TABLET, FILM COATED ORAL at 21:56

## 2023-02-12 RX ADMIN — INSULIN LISPRO 12 UNITS: 100 INJECTION, SOLUTION INTRAVENOUS; SUBCUTANEOUS at 16:03

## 2023-02-12 RX ADMIN — SERTRALINE 100 MG: 100 TABLET, FILM COATED ORAL at 07:47

## 2023-02-12 RX ADMIN — PANTOPRAZOLE SODIUM 40 MG: 40 TABLET, DELAYED RELEASE ORAL at 05:55

## 2023-02-12 RX ADMIN — PREGABALIN 200 MG: 100 CAPSULE ORAL at 21:56

## 2023-02-12 RX ADMIN — ROPINIROLE HYDROCHLORIDE 1 MG: 1 TABLET, FILM COATED ORAL at 07:46

## 2023-02-12 RX ADMIN — PREDNISONE 40 MG: 20 TABLET ORAL at 07:46

## 2023-02-12 RX ADMIN — ROPINIROLE HYDROCHLORIDE 1 MG: 1 TABLET, FILM COATED ORAL at 21:56

## 2023-02-12 RX ADMIN — IPRATROPIUM BROMIDE AND ALBUTEROL SULFATE 1 AMPULE: .5; 2.5 SOLUTION RESPIRATORY (INHALATION) at 07:31

## 2023-02-12 RX ADMIN — INSULIN LISPRO 4 UNITS: 100 INJECTION, SOLUTION INTRAVENOUS; SUBCUTANEOUS at 11:09

## 2023-02-12 RX ADMIN — PREGABALIN 200 MG: 100 CAPSULE ORAL at 07:47

## 2023-02-12 RX ADMIN — RISPERIDONE 1 MG: 1 TABLET ORAL at 07:47

## 2023-02-12 RX ADMIN — IPRATROPIUM BROMIDE AND ALBUTEROL SULFATE 1 AMPULE: .5; 2.5 SOLUTION RESPIRATORY (INHALATION) at 20:53

## 2023-02-12 RX ADMIN — IPRATROPIUM BROMIDE AND ALBUTEROL SULFATE 1 AMPULE: .5; 2.5 SOLUTION RESPIRATORY (INHALATION) at 15:41

## 2023-02-12 ASSESSMENT — PAIN SCALES - GENERAL: PAINLEVEL_OUTOF10: 0

## 2023-02-12 NOTE — PROGRESS NOTES
RT Inhaler-Nebulizer Bronchodilator Protocol Note    There is a bronchodilator order in the chart from a provider indicating to follow the RT Bronchodilator Protocol and there is an Initiate RT Inhaler-Nebulizer Bronchodilator Protocol order as well (see protocol at bottom of note). CXR Findings:  No results found. The findings from the last RT Protocol Assessment were as follows:   History Pulmonary Disease: (P) Chronic pulmonary disease  Respiratory Pattern: (P) Dyspnea on exertion or RR 21-25 bpm  Breath Sounds: (P) Intermittent or unilateral wheezes  Cough: (P) Strong, spontaneous, non-productive  Indication for Bronchodilator Therapy: (P) On home bronchodilators  Bronchodilator Assessment Score: (P) 8    Aerosolized bronchodilator medication orders have been revised according to the RT Inhaler-Nebulizer Bronchodilator Protocol below. Respiratory Therapist to perform RT Therapy Protocol Assessment initially then follow the protocol. Repeat RT Therapy Protocol Assessment PRN for score 0-3 or on second treatment, BID, and PRN for scores above 3. No Indications - adjust the frequency to every 6 hours PRN wheezing or bronchospasm, if no treatments needed after 48 hours then discontinue using Per Protocol order mode. If indication present, adjust the RT bronchodilator orders based on the Bronchodilator Assessment Score as indicated below. Use Inhaler orders unless patient has one or more of the following: on home nebulizer, not able to hold breath for 10 seconds, is not alert and oriented, cannot activate and use MDI correctly, or respiratory rate 25 breaths per minute or more, then use the equivalent nebulizer order(s) with same Frequency and PRN reasons based on the score. If a patient is on this medication at home then do not decrease Frequency below that used at home.     0-3 - enter or revise RT bronchodilator order(s) to equivalent RT Bronchodilator order with Frequency of every 4 hours PRN for wheezing or increased work of breathing using Per Protocol order mode. 4-6 - enter or revise RT Bronchodilator order(s) to two equivalent RT bronchodilator orders with one order with BID Frequency and one order with Frequency of every 4 hours PRN wheezing or increased work of breathing using Per Protocol order mode. 7-10 - enter or revise RT Bronchodilator order(s) to two equivalent RT bronchodilator orders with one order with TID Frequency and one order with Frequency of every 4 hours PRN wheezing or increased work of breathing using Per Protocol order mode. 11-13 - enter or revise RT Bronchodilator order(s) to one equivalent RT bronchodilator order with QID Frequency and an Albuterol order with Frequency of every 4 hours PRN wheezing or increased work of breathing using Per Protocol order mode. Greater than 13 - enter or revise RT Bronchodilator order(s) to one equivalent RT bronchodilator order with every 4 hours Frequency and an Albuterol order with Frequency of every 2 hours PRN wheezing or increased work of breathing using Per Protocol order mode.        Electronically signed by Zabrina Abdalla RCP on 2/12/2023 at 7:35 AM

## 2023-02-12 NOTE — PLAN OF CARE
Pt resting in bed, VSS, SpO2 94% on 3L NC, pt states she is SOB at rest and with exertion, has a moist productive cough producing green phlegm. Purewick patent, pt denies pain. Reviewed with pt plan of care for shift and medications, all questions answered, pt voices no concerns. Problem: Discharge Planning  Goal: Discharge to home or other facility with appropriate resources  Outcome: Progressing  Flowsheets (Taken 2/11/2023 2000)  Discharge to home or other facility with appropriate resources:   Identify barriers to discharge with patient and caregiver   Arrange for needed discharge resources and transportation as appropriate     Problem: Pain  Goal: Verbalizes/displays adequate comfort level or baseline comfort level  Outcome: Progressing  Flowsheets (Taken 2/11/2023 2000)  Verbalizes/displays adequate comfort level or baseline comfort level:   Encourage patient to monitor pain and request assistance   Assess pain using appropriate pain scale   Administer analgesics based on type and severity of pain and evaluate response   Implement non-pharmacological measures as appropriate and evaluate response     Problem: Nutrition Deficit:  Goal: Optimize nutritional status  Outcome: Progressing     Problem: Skin/Tissue Integrity  Goal: Absence of new skin breakdown  Description: 1. Monitor for areas of redness and/or skin breakdown  2. Assess vascular access sites hourly  3. Every 4-6 hours minimum:  Change oxygen saturation probe site  4. Every 4-6 hours:  If on nasal continuous positive airway pressure, respiratory therapy assess nares and determine need for appliance change or resting period.   Outcome: Progressing     Problem: Safety - Adult  Goal: Free from fall injury  Outcome: Progressing     Problem: Respiratory - Adult  Goal: Achieves optimal ventilation and oxygenation  Outcome: Progressing     Problem: Skin/Tissue Integrity - Adult  Goal: Skin integrity remains intact  Outcome: Progressing  Flowsheets (Taken 2/11/2023 2000)  Skin Integrity Remains Intact: Monitor for areas of redness and/or skin breakdown     Problem: Musculoskeletal - Adult  Goal: Return mobility to safest level of function  Outcome: Progressing  Goal: Maintain proper alignment of affected body part  Outcome: Progressing  Goal: Return ADL status to a safe level of function  Outcome: Progressing

## 2023-02-12 NOTE — PROGRESS NOTES
Pulmonary & Critical Care Medicine ICU Progress Note    CC: COPD    Events of Last 24 hours: less SOB    Invasive Lines: PICC    MV:    Vent Mode: AC/VC Resp Rate (Set): 18 bmp/Vt (Set, mL): 360 mL/ /FiO2 : 55 %  No results for input(s): PHART, NZH3VTY, PO2ART in the last 72 hours. Vitals:  /76   Pulse 92   Temp 97.8 °F (36.6 °C) (Oral)   Resp 22   Ht 5' 3\" (1.6 m)   Wt 173 lb 3.2 oz (78.6 kg)   SpO2 93%   BMI 30.68 kg/m²   on 3L  Constitutional:  No acute distress. Eyes: PERRL. Conjunctivae anicteric. ENT: Normal nose. Normal tongue. Neck:  Trachea is midline. No thyroid tenderness. Respiratory: No accessory muscle usage. decreased breath sounds. No wheezes. No rales. +Rhonchi. Cardiovascular: Normal S1S2. No digit clubbing. No digit cyanosis. No LE edema. Psychiatric: No anxiety or Agitation. Alert and Oriented to person, place and time.      Scheduled Meds:   ipratropium-albuterol  1 ampule Inhalation 4x daily    pantoprazole  40 mg Oral QAM AC    insulin lispro  0-16 Units SubCUTAneous TID WC    insulin lispro  0-4 Units SubCUTAneous Nightly    predniSONE  40 mg Oral Daily    pregabalin  200 mg Oral BID    rOPINIRole  1 mg Oral BID    lidocaine 1 % injection  5 mL IntraDERmal Once    risperiDONE  1 mg Oral BID    atorvastatin  60 mg Oral Daily    sertraline  100 mg Oral BID    sodium chloride flush  5-40 mL IntraVENous 2 times per day    enoxaparin  40 mg SubCUTAneous Daily    nicotine  1 patch TransDERmal Daily     PRN Meds:  traMADol, sodium chloride, glucose, dextrose bolus **OR** dextrose bolus, glucagon (rDNA), dextrose, sodium chloride flush, sodium chloride, ondansetron **OR** ondansetron, polyethylene glycol, acetaminophen **OR** acetaminophen, albuterol    Results:  CBC:   Recent Labs     02/10/23  0336   WBC 11.5*   HGB 11.7*   HCT 35.9*   MCV 92.7          BMP:   Recent Labs     02/10/23  0336 02/11/23  0532    140   K 3.6 3.6    103   CO2 27 27   PHOS 3.2 2.9   BUN 25* 24*   CREATININE 0.6 0.7       LIVER PROFILE: No results for input(s): AST, ALT, LIPASE, BILIDIR, BILITOT, ALKPHOS in the last 72 hours. Invalid input(s): AMYLASE,  ALB    Cultures:  2/2 resp cx: c. albicans    Films:  2/5/23 CTPA:   1. No CT evidence of a pulmonary embolism. 2. Bilateral lower lobe airspace consolidation, left worse than right,   representing either pneumonia or aspiration. 3. Additional scattered curvilinear, consolidative, and nodular opacities   throughout the upper lobes and right middle lobe likely reflect additional   multifocal pneumonia and/or pulmonary edema. 4. Small bilateral pleural effusions. 5. Enlarged nodular appearance of the left thyroid lobe. Consider further   characterization with a routine non urgent follow-up thyroid ultrasound. See   below. 6. Findings suggestive of cirrhosis and portal hypertension. CXR was reviewed by me and it showed 2/8/23    1. Stable appropriate positions of support apparatus. 2. Slight interval improvement of mild pulmonary edema. 3. No significant change in appearance of a small left pleural effusion and   left basilar airspace consolidation.        Assessment:    Acute COPD exacerbation   Bilateral lower extremity cellulitis  CHF/fluid overload  OHS/SHAZIA  Acute hypoxic  hypercapnic res failure      Plan:    Supplemental oxygen to maintain SaO2 >92%; wean as tolerated    prednisone taper   Completed Cefepime Day 7/7     BD  Consider repeat CT in 6-8 weeks

## 2023-02-12 NOTE — PROGRESS NOTES
02/11/23 2000   RT Protocol   History Pulmonary Disease 2   Respiratory pattern 2   Breath sounds 2   Cough 0   Indications for Bronchodilator Therapy Decreased or absent breath sounds   Bronchodilator Assessment Score 6   RT Inhaler-Nebulizer Bronchodilator Protocol Note    There is a bronchodilator order in the chart from a provider indicating to follow the RT Bronchodilator Protocol and there is an “Initiate RT Inhaler-Nebulizer Bronchodilator Protocol” order as well (see protocol at bottom of note).    CXR Findings:  No results found.    The findings from the last RT Protocol Assessment were as follows:   History Pulmonary Disease: Chronic pulmonary disease  Respiratory Pattern: Dyspnea on exertion or RR 21-25 bpm  Breath Sounds: Slightly diminished and/or crackles  Cough: Strong, spontaneous, non-productive  Indication for Bronchodilator Therapy: Decreased or absent breath sounds  Bronchodilator Assessment Score: 6    Aerosolized bronchodilator medication orders have been revised according to the RT Inhaler-Nebulizer Bronchodilator Protocol below.    Respiratory Therapist to perform RT Therapy Protocol Assessment initially then follow the protocol.  Repeat RT Therapy Protocol Assessment PRN for score 0-3 or on second treatment, BID, and PRN for scores above 3.    No Indications - adjust the frequency to every 6 hours PRN wheezing or bronchospasm, if no treatments needed after 48 hours then discontinue using Per Protocol order mode.     If indication present, adjust the RT bronchodilator orders based on the Bronchodilator Assessment Score as indicated below.  Use Inhaler orders unless patient has one or more of the following: on home nebulizer, not able to hold breath for 10 seconds, is not alert and oriented, cannot activate and use MDI correctly, or respiratory rate 25 breaths per minute or more, then use the equivalent nebulizer order(s) with same Frequency and PRN reasons based on the score.  If a patient  is on this medication at home then do not decrease Frequency below that used at home. 0-3 - enter or revise RT bronchodilator order(s) to equivalent RT Bronchodilator order with Frequency of every 4 hours PRN for wheezing or increased work of breathing using Per Protocol order mode. 4-6 - enter or revise RT Bronchodilator order(s) to two equivalent RT bronchodilator orders with one order with BID Frequency and one order with Frequency of every 4 hours PRN wheezing or increased work of breathing using Per Protocol order mode. 7-10 - enter or revise RT Bronchodilator order(s) to two equivalent RT bronchodilator orders with one order with TID Frequency and one order with Frequency of every 4 hours PRN wheezing or increased work of breathing using Per Protocol order mode. 11-13 - enter or revise RT Bronchodilator order(s) to one equivalent RT bronchodilator order with QID Frequency and an Albuterol order with Frequency of every 4 hours PRN wheezing or increased work of breathing using Per Protocol order mode. Greater than 13 - enter or revise RT Bronchodilator order(s) to one equivalent RT bronchodilator order with every 4 hours Frequency and an Albuterol order with Frequency of every 2 hours PRN wheezing or increased work of breathing using Per Protocol order mode.        Electronically signed by Garrett Elizondo RCP on 2/11/2023 at 8:51 PM

## 2023-02-12 NOTE — PROGRESS NOTES
Pt is lying in bed with their eyes open. Alert and oriented times 4. Speech is clear. Denies pain and any needs at this time. Call light within reach. Bed in lowest position with the wheels locked.   Astrid Monte RN

## 2023-02-12 NOTE — PROGRESS NOTES
IM Progress Note    Admit Date:  2/2/2023  10    Interval history:    copd exacerbation , failed bipap and intubated in ER   Imaging with left hemithorax opacification,s/p bronch with clearance of mucous plugs   Agitation issues noted on vent   CTA chest with no PE , bilateral pneumonia     -> Extubated-> Vapotherm  -> O2 weaned down to 9 L high flow-> 3 L. Transferred out of ICU to PCU on 2/10    Subjective:    Ms. Kelly Torre is on oxygen 3 L . She is awake and oriented , very weak . Awaiting SNF placement . Objective:   /76   Pulse 92   Temp 97.8 °F (36.6 °C) (Oral)   Resp 22   Ht 5' 3\" (1.6 m)   Wt 173 lb 3.2 oz (78.6 kg)   SpO2 91%   BMI 30.68 kg/m²     Intake/Output Summary (Last 24 hours) at 2/12/2023 1245  Last data filed at 2/12/2023 0759  Gross per 24 hour   Intake 835.4 ml   Output 100 ml   Net 735.4 ml         Physical Exam:    General:  elderly female , looks fatigued, no distress   Appears to be not in any distress  Mucous Membranes:  Pink , anicteric  Neck: No JVD, no carotid bruit, no thyromegaly  Chest: Diminished breath sounds , mild rhonchi .   Cardiovascular:  RRR S1S2 heard, no murmurs or gallops  Abdomen:  Soft, undistended, non tender, no organomegaly, BS present  Extremities: chronic 2 +  brawny edema of both LE with diffuse celluitis of ext Bilaterally  Distal pulses well felt  Neurological : sedated      Medications:   Scheduled Medications:    ipratropium-albuterol  1 ampule Inhalation 4x daily    pantoprazole  40 mg Oral QAM AC    insulin lispro  0-16 Units SubCUTAneous TID WC    insulin lispro  0-4 Units SubCUTAneous Nightly    predniSONE  40 mg Oral Daily    pregabalin  200 mg Oral BID    rOPINIRole  1 mg Oral BID    lidocaine 1 % injection  5 mL IntraDERmal Once    risperiDONE  1 mg Oral BID    atorvastatin  60 mg Oral Daily    sertraline  100 mg Oral BID    sodium chloride flush  5-40 mL IntraVENous 2 times per day    enoxaparin  40 mg SubCUTAneous Daily    nicotine 1 patch TransDERmal Daily     I   sodium chloride      dextrose      sodium chloride Stopped (02/09/23 1804)     traMADol, sodium chloride, glucose, dextrose bolus **OR** dextrose bolus, glucagon (rDNA), dextrose, sodium chloride flush, sodium chloride, ondansetron **OR** ondansetron, polyethylene glycol, acetaminophen **OR** acetaminophen, albuterol    Lab Data:  Recent Labs     02/10/23  0336   WBC 11.5*   HGB 11.7*   HCT 35.9*   MCV 92.7          Recent Labs     02/10/23  0336 02/11/23  0532    140   K 3.6 3.6    103   CO2 27 27   PHOS 3.2 2.9   BUN 25* 24*   CREATININE 0.6 0.7       No results for input(s): CKTOTAL, CKMB, CKMBINDEX, TROPONINI in the last 72 hours. Coagulation:   Lab Results   Component Value Date/Time    INR 1.04 02/04/2023 12:15 PM    APTT 27.6 02/04/2023 12:15 PM     Cardiac markers:   Lab Results   Component Value Date/Time    CKMB 10.3 07/14/2010 07:25 AM    CKTOTAL 74 10/17/2014 02:46 AM    TROPONINI <0.01 02/02/2023 05:05 AM         Lab Results   Component Value Date    ALT 12 02/03/2023    AST 13 (L) 02/03/2023    ALKPHOS 99 02/03/2023    BILITOT 0.3 02/03/2023       Lab Results   Component Value Date    INR 1.04 02/04/2023    INR 0.94 09/12/2020    INR 0.99 07/01/2018    PROTIME 13.5 02/04/2023    PROTIME 10.9 09/12/2020    PROTIME 11.3 07/01/2018     Cultures:   Cultures  Blood- NGTD  Sputum - pending  BAL NRF      Radiology  XR CHEST PORTABLE   Final Result   Increasing bibasilar ground-glass opacities with a developing right pleural   effusion. Pulmonary edema or ARDS suspected. XR CHEST PORTABLE   Final Result   1. Stable appropriate positions of support apparatus. 2. Slight interval improvement of mild pulmonary edema. 3. No significant change in appearance of a small left pleural effusion and   left basilar airspace consolidation. XR CHEST PORTABLE   Final Result   1. Stable appropriate positions of support apparatus.    2. Stable small bilateral pleural effusions. 3. Stable left basilar airspace consolidation. 4. Interval progression of diffuse hazy airspace opacity throughout both   lungs, right greater than left, which likely reflects progressive pulmonary   edema, though progressive multifocal pneumonia is also a consideration. XR CHEST PORTABLE   Final Result   Stable chest.  Bibasilar airspace disease appears similar         XR CHEST PORTABLE   Final Result   1. Endotracheal tube slightly low in position, lying approximately 2.0 cm   above the ivis. Suggest retraction of the tube by approximately 2-3 cm to   avoid right mainstem intubation. 2. Other support apparatus is in good anatomic position. 3. Slight interval decrease in size of a small left pleural effusion. 4. Interval improvement in appearance of bibasilar airspace disease. The findings were sent to the Radiology Results [address] at 6:04   am on 2/5/2023 to be communicated to a licensed caregiver. CTA PULMONARY W CONTRAST   Final Result   1. No CT evidence of a pulmonary embolism. 2. Bilateral lower lobe airspace consolidation, left worse than right,   representing either pneumonia or aspiration. 3. Additional scattered curvilinear, consolidative, and nodular opacities   throughout the upper lobes and right middle lobe likely reflect additional   multifocal pneumonia and/or pulmonary edema. 4. Small bilateral pleural effusions. 5. Enlarged nodular appearance of the left thyroid lobe. Consider further   characterization with a routine non urgent follow-up thyroid ultrasound. See   below. 6. Findings suggestive of cirrhosis and portal hypertension.       RECOMMENDATIONS:   Managing Incidental Thyroid Nodule Detected at CT or MRI or US      Follow up thyroid ultrasound also recommend in these scenarios      - Solitary nodule with high risk imaging features (locally invasive nodule or   suspicious lymph nodes)      - HETEROGENEOUS, ENLARGED THYROID GLAND.      - Increased uptake on PET      Note: These recommendations do not apply to pts. w/ increased risk      for thyroid cancer or pts. with symptomatic thyroid disease.      ________________________________________________________________      Recommendations for f/u of Incidental Thyroid Nodules (ITN)      found on CT, MR, NM and Extrathyroidal US are based upon the ACR      white paper and [facility] 3-tiered system for managing ITNs:      J Am Kylee Radiol. 2015 Feb;12(2): 143-50         XR CHEST PORTABLE   Final Result   Bibasilar airspace disease, left greater than right, similar compared to prior         VL Extremity Venous Bilateral   Final Result      XR CHEST PORTABLE   Final Result   Exam limited by patient positioning. No marked interval change in bilateral   airspace disease as compared to prior. XR CHEST PORTABLE   Final Result   Significantly limited evaluation secondary to patient positioning with no   signs to suggest pneumothorax. XR CHEST PORTABLE   Final Result   Complete opacification has developed of the left hemithorax, which can   reflect combination of atelectasis, airspace disease, and/or pleural   effusion. Right-sided airspace disease is not substantially changed. XR CHEST PORTABLE   Final Result   1. Endotracheal tube likely within the right mainstem bronchus. Recommend 3   cm of retraction and repeat chest radiograph. XR CHEST PORTABLE   Final Result   1. Findings suggestive of partial right upper lobe atelectasis. 2. Small left pleural effusion. 3. Central pulmonary vascular congestion without overt pulmonary edema. 4. Left basilar atelectasis. Assessment & Plan:    Acute respiratory failure with hypoxia and hypercapnia  Copd exacerbation   Left hemithorax opacification with mucous plugging  Pneumonia-gram-negative infection  - O2 sat 86% on 5L in the ER. Initially placed on NIV.   Too obtunded for NIV and PCO2 worsened after ~1 h on NIV. And eventually placed on vent  -Admitted to ICU . Seen in consultation by pulmonary critical care  -CTA chest with bilateral pna, no PE  -Had emergency bronch with removal of mucous plugs; BAL with normal respiratory madhavi  - Started on steroids  - treated with antibiotics vancomycin and cefepime for 7 days   -S/p intubation mechanical ventilation , patient has been extubated  - was on vapotherm--> 9 L high flow nasal canula--> 3 L . O2 sats remained stable on 3 L now  -> Transferred out of ICU . oxygen saturation stable, completed course of antibiotics    COPD exacerbation   -IV solumedrol, transition to oral prednisone. Will need prednisone taper under charge  - IV abx as above   - PRN/GIDEON intensive NEB therapy. Acute metabolic encephalopathy  -Patient was obtunded, likely metabolic 2/2 hypercarbia. Resolved     DM2  -hold oral Rx-can resume metformin.  -Continue sliding scale insulin coverage    Bilateral LE cellulitis   chronic lymphedema  -Treated with IV antibiotics vancomycin and cefepime as above  -Norvasc on hold    Tobacco Abuse  -on nicotine patch. Hx of cirrhosis with portal HTN   -Home meds reviewed, she does not on any diuretics at home    Thyroid enlargement on left side   - need f/w US as outpt    Weakness and debility  -Seen by PT OT       DVT Prophylaxis: Lx  Diet carb control  Code Status: Full Code     Case management following  Needs precert for SNF .       Nyasia Newby MD, 2/12/2023 12:45 PM

## 2023-02-13 ENCOUNTER — TELEPHONE (OUTPATIENT)
Dept: PULMONOLOGY | Age: 68
End: 2023-02-13

## 2023-02-13 PROBLEM — J96.01 ACUTE RESPIRATORY FAILURE WITH HYPOXIA AND HYPERCAPNIA (HCC): Status: ACTIVE | Noted: 2023-02-13

## 2023-02-13 PROBLEM — G47.33 OSA (OBSTRUCTIVE SLEEP APNEA): Status: ACTIVE | Noted: 2023-02-13

## 2023-02-13 PROBLEM — J96.02 ACUTE RESPIRATORY FAILURE WITH HYPOXIA AND HYPERCAPNIA (HCC): Status: ACTIVE | Noted: 2023-02-13

## 2023-02-13 LAB
FUNGUS (MYCOLOGY) CULTURE: NORMAL
FUNGUS STAIN: NORMAL
GLUCOSE BLD-MCNC: 196 MG/DL (ref 70–99)
GLUCOSE BLD-MCNC: 204 MG/DL (ref 70–99)
GLUCOSE BLD-MCNC: 216 MG/DL (ref 70–99)
GLUCOSE BLD-MCNC: 241 MG/DL (ref 70–99)
PERFORMED ON: ABNORMAL

## 2023-02-13 PROCEDURE — 97535 SELF CARE MNGMENT TRAINING: CPT

## 2023-02-13 PROCEDURE — 94669 MECHANICAL CHEST WALL OSCILL: CPT

## 2023-02-13 PROCEDURE — 97530 THERAPEUTIC ACTIVITIES: CPT

## 2023-02-13 PROCEDURE — 1200000000 HC SEMI PRIVATE

## 2023-02-13 PROCEDURE — 6370000000 HC RX 637 (ALT 250 FOR IP): Performed by: INTERNAL MEDICINE

## 2023-02-13 PROCEDURE — 94640 AIRWAY INHALATION TREATMENT: CPT

## 2023-02-13 PROCEDURE — 94761 N-INVAS EAR/PLS OXIMETRY MLT: CPT

## 2023-02-13 PROCEDURE — 99232 SBSQ HOSP IP/OBS MODERATE 35: CPT | Performed by: INTERNAL MEDICINE

## 2023-02-13 PROCEDURE — 2580000003 HC RX 258: Performed by: INTERNAL MEDICINE

## 2023-02-13 PROCEDURE — 2700000000 HC OXYGEN THERAPY PER DAY

## 2023-02-13 PROCEDURE — 6360000002 HC RX W HCPCS: Performed by: INTERNAL MEDICINE

## 2023-02-13 RX ADMIN — INSULIN LISPRO 4 UNITS: 100 INJECTION, SOLUTION INTRAVENOUS; SUBCUTANEOUS at 16:12

## 2023-02-13 RX ADMIN — RISPERIDONE 1 MG: 1 TABLET ORAL at 08:02

## 2023-02-13 RX ADMIN — ACETAMINOPHEN 650 MG: 325 TABLET ORAL at 22:37

## 2023-02-13 RX ADMIN — ENOXAPARIN SODIUM 40 MG: 100 INJECTION SUBCUTANEOUS at 08:02

## 2023-02-13 RX ADMIN — METFORMIN HYDROCHLORIDE 1000 MG: 500 TABLET ORAL at 08:01

## 2023-02-13 RX ADMIN — PREGABALIN 200 MG: 100 CAPSULE ORAL at 08:01

## 2023-02-13 RX ADMIN — PREDNISONE 40 MG: 20 TABLET ORAL at 08:03

## 2023-02-13 RX ADMIN — IPRATROPIUM BROMIDE AND ALBUTEROL SULFATE 1 AMPULE: .5; 2.5 SOLUTION RESPIRATORY (INHALATION) at 08:27

## 2023-02-13 RX ADMIN — ATORVASTATIN CALCIUM 60 MG: 40 TABLET, FILM COATED ORAL at 08:02

## 2023-02-13 RX ADMIN — ROPINIROLE HYDROCHLORIDE 1 MG: 1 TABLET, FILM COATED ORAL at 08:02

## 2023-02-13 RX ADMIN — IPRATROPIUM BROMIDE AND ALBUTEROL SULFATE 1 AMPULE: .5; 2.5 SOLUTION RESPIRATORY (INHALATION) at 16:00

## 2023-02-13 RX ADMIN — ROPINIROLE HYDROCHLORIDE 1 MG: 1 TABLET, FILM COATED ORAL at 20:01

## 2023-02-13 RX ADMIN — RISPERIDONE 1 MG: 1 TABLET ORAL at 20:01

## 2023-02-13 RX ADMIN — IPRATROPIUM BROMIDE AND ALBUTEROL SULFATE 1 AMPULE: .5; 2.5 SOLUTION RESPIRATORY (INHALATION) at 20:51

## 2023-02-13 RX ADMIN — SODIUM CHLORIDE, PRESERVATIVE FREE 10 ML: 5 INJECTION INTRAVENOUS at 08:01

## 2023-02-13 RX ADMIN — SERTRALINE 100 MG: 100 TABLET, FILM COATED ORAL at 20:01

## 2023-02-13 RX ADMIN — PANTOPRAZOLE SODIUM 40 MG: 40 TABLET, DELAYED RELEASE ORAL at 06:17

## 2023-02-13 RX ADMIN — INSULIN LISPRO 4 UNITS: 100 INJECTION, SOLUTION INTRAVENOUS; SUBCUTANEOUS at 08:00

## 2023-02-13 RX ADMIN — SERTRALINE 100 MG: 100 TABLET, FILM COATED ORAL at 08:02

## 2023-02-13 RX ADMIN — IPRATROPIUM BROMIDE AND ALBUTEROL SULFATE 1 AMPULE: .5; 2.5 SOLUTION RESPIRATORY (INHALATION) at 11:15

## 2023-02-13 RX ADMIN — PREGABALIN 200 MG: 100 CAPSULE ORAL at 20:01

## 2023-02-13 RX ADMIN — SODIUM CHLORIDE, PRESERVATIVE FREE 10 ML: 5 INJECTION INTRAVENOUS at 20:02

## 2023-02-13 RX ADMIN — METFORMIN HYDROCHLORIDE 1000 MG: 500 TABLET ORAL at 16:11

## 2023-02-13 ASSESSMENT — PAIN SCALES - WONG BAKER: WONGBAKER_NUMERICALRESPONSE: 0

## 2023-02-13 ASSESSMENT — PAIN DESCRIPTION - DESCRIPTORS: DESCRIPTORS: DISCOMFORT

## 2023-02-13 ASSESSMENT — PAIN SCALES - GENERAL
PAINLEVEL_OUTOF10: 0
PAINLEVEL_OUTOF10: 7

## 2023-02-13 ASSESSMENT — PAIN DESCRIPTION - LOCATION: LOCATION: LEG

## 2023-02-13 ASSESSMENT — PAIN DESCRIPTION - ORIENTATION: ORIENTATION: LEFT;RIGHT

## 2023-02-13 NOTE — PROGRESS NOTES
Inpatient Occupational Therapy Treatment     Unit: 3 [facility]  Date:  2/13/2023  Patient Name:    Idris Kenney  Admitting diagnosis:  COPD exacerbation [facility]) [J44.1]  Acute respiratory failure with hypercapnia ([facility] [address] [J96.02]  Acute encephalopathy [G93.40]  Acute on chronic respiratory failure with hypoxia and hypercapnia ([facility] [address] [J96.21, J96.22]  Admit Date:  2/2/2023  Precautions/Restrictions/WB Status/ Lines/ Wounds/ Oxygen: Fall risk, Bed/chair alarm, Lines (IV, Supplemental O2 (9L), and Arias catheter), Telemetry, and Continuous pulse oximetry   Intubated 2-2-23  Extubated 2-8-23   Treatment Time:  9342-8906   Treatment Number:  2  Timed Code Treatment Minutes: 45 minutes  Total Treatment Minutes: 45  minutes    Patient Goals for Therapy: \" not stated  \"          Discharge Recommendations: SNF  DME needs for discharge: Defer to facility         Therapy recommendations for staff:   Assist of 2 for bed mobility     History of Present Illness: per H&P   79 y.o. female  has a past medical history of Anxiety, Arthritis, Cancer ([address] Chronic back pain, Chronic pain, COPD (chronic obstructive pulmonary disease) ([address] Depression, DJD (degenerative joint disease), cervical, Drug-seeking behavior, GERD (gastroesophageal reflux disease), Hypercholesteremia, Hypertension, IBS (irritable bowel syndrome), Insomnia, K deficiency, Kidney stone, Narcotic addiction ([address] Narcotic overdose ([address] Osteoporosis, Overdose, PNA (pneumonia), Restless leg syndrome, and Thyroid disease. who presents to the ED for Hartness of breath. Patient is a poor historian as she is able unable to answer any question due to altered mental status  Home Health S4 Level Recommendation:  NA    AM-PAC Score: AM-PAC Inpatient Daily Activity Raw Score: 9 9    Subjective  Patient lying supine in bed with  no family at bedside. Pt agreeable to this OT session.      Cognition:    A&O Person , place   Able to follow 2 step commands     Pain:   no complaints Location: NA  Rating: NA  Pain Medicine Status: no request     Preadmission Environment:   Pt. Lives     with family (dtr)  Home environment:    mobile home/trailer  Steps to enter first floor:   5 steps to enter with bilateral railings   Steps to second floor/basement: N/A  Laundry:     1st floor  Bathroom:     tub/shower unit, shower chair , and comfort height toilet  Pt sleeps in a:    [address]  Equipment owned:    rollator, shower chair/bench, raised toilet seat, and home O2 (3L) continous    Preadmission Status:  Pt. Able to drive: No  Pt. Fully independent with ADLs: No  Pt. Required assistance from family for: Cleaning, Cooking, and Laundry   Pt. supervision for functional transfers and utilized Rollator for mobility in home and Rollator out in community  History of falls: No  Home Health Services:None      Upper Extremity ROM:    Not fully assessed this treatment     Upper Extremity Strength:    Not fully assessed this visit. The pt appears to be actively moving the left UE better today    Upper Extremity Sensation:    NT    Upper Extremity Proprioception:  NT    Coordination and Tone  Impaired    Balance:  Sitting: Mod assist Pt leans posterior and keeps head flexed forward   Standing: Mod assist of two to stand into the stedy       Bed Mobility:   Supine to Sit:    Max A , 2 person  Sit to Supine:   Max A , 2 person  Rolling: Mod assist with VC   Scooting in sitting: Max A   Scooting in supine: Total A, 2 person    Transfers:    Sit to stand: Mod assist of two to stedy with VC to stand up tall   Stand to sit:    Max assist   Bed to chair:     Not Tested- pt had a bowel movement and needed to be changed   Bed/ chair to standard toilet:  Not Tested  Bed/chair to [facility]:   Not Tested    See PT note for gait analysis.     ADLs:  Dressing:      UE:   Not Tested  LE:    Max A  to don depends and socks     Bathing:    UE:  Not Tested  LE:  Not Tested    Eating:   Not Tested    Toileting:  Not Tested    Grooming/hygiene: Not Tested    Activity Tolerance   Pt completed therapy session with improved endurance, decreased balance. BP (mmHg) HR (bpm) SpO2 (%) on 9L Comments   Supine at rest  88 bpm 91%    Seated at EOB  bpm %    Standing  bpm %    End of session    %      Positioning Needs   In bed with needs at reach , pt set up with lunch     Ther Ex / Activities Initiated:   N/A    Patient/Family Education   Pt educated on role of inpatient OT, plan of care, importance of continued activity, calling for assist with mobility. Assessment:    Pt seen for Occupational therapy evaluation in acute care setting. Pt demonstrated decreased Activity tolerance, ADLs, Balance , Bed mobility, Safety Awareness, Transfers, and Cognition. Pt functioning below baseline and will likely benefit from skilled occupational therapy services to maximize safety and independence. Recommending SNF     Goal(s) : To be met in 3 Visits:  1). Bed to toilet/BSC:        Mod A of 2 with appropriate AD   2.) Pt will complete 3/3 CHF goals          To be met in 5 Visits:  1). Supine to/from Sit in preporation for ADL task: Mod A  2.) Toileting         Mod A  3.) Grooming        Mod A  4). Upper Body Dressing: Mod A  5). Lower Body Dressing:       Max A  6). Pt to demonstrate UE therapeutic exs x 15 reps with minimal cues    Rehabilitation Potential: Fair  Strengths for achieving goals include: Pt cooperative   Barriers to achieving goals include:  Complexity of condition    Plan: To be seen 3-5 x/wk while in acute care setting for therapeutic exercises, bed mobility, transfers, family/patient education, ADL/IADL retraining, and energy conservation training.     Signature: Manuel Potter OTR/L [id number]       If patient discharges from this facility prior to next visit, this note will serve as the Discharge Summary

## 2023-02-13 NOTE — PROGRESS NOTES
Shift assessment completed, see flow sheet. Medications administered per MAR. Bilateral LS diminished, occasional productive cough. A&O x4  R PICC in place and functioning appropriately, dressing C/D/I. Dysphagia diet in place, bedtime snack offered but declined by pt. Purewick in place collecting clear, mariza urine. Small formed BM noted, rohith care provided. Call light within reach. Bed locked and in lowest position with alarm on. Will continue to monitor.      02/12/23 2135   Vitals   Temp 98.2 °F (36.8 °C)   Heart Rate 86   Resp 18   BP (!) 151/70   MAP (Calculated) 97   Level of Consciousness 0   MEWS Score 1   Cardiac Rhythm Sinus rhythm   Pain Assessment   Pain Assessment 0-10   Pain Level 0   Oxygen Therapy   SpO2 98 %   O2 Device Nasal cannula   O2 Flow Rate (L/min) 3 L/min

## 2023-02-13 NOTE — PROGRESS NOTES
Pulmonary & Critical Care Medicine ICU Progress Note    CC: COPD    Events of Last 24 hours:  Less short of breath  Appears comfortable        Invasive Lines: PICC  BP (!) 155/69   Pulse 91   Temp 98 °F (36.7 °C)   Resp 18   Ht 5' 3\" (1.6 m)   Wt 172 lb 3.2 oz (78.1 kg)   SpO2 96%   BMI 30.50 kg/m² 3 L-uses 3 L at home  Constitutional:  No acute distress. Sherryle Moder HEENT: no scleral icterus  Neck: No tracheal deviation present. Cardiovascular: Normal heart sounds. Pulmonary/Chest: Minimal wheezes. No rhonchi. No rales. No decreased breath sounds. No accessory muscle usage or stridor. Abdominal: Soft. Musculoskeletal: No cyanosis. No clubbing. Skin: Skin is warm and dry. Scheduled Meds:   metFORMIN  1,000 mg Oral BID WC    ipratropium-albuterol  1 ampule Inhalation 4x daily    pantoprazole  40 mg Oral QAM AC    insulin lispro  0-16 Units SubCUTAneous TID WC    insulin lispro  0-4 Units SubCUTAneous Nightly    predniSONE  40 mg Oral Daily    pregabalin  200 mg Oral BID    rOPINIRole  1 mg Oral BID    lidocaine 1 % injection  5 mL IntraDERmal Once    risperiDONE  1 mg Oral BID    atorvastatin  60 mg Oral Daily    sertraline  100 mg Oral BID    sodium chloride flush  5-40 mL IntraVENous 2 times per day    enoxaparin  40 mg SubCUTAneous Daily    nicotine  1 patch TransDERmal Daily     PRN Meds:  traMADol, sodium chloride, glucose, dextrose bolus **OR** dextrose bolus, glucagon (rDNA), dextrose, sodium chloride flush, sodium chloride, ondansetron **OR** ondansetron, polyethylene glycol, acetaminophen **OR** acetaminophen, albuterol    Results:  CBC:   No results for input(s): WBC, HGB, HCT, MCV, PLT in the last 72 hours. BMP:   Recent Labs     02/11/23  0532      K 3.6      CO2 27   PHOS 2.9   BUN 24*   CREATININE 0.7       LIVER PROFILE: No results for input(s): AST, ALT, LIPASE, BILIDIR, BILITOT, ALKPHOS in the last 72 hours. Invalid input(s):   AMYLASE,  ALB    Cultures:  2/2 BAL NGTD  2/2 BC NGTD  2/2 respiratory NGTD    Films:  Chest x-ray 2/9   Increasing bibasilar ground-glass opacities with a developing right pleural   effusion. Pulmonary edema or ARDS suspected    CTPA 2/4   1. No CT evidence of a pulmonary embolism. 2. Bilateral lower lobe airspace consolidation, left worse than right,   representing either pneumonia or aspiration. 3. Additional scattered curvilinear, consolidative, and nodular opacities   throughout the upper lobes and right middle lobe likely reflect additional   multifocal pneumonia and/or pulmonary edema. 4. Small bilateral pleural effusions. 5. Enlarged nodular appearance of the left thyroid lobe. Consider further   characterization with a routine non urgent follow-up thyroid ultrasound. See below.    6. Findings suggestive of cirrhosis and portal hypertension    Assessment:    Acute hypoxemic hypercapnic respiratory failure  Acute COPD exacerbation   Bilateral lower extremity cellulitis  CHF/fluid overload  OHS/SHAZIA        Plan:    Supplemental oxygen to maintain SaO2 >92%; wean as tolerated    Prednisone taper  Inhaled bronchodilators  Completed Cefepime Day 7/7    BD  Consider repeat CT in 6-8 weeks (Office is  notified)

## 2023-02-13 NOTE — PROGRESS NOTES
Comprehensive Nutrition Assessment    Type and Reason for Visit:  Reassess (Follow-up)    Nutrition Recommendations/Plan:   Continue current dysphagia - minced & moist; 4 CHO choice diet  Add Ensure high protein once daily with breakfast  Continue to monitor appetite & PO intake  Continue to monitor weight trends & nutrition related lab values     Malnutrition Assessment:  Malnutrition Status: At risk for malnutrition (02/13/23 3084)    Context:  Acute Illness     Findings of the 6 clinical characteristics of malnutrition:  Energy Intake:  Mild decrease in energy intake  Weight Loss:  Greater than 2% over 1 week (patient is -8.7L fluid since admission)   Body Fat Loss:  Unable to assess     Muscle Mass Loss:  Unable to assess    Fluid Accumulation:  Unable to assess  (legs oozing)   Strength:  Not Performed    Nutrition Assessment:    Pt has improved from a nutritional standpoint since last RD assessment AEB PO diet advancement & some PO intake documented on diet. Pt remains at risk for further compromise d/t poor PO intake & altered nutrition related lab values. Will continue dysphagia - minced & moist; 4 CHO choice diet + add Ensure high protein w/ breakfast meal.    Nutrition Related Findings:    Pt is alert & oriented when awake but was asleep when RD went in for follow-up assessment; pt is edentulous; pt's skin is dry & warm; pt has chronic 2+ edema in BLEs; pt's bowels are active & abdomen is soft & nontender; last BM was today (2/13). Pt's PO intake has been poor since last RD assessment; she's on a dysphagia diet; minced & moist. Pt has lost ~15 lbs since last RD assessment but pt is negative 8.7 L of fluid since admission (2/7) Wound Type: None       Current Nutrition Intake & Therapies:    Average Meal Intake: 1-25%  Average Supplements Intake: None Ordered  ADULT DIET;  Dysphagia - Minced and Moist; 4 carb choices (60 gm/meal)  ADULT ORAL NUTRITION SUPPLEMENT; Breakfast; Low Calorie/High Protein Oral Supplement    Anthropometric Measures:  Height: 5' 3\" (160 cm)  Ideal Body Weight (IBW): 115 lbs (52 kg)    Admission Body Weight: 190 lb 15 oz (86.6 kg) (2/3/23 (bed scale))  Current Body Weight: 172 lb 3.2 oz (78.1 kg) (2/13/23 (bed scale)), 149.7 % IBW. Weight Source: Bed Scale  Current BMI (kg/m2): 30.5  Usual Body Weight: 167 lb 10 oz (76 kg) (5/18/22 (bed scale))  % Weight Change (Calculated): 2.7  Weight Adjustment For: No Adjustment                 BMI Categories: Obese Class 1 (BMI 30.0-34. 9)    Estimated Daily Nutrient Needs:  Energy Requirements Based On: Kcal/kg  Weight Used for Energy Requirements: Current  Energy (kcal/day): 1171 - 1327 kcal/kg CBW (15 -17 kcal/kg CBW)  Weight Used for Protein Requirements: Ideal  Protein (g/day): 104 - 114 g PRO/day (2 - 2.2 g/kg IBW)  Method Used for Fluid Requirements: 1 ml/kcal  Fluid (ml/day): 1171 - 1327 mL/day    Nutrition Diagnosis:   Inadequate protein-energy intake related to acute injury/trauma, increase demand for energy/nutrients as evidenced by intake 0-25%, lab values, weight loss, localized or generalized fluid accumulation    Nutrition Interventions:   Food and/or Nutrient Delivery: Continue Current Diet, Start Oral Nutrition Supplement  Nutrition Education/Counseling: No recommendation at this time  Coordination of Nutrition Care: Continue to monitor while inpatient  Plan of Care discussed with: ICU Team    Goals:  Previous Goal Met: Goal(s) Achieved  Goals: PO intake 75% or greater, by next RD assessment       Nutrition Monitoring and Evaluation:   Behavioral-Environmental Outcomes: None Identified  Food/Nutrient Intake Outcomes: Diet Advancement/Tolerance, Food and Nutrient Intake  Physical Signs/Symptoms Outcomes: Biochemical Data, Weight, Fluid Status or Edema    Discharge Planning:    Continue current diet     Hilda Garcia, [address] [place] [address],   Contact: [phone]

## 2023-02-13 NOTE — PROGRESS NOTES
Inpatient Physical Therapy Treatment    Unit: 3 [facility]  Date:  2/13/2023  Patient Name:    Norma Gonzales  Admitting diagnosis:  COPD exacerbation [facility]) [J44.1]  Acute respiratory failure with hypercapnia ([facility] [address] [J96.02]  Acute encephalopathy [G93.40]  Acute on chronic respiratory failure with hypoxia and hypercapnia ([facility] [address] [J96.21, J96.22]  Admit Date:  2/2/2023  Precautions/Restrictions/WB Status/ Lines/ Wounds/ Oxygen: Fall risk, Bed/chair alarm, Lines (IV, Supplemental O2 (3L), and Arias catheter), Telemetry, and Continuous pulse oximetry    Treatment Time:  4004-6112   Treatment Number:  2  Timed Code Treatment Minutes:  45 minutes  Total Treatment Minutes:   45 minutes    Patient Stated Goals for Therapy: \" not stated \"          Discharge Recommendations: SNF  DME needs for discharge: Defer to facility       Therapy recommendation for EMS Transport: requires transport by cot due to pt needs lift equipment for safe transfers    Therapy recommendations for staff:   Assist of 2 for bed mobility. Unable to sit at EOB unsupported. Maxi Move to recliner. History of Present Illness:   COPD exacerbation , failed bipap and intubated in ER   Imaging with left hemithorax opacification,s/p bronch with clearance of mucous plugs      Agitation issues noted on vent   CTA chest with no PE , bilateral pneumonia   Intubated 2/2-2/6. Extubated to Vapotherm. Weaned to NC. Transferred out of ICU to PCU on 2/10    [address] S4 Level Recommendation:  NA    AM-PAC Mobility Score    AM-PAC Inpatient Mobility Raw Score : 8       Subjective  Patient lying reclined bed with no family present. Pt agreeable to this PT session.      Cognition    A&O Person and Place   Able to follow 2 step commands    Pain   No  Location: N/A  Rating: NA /10  Pain Medicine Status: No request made    Preadmission Environment:   Pt. Lives                                              with family (dtr)  Home environment:                            mobile home/trailer  Steps to enter first floor:                     5 steps to enter with bilateral railings   Steps to second floor/basement:        N/A  Laundry:                                              1st floor  Bathroom:                                           tub/shower unit, shower chair , and comfort height toilet  Pt sleeps in a:                                     [address]  Equipment owned:                              rollator, shower chair/bench, raised toilet seat, and home O2 (3L) continous     Preadmission Status:  Pt. Able to drive: No  Pt. Fully independent with ADLs: No  Pt. Required assistance from family for: Cleaning, Cooking, and Laundry   Pt. supervision for functional transfers and utilized Rollator for mobility in home and Rollator out in community  History of falls:            No  Home Health Services:None    Balance  Static Sitting:  Fair -; CGA to Max A (fatigues quickly into posterior lean. Today better able to use UE and trunk stabilizers to recover, at times)  Dynamic Sitting:  Poor; Max A   Comments: At EOB x 25 minutes    Static Standing: Poor; Max A  and 2 persons for partial  STEDY x 2 trials of <5 seconds each. Dynamic Standing: Not tested; N/A  Comments: Pt stood in heavily flexed posture, hips flexed to ~30* despite Max A x 2. Posture  Seated: Forward head and neck, Thoracic kyphosis, and posterior pelvic tilt  Standing: N/A    Bed Mobility   Supine to Sit:    Max A  and 2 persons  Sit to Supine: Total A and 2 persons  Rolling: Max A   Scooting in sitting: Total A and 2 persons  Scooting in supine: Total A and 2 persons  Bridging:  No    Transfer Training     Sit to stand:   Attempted 2 trials from EOB with STEDY. Partial stand only 2/2 poor hip and trunk extension. Stand to sit:   Not Tested  Bed to Chair:   Not Tested with use of N/A    Gait gait deferred due to weakness; pt ambulated 0 ft.      Stair Training deferred, pt unsafe/ not appropriate to complete stairs at this time    Therapeutic Exercises Initiated    Exercises in BOLD performed in unit today. Items not bolded are carried forward from prior visits for continuity of the record. *For CHF pt, see ADDENDUM below for therex details. Exercise/Equipment Resistance/Repetitions Other comments     Ankle pumps  X 20 reps AAROM  + passive stretch into DF x 1 minute (contracted, unable to reach neutral)     Ankle circles X 10 reps AAROM      SAQ  X 5 reps AROM      Heel slides  X 5 reps AAROM (L weaker than R)      Leg press  X 5 reps (very weak bilat)      Activity Tolerance   During therapy session noted pt with no adverse symptoms to activity. Positioning Needs   Pt in bed, alarm set, positioned in proper neutral alignment and pressure relief provided. Call light provided and all needs within reach    Other Activities  Pt incontinent of stool upon first stand with STEDY. Needed total A for pericare and brief change in bed. Patient/Family Education   Pt educated on role of inpatient PT, POC, importance of continued activity, HEP and calling for assist with mobility. Assessment  Pt requires heavy assist for supine<>sit, sustained static sitting at EOB, and for attempts to stand with STEDY. Pt was educated RE importance of supine AROM but will need reinforcement. Pt functioning well below baseline and would benefit from skilled physical therapy to address current deficits mentioned above. Recommending SNF upon discharge as patient functioning well below baseline, demonstrates good rehab potential and unable to return home due to burden of care beyond caregiver ability and home environment not conducive to patient recovery. Goals : To be met in 3 visits:  1). Independent with LE Ex x 10 reps  2). CHF goal: N/A  3). Sit EOB x 10 minutes with Min A    To be met in 6 visits:  1). Supine to/from sit: Min A   2). Sit to/from stand: Min A   3). Bed to chair: Total A with STEDY  4).   Tolerate B LE exercises 3 sets of 10-15 reps    Rehabilitation Potential: Good  Strengths for achieving goals include:   Pt motivated, PLOF, Family Support, and Pt cooperative   Barriers to achieving goals include:    No Barriers    Plan    To be seen 3-5 x / week  while in acute care setting for therapeutic exercises, bed mobility, transfers, progressive gait training, balance training, and family/patient education. Signature: Broderick Romero, PT, DPT    If patient discharges from this facility prior to next visit, this note will serve as the Discharge Summary.

## 2023-02-13 NOTE — PROGRESS NOTES
4 Eyes Skin Assessment     The patient is being assess for   Shift Handoff    I agree that 2 RN's have performed a thorough Head to Toe Skin Assessment on the patient. ALL assessment sites listed below have been assessed. Areas assessed for pressure by both nurses:   [x]   Head, Face, and Ears   [x]   Shoulders, Back, and Chest, Abdomen  [x]   Arms, Elbows, and Hands   [x]   Coccyx, Sacrum, and Ischium  [x]   Legs, Feet, and Heels        Skin Assessed Under all Medical Devices by both nurses:  O2 device tubing              All Mepilex Borders were peeled back and area peeked at by both nurses:  Yes  Please list where Mepilex Borders are located:  Coccyx Preventative. The patient has moisture associated dermatitis and skin breakdown in her rohith are and skin folds. **SHARE this note so that the co-signing nurse is able to place an eSignature**    Co-signer eSignature: {Esignature:[id number]}    Does the Patient have Skin Breakdown related to pressure? [place] area of the the patients left second toe.          Media Information  Document Information      Antonietta Almodovar RN  Mhcz 3e Overflow            Zack Prevention initiated:  Yes   Wound Care Orders initiated:  Yes      [id number] nurse consulted for Pressure Injury (Stage 3,4, Unstageable, DTI, NWPT, Complex wounds)and New or Established Ostomies:  Yes      Primary Nurse eSignature: Electronically signed by Antonietta Almodovar RN on 2/13/23 at 10:11 AM EST

## 2023-02-13 NOTE — PROGRESS NOTES
IM Progress Note    Admit Date:  2/2/2023  11    Interval history:    copd exacerbation , failed bipap and intubated in ER   Imaging with left hemithorax opacification,s/p bronch with clearance of mucous plugs   Agitation issues noted on vent   CTA chest with no PE , bilateral pneumonia     -> Extubated-> Vapotherm  -> O2 weaned down to 9 L high flow-> 3 L. Transferred out of ICU to PCU on 2/10    Subjective:    Ms. Duyen Murray is on oxygen 3 L . She is awake and oriented , very weak . No distress. No cough or shortness of breath. No fevers. Awaiting SNF placement . Objective:   /67   Pulse 90   Temp 98.4 °F (36.9 °C) (Oral)   Resp 20   Ht 5' 3\" (1.6 m)   Wt 172 lb 3.2 oz (78.1 kg)   SpO2 95%   BMI 30.50 kg/m²     Intake/Output Summary (Last 24 hours) at 2/13/2023 1612  Last data filed at 2/13/2023 0801  Gross per 24 hour   Intake 500 ml   Output 1175 ml   Net -675 ml         Physical Exam:    General:  elderly female , looks fatigued, no distress   Appears to be not in any distress  Mucous Membranes:  Pink , anicteric  Neck: No JVD, no carotid bruit, no thyromegaly  Chest: Diminished breath sounds , mild rhonchi .   Cardiovascular:  RRR S1S2 heard, no murmurs or gallops  Abdomen:  Soft, undistended, non tender, no organomegaly, BS present  Extremities: chronic 2 +  brawny edema of both LE with diffuse celluitis of ext Bilaterally  Distal pulses well felt  Neurological : sedated      Medications:   Scheduled Medications:    metFORMIN  1,000 mg Oral BID WC    ipratropium-albuterol  1 ampule Inhalation 4x daily    pantoprazole  40 mg Oral QAM AC    insulin lispro  0-16 Units SubCUTAneous TID WC    insulin lispro  0-4 Units SubCUTAneous Nightly    predniSONE  40 mg Oral Daily    pregabalin  200 mg Oral BID    rOPINIRole  1 mg Oral BID    lidocaine 1 % injection  5 mL IntraDERmal Once    risperiDONE  1 mg Oral BID    atorvastatin  60 mg Oral Daily    sertraline  100 mg Oral BID    sodium chloride flush  5-40 mL IntraVENous 2 times per day    enoxaparin  40 mg SubCUTAneous Daily    nicotine  1 patch TransDERmal Daily     I   sodium chloride      dextrose      sodium chloride Stopped (02/09/23 1804)     traMADol, sodium chloride, glucose, dextrose bolus **OR** dextrose bolus, glucagon (rDNA), dextrose, sodium chloride flush, sodium chloride, ondansetron **OR** ondansetron, polyethylene glycol, acetaminophen **OR** acetaminophen, albuterol    Lab Data:  No results for input(s): WBC, HGB, HCT, MCV, PLT in the last 72 hours. Recent Labs     02/11/23  0532      K 3.6      CO2 27   PHOS 2.9   BUN 24*   CREATININE 0.7       No results for input(s): CKTOTAL, CKMB, CKMBINDEX, TROPONINI in the last 72 hours. Coagulation:   Lab Results   Component Value Date/Time    INR 1.04 02/04/2023 12:15 PM    APTT 27.6 02/04/2023 12:15 PM     Cardiac markers:   Lab Results   Component Value Date/Time    CKMB 10.3 07/14/2010 07:25 AM    CKTOTAL 74 10/17/2014 02:46 AM    TROPONINI <0.01 02/02/2023 05:05 AM         Lab Results   Component Value Date    ALT 12 02/03/2023    AST 13 (L) 02/03/2023    ALKPHOS 99 02/03/2023    BILITOT 0.3 02/03/2023       Lab Results   Component Value Date    INR 1.04 02/04/2023    INR 0.94 09/12/2020    INR 0.99 07/01/2018    PROTIME 13.5 02/04/2023    PROTIME 10.9 09/12/2020    PROTIME 11.3 07/01/2018     Cultures:   COVID/Influenza: not detected   MRSA screen negative  Blood- NGTD  BAL NRF  AFB culture no growth so far    Radiology  XR CHEST PORTABLE   Final Result   Increasing bibasilar ground-glass opacities with a developing right pleural   effusion. Pulmonary edema or ARDS suspected. XR CHEST PORTABLE   Final Result   1. Stable appropriate positions of support apparatus. 2. Slight interval improvement of mild pulmonary edema. 3. No significant change in appearance of a small left pleural effusion and   left basilar airspace consolidation.          XR CHEST PORTABLE Final Result   1. Stable appropriate positions of support apparatus. 2. Stable small bilateral pleural effusions. 3. Stable left basilar airspace consolidation. 4. Interval progression of diffuse hazy airspace opacity throughout both   lungs, right greater than left, which likely reflects progressive pulmonary   edema, though progressive multifocal pneumonia is also a consideration. XR CHEST PORTABLE   Final Result   Stable chest.  Bibasilar airspace disease appears similar         XR CHEST PORTABLE   Final Result   1. Endotracheal tube slightly low in position, lying approximately 2.0 cm   above the ivis. Suggest retraction of the tube by approximately 2-3 cm to   avoid right mainstem intubation. 2. Other support apparatus is in good anatomic position. 3. Slight interval decrease in size of a small left pleural effusion. 4. Interval improvement in appearance of bibasilar airspace disease. The findings were sent to the Radiology Results [address] at 6:04   am on 2/5/2023 to be communicated to a licensed caregiver. CTA PULMONARY W CONTRAST   Final Result   1. No CT evidence of a pulmonary embolism. 2. Bilateral lower lobe airspace consolidation, left worse than right,   representing either pneumonia or aspiration. 3. Additional scattered curvilinear, consolidative, and nodular opacities   throughout the upper lobes and right middle lobe likely reflect additional   multifocal pneumonia and/or pulmonary edema. 4. Small bilateral pleural effusions. 5. Enlarged nodular appearance of the left thyroid lobe. Consider further   characterization with a routine non urgent follow-up thyroid ultrasound. See   below. 6. Findings suggestive of cirrhosis and portal hypertension.       RECOMMENDATIONS:   Managing Incidental Thyroid Nodule Detected at CT or MRI or US      Follow up thyroid ultrasound also recommend in these scenarios      - Solitary nodule with high risk imaging features (locally invasive nodule or   suspicious lymph nodes)      - HETEROGENEOUS, ENLARGED THYROID GLAND.      - Increased uptake on PET      Note: These recommendations do not apply to pts. w/ increased risk      for thyroid cancer or pts. with symptomatic thyroid disease.      ________________________________________________________________      Recommendations for f/u of Incidental Thyroid Nodules (ITN)      found on CT, MR, NM and Extrathyroidal US are based upon the ACR      white paper and [facility] 3-tiered system for managing ITNs:      J Am Kylee Radiol. 2015 Feb;12(2): 143-50         XR CHEST PORTABLE   Final Result   Bibasilar airspace disease, left greater than right, similar compared to prior         VL Extremity Venous Bilateral   Final Result      XR CHEST PORTABLE   Final Result   Exam limited by patient positioning. No marked interval change in bilateral   airspace disease as compared to prior. XR CHEST PORTABLE   Final Result   Significantly limited evaluation secondary to patient positioning with no   signs to suggest pneumothorax. XR CHEST PORTABLE   Final Result   Complete opacification has developed of the left hemithorax, which can   reflect combination of atelectasis, airspace disease, and/or pleural   effusion. Right-sided airspace disease is not substantially changed. XR CHEST PORTABLE   Final Result   1. Endotracheal tube likely within the right mainstem bronchus. Recommend 3   cm of retraction and repeat chest radiograph. XR CHEST PORTABLE   Final Result   1. Findings suggestive of partial right upper lobe atelectasis. 2. Small left pleural effusion. 3. Central pulmonary vascular congestion without overt pulmonary edema. 4. Left basilar atelectasis.          XR CHEST PORTABLE    (Results Pending)       Assessment & Plan:    Acute respiratory failure with hypoxia and hypercapnia  Copd exacerbation   Left hemithorax opacification with mucous plugging  Pneumonia-gram-negative infection  - O2 sat 86% on 5L in the ER. Initially placed on NIV. Too obtunded for NIV and PCO2 worsened after ~1 h on NIV. And eventually placed on vent  -Admitted to ICU . Seen in consultation by pulmonary critical care  -CTA chest with bilateral pna, no PE  -Had emergency bronch with removal of mucous plugs; BAL with normal respiratory madhavi  - Started on steroids  - treated with antibiotics vancomycin and cefepime for 7 days   -S/p intubation mechanical ventilation , patient has been extubated  - was on vapotherm--> 9 L high flow nasal canula--> 3 L . O2 sats remained stable on 3 L now  -> Transferred out of ICU . oxygen saturation stable, completed course of antibiotics    COPD exacerbation   -IV solumedrol, transition to oral prednisone. Will need prednisone taper under charge  - IV abx as above   - PRN/GIDEON intensive NEB therapy. Acute metabolic encephalopathy  -Patient was obtunded, likely metabolic 2/2 hypercarbia. Resolved     DM2  -Resume metformin.  -Continue sliding scale insulin coverage    Bilateral LE cellulitis   chronic lymphedema  -Treated with IV antibiotics vancomycin and cefepime as above  -Norvasc on hold    Tobacco Abuse  -on nicotine patch. Hx of cirrhosis with portal HTN   -Home meds reviewed, she does not on any diuretics at home    Thyroid enlargement on left side   - need f/w US as outpt    Weakness and debility  -Seen by PT OT       DVT Prophylaxis: Lx  Diet carb control  Code Status: Full Code       We have been waiting for SNF.   Per Case management placement will be difficult      Jed Spatz, MD, 2/13/2023 4:12 PM

## 2023-02-13 NOTE — CARE COORDINATION
INTERDISCIPLINARY PLAN OF CARE CONFERENCE    Date/Time: 2/13/2023 9:43 AM  Completed by: Lele Lomax RN, Case Management      Patient Name:  Luis Tony  YOB: 1955  Admitting Diagnosis: COPD exacerbation ([facility] [address] [J44.1]  Acute respiratory failure with hypercapnia ([address] [J96.02]  Acute encephalopathy [G93.40]  Acute on chronic respiratory failure with hypoxia and hypercapnia ([address] [T28.63, J96.22]     Admit Date/Time:  2/2/2023  4:53 AM    Chart reviewed. Interdisciplinary team contacted or reviewed plan related to patient progress and discharge plans. Disciplines included Case Management, Nursing, and Dietitian. Current Status:inpt  PT/OT recommendation for discharge plan of care: SNF    Expected D/C Disposition:  Skilled nursing facility  Confirmed plan with patient and/or family Yes     Discharge Plan Comments: Reviewed chart. Writer left vm for Funmilayo with [facility] r/t referral for rehab and provided with  ICU phone number. 11:06 AM Writer spoke with Funmilayo with [facility] [employer] and she will initiate pre-cert this afternoon after updated PT/OT evaluation. Writer spoke with Emilee May from PT and she is aware pt needs re-eval. Writer left vm for pt's daughter,Helene, to update her on above POC. Following. Nany Huddleston 39 spoke with Emelyn Edwards from [facility] and she states now cannot accept the pt r/t her past history of drug abuse. [phone] called referral to Prabhakar Rodriguez with . [facility] at [phone] and fs faxed to Prabhakar Rodriguez. CM has not heard back from [facility] [address] with pt and granddaughter at bedside. Pt requesting referral to [facility] and referral called to Aura Shaw with [facility]. Per pt requesting referral to : 1. 's  2. [address] [place] [address]  3. [facility] if [facility] unable to accept. Following.       Home O2 in place on admit: Yes

## 2023-02-13 NOTE — PROGRESS NOTES
02/12/23 2000   RT Protocol   History Pulmonary Disease 2   Respiratory pattern 2   Breath sounds 4   Cough 0   Indications for Bronchodilator Therapy On home bronchodilators   Bronchodilator Assessment Score 8   RT Inhaler-Nebulizer Bronchodilator Protocol Note    There is a bronchodilator order in the chart from a provider indicating to follow the RT Bronchodilator Protocol and there is an Initiate RT Inhaler-Nebulizer Bronchodilator Protocol order as well (see protocol at bottom of note). CXR Findings:  No results found. The findings from the last RT Protocol Assessment were as follows:   History Pulmonary Disease: Chronic pulmonary disease  Respiratory Pattern: Dyspnea on exertion or RR 21-25 bpm  Breath Sounds: Intermittent or unilateral wheezes  Cough: Strong, spontaneous, non-productive  Indication for Bronchodilator Therapy: On home bronchodilators  Bronchodilator Assessment Score: 8    Aerosolized bronchodilator medication orders have been revised according to the RT Inhaler-Nebulizer Bronchodilator Protocol below. Respiratory Therapist to perform RT Therapy Protocol Assessment initially then follow the protocol. Repeat RT Therapy Protocol Assessment PRN for score 0-3 or on second treatment, BID, and PRN for scores above 3. No Indications - adjust the frequency to every 6 hours PRN wheezing or bronchospasm, if no treatments needed after 48 hours then discontinue using Per Protocol order mode. If indication present, adjust the RT bronchodilator orders based on the Bronchodilator Assessment Score as indicated below. Use Inhaler orders unless patient has one or more of the following: on home nebulizer, not able to hold breath for 10 seconds, is not alert and oriented, cannot activate and use MDI correctly, or respiratory rate 25 breaths per minute or more, then use the equivalent nebulizer order(s) with same Frequency and PRN reasons based on the score.   If a patient is on this medication at home then do not decrease Frequency below that used at home. 0-3 - enter or revise RT bronchodilator order(s) to equivalent RT Bronchodilator order with Frequency of every 4 hours PRN for wheezing or increased work of breathing using Per Protocol order mode. 4-6 - enter or revise RT Bronchodilator order(s) to two equivalent RT bronchodilator orders with one order with BID Frequency and one order with Frequency of every 4 hours PRN wheezing or increased work of breathing using Per Protocol order mode. 7-10 - enter or revise RT Bronchodilator order(s) to two equivalent RT bronchodilator orders with one order with TID Frequency and one order with Frequency of every 4 hours PRN wheezing or increased work of breathing using Per Protocol order mode. 11-13 - enter or revise RT Bronchodilator order(s) to one equivalent RT bronchodilator order with QID Frequency and an Albuterol order with Frequency of every 4 hours PRN wheezing or increased work of breathing using Per Protocol order mode. Greater than 13 - enter or revise RT Bronchodilator order(s) to one equivalent RT bronchodilator order with every 4 hours Frequency and an Albuterol order with Frequency of every 2 hours PRN wheezing or increased work of breathing using Per Protocol order mode.        Electronically signed by Ankit Rizo RCP on 2/12/2023 at 8:57 PM

## 2023-02-14 ENCOUNTER — APPOINTMENT (OUTPATIENT)
Dept: GENERAL RADIOLOGY | Age: 68
DRG: 207 | End: 2023-02-14
Payer: MEDICARE

## 2023-02-14 PROBLEM — E11.9 TYPE 2 DIABETES MELLITUS WITHOUT COMPLICATION, WITHOUT LONG-TERM CURRENT USE OF INSULIN (HCC): Status: ACTIVE | Noted: 2023-02-14

## 2023-02-14 LAB
AFB CULTURE (MYCOBACTERIA): NORMAL
AFB CULTURE (MYCOBACTERIA): NORMAL
AFB SMEAR: NORMAL
AFB SMEAR: NORMAL
ANION GAP SERPL CALCULATED.3IONS-SCNC: 11 MMOL/L (ref 3–16)
BUN BLDV-MCNC: 21 MG/DL (ref 7–20)
CALCIUM SERPL-MCNC: 9.5 MG/DL (ref 8.3–10.6)
CHLORIDE BLD-SCNC: 99 MMOL/L (ref 99–110)
CO2: 29 MMOL/L (ref 21–32)
CREAT SERPL-MCNC: 0.7 MG/DL (ref 0.6–1.2)
GFR SERPL CREATININE-BSD FRML MDRD: >60 ML/MIN/{1.73_M2}
GLUCOSE BLD-MCNC: 180 MG/DL (ref 70–99)
GLUCOSE BLD-MCNC: 201 MG/DL (ref 70–99)
GLUCOSE BLD-MCNC: 201 MG/DL (ref 70–99)
GLUCOSE BLD-MCNC: 221 MG/DL (ref 70–99)
GLUCOSE BLD-MCNC: 273 MG/DL (ref 70–99)
HCT VFR BLD CALC: 36.9 % (ref 36–48)
HEMOGLOBIN: 11.8 G/DL (ref 12–16)
MCH RBC QN AUTO: 29.7 PG (ref 26–34)
MCHC RBC AUTO-ENTMCNC: 32.1 G/DL (ref 31–36)
MCV RBC AUTO: 92.4 FL (ref 80–100)
PDW BLD-RTO: 15.1 % (ref 12.4–15.4)
PERFORMED ON: ABNORMAL
PLATELET # BLD: 227 K/UL (ref 135–450)
PMV BLD AUTO: 9.1 FL (ref 5–10.5)
POTASSIUM REFLEX MAGNESIUM: 3.7 MMOL/L (ref 3.5–5.1)
RBC # BLD: 3.99 M/UL (ref 4–5.2)
SODIUM BLD-SCNC: 139 MMOL/L (ref 136–145)
WBC # BLD: 11.2 K/UL (ref 4–11)

## 2023-02-14 PROCEDURE — 85027 COMPLETE CBC AUTOMATED: CPT

## 2023-02-14 PROCEDURE — 80048 BASIC METABOLIC PNL TOTAL CA: CPT

## 2023-02-14 PROCEDURE — 2700000000 HC OXYGEN THERAPY PER DAY

## 2023-02-14 PROCEDURE — 6370000000 HC RX 637 (ALT 250 FOR IP): Performed by: INTERNAL MEDICINE

## 2023-02-14 PROCEDURE — 99232 SBSQ HOSP IP/OBS MODERATE 35: CPT | Performed by: INTERNAL MEDICINE

## 2023-02-14 PROCEDURE — 6360000002 HC RX W HCPCS: Performed by: INTERNAL MEDICINE

## 2023-02-14 PROCEDURE — 94640 AIRWAY INHALATION TREATMENT: CPT

## 2023-02-14 PROCEDURE — 71045 X-RAY EXAM CHEST 1 VIEW: CPT

## 2023-02-14 PROCEDURE — 92526 ORAL FUNCTION THERAPY: CPT

## 2023-02-14 PROCEDURE — 94761 N-INVAS EAR/PLS OXIMETRY MLT: CPT

## 2023-02-14 PROCEDURE — 2580000003 HC RX 258: Performed by: INTERNAL MEDICINE

## 2023-02-14 PROCEDURE — 1200000000 HC SEMI PRIVATE

## 2023-02-14 PROCEDURE — 94669 MECHANICAL CHEST WALL OSCILL: CPT

## 2023-02-14 RX ORDER — PREDNISONE 20 MG/1
30 TABLET ORAL DAILY
Status: DISCONTINUED | OUTPATIENT
Start: 2023-02-14 | End: 2023-02-16 | Stop reason: HOSPADM

## 2023-02-14 RX ORDER — IPRATROPIUM BROMIDE AND ALBUTEROL SULFATE 2.5; .5 MG/3ML; MG/3ML
1 SOLUTION RESPIRATORY (INHALATION) 3 TIMES DAILY
Status: DISCONTINUED | OUTPATIENT
Start: 2023-02-14 | End: 2023-02-16 | Stop reason: HOSPADM

## 2023-02-14 RX ADMIN — ROPINIROLE HYDROCHLORIDE 1 MG: 1 TABLET, FILM COATED ORAL at 08:52

## 2023-02-14 RX ADMIN — INSULIN LISPRO 4 UNITS: 100 INJECTION, SOLUTION INTRAVENOUS; SUBCUTANEOUS at 16:38

## 2023-02-14 RX ADMIN — IPRATROPIUM BROMIDE AND ALBUTEROL SULFATE 1 AMPULE: .5; 2.5 SOLUTION RESPIRATORY (INHALATION) at 08:34

## 2023-02-14 RX ADMIN — PREGABALIN 200 MG: 100 CAPSULE ORAL at 08:51

## 2023-02-14 RX ADMIN — SERTRALINE 100 MG: 100 TABLET, FILM COATED ORAL at 20:29

## 2023-02-14 RX ADMIN — ACETAMINOPHEN 650 MG: 325 TABLET ORAL at 12:32

## 2023-02-14 RX ADMIN — INSULIN LISPRO 8 UNITS: 100 INJECTION, SOLUTION INTRAVENOUS; SUBCUTANEOUS at 11:34

## 2023-02-14 RX ADMIN — PREGABALIN 200 MG: 100 CAPSULE ORAL at 20:29

## 2023-02-14 RX ADMIN — RISPERIDONE 1 MG: 1 TABLET ORAL at 08:52

## 2023-02-14 RX ADMIN — SODIUM CHLORIDE, PRESERVATIVE FREE 10 ML: 5 INJECTION INTRAVENOUS at 08:51

## 2023-02-14 RX ADMIN — SODIUM CHLORIDE, PRESERVATIVE FREE 10 ML: 5 INJECTION INTRAVENOUS at 20:30

## 2023-02-14 RX ADMIN — SERTRALINE 100 MG: 100 TABLET, FILM COATED ORAL at 08:51

## 2023-02-14 RX ADMIN — RISPERIDONE 1 MG: 1 TABLET ORAL at 20:29

## 2023-02-14 RX ADMIN — PREDNISONE 30 MG: 20 TABLET ORAL at 08:52

## 2023-02-14 RX ADMIN — IPRATROPIUM BROMIDE AND ALBUTEROL SULFATE 1 AMPULE: 2.5; .5 SOLUTION RESPIRATORY (INHALATION) at 21:00

## 2023-02-14 RX ADMIN — ATORVASTATIN CALCIUM 60 MG: 40 TABLET, FILM COATED ORAL at 08:52

## 2023-02-14 RX ADMIN — METFORMIN HYDROCHLORIDE 1000 MG: 500 TABLET ORAL at 17:14

## 2023-02-14 RX ADMIN — INSULIN LISPRO 4 UNITS: 100 INJECTION, SOLUTION INTRAVENOUS; SUBCUTANEOUS at 08:58

## 2023-02-14 RX ADMIN — ROPINIROLE HYDROCHLORIDE 1 MG: 1 TABLET, FILM COATED ORAL at 20:29

## 2023-02-14 RX ADMIN — IPRATROPIUM BROMIDE AND ALBUTEROL SULFATE 1 AMPULE: .5; 2.5 SOLUTION RESPIRATORY (INHALATION) at 11:39

## 2023-02-14 RX ADMIN — ENOXAPARIN SODIUM 40 MG: 100 INJECTION SUBCUTANEOUS at 08:51

## 2023-02-14 RX ADMIN — TRAMADOL HYDROCHLORIDE 50 MG: 50 TABLET, COATED ORAL at 20:34

## 2023-02-14 RX ADMIN — METFORMIN HYDROCHLORIDE 1000 MG: 500 TABLET ORAL at 08:52

## 2023-02-14 ASSESSMENT — PAIN SCALES - GENERAL
PAINLEVEL_OUTOF10: 7
PAINLEVEL_OUTOF10: 9

## 2023-02-14 ASSESSMENT — PAIN DESCRIPTION - DESCRIPTORS
DESCRIPTORS: STABBING
DESCRIPTORS: DISCOMFORT

## 2023-02-14 ASSESSMENT — PAIN DESCRIPTION - ORIENTATION
ORIENTATION: LOWER;MID
ORIENTATION: RIGHT;LEFT

## 2023-02-14 ASSESSMENT — PAIN DESCRIPTION - LOCATION
LOCATION: LEG
LOCATION: BACK

## 2023-02-14 NOTE — PLAN OF CARE
Problem: Discharge Planning  Goal: Discharge to home or other facility with appropriate resources  Outcome: Progressing     Problem: Pain  Goal: Verbalizes/displays adequate comfort level or baseline comfort level  Outcome: Progressing     Problem: Nutrition Deficit:  Goal: Optimize nutritional status  Outcome: Progressing  Flowsheets (Taken 2/13/2023 1538 by Jennifer Nuñez)  Nutrient intake appropriate for improving, restoring, or maintaining nutritional needs:   Assess nutritional status and recommend course of action   Monitor oral intake, labs, and treatment plans     Problem: Skin/Tissue Integrity  Goal: Absence of new skin breakdown  Description: 1. Monitor for areas of redness and/or skin breakdown  2. Assess vascular access sites hourly  3. Every 4-6 hours minimum:  Change oxygen saturation probe site  4. Every 4-6 hours:  If on nasal continuous positive airway pressure, respiratory therapy assess nares and determine need for appliance change or resting period.   Outcome: Progressing     Problem: Safety - Adult  Goal: Free from fall injury  Outcome: Progressing     Problem: Respiratory - Adult  Goal: Achieves optimal ventilation and oxygenation  Outcome: Progressing     Problem: Skin/Tissue Integrity - Adult  Goal: Skin integrity remains intact  Outcome: Progressing     Problem: Musculoskeletal - Adult  Goal: Return mobility to safest level of function  Outcome: Progressing  Goal: Maintain proper alignment of affected body part  Outcome: Progressing  Goal: Return ADL status to a safe level of function  Outcome: Progressing

## 2023-02-14 NOTE — PROGRESS NOTES
RT Inhaler-Nebulizer Bronchodilator Protocol Note    There is a bronchodilator order in the chart from a provider indicating to follow the RT Bronchodilator Protocol and there is an Initiate RT Inhaler-Nebulizer Bronchodilator Protocol order as well (see protocol at bottom of note). CXR Findings:  No results found. The findings from the last RT Protocol Assessment were as follows:   History Pulmonary Disease: (P) Chronic pulmonary disease  Respiratory Pattern: (P) Dyspnea on exertion or RR 21-25 bpm  Breath Sounds: (P) Intermittent or unilateral wheezes  Cough: (P) Strong, spontaneous, non-productive  Indication for Bronchodilator Therapy: (P) On home bronchodilators  Bronchodilator Assessment Score: (P) 8    Aerosolized bronchodilator medication orders have been revised according to the RT Inhaler-Nebulizer Bronchodilator Protocol below. Respiratory Therapist to perform RT Therapy Protocol Assessment initially then follow the protocol. Repeat RT Therapy Protocol Assessment PRN for score 0-3 or on second treatment, BID, and PRN for scores above 3. No Indications - adjust the frequency to every 6 hours PRN wheezing or bronchospasm, if no treatments needed after 48 hours then discontinue using Per Protocol order mode. If indication present, adjust the RT bronchodilator orders based on the Bronchodilator Assessment Score as indicated below. Use Inhaler orders unless patient has one or more of the following: on home nebulizer, not able to hold breath for 10 seconds, is not alert and oriented, cannot activate and use MDI correctly, or respiratory rate 25 breaths per minute or more, then use the equivalent nebulizer order(s) with same Frequency and PRN reasons based on the score. If a patient is on this medication at home then do not decrease Frequency below that used at home.     0-3 - enter or revise RT bronchodilator order(s) to equivalent RT Bronchodilator order with Frequency of every 4 hours PRN for wheezing or increased work of breathing using Per Protocol order mode. 4-6 - enter or revise RT Bronchodilator order(s) to two equivalent RT bronchodilator orders with one order with BID Frequency and one order with Frequency of every 4 hours PRN wheezing or increased work of breathing using Per Protocol order mode. 7-10 - enter or revise RT Bronchodilator order(s) to two equivalent RT bronchodilator orders with one order with TID Frequency and one order with Frequency of every 4 hours PRN wheezing or increased work of breathing using Per Protocol order mode. 11-13 - enter or revise RT Bronchodilator order(s) to one equivalent RT bronchodilator order with QID Frequency and an Albuterol order with Frequency of every 4 hours PRN wheezing or increased work of breathing using Per Protocol order mode. Greater than 13 - enter or revise RT Bronchodilator order(s) to one equivalent RT bronchodilator order with every 4 hours Frequency and an Albuterol order with Frequency of every 2 hours PRN wheezing or increased work of breathing using Per Protocol order mode.      Electronically signed by Deni Wolf RCP on 2/13/2023 at 9:05 PM

## 2023-02-14 NOTE — CARE COORDINATION
[address] Nevarez with Rachel from [facility] and she states pt remains under review. Per Rachel at [facility] they can accept the pt.  Precert started today through [employer] portal. +bed

## 2023-02-14 NOTE — PROGRESS NOTES
Pt medicated for pain 7/10 in bilateral legs. Medicated with PRN tylenol. See MAR and pain flow sheet. No further assistance needed at this time. Will continue to monitor.

## 2023-02-14 NOTE — PROGRESS NOTES
Pulmonary & Critical Care Medicine ICU Progress Note    CC: COPD    Events of Last 24 hours:  Appears comfortable  3 L O2-uses 3 L at home      Invasive Lines: PICC  BP (!) 150/61   Pulse 71   Temp 97.9 °F (36.6 °C) (Oral)   Resp 16   Ht 5' 3\" (1.6 m)   Wt 172 lb 3.1 oz (78.1 kg)   SpO2 97%   BMI 30.50 kg/m² 3 L-uses 3 L at home  Constitutional:  No acute distress. [place] HEENT: no scleral icterus  Neck: No tracheal deviation present. Cardiovascular: Normal heart sounds. Pulmonary/Chest: Few wheezes. No rhonchi. No rales. No decreased breath sounds. No accessory muscle usage or stridor. Abdominal: Soft. Musculoskeletal: No cyanosis. No clubbing. Skin: Skin is warm and dry. Scheduled Meds:   predniSONE  30 mg Oral Daily    metFORMIN  1,000 mg Oral BID WC    ipratropium-albuterol  1 ampule Inhalation 4x daily    pantoprazole  40 mg Oral QAM AC    insulin lispro  0-16 Units SubCUTAneous TID WC    insulin lispro  0-4 Units SubCUTAneous Nightly    pregabalin  200 mg Oral BID    rOPINIRole  1 mg Oral BID    lidocaine 1 % injection  5 mL IntraDERmal Once    risperiDONE  1 mg Oral BID    atorvastatin  60 mg Oral Daily    sertraline  100 mg Oral BID    sodium chloride flush  5-40 mL IntraVENous 2 times per day    enoxaparin  40 mg SubCUTAneous Daily    nicotine  1 patch TransDERmal Daily     PRN Meds:  traMADol, sodium chloride, glucose, dextrose bolus **OR** dextrose bolus, glucagon (rDNA), dextrose, sodium chloride flush, sodium chloride, ondansetron **OR** ondansetron, polyethylene glycol, acetaminophen **OR** acetaminophen, albuterol    Results:  CBC:   Recent Labs     02/14/23  0532   WBC 11.2*   HGB 11.8*   HCT 36.9   MCV 92.4        BMP:   Recent Labs     02/14/23  0532      K 3.7   CL 99   CO2 29   BUN 21*   CREATININE 0.7     LIVER PROFILE: No results for input(s): AST, ALT, LIPASE, BILIDIR, BILITOT, ALKPHOS in the last 72 hours. Invalid input(s):   AMYLASE, ALB    Cultures:  2/2 BAL NGTD  2/2 BC NGTD  2/2 respiratory NGTD    Films:  Chest x-ray 2/9   Increasing bibasilar ground-glass opacities with a developing right pleural   effusion. Pulmonary edema or ARDS suspected    CTPA 2/4   1. No CT evidence of a pulmonary embolism. 2. Bilateral lower lobe airspace consolidation, left worse than right,   representing either pneumonia or aspiration. 3. Additional scattered curvilinear, consolidative, and nodular opacities   throughout the upper lobes and right middle lobe likely reflect additional   multifocal pneumonia and/or pulmonary edema. 4. Small bilateral pleural effusions. 5. Enlarged nodular appearance of the left thyroid lobe. Consider further   characterization with a routine non urgent follow-up thyroid ultrasound. See below.    6. Findings suggestive of cirrhosis and portal hypertension    Assessment:    Acute hypoxemic hypercapnic respiratory failure  Acute COPD exacerbation   Bilateral lower extremity cellulitis  CHF/fluid overload  OHS/SHAZIA        Plan:    Supplemental oxygen to maintain SaO2 >92%; wean as tolerated    Prednisone taper  Inhaled bronchodilators  Completed Cefepime Day 7/7    Consider repeat CT in 6-8 weeks (Office is  notified)

## 2023-02-14 NOTE — PROGRESS NOTES
Unable to get blood return in patient PICC double lumen. Changed patient to lab collect for morning lab draw. Will cont to monitor.

## 2023-02-14 NOTE — PROGRESS NOTES
Speech Language Pathology  Dysphagia Treatment/Follow-Up Note  Facility/Department: [address] OVERFLOW    Recommendations:  Solid Consistency: IDDSI 5 Minced and moist Solids  Liquid Consistency: IDDSI 0 Thin Liquids  Medication: Meds PO as tolerated    Cammy Stewart  : 1955 (79 y.o.)   MRN: [id number]  ROOM: [address] [place] [address]  ADMISSION DATE: 2023  PATIENT DIAGNOSIS(ES): COPD exacerbation (HCC) [J44.1]  Acute respiratory failure with hypercapnia (HCC) [J96.02]  Acute encephalopathy [G93.40]  Acute on chronic respiratory failure with hypoxia and hypercapnia (HCC) [J96.21, J96.22]  Allergies   Allergen Reactions    Aspirin Hives     Other reaction(s): GI Upset, Unknown    Duloxetine Hcl Nausea And Vomiting     Other reaction(s): Unknown, Vomiting    Duloxetine Nausea And Vomiting    Asa Buff (Mag [Buffered Aspirin] Hives     DATE ONSET: 2023    Pain: The patient does not complain of pain     Current Diet: ADULT DIET; Dysphagia - Minced and Moist; 4 carb choices (60 gm/meal)  ADULT ORAL NUTRITION SUPPLEMENT; Breakfast; Low Calorie/High Protein Oral Supplement    Diet Tolerance:  Patient tolerating current diet level without signs/symptoms of aspiration. Dysphagia Treatment and Impressions:  Subjective: Pt seen in room at bedside with RN Mindy Rider) permission. Pt upright in bed as able, quiet kyphotic. Pt alert, cooperative, and agreeable to tx session. RN Report/Chart Review: RN reports good tolerance of current diet; no s/s with PO meds  Patient tolerance: Pt reports distaste for current diet.     Respiratory Status: Pt with SPO2% of 93 on 3 LPM NC with RR of 24-28    Liquid PO Trials:   IDDSI 0 Thin:  Assessed via cup: no anterior bolus loss , suspect functional A-P bolus transit, swallow timing subjectively appears timely, no clinical s/s of aspiration, no coughing, dry vocal quality, no throat clearing, and vitals stable    Solid PO Trials  IDDSI 5 Minced and Moist:   no anterior bolus loss , suspect functional A-P bolus transit, decreased mastication swallow timing subjectively appears timely, good oral clearance, no clinical s/s of aspiration, no coughing, dry vocal quality, no throat clearing, and vitals stable  IDDSI 6 Soft and Bite Sized:   no anterior bolus loss , suspect functional A-P bolus transit, swallow timing subjectively appears timely, prolonged mastication, oral stasis noted post swallow, no clinical s/s of aspiration, no coughing, dry vocal quality, no throat clearing, increased WOB and increased RR; pt reports difficulty with this diet texture    Education: SLP edu pt re: Role of SLP, Rationale for dysphagia tx, Recommended compensatory strategies, Aspiration precautions, and Importance of oral care to reduce adverse affects in the event of aspiration. Pt verbalized understanding, would benefit from ongoing education, and RN aware of recommendations  Assessment: Pt tolerating current diet with no clinical s/s of aspiration. Some carryover of recommended compensatory strategies    Recommendations: SLP recommends to continue with IDDSI 5 Minced and moist Solids; IDDSI 0 Thin Liquids; Meds PO as tolerated; Pt may benefit from completion of instrumental swallow study in the future to correlate clinical findings-- will continue to monitor. Risk Management: upright for all intake, stay upright for at least 30 mins after intake, small bites/sips, assist feed, downgrade to mildly thick if s/s of aspiration develop, oral care 2-3x/day to reduce adverse affects in the event of aspiration, increase physical mobility as able, alternate bites/sips, slow rate of intake, general GERD precautions, general aspiration precautions, and hold PO and contact SLP if s/s of aspiration or worsening respiratory status develop. Dysphagia Goals:  Timeframe for Long-term Goals: 1-2 weeks (2/15-3/1/23)  Goal 1: Pt will tolerate ongoing assessment of swallowing during this admission. 02/14: ongoing, progressing. Short-term Goals  Timeframe for Short-term Goals: 1 week, 3-5x/wk until 02/15/23  . 02/10: goal met  2) The patient will recall and perform compensatory strategies, with no cues. 02/14: ongoing, pt would benefit from continued reinforcement. 3) The pt will participate in completion of instrumental swallow study. 02/14: ongoing; will continue to monitor for need. Speech/Language/Cog Goals: N/A     Recommendations:  Solid Consistency: IDDSI 5 Minced and moist Solids  Liquid Consistency: IDDSI 0 Thin Liquids  Medication: Meds PO as tolerated    Plan:    Continued Dysphagia treatment with goals per plan of care. Discharge Recommendations: Recommend SLP tx at discharge    If pt discharges from hospital prior to Speech/Swallowing discharge, this note serves as tx and discharge summary.      Total Treatment Time / Charges     Time in Time out Total Time / units   Cognitive Tx         Speech Tx      Dysphagia Tx 0930 0945 15 minutes/ 1 unit     Signature:  Ghassan Schroeder MA. CF- SLP  Speech Language Pathologist  [id number]

## 2023-02-14 NOTE — PROGRESS NOTES
IM Progress Note    Admit Date:  2/2/2023  12    Interval history:    copd exacerbation , failed bipap and intubated in ER   Imaging with left hemithorax opacification,s/p bronch with clearance of mucous plugs   Agitation issues noted on vent   CTA chest with no PE , bilateral pneumonia     -> Extubated-> Vapotherm  -> O2 weaned down to 9 L high flow-> 3 L. Transferred out of ICU to PCU on 2/10    Subjective:    Ms. Carli Arshad remains stable ,on oxygen 3 L . She is awake and oriented , very weak . No distress. No cough or shortness of breath. No fevers. Awaiting SNF placement . Objective:   BP (!) 153/71   Pulse 90   Temp 97.8 °F (36.6 °C) (Oral)   Resp 16   Ht 5' 3\" (1.6 m)   Wt 172 lb 3.1 oz (78.1 kg)   SpO2 90%   BMI 30.50 kg/m²     Intake/Output Summary (Last 24 hours) at 2/14/2023 1400  Last data filed at 2/14/2023 0411  Gross per 24 hour   Intake 720 ml   Output 1550 ml   Net -830 ml         Physical Exam:    General:  elderly female , looks fatigued, no distress. Awake and alert, oriented to place and person   Appears to be not in any distress  Mucous Membranes:  Pink , anicteric  Neck: No JVD, no carotid bruit, no thyromegaly  Chest: Diminished breath sounds, no wheezes or rhonchi .   Cardiovascular:  RRR S1S2 heard, no murmurs or gallops  Abdomen:  Soft, undistended, non tender, no organomegaly, BS present  Extremities: chronic 2 +  brawny edema of both LE with diffuse celluitis of ext Bilaterally  Distal pulses well felt  Neurological : Awake alert ; nonfocal      Medications:   Scheduled Medications:    predniSONE  30 mg Oral Daily    metFORMIN  1,000 mg Oral BID WC    ipratropium-albuterol  1 ampule Inhalation 4x daily    pantoprazole  40 mg Oral QAM AC    insulin lispro  0-16 Units SubCUTAneous TID WC    insulin lispro  0-4 Units SubCUTAneous Nightly    pregabalin  200 mg Oral BID    rOPINIRole  1 mg Oral BID    lidocaine 1 % injection  5 mL IntraDERmal Once    risperiDONE  1 mg Oral BID    atorvastatin  60 mg Oral Daily    sertraline  100 mg Oral BID    sodium chloride flush  5-40 mL IntraVENous 2 times per day    enoxaparin  40 mg SubCUTAneous Daily    nicotine  1 patch TransDERmal Daily     I   sodium chloride      dextrose      sodium chloride Stopped (02/09/23 1804)     traMADol, sodium chloride, glucose, dextrose bolus **OR** dextrose bolus, glucagon (rDNA), dextrose, sodium chloride flush, sodium chloride, ondansetron **OR** ondansetron, polyethylene glycol, acetaminophen **OR** acetaminophen, albuterol    Lab Data:  Recent Labs     02/14/23  0532   WBC 11.2*   HGB 11.8*   HCT 36.9   MCV 92.4          Recent Labs     02/14/23  0532      K 3.7   CL 99   CO2 29   BUN 21*   CREATININE 0.7       No results for input(s): CKTOTAL, CKMB, CKMBINDEX, TROPONINI in the last 72 hours. Coagulation:   Lab Results   Component Value Date/Time    INR 1.04 02/04/2023 12:15 PM    APTT 27.6 02/04/2023 12:15 PM     Cardiac markers:   Lab Results   Component Value Date/Time    CKMB 10.3 07/14/2010 07:25 AM    CKTOTAL 74 10/17/2014 02:46 AM    TROPONINI <0.01 02/02/2023 05:05 AM         Lab Results   Component Value Date    ALT 12 02/03/2023    AST 13 (L) 02/03/2023    ALKPHOS 99 02/03/2023    BILITOT 0.3 02/03/2023       Lab Results   Component Value Date    INR 1.04 02/04/2023    INR 0.94 09/12/2020    INR 0.99 07/01/2018    PROTIME 13.5 02/04/2023    PROTIME 10.9 09/12/2020    PROTIME 11.3 07/01/2018     Cultures:   COVID/Influenza: not detected   MRSA screen negative  Blood- NGTD  BAL NRF  AFB culture no growth so far    Radiology  XR CHEST PORTABLE   Final Result   Mild bibasilar airspace disease which could reflect atelectasis versus   pneumonia in the appropriate clinical setting. XR CHEST PORTABLE   Final Result   Increasing bibasilar ground-glass opacities with a developing right pleural   effusion. Pulmonary edema or ARDS suspected.          XR CHEST PORTABLE   Final Result 1. Stable appropriate positions of support apparatus. 2. Slight interval improvement of mild pulmonary edema. 3. No significant change in appearance of a small left pleural effusion and   left basilar airspace consolidation. XR CHEST PORTABLE   Final Result   1. Stable appropriate positions of support apparatus. 2. Stable small bilateral pleural effusions. 3. Stable left basilar airspace consolidation. 4. Interval progression of diffuse hazy airspace opacity throughout both   lungs, right greater than left, which likely reflects progressive pulmonary   edema, though progressive multifocal pneumonia is also a consideration. XR CHEST PORTABLE   Final Result   Stable chest.  Bibasilar airspace disease appears similar         XR CHEST PORTABLE   Final Result   1. Endotracheal tube slightly low in position, lying approximately 2.0 cm   above the ivis. Suggest retraction of the tube by approximately 2-3 cm to   avoid right mainstem intubation. 2. Other support apparatus is in good anatomic position. 3. Slight interval decrease in size of a small left pleural effusion. 4. Interval improvement in appearance of bibasilar airspace disease. The findings were sent to the Radiology Results [address] at 6:04   am on 2/5/2023 to be communicated to a licensed caregiver. CTA PULMONARY W CONTRAST   Final Result   1. No CT evidence of a pulmonary embolism. 2. Bilateral lower lobe airspace consolidation, left worse than right,   representing either pneumonia or aspiration. 3. Additional scattered curvilinear, consolidative, and nodular opacities   throughout the upper lobes and right middle lobe likely reflect additional   multifocal pneumonia and/or pulmonary edema. 4. Small bilateral pleural effusions. 5. Enlarged nodular appearance of the left thyroid lobe. Consider further   characterization with a routine non urgent follow-up thyroid ultrasound. See   below.    6. Findings suggestive of cirrhosis and portal hypertension. RECOMMENDATIONS:   Managing Incidental Thyroid Nodule Detected at CT or MRI or US      Follow up thyroid ultrasound also recommend in these scenarios      - Solitary nodule with high risk imaging features (locally invasive nodule or   suspicious lymph nodes)      - HETEROGENEOUS, ENLARGED THYROID GLAND.      - Increased uptake on PET      Note: These recommendations do not apply to pts. w/ increased risk      for thyroid cancer or pts. with symptomatic thyroid disease.      ________________________________________________________________      Recommendations for f/u of Incidental Thyroid Nodules (ITN)      found on CT, MR, NM and Extrathyroidal US are based upon the ACR      white paper and [facility] 3-tiered system for managing ITNs:      J Am Kylee Radiol. 2015 Feb;12(2): 143-50         XR CHEST PORTABLE   Final Result   Bibasilar airspace disease, left greater than right, similar compared to prior         VL Extremity Venous Bilateral   Final Result      XR CHEST PORTABLE   Final Result   Exam limited by patient positioning. No marked interval change in bilateral   airspace disease as compared to prior. XR CHEST PORTABLE   Final Result   Significantly limited evaluation secondary to patient positioning with no   signs to suggest pneumothorax. XR CHEST PORTABLE   Final Result   Complete opacification has developed of the left hemithorax, which can   reflect combination of atelectasis, airspace disease, and/or pleural   effusion. Right-sided airspace disease is not substantially changed. XR CHEST PORTABLE   Final Result   1. Endotracheal tube likely within the right mainstem bronchus. Recommend 3   cm of retraction and repeat chest radiograph. XR CHEST PORTABLE   Final Result   1. Findings suggestive of partial right upper lobe atelectasis. 2. Small left pleural effusion.    3. Central pulmonary vascular congestion without overt pulmonary edema. 4. Left basilar atelectasis. Assessment & Plan:    Acute respiratory failure with hypoxia and hypercapnia  Copd exacerbation   Left hemithorax opacification with mucous plugging  Pneumonia-gram-negative infection  - O2 sat 86% on 5L in the ER. Initially placed on NIV. Too obtunded for NIV and PCO2 worsened after ~1 h on NIV. And eventually placed on vent  -Admitted to ICU . Seen in consultation by pulmonary critical care  -CTA chest with bilateral pna, no PE  -Had emergency bronch with removal of mucous plugs; BAL with normal respiratory madhavi  - Started on steroids  - treated with antibiotics vancomycin and cefepime for 7 days   -S/p intubation mechanical ventilation , patient has been extubated  - was on vapotherm--> 9 L high flow nasal canula--> 3 L . O2 sats remain stable on 3 L now  -> Transferred out of ICU . oxygen saturation stable, completed course of antibiotics  Follow-up CT chest recommended in 6 to 8 weeks    COPD exacerbation   -IV solumedrol, transition to oral prednisone-> Taper prednisone. - IV abx as above   - PRN/GIDEON intensive NEB therapy. Acute metabolic encephalopathy  -Patient was obtunded, likely metabolic 2/2 hypercarbia. Resolved     DM2  -Resume metformin.  -Continue sliding scale insulin coverage    Bilateral LE cellulitis   chronic lymphedema  -Treated with IV antibiotics vancomycin and cefepime as above  -Norvasc on hold    Tobacco Abuse  -on nicotine patch. Hx of cirrhosis with portal HTN   -Home meds reviewed, she does not on any diuretics at home    Thyroid enlargement on left side   - need f/w US as outpt    Weakness and debility  -Seen by PT OT  Needs SNF       DVT Prophylaxis: Lx  Diet carb control  Code Status: Full Code       We have been waiting for SNF.   Per Case management placement will be difficult      Chiqui Tamez MD, 2/14/2023 2:00 PM

## 2023-02-14 NOTE — PROGRESS NOTES
First assessment is complete. Patient was incontinent, sheets, gown, and purewick changed and documented in flowsheets. Patient VSS, call light within reach, bed alarm on. Will cont to monitor.

## 2023-02-15 LAB
GLUCOSE BLD-MCNC: 191 MG/DL (ref 70–99)
GLUCOSE BLD-MCNC: 192 MG/DL (ref 70–99)
GLUCOSE BLD-MCNC: 197 MG/DL (ref 70–99)
GLUCOSE BLD-MCNC: 230 MG/DL (ref 70–99)
PERFORMED ON: ABNORMAL

## 2023-02-15 PROCEDURE — 2580000003 HC RX 258: Performed by: INTERNAL MEDICINE

## 2023-02-15 PROCEDURE — 94640 AIRWAY INHALATION TREATMENT: CPT

## 2023-02-15 PROCEDURE — 6370000000 HC RX 637 (ALT 250 FOR IP): Performed by: INTERNAL MEDICINE

## 2023-02-15 PROCEDURE — 6360000002 HC RX W HCPCS: Performed by: INTERNAL MEDICINE

## 2023-02-15 PROCEDURE — 97530 THERAPEUTIC ACTIVITIES: CPT

## 2023-02-15 PROCEDURE — 94669 MECHANICAL CHEST WALL OSCILL: CPT

## 2023-02-15 PROCEDURE — 97110 THERAPEUTIC EXERCISES: CPT

## 2023-02-15 PROCEDURE — 99232 SBSQ HOSP IP/OBS MODERATE 35: CPT | Performed by: INTERNAL MEDICINE

## 2023-02-15 PROCEDURE — 94761 N-INVAS EAR/PLS OXIMETRY MLT: CPT

## 2023-02-15 PROCEDURE — 1200000000 HC SEMI PRIVATE

## 2023-02-15 PROCEDURE — 2700000000 HC OXYGEN THERAPY PER DAY

## 2023-02-15 RX ADMIN — ROPINIROLE HYDROCHLORIDE 1 MG: 1 TABLET, FILM COATED ORAL at 19:55

## 2023-02-15 RX ADMIN — IPRATROPIUM BROMIDE AND ALBUTEROL SULFATE 1 AMPULE: 2.5; .5 SOLUTION RESPIRATORY (INHALATION) at 21:16

## 2023-02-15 RX ADMIN — SODIUM CHLORIDE, PRESERVATIVE FREE 10 ML: 5 INJECTION INTRAVENOUS at 20:44

## 2023-02-15 RX ADMIN — RISPERIDONE 1 MG: 1 TABLET ORAL at 19:55

## 2023-02-15 RX ADMIN — TRAMADOL HYDROCHLORIDE 50 MG: 50 TABLET, COATED ORAL at 19:54

## 2023-02-15 RX ADMIN — PREGABALIN 200 MG: 100 CAPSULE ORAL at 08:34

## 2023-02-15 RX ADMIN — ROPINIROLE HYDROCHLORIDE 1 MG: 1 TABLET, FILM COATED ORAL at 08:52

## 2023-02-15 RX ADMIN — RISPERIDONE 1 MG: 1 TABLET ORAL at 08:35

## 2023-02-15 RX ADMIN — PREDNISONE 30 MG: 20 TABLET ORAL at 08:34

## 2023-02-15 RX ADMIN — ATORVASTATIN CALCIUM 60 MG: 40 TABLET, FILM COATED ORAL at 08:35

## 2023-02-15 RX ADMIN — INSULIN LISPRO 4 UNITS: 100 INJECTION, SOLUTION INTRAVENOUS; SUBCUTANEOUS at 11:24

## 2023-02-15 RX ADMIN — IPRATROPIUM BROMIDE AND ALBUTEROL SULFATE 1 AMPULE: 2.5; .5 SOLUTION RESPIRATORY (INHALATION) at 07:19

## 2023-02-15 RX ADMIN — METFORMIN HYDROCHLORIDE 1000 MG: 500 TABLET ORAL at 17:44

## 2023-02-15 RX ADMIN — ENOXAPARIN SODIUM 40 MG: 100 INJECTION SUBCUTANEOUS at 08:34

## 2023-02-15 RX ADMIN — SERTRALINE 100 MG: 100 TABLET, FILM COATED ORAL at 19:55

## 2023-02-15 RX ADMIN — SODIUM CHLORIDE, PRESERVATIVE FREE 10 ML: 5 INJECTION INTRAVENOUS at 08:36

## 2023-02-15 RX ADMIN — SERTRALINE 100 MG: 100 TABLET, FILM COATED ORAL at 08:35

## 2023-02-15 RX ADMIN — IPRATROPIUM BROMIDE AND ALBUTEROL SULFATE 1 AMPULE: 2.5; .5 SOLUTION RESPIRATORY (INHALATION) at 11:12

## 2023-02-15 RX ADMIN — PANTOPRAZOLE SODIUM 40 MG: 40 TABLET, DELAYED RELEASE ORAL at 05:37

## 2023-02-15 RX ADMIN — METFORMIN HYDROCHLORIDE 1000 MG: 500 TABLET ORAL at 08:35

## 2023-02-15 RX ADMIN — PREGABALIN 200 MG: 100 CAPSULE ORAL at 19:55

## 2023-02-15 ASSESSMENT — PAIN SCALES - GENERAL
PAINLEVEL_OUTOF10: 7
PAINLEVEL_OUTOF10: 7

## 2023-02-15 ASSESSMENT — PAIN DESCRIPTION - DESCRIPTORS: DESCRIPTORS: STABBING

## 2023-02-15 ASSESSMENT — PAIN - FUNCTIONAL ASSESSMENT: PAIN_FUNCTIONAL_ASSESSMENT: ACTIVITIES ARE NOT PREVENTED

## 2023-02-15 ASSESSMENT — PAIN DESCRIPTION - LOCATION: LOCATION: LEG

## 2023-02-15 ASSESSMENT — PAIN DESCRIPTION - ORIENTATION: ORIENTATION: RIGHT;LEFT;LOWER

## 2023-02-15 NOTE — PROGRESS NOTES
RT Inhaler-Nebulizer Bronchodilator Protocol Note    There is a bronchodilator order in the chart from a provider indicating to follow the RT Bronchodilator Protocol and there is an Initiate RT Inhaler-Nebulizer Bronchodilator Protocol order as well (see protocol at bottom of note). CXR Findings:  XR CHEST PORTABLE    Result Date: 2/14/2023  Mild bibasilar airspace disease which could reflect atelectasis versus pneumonia in the appropriate clinical setting. The findings from the last RT Protocol Assessment were as follows:   History Pulmonary Disease: (P) Chronic pulmonary disease  Respiratory Pattern: (P) Regular pattern and RR 12-20 bpm  Breath Sounds: (P) Slightly diminished and/or crackles  Cough: (P) Strong, spontaneous, non-productive  Indication for Bronchodilator Therapy: (P) On home bronchodilators  Bronchodilator Assessment Score: (P) 4    Aerosolized bronchodilator medication orders have been revised according to the RT Inhaler-Nebulizer Bronchodilator Protocol below. Respiratory Therapist to perform RT Therapy Protocol Assessment initially then follow the protocol. Repeat RT Therapy Protocol Assessment PRN for score 0-3 or on second treatment, BID, and PRN for scores above 3. No Indications - adjust the frequency to every 6 hours PRN wheezing or bronchospasm, if no treatments needed after 48 hours then discontinue using Per Protocol order mode. If indication present, adjust the RT bronchodilator orders based on the Bronchodilator Assessment Score as indicated below. Use Inhaler orders unless patient has one or more of the following: on home nebulizer, not able to hold breath for 10 seconds, is not alert and oriented, cannot activate and use MDI correctly, or respiratory rate 25 breaths per minute or more, then use the equivalent nebulizer order(s) with same Frequency and PRN reasons based on the score.   If a patient is on this medication at home then do not decrease Frequency below that used at home. 0-3 - enter or revise RT bronchodilator order(s) to equivalent RT Bronchodilator order with Frequency of every 4 hours PRN for wheezing or increased work of breathing using Per Protocol order mode. 4-6 - enter or revise RT Bronchodilator order(s) to two equivalent RT bronchodilator orders with one order with BID Frequency and one order with Frequency of every 4 hours PRN wheezing or increased work of breathing using Per Protocol order mode. 7-10 - enter or revise RT Bronchodilator order(s) to two equivalent RT bronchodilator orders with one order with TID Frequency and one order with Frequency of every 4 hours PRN wheezing or increased work of breathing using Per Protocol order mode. 11-13 - enter or revise RT Bronchodilator order(s) to one equivalent RT bronchodilator order with QID Frequency and an Albuterol order with Frequency of every 4 hours PRN wheezing or increased work of breathing using Per Protocol order mode. Greater than 13 - enter or revise RT Bronchodilator order(s) to one equivalent RT bronchodilator order with every 4 hours Frequency and an Albuterol order with Frequency of every 2 hours PRN wheezing or increased work of breathing using Per Protocol order mode. RT to enter RT Home Evaluation for COPD & MDI Assessment order using Per Protocol order mode.     Electronically signed by Ciro Adamson RCP on 2/15/2023 at 7:22 AM

## 2023-02-15 NOTE — PROGRESS NOTES
Pulmonary & Critical Care Medicine ICU Progress Note    CC: COPD    Events of Last 24 hours:  Feels about the same  Remains on 3 L O2    Invasive Lines: PICC  BP (!) 140/63   Pulse 81   Temp 97.7 °F (36.5 °C) (Oral)   Resp 18   Ht 5' 3\" (1.6 m)   Wt 173 lb 3.5 oz (78.6 kg)   SpO2 92%   BMI 30.68 kg/m² 3 L-uses 3 L at home  Constitutional:  No acute distress. Barnetta Gunderson HEENT: no scleral icterus  Neck: No tracheal deviation present. Cardiovascular: Normal heart sounds. Pulmonary/Chest: Minimal wheezes. No rhonchi. No rales. No decreased breath sounds. No accessory muscle usage or stridor. Abdominal: Soft. Musculoskeletal: No cyanosis. No clubbing. Skin: Skin is warm and dry. Scheduled Meds:   predniSONE  30 mg Oral Daily    ipratropium-albuterol  1 ampule Inhalation TID    metFORMIN  1,000 mg Oral BID WC    pantoprazole  40 mg Oral QAM AC    insulin lispro  0-16 Units SubCUTAneous TID WC    insulin lispro  0-4 Units SubCUTAneous Nightly    pregabalin  200 mg Oral BID    rOPINIRole  1 mg Oral BID    lidocaine 1 % injection  5 mL IntraDERmal Once    risperiDONE  1 mg Oral BID    atorvastatin  60 mg Oral Daily    sertraline  100 mg Oral BID    sodium chloride flush  5-40 mL IntraVENous 2 times per day    enoxaparin  40 mg SubCUTAneous Daily    nicotine  1 patch TransDERmal Daily     PRN Meds:  traMADol, sodium chloride, glucose, dextrose bolus **OR** dextrose bolus, glucagon (rDNA), dextrose, sodium chloride flush, sodium chloride, ondansetron **OR** ondansetron, polyethylene glycol, acetaminophen **OR** acetaminophen, albuterol    Results:  CBC:   Recent Labs     02/14/23  0532   WBC 11.2*   HGB 11.8*   HCT 36.9   MCV 92.4        BMP:   Recent Labs     02/14/23  0532      K 3.7   CL 99   CO2 29   BUN 21*   CREATININE 0.7     LIVER PROFILE: No results for input(s): AST, ALT, LIPASE, BILIDIR, BILITOT, ALKPHOS in the last 72 hours. Invalid input(s):   AMYLASE,  ALB    Cultures:  2/2 BAL NGTD  2/2 BC NGTD  2/2 respiratory NGTD    Films:  Chest x-ray 2/14  Mild bibasilar airspace disease        CTPA 2/4   1. No CT evidence of a pulmonary embolism. 2. Bilateral lower lobe airspace consolidation, left worse than right,   representing either pneumonia or aspiration. 3. Additional scattered curvilinear, consolidative, and nodular opacities   throughout the upper lobes and right middle lobe likely reflect additional   multifocal pneumonia and/or pulmonary edema. 4. Small bilateral pleural effusions. 5. Enlarged nodular appearance of the left thyroid lobe. Consider further   characterization with a routine non urgent follow-up thyroid ultrasound. See below.    6. Findings suggestive of cirrhosis and portal hypertension    Assessment:    Acute hypoxemic hypercapnic respiratory failure  Acute COPD exacerbation   Bilateral lower extremity cellulitis  CHF/fluid overload  OHS/SHAZIA        Plan:    Supplemental oxygen to maintain SaO2 >92%; wean as tolerated    Prednisone taper  Inhaled bronchodilators  Completed Cefepime Day 7/7    Consider repeat CT in 6-8 weeks (Office is  notified)   DC plan is okay with pulmonary

## 2023-02-15 NOTE — PROGRESS NOTES
Inpatient Physical Therapy Treatment    Unit: 3 [facility]  Date:  2/15/2023  Patient Name:    Kris Gil  Admitting diagnosis:  COPD exacerbation [facility]) [J44.1]  Acute respiratory failure with hypercapnia ([facility] [address] [J96.02]  Acute encephalopathy [G93.40]  Acute on chronic respiratory failure with hypoxia and hypercapnia ([facility] [address] [J96.21, J96.22]  Admit Date:  2/2/2023  Precautions/Restrictions/WB Status/ Lines/ Wounds/ Oxygen: Fall risk, Bed/chair alarm, Lines (IV, Supplemental O2 (3L), and Arias catheter), Telemetry, and Continuous pulse oximetry    Treatment Time:  0445-9635  Treatment Number:  3  Timed Code Treatment Minutes:  23 minutes  Total Treatment Minutes:  23 minutes    Patient Stated Goals for Therapy: \" not stated \"          Discharge Recommendations: SNF  DME needs for discharge: Defer to facility       Therapy recommendation for EMS Transport: requires transport by cot due to pt needs lift equipment for safe transfers    Therapy recommendations for staff:   Assist of 2 for bed mobility. Unable to sit at EOB unsupported. Maxi Move to recliner. History of Present Illness:   COPD exacerbation , failed bipap and intubated in ER   Imaging with left hemithorax opacification,s/p bronch with clearance of mucous plugs      Agitation issues noted on vent   CTA chest with no PE , bilateral pneumonia   Intubated 2/2-2/6. Extubated to Vapotherm. Weaned to NC. Transferred out of ICU to PCU on 2/10    34 [facility] S4 Level Recommendation:  NA    AM-PAC Mobility Score    AM-PAC Inpatient Mobility Raw Score : 8       Subjective  Patient lying reclined bed with no family present. Pt agreeable to this PT session. Cognition    A&O orientation not directly assessed.     Able to follow 2 step commands    Pain   No  Location: N/A  Rating: NA /10  Pain Medicine Status: No request made    Preadmission Environment:   Pt. Lives                                              with family (dtr)  Home environment: mobile home/trailer  Steps to enter first floor:                     5 steps to enter with bilateral railings   Steps to second floor/basement:        N/A  Laundry:                                              1st floor  Bathroom:                                           tub/shower unit, shower chair , and comfort height toilet  Pt sleeps in a:                                     [address]  Equipment owned:                              rollator, shower chair/bench, raised toilet seat, and home O2 (3L) continous     Preadmission Status:  Pt. Able to drive: No  Pt. Fully independent with ADLs: No  Pt. Required assistance from family for: Cleaning, Cooking, and Laundry   Pt. supervision for functional transfers and utilized Rollator for mobility in home and Rollator out in community  History of falls:            No  Home Health Services:None    Balance  Static Sitting:  Fair -; CGA (improving)  Dynamic Sitting:  Poor;  needs bilat UE support on rails to prevent posterior drift  Comments: At EOB x 5 minutes    Static Standing: Poor; Max A  and 2 persons for partial  STEDY x 2 trials of 5-10 seconds each. Dynamic Standing: Not tested; N/A  Comments: Pt stood in heavily flexed posture, hips flexed to ~30* despite Max A x 2. Posture  Seated: Forward head and neck, Thoracic kyphosis, and posterior pelvic tilt  Standing:  Heavily flexed at hips     Bed Mobility   Supine to Sit:    Max A  and 2 persons  Sit to Supine:   Not Tested  Rolling:   Not Tested  Scooting in sitting: Total A  Scooting in supine:  Not Tested  Bridging:  No    Transfer Training     Sit to stand: Max A x 2 people from EOB in STEDY. Pt able to elevate bottom off bed but needed Max A x 2 to facilitate hip extension from ~70* to ~30*. Stand to sit:   Min A  and 2 persons  Bed to Chair:   Total A with use of GARY STEDY    Gait gait deferred due to weakness; pt ambulated 0 ft.      Stair Training deferred, pt unsafe/ not appropriate to complete stairs at this time    Therapeutic Exercises Initiated    Supine:  X 10 reps AROM each: ankle pumps, ankle circles, log rolls  X 5 reps AAROM heel slides and hip IR/ER  Sitting in chair:   X 3 reps AROM  LAQ  X 10 reps each cervical rotation, side bend, flexion AAROM    Activity Tolerance   During therapy session noted pt with no adverse symptoms to activity. SpO2 87-88% following transfer; recovered after ~5 minutes seated rest.    Positioning Needs   Pt up in chair, alarm set, positioned in proper neutral alignment and pressure relief provided. Call light provided and all needs within reach    Other Activities  RN made aware of pt status. Patient/Family Education   Pt educated on role of inpatient PT, POC, importance of continued activity, HEP and calling for assist with mobility. Assessment  Pt demonstrating slow progress towards mobility goals. Able to demo improved sitting balance however still with very limited cone of stability and poor endurance. Able to contribute more during sit>stand trial with STEDY although still needed Max A x 2 to clear bottom around paddles due to functional LE weakness. Recommending SNF upon discharge as patient functioning well below baseline, demonstrates good rehab potential and unable to return home due to burden of care beyond caregiver ability and home environment not conducive to patient recovery. Goals : To be met in 3 visits:  1). Independent with LE Ex x 10 reps  2). CHF goal: N/A  3). Sit EOB x 10 minutes with Min A    To be met in 6 visits:  1). Supine to/from sit: Min A   2). Sit to/from stand: Min A   3). Bed to chair: Total A with STEDY  4).   Tolerate B LE exercises 3 sets of 10-15 reps    Rehabilitation Potential: Good  Strengths for achieving goals include:   Pt motivated, PLOF, Family Support, and Pt cooperative   Barriers to achieving goals include:    No Barriers    Plan    To be seen 3-5 x / week  while in acute care setting for therapeutic exercises, bed mobility, transfers, progressive gait training, balance training, and family/patient education. Signature: Violet Wadsworth, PT, DPT    If patient discharges from this facility prior to next visit, this note will serve as the Discharge Summary.

## 2023-02-15 NOTE — PROGRESS NOTES
Inpatient Occupational Therapy Treatment     Unit: 3 [facility]  Date:  2/15/2023  Patient Name:    Taran Cid  Admitting diagnosis:  COPD exacerbation [facility]) [J44.1]  Acute respiratory failure with hypercapnia ([facility] [address] [J96.02]  Acute encephalopathy [G93.40]  Acute on chronic respiratory failure with hypoxia and hypercapnia ([facility] [address] [J96.21, J96.22]  Admit Date:  2/2/2023  Precautions/Restrictions/WB Status/ Lines/ Wounds/ Oxygen: Fall risk, Bed/chair alarm, Lines (IV, Supplemental O2 (9L), and Arias catheter), Telemetry, and Continuous pulse oximetry   Intubated 2-2-23  Extubated 2-8-23   Treatment Time:  2021-7379  Treatment Number:  3  Timed Code Treatment Minutes: 23 minutes  Total Treatment Minutes: 23 minutes    Patient Goals for Therapy: \" not stated  \"          Discharge Recommendations: SNF  DME needs for discharge: Defer to facility         Therapy recommendations for staff:   Assist of 2 for bed mobility     History of Present Illness: per H&P   79 y.o. female  has a past medical history of Anxiety, Arthritis, Cancer ([address] Chronic back pain, Chronic pain, COPD (chronic obstructive pulmonary disease) ([address] Depression, DJD (degenerative joint disease), cervical, Drug-seeking behavior, GERD (gastroesophageal reflux disease), Hypercholesteremia, Hypertension, IBS (irritable bowel syndrome), Insomnia, K deficiency, Kidney stone, Narcotic addiction ([address] Narcotic overdose ([address] Osteoporosis, Overdose, PNA (pneumonia), Restless leg syndrome, and Thyroid disease. who presents to the ED for Hartness of breath. Patient is a poor historian as she is able unable to answer any question due to altered mental status  Home Health S4 Level Recommendation:  NA    AM-PAC Score: AM-PAC Inpatient Daily Activity Raw Score: 9 9    Subjective  Patient lying supine in bed with  no family at bedside. Pt agreeable to this OT session.      Cognition:    A&O Person , place   Able to follow 2 step commands     Pain:   no complaints Location: NA  Rating: NA  Pain Medicine Status: no request     Preadmission Environment:   Pt. Lives     with family (dtr)  Home environment:    mobile home/trailer  Steps to enter first floor:   5 steps to enter with bilateral railings   Steps to second floor/basement: N/A  Laundry:     1st floor  Bathroom:     tub/shower unit, shower chair , and comfort height toilet  Pt sleeps in a:    [address] 22  Equipment owned:    rollator, shower chair/bench, raised toilet seat, and home O2 (3L) continous    Preadmission Status:  Pt. Able to drive: No  Pt. Fully independent with ADLs: No  Pt. Required assistance from family for: Cleaning, Cooking, and Laundry   Pt. supervision for functional transfers and utilized Rollator for mobility in home and Rollator out in community  History of falls: No  Home Health Services:None      Balance:  Sitting: Mod assist Pt leans to the left and keeps head flexed forward   Standing: Mod assist of two to stand into the stedy  PT stands in a flexed position and needs VC and mod assist to extend UEs and back. Bed Mobility:   Supine to Sit:    Max A , 2 person  Sit to Supine:   Max A , 2 person  Rolling:   Max  assist with VC to the right   Scooting in sitting: NT  Scooting in supine:  NT    Transfers:    Sit to stand: Mod assist of two to stedy . Stand to sit:    Max assist   Bed to chair:     Stedy used - total assist    Bed/ chair to standard toilet:  Not Tested  Bed/chair to [facility]:   Not Tested      ADLs:  Dressing:      UE:   Not Tested  LE:    Max A      Bathing:    UE:  Not Tested  LE:  Not Tested    Eating:   Not Tested    Toileting:  Not Tested    Grooming/hygiene: Not Tested    Activity Tolerance   Pt completed therapy session with improved endurance, decreased balance.       BP (mmHg) HR (bpm) SpO2 (%) on 9L Comments   Supine at rest  103-100 bpm 87-91%    Seated at EOB  bpm %    Standing  bpm %    End of session    %      Positioning Needs   In chair with needs at reach . Sitting upright in chair with blanket under feet for support. Pt stated she wanted to sit up right. Ther Ex / Activities Initiated:   N/A    Patient/Family Education   Pt educated on role of inpatient OT, plan of care, importance of continued activity, calling for assist with mobility. Assessment:    Pt seen for Occupational therapy evaluation in acute care setting. Pt demonstrated decreased Activity tolerance, ADLs, Balance , Bed mobility, Safety Awareness, Transfers, and Cognition. Pt functioning below baseline and will likely benefit from skilled occupational therapy services to maximize safety and independence. Recommending SNF     Goal(s) : To be met in 3 Visits:  1). Bed to toilet/BSC:        Mod A of 2 with appropriate AD   2.) Pt will complete 3/3 CHF goals          To be met in 5 Visits:  1). Supine to/from Sit in preporation for ADL task: Mod A  2.) Toileting         Mod A  3.) Grooming        Mod A  4). Upper Body Dressing: Mod A  5). Lower Body Dressing:       Max A  6). Pt to demonstrate UE therapeutic exs x 15 reps with minimal cues    Rehabilitation Potential: Fair  Strengths for achieving goals include: Pt cooperative   Barriers to achieving goals include:  Complexity of condition    Plan: To be seen 3-5 x/wk while in acute care setting for therapeutic exercises, bed mobility, transfers, family/patient education, ADL/IADL retraining, and energy conservation training.     Signature: Lizzy Monet OTR/L [id number]       If patient discharges from this facility prior to next visit, this note will serve as the Discharge Summary

## 2023-02-15 NOTE — PROGRESS NOTES
IM Progress Note    Admit Date:  2/2/2023  13    Interval history:    copd exacerbation , failed bipap and intubated in ER   Imaging with left hemithorax opacification,s/p bronch with clearance of mucous plugs   Agitation issues noted on vent   CTA chest with no PE , bilateral pneumonia     -> Extubated-> Vapotherm  -> O2 weaned down to 9 L high flow-> 3 L. Transferred out of ICU to PCU on 2/10. Patient has remained stable on the floor. Continued on oxygen 3 L. She is very weak, PT OT seeing her. Awaiting SNF placement. Subjective:    Ms. Charlie Cordova remains stable ,on oxygen 3 L . She is awake and oriented , very weak . No distress. No cough or shortness of breath. No fevers. Awaiting SNF placement . Objective:   BP (!) 130/51   Pulse (!) 102   Temp 98.1 °F (36.7 °C) (Oral)   Resp 18   Ht 5' 3\" (1.6 m)   Wt 173 lb 3.5 oz (78.6 kg)   SpO2 90%   BMI 30.68 kg/m²     Intake/Output Summary (Last 24 hours) at 2/15/2023 1212  Last data filed at 2/15/2023 0400  Gross per 24 hour   Intake 120 ml   Output 1650 ml   Net -1530 ml         Physical Exam:    General:  elderly female , looks fatigued, no distress. Awake and alert, oriented to place and person   Appears to be not in any distress  Mucous Membranes:  Pink , anicteric  Neck: No JVD, no carotid bruit, no thyromegaly  Chest: Diminished breath sounds, no wheezes or rhonchi .   Cardiovascular:  RRR S1S2 heard, no murmurs or gallops  Abdomen:  Soft, undistended, non tender, no organomegaly, BS present  Extremities: chronic 2 +  brawny edema of both LE with diffuse celluitis of ext Bilaterally  Distal pulses well felt  Neurological : Awake alert ; nonfocal      Medications:   Scheduled Medications:    predniSONE  30 mg Oral Daily    ipratropium-albuterol  1 ampule Inhalation TID    metFORMIN  1,000 mg Oral BID WC    pantoprazole  40 mg Oral QAM AC    insulin lispro  0-16 Units SubCUTAneous TID WC    insulin lispro  0-4 Units SubCUTAneous Nightly pregabalin  200 mg Oral BID    rOPINIRole  1 mg Oral BID    lidocaine 1 % injection  5 mL IntraDERmal Once    risperiDONE  1 mg Oral BID    atorvastatin  60 mg Oral Daily    sertraline  100 mg Oral BID    sodium chloride flush  5-40 mL IntraVENous 2 times per day    enoxaparin  40 mg SubCUTAneous Daily    nicotine  1 patch TransDERmal Daily     I   sodium chloride      dextrose      sodium chloride Stopped (02/09/23 1804)     traMADol, sodium chloride, glucose, dextrose bolus **OR** dextrose bolus, glucagon (rDNA), dextrose, sodium chloride flush, sodium chloride, ondansetron **OR** ondansetron, polyethylene glycol, acetaminophen **OR** acetaminophen, albuterol    Lab Data:  Recent Labs     02/14/23  0532   WBC 11.2*   HGB 11.8*   HCT 36.9   MCV 92.4          Recent Labs     02/14/23  0532      K 3.7   CL 99   CO2 29   BUN 21*   CREATININE 0.7       No results for input(s): CKTOTAL, CKMB, CKMBINDEX, TROPONINI in the last 72 hours. Coagulation:   Lab Results   Component Value Date/Time    INR 1.04 02/04/2023 12:15 PM    APTT 27.6 02/04/2023 12:15 PM     Cardiac markers:   Lab Results   Component Value Date/Time    CKMB 10.3 07/14/2010 07:25 AM    CKTOTAL 74 10/17/2014 02:46 AM    TROPONINI <0.01 02/02/2023 05:05 AM         Lab Results   Component Value Date    ALT 12 02/03/2023    AST 13 (L) 02/03/2023    ALKPHOS 99 02/03/2023    BILITOT 0.3 02/03/2023       Lab Results   Component Value Date    INR 1.04 02/04/2023    INR 0.94 09/12/2020    INR 0.99 07/01/2018    PROTIME 13.5 02/04/2023    PROTIME 10.9 09/12/2020    PROTIME 11.3 07/01/2018     Cultures:   COVID/Influenza: not detected   MRSA screen negative  Blood- NGTD  BAL NRF  AFB culture no growth so far    Radiology  XR CHEST PORTABLE   Final Result   Mild bibasilar airspace disease which could reflect atelectasis versus   pneumonia in the appropriate clinical setting.          XR CHEST PORTABLE   Final Result   Increasing bibasilar ground-glass opacities with a developing right pleural   effusion. Pulmonary edema or ARDS suspected. XR CHEST PORTABLE   Final Result   1. Stable appropriate positions of support apparatus. 2. Slight interval improvement of mild pulmonary edema. 3. No significant change in appearance of a small left pleural effusion and   left basilar airspace consolidation. XR CHEST PORTABLE   Final Result   1. Stable appropriate positions of support apparatus. 2. Stable small bilateral pleural effusions. 3. Stable left basilar airspace consolidation. 4. Interval progression of diffuse hazy airspace opacity throughout both   lungs, right greater than left, which likely reflects progressive pulmonary   edema, though progressive multifocal pneumonia is also a consideration. XR CHEST PORTABLE   Final Result   Stable chest.  Bibasilar airspace disease appears similar         XR CHEST PORTABLE   Final Result   1. Endotracheal tube slightly low in position, lying approximately 2.0 cm   above the ivis. Suggest retraction of the tube by approximately 2-3 cm to   avoid right mainstem intubation. 2. Other support apparatus is in good anatomic position. 3. Slight interval decrease in size of a small left pleural effusion. 4. Interval improvement in appearance of bibasilar airspace disease. The findings were sent to the Radiology Results [address] at 6:04   am on 2/5/2023 to be communicated to a licensed caregiver. CTA PULMONARY W CONTRAST   Final Result   1. No CT evidence of a pulmonary embolism. 2. Bilateral lower lobe airspace consolidation, left worse than right,   representing either pneumonia or aspiration. 3. Additional scattered curvilinear, consolidative, and nodular opacities   throughout the upper lobes and right middle lobe likely reflect additional   multifocal pneumonia and/or pulmonary edema. 4. Small bilateral pleural effusions.    5. Enlarged nodular appearance of the left thyroid lobe. Consider further   characterization with a routine non urgent follow-up thyroid ultrasound. See   below. 6. Findings suggestive of cirrhosis and portal hypertension. RECOMMENDATIONS:   Managing Incidental Thyroid Nodule Detected at CT or MRI or US      Follow up thyroid ultrasound also recommend in these scenarios      - Solitary nodule with high risk imaging features (locally invasive nodule or   suspicious lymph nodes)      - HETEROGENEOUS, ENLARGED THYROID GLAND.      - Increased uptake on PET      Note: These recommendations do not apply to pts. w/ increased risk      for thyroid cancer or pts. with symptomatic thyroid disease.      ________________________________________________________________      Recommendations for f/u of Incidental Thyroid Nodules (ITN)      found on CT, MR, NM and Extrathyroidal US are based upon the ACR      white paper and [facility] 3-tiered system for managing ITNs:      J Am Kylee Radiol. 2015 Feb;12(2): 143-50         XR CHEST PORTABLE   Final Result   Bibasilar airspace disease, left greater than right, similar compared to prior         VL Extremity Venous Bilateral   Final Result      XR CHEST PORTABLE   Final Result   Exam limited by patient positioning. No marked interval change in bilateral   airspace disease as compared to prior. XR CHEST PORTABLE   Final Result   Significantly limited evaluation secondary to patient positioning with no   signs to suggest pneumothorax. XR CHEST PORTABLE   Final Result   Complete opacification has developed of the left hemithorax, which can   reflect combination of atelectasis, airspace disease, and/or pleural   effusion. Right-sided airspace disease is not substantially changed. XR CHEST PORTABLE   Final Result   1. Endotracheal tube likely within the right mainstem bronchus. Recommend 3   cm of retraction and repeat chest radiograph.          XR CHEST PORTABLE   Final Result 1. Findings suggestive of partial right upper lobe atelectasis. 2. Small left pleural effusion. 3. Central pulmonary vascular congestion without overt pulmonary edema. 4. Left basilar atelectasis. Assessment & Plan:    Acute respiratory failure with hypoxia and hypercapnia  Copd exacerbation   Left hemithorax opacification with mucous plugging  Pneumonia-gram-negative infection  - O2 sat 86% on 5L in the ER. Initially placed on NIV. Too obtunded for NIV and PCO2 worsened after ~1 h on NIV. And eventually placed on vent  -Admitted to ICU . Seen in consultation by pulmonary critical care  -CTA chest with bilateral pna, no PE  -Had emergency bronch with removal of mucous plugs; BAL with normal respiratory madhavi  - Started on steroids  - treated with antibiotics vancomycin and cefepime for 7 days   -S/p intubation mechanical ventilation , patient has been extubated  - was on vapotherm--> 9 L high flow nasal canula--> 3 L . O2 sats remain stable on 3 L now  -> Transferred out of ICU . oxygen saturation stable, completed course of antibiotics  Follow-up CT chest recommended in 6 to 8 weeks    COPD exacerbation   -IV solumedrol, transition to oral prednisone-> Taper prednisone-currently decreased to 30 mg daily on 2/14  - IV abx as above   - PRN/GIDEON intensive NEB therapy. Acute metabolic encephalopathy  -Patient was obtunded, likely metabolic 2/2 hypercarbia. Resolved     DM2  -Resume metformin.  -Continue sliding scale insulin coverage    Bilateral LE cellulitis   chronic lymphedema  -Treated with IV antibiotics vancomycin and cefepime as above  -Norvasc on hold    Tobacco Abuse  -on nicotine patch.      Hx of cirrhosis with portal HTN   -Home meds reviewed, she does not on any diuretics at home    Thyroid enlargement on left side   - need f/w US as outpt    Weakness and debility  -Seen by PT OT  Needs SNF       DVT Prophylaxis: Lx  Diet carb control  Code Status: Full Code       We have been waiting for SNF.   Per Case management placement will be difficult      Freddy Diego MD, 2/15/2023 12:12 PM

## 2023-02-15 NOTE — CARE COORDINATION
INTERDISCIPLINARY PLAN OF CARE CONFERENCE    Date/Time: 2/15/2023 1:43 PM  Completed by: Gildardo Kiser RN, Case Management      Patient Name:  Nery Gaston  YOB: 1955  Admitting Diagnosis: COPD exacerbation ([facility] [address] [J44.1]  Acute respiratory failure with hypercapnia ([address] [J96.02]  Acute encephalopathy [G93.40]  Acute on chronic respiratory failure with hypoxia and hypercapnia ([address] [L53.50, J96.22]     Admit Date/Time:  2/2/2023  4:53 AM    Chart reviewed. Interdisciplinary team contacted or reviewed plan related to patient progress and discharge plans. Disciplines included Case Management, Nursing, and Dietitian. Current Status:inpt  PT/OT recommendation for discharge plan of care: SNF    Expected D/C Disposition:  Skilled nursing facility  Confirmed plan with patient and/or family Yes     Discharge Plan Comments: Reviewed chart. Pre-cert still pending. Plan continues [facility],+bed. Following.     Home O2 in place on admit: per facility

## 2023-02-16 VITALS
HEART RATE: 88 BPM | OXYGEN SATURATION: 91 % | HEIGHT: 63 IN | TEMPERATURE: 98 F | BODY MASS INDEX: 30.87 KG/M2 | WEIGHT: 174.2 LBS | DIASTOLIC BLOOD PRESSURE: 74 MMHG | RESPIRATION RATE: 18 BRPM | SYSTOLIC BLOOD PRESSURE: 111 MMHG

## 2023-02-16 LAB
GLUCOSE BLD-MCNC: 132 MG/DL (ref 70–99)
GLUCOSE BLD-MCNC: 162 MG/DL (ref 70–99)
GLUCOSE BLD-MCNC: 173 MG/DL (ref 70–99)
GLUCOSE BLD-MCNC: 174 MG/DL (ref 70–99)
GLUCOSE BLD-MCNC: 268 MG/DL (ref 70–99)
PERFORMED ON: ABNORMAL

## 2023-02-16 PROCEDURE — 6370000000 HC RX 637 (ALT 250 FOR IP): Performed by: INTERNAL MEDICINE

## 2023-02-16 PROCEDURE — 2580000003 HC RX 258: Performed by: INTERNAL MEDICINE

## 2023-02-16 PROCEDURE — 99232 SBSQ HOSP IP/OBS MODERATE 35: CPT | Performed by: INTERNAL MEDICINE

## 2023-02-16 PROCEDURE — 6360000002 HC RX W HCPCS: Performed by: INTERNAL MEDICINE

## 2023-02-16 PROCEDURE — 2700000000 HC OXYGEN THERAPY PER DAY

## 2023-02-16 PROCEDURE — 94640 AIRWAY INHALATION TREATMENT: CPT

## 2023-02-16 PROCEDURE — 94761 N-INVAS EAR/PLS OXIMETRY MLT: CPT

## 2023-02-16 PROCEDURE — 94669 MECHANICAL CHEST WALL OSCILL: CPT

## 2023-02-16 RX ORDER — ENOXAPARIN SODIUM 100 MG/ML
40 INJECTION SUBCUTANEOUS DAILY
DISCHARGE
Start: 2023-02-17

## 2023-02-16 RX ORDER — ROPINIROLE 1 MG/1
1 TABLET, FILM COATED ORAL 2 TIMES DAILY
DISCHARGE
Start: 2023-02-16

## 2023-02-16 RX ORDER — INSULIN LISPRO 100 [IU]/ML
0-16 INJECTION, SOLUTION INTRAVENOUS; SUBCUTANEOUS
DISCHARGE
Start: 2023-02-16

## 2023-02-16 RX ORDER — POLYETHYLENE GLYCOL 3350 17 G/17G
17 POWDER, FOR SOLUTION ORAL DAILY PRN
DISCHARGE
Start: 2023-02-16 | End: 2023-03-18

## 2023-02-16 RX ORDER — ATORVASTATIN CALCIUM 20 MG/1
60 TABLET, FILM COATED ORAL DAILY
DISCHARGE
Start: 2023-02-17

## 2023-02-16 RX ORDER — NICOTINE 21 MG/24HR
1 PATCH, TRANSDERMAL 24 HOURS TRANSDERMAL DAILY
DISCHARGE
Start: 2023-02-17

## 2023-02-16 RX ORDER — SERTRALINE HYDROCHLORIDE 100 MG/1
100 TABLET, FILM COATED ORAL 2 TIMES DAILY
DISCHARGE
Start: 2023-02-16

## 2023-02-16 RX ORDER — TRAMADOL HYDROCHLORIDE 50 MG/1
50 TABLET ORAL EVERY 12 HOURS PRN
Qty: 6 TABLET | Refills: 0 | Status: SHIPPED | OUTPATIENT
Start: 2023-02-16 | End: 2023-02-19

## 2023-02-16 RX ORDER — RISPERIDONE 1 MG/1
1 TABLET ORAL 2 TIMES DAILY
DISCHARGE
Start: 2023-02-16

## 2023-02-16 RX ORDER — PREDNISONE 10 MG/1
TABLET ORAL
Qty: 9 TABLET | Refills: 0 | DISCHARGE
Start: 2023-02-16

## 2023-02-16 RX ADMIN — TRAMADOL HYDROCHLORIDE 50 MG: 50 TABLET, COATED ORAL at 08:22

## 2023-02-16 RX ADMIN — INSULIN LISPRO 8 UNITS: 100 INJECTION, SOLUTION INTRAVENOUS; SUBCUTANEOUS at 12:13

## 2023-02-16 RX ADMIN — SERTRALINE 100 MG: 100 TABLET, FILM COATED ORAL at 08:21

## 2023-02-16 RX ADMIN — METFORMIN HYDROCHLORIDE 1000 MG: 500 TABLET ORAL at 16:58

## 2023-02-16 RX ADMIN — ATORVASTATIN CALCIUM 60 MG: 40 TABLET, FILM COATED ORAL at 08:22

## 2023-02-16 RX ADMIN — IPRATROPIUM BROMIDE AND ALBUTEROL SULFATE 1 AMPULE: 2.5; .5 SOLUTION RESPIRATORY (INHALATION) at 08:39

## 2023-02-16 RX ADMIN — PREDNISONE 30 MG: 20 TABLET ORAL at 08:21

## 2023-02-16 RX ADMIN — PANTOPRAZOLE SODIUM 40 MG: 40 TABLET, DELAYED RELEASE ORAL at 05:35

## 2023-02-16 RX ADMIN — METFORMIN HYDROCHLORIDE 1000 MG: 500 TABLET ORAL at 08:21

## 2023-02-16 RX ADMIN — ONDANSETRON HYDROCHLORIDE 4 MG: 2 INJECTION, SOLUTION INTRAMUSCULAR; INTRAVENOUS at 08:59

## 2023-02-16 RX ADMIN — RISPERIDONE 1 MG: 1 TABLET ORAL at 08:21

## 2023-02-16 RX ADMIN — PREGABALIN 200 MG: 100 CAPSULE ORAL at 08:21

## 2023-02-16 RX ADMIN — SODIUM CHLORIDE, PRESERVATIVE FREE 10 ML: 5 INJECTION INTRAVENOUS at 08:26

## 2023-02-16 RX ADMIN — ENOXAPARIN SODIUM 40 MG: 100 INJECTION SUBCUTANEOUS at 08:22

## 2023-02-16 RX ADMIN — IPRATROPIUM BROMIDE AND ALBUTEROL SULFATE 1 AMPULE: 2.5; .5 SOLUTION RESPIRATORY (INHALATION) at 15:46

## 2023-02-16 RX ADMIN — ROPINIROLE HYDROCHLORIDE 1 MG: 1 TABLET, FILM COATED ORAL at 08:20

## 2023-02-16 ASSESSMENT — PAIN SCALES - GENERAL
PAINLEVEL_OUTOF10: 8
PAINLEVEL_OUTOF10: 7

## 2023-02-16 NOTE — PROGRESS NOTES
RT Inhaler-Nebulizer Bronchodilator Protocol Note    There is a bronchodilator order in the chart from a provider indicating to follow the RT Bronchodilator Protocol and there is an Initiate RT Inhaler-Nebulizer Bronchodilator Protocol order as well (see protocol at bottom of note). CXR Findings:  XR CHEST PORTABLE    Result Date: 2/14/2023  Mild bibasilar airspace disease which could reflect atelectasis versus pneumonia in the appropriate clinical setting. The findings from the last RT Protocol Assessment were as follows:   History Pulmonary Disease: Chronic pulmonary disease  Respiratory Pattern: Regular pattern and RR 12-20 bpm  Breath Sounds: Slightly diminished and/or crackles  Cough: Strong, spontaneous, non-productive  Indication for Bronchodilator Therapy: On home bronchodilators  Bronchodilator Assessment Score: 4    Aerosolized bronchodilator medication orders have been revised according to the RT Inhaler-Nebulizer Bronchodilator Protocol below. Respiratory Therapist to perform RT Therapy Protocol Assessment initially then follow the protocol. Repeat RT Therapy Protocol Assessment PRN for score 0-3 or on second treatment, BID, and PRN for scores above 3. No Indications - adjust the frequency to every 6 hours PRN wheezing or bronchospasm, if no treatments needed after 48 hours then discontinue using Per Protocol order mode. If indication present, adjust the RT bronchodilator orders based on the Bronchodilator Assessment Score as indicated below. Use Inhaler orders unless patient has one or more of the following: on home nebulizer, not able to hold breath for 10 seconds, is not alert and oriented, cannot activate and use MDI correctly, or respiratory rate 25 breaths per minute or more, then use the equivalent nebulizer order(s) with same Frequency and PRN reasons based on the score.   If a patient is on this medication at home then do not decrease Frequency below that used at home.    0-3 - enter or revise RT bronchodilator order(s) to equivalent RT Bronchodilator order with Frequency of every 4 hours PRN for wheezing or increased work of breathing using Per Protocol order mode. 4-6 - enter or revise RT Bronchodilator order(s) to two equivalent RT bronchodilator orders with one order with BID Frequency and one order with Frequency of every 4 hours PRN wheezing or increased work of breathing using Per Protocol order mode. 7-10 - enter or revise RT Bronchodilator order(s) to two equivalent RT bronchodilator orders with one order with TID Frequency and one order with Frequency of every 4 hours PRN wheezing or increased work of breathing using Per Protocol order mode. 11-13 - enter or revise RT Bronchodilator order(s) to one equivalent RT bronchodilator order with QID Frequency and an Albuterol order with Frequency of every 4 hours PRN wheezing or increased work of breathing using Per Protocol order mode. Greater than 13 - enter or revise RT Bronchodilator order(s) to one equivalent RT bronchodilator order with every 4 hours Frequency and an Albuterol order with Frequency of every 2 hours PRN wheezing or increased work of breathing using Per Protocol order mode.      Electronically signed by Brittany Newton RCP on 2/15/2023 at 9:19 PM

## 2023-02-16 NOTE — PROGRESS NOTES
One occurrence of unmeasured urine accounted for. Redness resembling fungal growth to groin noted. Cream added and purewick replaced.

## 2023-02-16 NOTE — PROGRESS NOTES
RT Inhaler-Nebulizer Bronchodilator Protocol Note    There is a bronchodilator order in the chart from a provider indicating to follow the RT Bronchodilator Protocol and there is an Initiate RT Inhaler-Nebulizer Bronchodilator Protocol order as well (see protocol at bottom of note). CXR Findings:  No results found. The findings from the last RT Protocol Assessment were as follows:   History Pulmonary Disease: Chronic pulmonary disease  Respiratory Pattern: Dyspnea on exertion or RR 21-25 bpm  Breath Sounds: Slightly diminished and/or crackles  Cough: Strong, productive  Indication for Bronchodilator Therapy: On home bronchodilators  Bronchodilator Assessment Score: 7    Aerosolized bronchodilator medication orders have been revised according to the RT Inhaler-Nebulizer Bronchodilator Protocol below. Respiratory Therapist to perform RT Therapy Protocol Assessment initially then follow the protocol. Repeat RT Therapy Protocol Assessment PRN for score 0-3 or on second treatment, BID, and PRN for scores above 3. No Indications - adjust the frequency to every 6 hours PRN wheezing or bronchospasm, if no treatments needed after 48 hours then discontinue using Per Protocol order mode. If indication present, adjust the RT bronchodilator orders based on the Bronchodilator Assessment Score as indicated below. Use Inhaler orders unless patient has one or more of the following: on home nebulizer, not able to hold breath for 10 seconds, is not alert and oriented, cannot activate and use MDI correctly, or respiratory rate 25 breaths per minute or more, then use the equivalent nebulizer order(s) with same Frequency and PRN reasons based on the score. If a patient is on this medication at home then do not decrease Frequency below that used at home.     0-3 - enter or revise RT bronchodilator order(s) to equivalent RT Bronchodilator order with Frequency of every 4 hours PRN for wheezing or increased work of breathing using Per Protocol order mode. 4-6 - enter or revise RT Bronchodilator order(s) to two equivalent RT bronchodilator orders with one order with BID Frequency and one order with Frequency of every 4 hours PRN wheezing or increased work of breathing using Per Protocol order mode. 7-10 - enter or revise RT Bronchodilator order(s) to two equivalent RT bronchodilator orders with one order with TID Frequency and one order with Frequency of every 4 hours PRN wheezing or increased work of breathing using Per Protocol order mode. 11-13 - enter or revise RT Bronchodilator order(s) to one equivalent RT bronchodilator order with QID Frequency and an Albuterol order with Frequency of every 4 hours PRN wheezing or increased work of breathing using Per Protocol order mode. Greater than 13 - enter or revise RT Bronchodilator order(s) to one equivalent RT bronchodilator order with every 4 hours Frequency and an Albuterol order with Frequency of every 2 hours PRN wheezing or increased work of breathing using Per Protocol order mode. RT to enter RT Home Evaluation for COPD & MDI Assessment order using Per Protocol order mode.     Electronically signed by Tiff Nascimento Back on 2/16/2023 at 8:45 AM

## 2023-02-16 NOTE — DISCHARGE INSTR - COC
Continuity of Care Form    Patient Name: Roseline Sharp   :  1955  MRN:  [id number]    Admit date:  2023  Discharge date:  23    Code Status Order: Full Code   Advance Directives:     Admitting Physician:  Hossein Dean MD  PCP: Cherelle Franz    Discharging Nurse: [facility] Unit/Room#: 3020/3020-01  Discharging Unit Phone Number: [phone]    Emergency Contact:   Extended Emergency Contact Information  Primary Emergency Contact: [facility]  Address: [address] Phone: [phone]  Mobile Phone: [phone]  Relation: Child  Secondary Emergency Contact:  [address] Phone: [phone]  Mobile Phone: [phone]  Relation: Child    Past Surgical History:  Past Surgical History:   Procedure Laterality Date    APPENDECTOMY      BACK SURGERY      CERVICAL DISCECTOMY  13    anterior discetomy fusion c 3/4 c 4/5 posterior fusion C3-C5 screws and rods C3-C5    CHOLECYSTECTOMY      Laparoscopic    COLONOSCOPY      COLONOSCOPY  08/10/2016    FOOT SURGERY Right     FRACTURE SURGERY Right     distal radius    HAND SURGERY  2012    crushed right hand and has two steel plates in hand    HYSTERECTOMY (CERVIX STATUS UNKNOWN)      NECK SURGERY      disc    OTHER SURGICAL HISTORY  14    TRANSFORAMINAL LUMBAR INTERBODY FUSION L4 L5       Immunization History:   Immunization History   Administered Date(s) Administered    COVID-19, PFIZER GRAY top, DO NOT Dilute, (age 15 y+), IM, 30 mcg/0.3 mL 2022    COVID-19, PFIZER PURPLE top, DILUTE for use, (age 15 y+), 30mcg/0.3mL 03/15/2021, 2021    Influenza Virus Vaccine 10/27/2008, 2009, 2010, 2011, 2015, 10/05/2016, 10/22/2018, 10/22/2018    Influenza, FLUARIX, FLULAVAL, FLUZONE (age 10 mo+) AND AFLURIA, (age 1 y+), PF, 0.5mL 10/07/2019    Influenza, High Dose (Fluzone 65 yrs and older) 10/07/2019 Influenza, Intradermal, Preservative free 10/17/2012, 08/30/2013, 01/05/2015    Tdap (Boostrix, Adacel) 02/15/2013       Active Problems:  Patient Active Problem List   Diagnosis Code    GERD (gastroesophageal reflux disease) K21.9    Osteoporosis M81.0    Insomnia G47.00    Restless leg syndrome G25.81    DJD (degenerative joint disease), cervical M47.812    Chronic pain G89.29    IBS (irritable bowel syndrome) K58.9    Hyperlipidemia E78.5    Depression with anxiety F41.8    B[facility] ([facility] F31.9    Anorexia nervosa F50.00    B12 deficiency E53.8    Generalized anxiety disorder F41.1    Fatigue R53.83    Lumbosacral radiculopathy due to degenerative joint disease of spine M47.27    Low back pain M54.50    Low back pain radiating to both legs M54.50, M79.604, M79.605    Constipation due to pain medication K59.03    Vitamin D deficiency E55.9    Spinal stenosis M48.00    COPD (chronic obstructive pulmonary disease) (HCC) J44.9    Gait disturbance R26.9    Weakness R53.1    TIA (transient ischemic attack) G45.9    Paresthesia R20.2    Thyroid nodule E04.1    PNA (pneumonia) J18.9    Acute encephalopathy G93.40    Post traumatic stress disorder (PTSD) F43.10    Thoracic compression fracture, closed, initial encounter ([facility] S22.000A    COPD exacerbation (HCC) J44.1    Acute respiratory failure with hypercapnia (HCC) J96.02    Acute on chronic respiratory failure with hypoxia and hypercapnia (HCC) J96.21, J96.22    Essential hypertension I10    Community acquired pneumonia J18.9    Cellulitis of left lower extremity L03.116    Tobacco abuse Z72.0    Cirrhosis of liver without ascites (HCC) K74.60    Hyperglycemia R73.9    Cellulitis of both lower extremities L03.115, L03.116    Chronic respiratory failure with hypoxia (HCC) J96.11    Cellulitis and abscess of leg L03.119, L02.419    SHAZIA (obstructive sleep apnea) G47.33    Acute respiratory failure with hypoxia and hypercapnia (HCC) J96.01, J96.02    Type 2 diabetes mellitus without complication, without long-term current use of insulin (HCC) E11.9       Isolation/Infection:   Isolation            No Isolation          Patient Infection Status       Infection Onset Added Last Indicated Last Indicated By Review Planned Expiration Resolved Resolved By    None active    Resolved    COVID-19 (Rule Out) 02/04/23 02/04/23 02/04/23 COVID-19, Rapid (Ordered)   02/04/23 Rule-Out Test Resulted    COVID-19 (Rule Out) 02/02/23 02/02/23 02/02/23 COVID-19 & Influenza Combo (Ordered)   02/02/23 Rule-Out Test Resulted    COVID-19 (Rule Out) 05/14/22 05/14/22 05/14/22 COVID-19 & Influenza Combo (Ordered)   05/14/22 Rule-Out Test Resulted    COVID-19 (Rule Out) 03/07/21 03/07/21 03/07/21 COVID-19, Rapid (Ordered)   03/07/21 Rule-Out Test Resulted    COVID-19 (Rule Out) 09/12/20 09/12/20 09/13/20 COVID-19 (Ordered)   09/13/20 Rule-Out Test Resulted            Nurse Assessment:  Last Vital Signs: /74   Pulse 92   Temp 98 °F (36.7 °C) (Oral)   Resp 16   Ht 5' 3\" (1.6 m)   Wt 174 lb 3.2 oz (79 kg)   SpO2 94%   BMI 30.86 kg/m²     Last documented pain score (0-10 scale): Pain Level: 7  Last Weight:   Wt Readings from Last 1 Encounters:   02/16/23 174 lb 3.2 oz (79 kg)     Mental Status:  oriented and alert    IV Access:  - None    Nursing Mobility/ADLs:  Walking   Dependent  Transfer  Assisted  Bathing  Assisted  Dressing  Assisted  Toileting  Assisted  Feeding  Assisted  Med Admin  Assisted  Med Delivery   whole    Wound Care Documentation and Therapy:        Elimination:  Continence: Bowel: No  Bladder: No  Urinary Catheter: None   Colostomy/Ileostomy/Ileal Conduit: No       Date of Last BM: 2/16/23    Intake/Output Summary (Last 24 hours) at 2/16/2023 1338  Last data filed at 2/16/2023 1252  Gross per 24 hour   Intake 10 ml   Output 1000 ml   Net -990 ml     I/O last 3 completed shifts: In: 130 [P.O.:120; I.V.:10]  Out: 850 [Urine:850]    Safety Concerns:      At Risk for Falls and Aspiration Risk    Impairments/Disabilities:      None    Nutrition Therapy:  Current Nutrition Therapy:   - Oral Diet:  Carb Control Minced and Moist    Routes of Feeding: Oral  Liquids: No Restrictions  Daily Fluid Restriction: no  Last Modified Barium Swallow with Video (Video Swallowing Test): not done    Treatments at the Time of Hospital Discharge:   Respiratory Treatments: 3L NC  Oxygen Therapy:  is on oxygen at 3 L/min per nasal cannula.  Ventilator:    - No ventilator support    Rehab Therapies: Physical Therapy and Occupational Therapy  Weight Bearing Status/Restrictions: No weight bearing restrictions  Other Medical Equipment (for information only, NOT a DME order):  walker and bedside commode  Other Treatments: na    Patient's personal belongings (please select all that are sent with patient):  None    RN SIGNATURE:  Electronically signed by Nanci Smith RN on 2/16/23 at 2:15 PM EST    CASE MANAGEMENT/SOCIAL WORK SECTION    Inpatient Status Date: 02/02/23    Readmission Risk Assessment Score:  Readmission Risk              Risk of Unplanned Readmission:  29           Discharging to Facility/ Agency   Name: Name: [facility]  Address: [facility] [address], [place], [address]  Phone: [phone]  Fax: [phone]     / signature: Electronically signed by Shanda Gary RN on 2/16/23 at 1:38 PM EST    PHYSICIAN SECTION    Prognosis: Fair    Condition at Discharge: Stable    Rehab Potential (if transferring to Rehab): Fair    Recommended Labs or Other Treatments After Discharge:     Physician Certification: I certify the above information and transfer of Romina Jones  is necessary for the continuing treatment of the diagnosis listed and that she requires Skilled Nursing Facility for less 30 days.     Update Admission H&P: No change in H&P    PHYSICIAN SIGNATURE:  Electronically signed by Rick Llamas MD on 2/16/23 at 1:40 PM EST

## 2023-02-16 NOTE — PROGRESS NOTES
Pulmonary & Critical Care Medicine ICU Progress Note    CC: COPD    Events of Last 24 hours:  Still with shortness of breath. Invasive Lines: PICC  BP (!) 140/84   Pulse 82   Temp 97.8 °F (36.6 °C) (Oral)   Resp 18   Ht 5' 3\" (1.6 m)   Wt 174 lb 3.2 oz (79 kg)   SpO2 96%   BMI 30.86 kg/m² 3 L-uses 3 L at home  Constitutional:  No acute distress. Alberta Crumble HEENT: no scleral icterus  Neck: No tracheal deviation present. Cardiovascular: Normal heart sounds. Pulmonary/Chest: few wheezes. No rhonchi. No rales. No decreased breath sounds. No accessory muscle usage or stridor. Abdominal: Soft. Musculoskeletal: No cyanosis. No clubbing. Skin: Skin is warm and dry. Scheduled Meds:   predniSONE  30 mg Oral Daily    ipratropium-albuterol  1 ampule Inhalation TID    metFORMIN  1,000 mg Oral BID WC    pantoprazole  40 mg Oral QAM AC    insulin lispro  0-16 Units SubCUTAneous TID WC    insulin lispro  0-4 Units SubCUTAneous Nightly    pregabalin  200 mg Oral BID    rOPINIRole  1 mg Oral BID    lidocaine 1 % injection  5 mL IntraDERmal Once    risperiDONE  1 mg Oral BID    atorvastatin  60 mg Oral Daily    sertraline  100 mg Oral BID    sodium chloride flush  5-40 mL IntraVENous 2 times per day    enoxaparin  40 mg SubCUTAneous Daily    nicotine  1 patch TransDERmal Daily     PRN Meds:  traMADol, sodium chloride, glucose, dextrose bolus **OR** dextrose bolus, glucagon (rDNA), dextrose, sodium chloride flush, sodium chloride, ondansetron **OR** ondansetron, polyethylene glycol, acetaminophen **OR** acetaminophen, albuterol    Results:  CBC:   Recent Labs     02/14/23  0532   WBC 11.2*   HGB 11.8*   HCT 36.9   MCV 92.4        BMP:   Recent Labs     02/14/23  0532      K 3.7   CL 99   CO2 29   BUN 21*   CREATININE 0.7     LIVER PROFILE: No results for input(s): AST, ALT, LIPASE, BILIDIR, BILITOT, ALKPHOS in the last 72 hours. Invalid input(s):   AMYLASE,  ALB    Cultures:  2/2 BAL NGTD  2/2 BC NGTD  2/2 respiratory NGTD    Films:  Chest x-ray 2/14  Mild bibasilar airspace disease        CTPA 2/4   1. No CT evidence of a pulmonary embolism. 2. Bilateral lower lobe airspace consolidation, left worse than right,   representing either pneumonia or aspiration. 3. Additional scattered curvilinear, consolidative, and nodular opacities   throughout the upper lobes and right middle lobe likely reflect additional   multifocal pneumonia and/or pulmonary edema. 4. Small bilateral pleural effusions. 5. Enlarged nodular appearance of the left thyroid lobe. Consider further   characterization with a routine non urgent follow-up thyroid ultrasound. See below.    6. Findings suggestive of cirrhosis and portal hypertension    Assessment:    Acute hypoxemic hypercapnic respiratory failure  COPD with acute exacerbation  Bilateral lower extremity cellulitis  CHF/fluid overload  OHS/SHAZIA        Plan:    Supplemental oxygen to maintain SaO2 >92%; wean as tolerated    Prednisone taper  Bronchodilators  Completed Cefepime Day 7/7    Consider repeat CT in 6-8 weeks (Office is  notified)   DC plan is okay with pulmonary

## 2023-02-16 NOTE — DISCHARGE INSTRUCTIONS
Your information:  Name: Dafne Amanda  : 1955    Your instructions: Follow up with PCP. Follow up with Pulmonology and get repeat CT in 6-8 weeks. What to do after you leave the hospital:    Recommended diet:  Carb Control Minced and Moist    Recommended activity: activity as tolerated        The following personal items were collected during your admission and were returned to you:    Belongings  Dental Appliances: None, At home  Vision - Corrective Lenses: None  Hearing Aid: None  Clothing: At home  Jewelry: None  Electronic Devices: None  Weapons (Notify Protective Services/Security): None  Home Medications: None  Valuables Given To: Patient  Provide Name(s) of Who Valuable(s) Were Given To: na    Information obtained by:  By signing below, I understand that if any problems occur once I leave the hospital I am to contact MD.  I understand and acknowledge receipt of the instructions indicated above.

## 2023-02-16 NOTE — FLOWSHEET NOTE
02/16/23 0800   Vital Signs   Temp 98.3 °F (36.8 °C)   Temp Source Oral   Heart Rate 92   Heart Rate Source Monitor   Resp 18   BP (!) 146/66   MAP (Calculated) 93   BP Location Left upper arm   BP Method Automatic   Patient Position Semi fowlers   Level of Consciousness 0   MEWS Score 1   Pain Assessment   Pain Assessment 0-10   Pain Level 8   Opioid-Induced Sedation   POSS Score 1   Oxygen Therapy   SpO2 92 %   O2 Device Nasal cannula   O2 Flow Rate (L/min) 4 L/min   Shift assessment complete. See flow sheet. Scheduled medications given, See MAR. Head to toe complete. Vital signs logged and active bowel sounds in all 4 quadrants. Pt awake in bed. Medications taken without difficulty. Pt repositioned in bed. No further needs noted at this time. Call light and bedside table within reach. Bed in lowest position, wheels locked and side rails up x2.      Michelle Nair RN

## 2023-02-16 NOTE — PROGRESS NOTES
PICC line removed using proper technique. Pressure held for 5 mins and pressure dressing applied. Pt tolerated well.

## 2023-02-16 NOTE — PROGRESS NOTES
IM Progress Note    Admit Date:  2/2/2023  14    Interval history:    copd exacerbation , failed bipap and intubated in ER   Imaging with left hemithorax opacification,s/p bronch with clearance of mucous plugs   Agitation issues noted on vent   CTA chest with no PE , bilateral pneumonia     -> Extubated-> Vapotherm  -> O2 weaned down to 9 L high flow-> 3 L. Transferred out of ICU to PCU on 2/10. Patient has remained stable on the floor. Continued on oxygen 3 L. She is very weak, PT OT seeing her. Awaiting SNF placement. Subjective:    Ms. Sean Sanchez is an had episode of nausea and vomiting yesterday . Stable now , O2 requirement up to 4 L last night . Yolanda Okeefe Currently being weaned down again . .    remains stable ,on oxygen 3 L . She is awake and oriented , very weak . No distress. No cough or shortness of breath. No fevers. Awaiting SNF placement . Objective:   /74   Pulse 92   Temp 98 °F (36.7 °C) (Oral)   Resp 16   Ht 5' 3\" (1.6 m)   Wt 174 lb 3.2 oz (79 kg)   SpO2 94%   BMI 30.86 kg/m²     Intake/Output Summary (Last 24 hours) at 2/16/2023 1256  Last data filed at 2/16/2023 1252  Gross per 24 hour   Intake 10 ml   Output 1000 ml   Net -990 ml         Physical Exam:    General:  elderly female , looks fatigued, no distress. Awake and alert, oriented to place and person   Appears to be not in any distress  Mucous Membranes:  Pink , anicteric  Neck: No JVD, no carotid bruit, no thyromegaly  Chest: Diminished breath sounds, no wheezes or rhonchi .   Cardiovascular:  RRR S1S2 heard, no murmurs or gallops  Abdomen:  Soft, undistended, non tender, no organomegaly, BS present  Extremities: chronic 2 +  brawny edema of both LE with diffuse celluitis of ext Bilaterally  Distal pulses well felt  Neurological : Awake alert ; nonfocal      Medications:   Scheduled Medications:    predniSONE  30 mg Oral Daily    ipratropium-albuterol  1 ampule Inhalation TID    metFORMIN  1,000 mg Oral BID WC pantoprazole  40 mg Oral QAM AC    insulin lispro  0-16 Units SubCUTAneous TID WC    insulin lispro  0-4 Units SubCUTAneous Nightly    pregabalin  200 mg Oral BID    rOPINIRole  1 mg Oral BID    lidocaine 1 % injection  5 mL IntraDERmal Once    risperiDONE  1 mg Oral BID    atorvastatin  60 mg Oral Daily    sertraline  100 mg Oral BID    sodium chloride flush  5-40 mL IntraVENous 2 times per day    enoxaparin  40 mg SubCUTAneous Daily    nicotine  1 patch TransDERmal Daily     I   sodium chloride      dextrose      sodium chloride Stopped (02/09/23 1804)     traMADol, sodium chloride, glucose, dextrose bolus **OR** dextrose bolus, glucagon (rDNA), dextrose, sodium chloride flush, sodium chloride, ondansetron **OR** ondansetron, polyethylene glycol, acetaminophen **OR** acetaminophen, albuterol    Lab Data:  Recent Labs     02/14/23  0532   WBC 11.2*   HGB 11.8*   HCT 36.9   MCV 92.4          Recent Labs     02/14/23  0532      K 3.7   CL 99   CO2 29   BUN 21*   CREATININE 0.7       No results for input(s): CKTOTAL, CKMB, CKMBINDEX, TROPONINI in the last 72 hours.       Coagulation:   Lab Results   Component Value Date/Time    INR 1.04 02/04/2023 12:15 PM    APTT 27.6 02/04/2023 12:15 PM     Cardiac markers:   Lab Results   Component Value Date/Time    CKMB 10.3 07/14/2010 07:25 AM    CKTOTAL 74 10/17/2014 02:46 AM    TROPONINI <0.01 02/02/2023 05:05 AM         Lab Results   Component Value Date    ALT 12 02/03/2023    AST 13 (L) 02/03/2023    ALKPHOS 99 02/03/2023    BILITOT 0.3 02/03/2023       Lab Results   Component Value Date    INR 1.04 02/04/2023    INR 0.94 09/12/2020    INR 0.99 07/01/2018    PROTIME 13.5 02/04/2023    PROTIME 10.9 09/12/2020    PROTIME 11.3 07/01/2018     Cultures:   COVID/Influenza: not detected   MRSA screen negative  Blood- NGTD  BAL NRF  AFB culture no growth so far    Radiology  XR CHEST PORTABLE   Final Result   Mild bibasilar airspace disease which could reflect atelectasis versus   pneumonia in the appropriate clinical setting. XR CHEST PORTABLE   Final Result   Increasing bibasilar ground-glass opacities with a developing right pleural   effusion. Pulmonary edema or ARDS suspected. XR CHEST PORTABLE   Final Result   1. Stable appropriate positions of support apparatus. 2. Slight interval improvement of mild pulmonary edema. 3. No significant change in appearance of a small left pleural effusion and   left basilar airspace consolidation. XR CHEST PORTABLE   Final Result   1. Stable appropriate positions of support apparatus. 2. Stable small bilateral pleural effusions. 3. Stable left basilar airspace consolidation. 4. Interval progression of diffuse hazy airspace opacity throughout both   lungs, right greater than left, which likely reflects progressive pulmonary   edema, though progressive multifocal pneumonia is also a consideration. XR CHEST PORTABLE   Final Result   Stable chest.  Bibasilar airspace disease appears similar         XR CHEST PORTABLE   Final Result   1. Endotracheal tube slightly low in position, lying approximately 2.0 cm   above the ivis. Suggest retraction of the tube by approximately 2-3 cm to   avoid right mainstem intubation. 2. Other support apparatus is in good anatomic position. 3. Slight interval decrease in size of a small left pleural effusion. 4. Interval improvement in appearance of bibasilar airspace disease. The findings were sent to the Radiology Results [address] at 6:04   am on 2/5/2023 to be communicated to a licensed caregiver. CTA PULMONARY W CONTRAST   Final Result   1. No CT evidence of a pulmonary embolism. 2. Bilateral lower lobe airspace consolidation, left worse than right,   representing either pneumonia or aspiration.    3. Additional scattered curvilinear, consolidative, and nodular opacities   throughout the upper lobes and right middle lobe likely reflect additional   multifocal pneumonia and/or pulmonary edema. 4. Small bilateral pleural effusions. 5. Enlarged nodular appearance of the left thyroid lobe. Consider further   characterization with a routine non urgent follow-up thyroid ultrasound. See   below. 6. Findings suggestive of cirrhosis and portal hypertension. RECOMMENDATIONS:   Managing Incidental Thyroid Nodule Detected at CT or MRI or US      Follow up thyroid ultrasound also recommend in these scenarios      - Solitary nodule with high risk imaging features (locally invasive nodule or   suspicious lymph nodes)      - HETEROGENEOUS, ENLARGED THYROID GLAND.      - Increased uptake on PET      Note: These recommendations do not apply to pts. w/ increased risk      for thyroid cancer or pts. with symptomatic thyroid disease.      ________________________________________________________________      Recommendations for f/u of Incidental Thyroid Nodules (ITN)      found on CT, MR, NM and Extrathyroidal US are based upon the ACR      white paper and [facility] 3-tiered system for managing ITNs:      J Am Kylee Radiol. 2015 Feb;12(2): 143-50         XR CHEST PORTABLE   Final Result   Bibasilar airspace disease, left greater than right, similar compared to prior         VL Extremity Venous Bilateral   Final Result      XR CHEST PORTABLE   Final Result   Exam limited by patient positioning. No marked interval change in bilateral   airspace disease as compared to prior. XR CHEST PORTABLE   Final Result   Significantly limited evaluation secondary to patient positioning with no   signs to suggest pneumothorax. XR CHEST PORTABLE   Final Result   Complete opacification has developed of the left hemithorax, which can   reflect combination of atelectasis, airspace disease, and/or pleural   effusion. Right-sided airspace disease is not substantially changed. XR CHEST PORTABLE   Final Result   1.  Endotracheal tube likely within the right mainstem bronchus. Recommend 3   cm of retraction and repeat chest radiograph. XR CHEST PORTABLE   Final Result   1. Findings suggestive of partial right upper lobe atelectasis. 2. Small left pleural effusion. 3. Central pulmonary vascular congestion without overt pulmonary edema. 4. Left basilar atelectasis. Assessment & Plan:    Acute respiratory failure with hypoxia and hypercapnia  Copd exacerbation   Left hemithorax opacification with mucous plugging  Pneumonia-gram-negative infection  - O2 sat 86% on 5L in the ER. Initially placed on NIV. Too obtunded for NIV and PCO2 worsened after ~1 h on NIV. And eventually placed on vent  -Admitted to ICU . Seen in consultation by pulmonary critical care  -CTA chest with bilateral pna, no PE  -Had emergency bronch with removal of mucous plugs; BAL with normal respiratory madhavi  - Started on steroids  - treated with antibiotics vancomycin and cefepime for 7 days   -S/p intubation mechanical ventilation , patient has been extubated  - was on vapotherm--> 9 L high flow nasal canula--> 3 L . O2 sats remain stable on 3 L now  -> Transferred out of ICU . oxygen saturation stable, completed course of antibiotics  Follow-up CT chest recommended in 6 to 8 weeks    COPD exacerbation   -IV solumedrol, transition to oral prednisone-> Taper prednisone-currently decreased to 30 mg daily on 2/14  - IV abx as above   - PRN/GIDEON intensive NEB therapy. Acute metabolic encephalopathy  -Patient was obtunded, likely metabolic 2/2 hypercarbia. Resolved     DM2  -Resume metformin.  -Continue sliding scale insulin coverage    Bilateral LE cellulitis   chronic lymphedema  -Treated with IV antibiotics vancomycin and cefepime as above  -Norvasc on hold    Tobacco Abuse  -on nicotine patch.      Hx of cirrhosis with portal HTN   -Home meds reviewed, she does not on any diuretics at home    Thyroid enlargement on left side   - need f/w US as outpt    Weakness and debility  -Seen by PT OT  Needs SNF       DVT Prophylaxis: Lx  Diet carb control  Code Status: Full Code       We have been waiting for SNF. Per Case management placement will be difficult       2/16  SNF bed at available and pre-CERT ready.    Discharge today    Katherine Harrington MD, 2/16/2023 12:56 PM

## 2023-02-16 NOTE — CARE COORDINATION
DISCHARGE ORDER  Date/Time 2023 1:47 PM  Completed by: Kennedy Minaya RN, Case Management    Patient Name: Andrews Abel    : 1955      Admit order Date and Status:inpt  Noted discharge order. (verify MD's last order for status of admission/Traditional Medicare 3 [place] Inpatient qualifying stay required for SNF)    Confirmed discharge plan with:              Patient:  Yes              When pt confirms DC plan does any support person need to be contacted by CM Yes if yes who___granddaughterKatheryn per pt request___                      Discharge to Facility: [facility] phone number for staff giving report: [phone]   Pre-certification completed: yes   Hospital Exemption Notification (HENS) completed: yes   Discharge orders and Continuity of Care faxed to facility:  yes      Transportation:               Medical Transport explained with choice list offered to pt/family. Choice:Yes(no preference)  Agency used: [facility]   time:   [id number]      Pt/family/Nursing/Facility aware of  time: yes   Names: pt and pt's granddaughterKatheryn, bedside nurse, Rachel with [facility]  Ambulance form completed:  yes:      Date Last IMM Given: 2023    Comments:Reviewed chart. Met with the pt at bedside. Writer spoke with Rachel from [facility] and she can accept the pt at d/c today. No other d/c needs voiced or identified. Pt is being d/c'd to rehab today. Pt's O2 sats are 94% on 3. Discharge timeout done with Nico Roblero. All discharge needs and concerns addressed. Discharging nurse to complete YIMI, reconcile AVS, and place final copy with patient's discharge packet. Discharging RN to ensure that written prescriptions for  Level II medications are sent with patient to the facility as per protocol.

## 2023-02-16 NOTE — PROGRESS NOTES
Report given to EMS staff x2. EMS staff assisted pt onto stretcher, O2 switched over to portable tank @ 3L NC and belongings sent with pt. Pt leaving unit in stable condition.

## 2023-02-16 NOTE — FLOWSHEET NOTE
02/15/23 1930   Vital Signs   Temp 97.8 °F (36.6 °C)   Temp Source Oral   Heart Rate 97   Heart Rate Source Monitor   Resp 20   BP (!) 155/92   MAP (Calculated) 113   BP Location Left upper arm   BP Method Automatic   Patient Position Semi fowlers   Level of Consciousness 0   MEWS Score 1   Pain Assessment   Pain Assessment 0-10   Pain Level 7   Pain Location Leg   Oxygen Therapy   SpO2 94 %   O2 Device Nasal cannula   O2 Flow Rate (L/min) 3.5 L/min   Pt resting in bed comfortably. Call light and bedside table within reach. Shift assessment added.  Bed alarm is set

## 2023-02-16 NOTE — PROGRESS NOTES
4 Eyes Skin Assessment     The patient is being assess for   Transfer to New Unit    I agree that 2 RN's have performed a thorough Head to Toe Skin Assessment on the patient. ALL assessment sites listed below have been assessed. Areas assessed for pressure by both nurses:   [x]   Head, Face, and Ears   [x]   Shoulders, Back, and Chest, Abdomen  [x]   Arms, Elbows, and Hands   [x]   Coccyx, Sacrum, and Ischium  [x]   Legs, Feet, and Heels        Pt has scattered bruising, cracked, dry flaky skin to BLE, redness to coccyx, and redness/excoriation sue area    Skin Assessed Under all Medical Devices by both nurses:  O2 device tubing              All Mepilex Borders were peeled back and area peeked at by both nurses:  No: na  Please list where Mepilex Borders are located:  na             **SHARE this note so that the co-signing nurse is able to place an eSignature**    Co-signer eSignature: Electronically signed by Babs Diaz RN on 2/16/23 at 4:26 PM EST    Does the Patient have Skin Breakdown related to pressure?   Yes [facility] WOUND CARE was Initiated documentation include the Sue-wound, Wound Assessment, Measurements, Dressing Treatment, Drainage, and Color\",     (Insert Photo here                      )         Zack Prevention initiated:  Yes   Wound Care Orders initiated:  Yes      [id number]  nurse consulted for Pressure Injury (Stage 3,4, Unstageable, DTI, NWPT, Complex wounds)and New or Established Ostomies:  No      Primary Nurse eSignature: Electronically signed by Trace Franklin RN on 2/16/23 at 4:25 PM EST

## 2023-02-16 NOTE — PLAN OF CARE
Problem: Discharge Planning  Goal: Discharge to home or other facility with appropriate resources  2/16/2023 0915 by Monica Maria RN  Outcome: Progressing  Flowsheets (Taken 2/16/2023 0805)  Discharge to home or other facility with appropriate resources: Identify barriers to discharge with patient and caregiver  2/15/2023 2122 by Debra Wall RN  Outcome: Progressing     Problem: Pain  Goal: Verbalizes/displays adequate comfort level or baseline comfort level  2/16/2023 0915 by Monica Maria RN  Outcome: Progressing  2/15/2023 2122 by Debra Wall RN  Outcome: Progressing     Problem: Nutrition Deficit:  Goal: Optimize nutritional status  2/16/2023 0915 by Monica Maria RN  Outcome: Progressing  2/15/2023 2122 by Debra Wall RN  Outcome: Progressing     Problem: Skin/Tissue Integrity  Goal: Absence of new skin breakdown  Description: 1. Monitor for areas of redness and/or skin breakdown  2. Assess vascular access sites hourly  3. Every 4-6 hours minimum:  Change oxygen saturation probe site  4. Every 4-6 hours:  If on nasal continuous positive airway pressure, respiratory therapy assess nares and determine need for appliance change or resting period.   2/16/2023 0915 by Monica Maria RN  Outcome: Progressing  2/15/2023 2122 by Debra Wall RN  Outcome: Progressing     Problem: Safety - Adult  Goal: Free from fall injury  2/16/2023 0915 by Monica Maria RN  Outcome: Progressing  2/15/2023 2122 by Debra Wall RN  Outcome: Progressing     Problem: Respiratory - Adult  Goal: Achieves optimal ventilation and oxygenation  2/16/2023 0915 by Monica Maria RN  Outcome: Progressing  2/15/2023 2122 by Debra Wall RN  Outcome: Progressing     Problem: Skin/Tissue Integrity - Adult  Goal: Skin integrity remains intact  2/16/2023 0915 by Monica Maria RN  Outcome: Progressing  Flowsheets  Taken 2/16/2023 0914  Skin Integrity Remains Intact: Monitor for areas of redness and/or skin breakdown  Taken 2/16/2023 1778  Skin Integrity Remains Intact: Monitor for areas of redness and/or skin breakdown  2/15/2023 2122 by Carlene Colin RN  Outcome: Progressing     Problem: Musculoskeletal - Adult  Goal: Return mobility to safest level of function  2/16/2023 0915 by Michelle Nair RN  Outcome: Progressing  2/15/2023 2122 by Carlene Colin RN  Outcome: Progressing  Goal: Maintain proper alignment of affected body part  2/16/2023 0915 by Michelle Nair RN  Outcome: Progressing  2/15/2023 2122 by aCrlene Colin RN  Outcome: Progressing  Goal: Return ADL status to a safe level of function  2/16/2023 0915 by Michelle Nair RN  Outcome: Progressing  2/15/2023 2122 by Carlene Colin RN  Outcome: Progressing

## 2023-02-16 NOTE — PLAN OF CARE
Problem: Pain  Goal: Verbalizes/displays adequate comfort level or baseline comfort level  Outcome: Progressing     Problem: Nutrition Deficit:  Goal: Optimize nutritional status  Outcome: Progressing     Problem: Safety - Adult  Goal: Free from fall injury  Outcome: Progressing     Problem: Respiratory - Adult  Goal: Achieves optimal ventilation and oxygenation  Outcome: Progressing     Problem: Musculoskeletal - Adult  Goal: Return mobility to safest level of function  Outcome: Progressing     Problem: Skin/Tissue Integrity - Adult  Goal: Skin integrity remains intact  Outcome: Progressing

## 2023-02-16 NOTE — PROGRESS NOTES
Pt called for emesis bag, upon entering room pt had one episode of emesis unmeasured. Gown changed, and PRN Zofran given, see MAR. Pt provided with emesis bag.

## 2023-02-16 NOTE — PROGRESS NOTES
Hand off report given to [facility], RN. Patient is stable showing no signs of distress and has no current needs at this time. Call light is in reach and bed is in lowest position. Care is transferred at this time.

## 2023-02-16 NOTE — DISCHARGE SUMMARY
Name:  Oscar Witter  Room:  3020/3020-01  MRN:    [id number]    Discharge Summary      This discharge summary is in conjunction with a complete physical exam done on the day of discharge. Discharging Physician: Dr. Loli Servin: 2/2/2023  Discharge:  02/16/23     HPI taken from admission H&P:    The patient is a 79 y.o. female with PMH below, presents with reported shortness of breath. I am unable to obtain HPI as the patient is obtunded. She failed NIV in the ER with worsening hypercapnia and mental status. She was intubated emergently by me. She has a history of COPD. I do not see record that she is on O2 at home. She does have a history of narcotic overdose in the past.  Added on a urine tox. No additional info is available at this time. Diagnoses this Admission and Hospital Course:    Acute respiratory failure with hypoxia and hypercapnia  Copd exacerbation   Left hemithorax opacification with mucous plugging  Pneumonia-gram-negative infection  - O2 sat 86% on 5L in the ER. Initially placed on NIV. Too obtunded for NIV and PCO2 worsened after ~1 h on NIV. And eventually placed on vent  -Admitted to ICU . Seen in consultation by pulmonary critical care  -CTA chest with bilateral pna, no PE  -Had emergency bronch with removal of mucous plugs; BAL with normal respiratory madhavi  - Started on steroids  - treated with antibiotics vancomycin and cefepime for 7 days   -S/p intubation mechanical ventilation , patient has been extubated  - was on vapotherm--> 9 L high flow nasal canula--> 3 L . O2 sats remain stable on 3 L now  -> Transferred out of ICU . oxygen saturation stable, completed course of antibiotics  Follow-up CT chest recommended in 6 to 8 weeks     COPD exacerbation   -IV solumedrol, transition to oral prednisone-> Taper prednisone-currently decreased to 30 mg daily on 2/14  - IV abx as above   - PRN/GIDEON intensive NEB therapy.      Acute metabolic encephalopathy  -Patient was obtunded, likely metabolic 2/2 hypercarbia. Resolved      DM2  -Resume metformin.  -Continue sliding scale insulin coverage     Bilateral LE cellulitis   chronic lymphedema  -Treated with IV antibiotics vancomycin and cefepime as above  -Norvasc on hold     Tobacco Abuse  -on nicotine patch. Hx of cirrhosis with portal HTN   -Home meds reviewed, she does not on any diuretics at home     Thyroid enlargement on left side   - need f/w US as outpt     Weakness and debility  -Seen by PT OT  Needs SNF        DVT Prophylaxis: Lx  Diet carb control  Code Status: Full Code         We have been waiting for SNF. Per Case management placement will be difficult         2/16  SNF bed at available and pre-CERT ready. Discharge today    Procedures (Please Review Full Report for Details)  Bronchoscopy     Consults    Pulmonary       Physical Exam at Discharge:    /74   Pulse 92   Temp 98 °F (36.7 °C) (Oral)   Resp 16   Ht 5' 3\" (1.6 m)   Wt 174 lb 3.2 oz (79 kg)   SpO2 94%   BMI 30.86 kg/m²     Physical Exam:  General:  elderly female , looks fatigued, no distress. Awake and alert, oriented to place and person   Appears to be not in any distress  Mucous Membranes:  Pink , anicteric  Neck: No JVD, no carotid bruit, no thyromegaly  Chest: Diminished breath sounds, no wheezes or rhonchi .   Cardiovascular:  RRR S1S2 heard, no murmurs or gallops  Abdomen:  Soft, undistended, non tender, no organomegaly, BS present  Extremities: chronic 2 +  brawny edema of both LE with diffuse celluitis of ext Bilaterally  Distal pulses well felt  Neurological : Awake alert ; nonfocal    CBC:   Recent Labs     02/14/23  0532   WBC 11.2*   HGB 11.8*   HCT 36.9   MCV 92.4        BMP:   Recent Labs     02/14/23  0532      K 3.7   CL 99   CO2 29   BUN 21*   CREATININE 0.7       CULTURES  ***    RADIOLOGY  ***    Discharge Medications     Medication List        START taking these medications      albuterol (5 MG/ML) 0.5% nebulizer solution  Commonly known as: PROVENTIL  Take 0.5 mLs by nebulization every 4 hours as needed for Wheezing (Mild to Moderate Shortness of Breath)  Replaces: albuterol sulfate  (90 Base) MCG/ACT inhaler     enoxaparin 40 MG/0.4ML  Commonly known as: LOVENOX  Inject 0.4 mLs into the skin daily  Start taking on: February 17, 2023     insulin lispro 100 UNIT/ML Soln injection vial  Commonly known as: HUMALOG  Inject 0-16 Units into the skin 3 times daily (with meals)     nicotine 21 MG/24HR  Commonly known as: NICODERM CQ  Place 1 patch onto the skin daily  Start taking on: February 17, 2023     predniSONE 10 MG tablet  Commonly known as: DELTASONE  Take 2 tabs a day for 3 days ,Then 1 tab a day for 3 days. traMADol 50 MG tablet  Commonly known as: ULTRAM  Take 1 tablet by mouth every 12 hours as needed for Pain for up to 3 days.  Max Daily Amount: 100 mg            CHANGE how you take these medications      polyethylene glycol 17 g packet  Commonly known as: GLYCOLAX  Take 17 g by mouth daily as needed for Constipation  What changed:   when to take this  reasons to take this     risperiDONE 1 MG tablet  Commonly known as: RISPERDAL  Take 1 tablet by mouth 2 times daily  What changed: medication strength     rOPINIRole 1 MG tablet  Commonly known as: REQUIP  Take 1 tablet by mouth in the morning and at bedtime  What changed:   medication strength  how much to take  when to take this  additional instructions     sertraline 100 MG tablet  Commonly known as: ZOLOFT  Take 1 tablet by mouth 2 times daily  What changed:   how much to take  when to take this            CONTINUE taking these medications      Accu-Chek Guide strip  Generic drug: blood glucose test strips     Accu-Chek Softclix Lancets Misc     atorvastatin 20 MG tablet  Commonly known as: LIPITOR  Take 3 tablets by mouth daily  Start taking on: February 17, 2023     Lyrica 300 MG capsule  Generic drug: pregabalin     metFORMIN 500 MG tablet  Commonly known as: GLUCOPHAGE     omeprazole 40 MG delayed release capsule  Commonly known as: PRILOSEC     ondansetron 4 MG disintegrating tablet  Commonly known as: ZOFRAN-ODT  Take 1 tablet by mouth 3 times daily as needed for Nausea or Vomiting     Trelegy Ellipta 100-62.5-25 MCG/ACT Aepb inhaler  Generic drug: fluticasone-umeclidin-vilant            STOP taking these medications      albuterol sulfate  (90 Base) MCG/ACT inhaler  Commonly known as: Proventil HFA  Replaced by: albuterol (5 MG/ML) 0.5% nebulizer solution     amLODIPine 5 MG tablet  Commonly known as: NORVASC     Breo Ellipta 100-25 MCG/ACT inhaler  Generic drug: fluticasone furoate-vilanterol     furosemide 20 MG tablet  Commonly known as: LASIX     Linzess 290 MCG Caps capsule  Generic drug: linaclotide     losartan 50 MG tablet  Commonly known as: COZAAR     Stool Softener/Laxative 8.6-50 MG per tablet  Generic drug: senna-docusate     tiZANidine 4 MG tablet  Commonly known as: Samantha Joe               Where to Get Your Medications        You can get these medications from any pharmacy    Bring a paper prescription for each of these medications  traMADol 50 MG tablet       Information about where to get these medications is not yet available    Ask your nurse or doctor about these medications  albuterol (5 MG/ML) 0.5% nebulizer solution  atorvastatin 20 MG tablet  enoxaparin 40 MG/0.4ML  insulin lispro 100 UNIT/ML Soln injection vial  nicotine 21 MG/24HR  polyethylene glycol 17 g packet  predniSONE 10 MG tablet  risperiDONE 1 MG tablet  rOPINIRole 1 MG tablet  sertraline 100 MG tablet           Discharged in stable condition to [facility]    Follow Up:   Follow up with  physician at [facility]       ****

## 2023-02-20 NOTE — TELEPHONE ENCOUNTER
[facility] called advised them pt needed a 6-8wk ct scan. Patient should follow up with [facility].

## 2023-03-21 LAB
ACID FAST STN SPEC QL: NORMAL
ACID FAST STN SPEC QL: NORMAL
MYCOBACTERIUM SPEC CULT: NORMAL
MYCOBACTERIUM SPEC CULT: NORMAL

## 2024-06-17 NOTE — CARE COORDINATION
[facility]M attempted ER outreach. Left message. Contact information for call back provided.        ER visit on 3/7/21 copd exacerbation [At Term] : at term [Normal Vaginal Route] : by normal vaginal route